# Patient Record
Sex: FEMALE | Race: WHITE | Employment: OTHER | URBAN - METROPOLITAN AREA
[De-identification: names, ages, dates, MRNs, and addresses within clinical notes are randomized per-mention and may not be internally consistent; named-entity substitution may affect disease eponyms.]

---

## 2017-01-12 ENCOUNTER — OFFICE VISIT (OUTPATIENT)
Dept: FAMILY MEDICINE CLINIC | Age: 72
End: 2017-01-12

## 2017-01-12 VITALS
HEART RATE: 60 BPM | DIASTOLIC BLOOD PRESSURE: 72 MMHG | RESPIRATION RATE: 16 BRPM | HEIGHT: 64 IN | SYSTOLIC BLOOD PRESSURE: 118 MMHG | TEMPERATURE: 96.7 F

## 2017-01-12 DIAGNOSIS — E11.9 TYPE 2 DIABETES MELLITUS WITHOUT COMPLICATION, WITHOUT LONG-TERM CURRENT USE OF INSULIN (HCC): ICD-10-CM

## 2017-01-12 DIAGNOSIS — L02.219 CUTANEOUS ABSCESS OF TRUNK, UNSPECIFIED SITE OF TRUNK: Primary | ICD-10-CM

## 2017-01-12 DIAGNOSIS — L02.219 CUTANEOUS ABSCESS OF TRUNK, UNSPECIFIED SITE OF TRUNK: ICD-10-CM

## 2017-01-12 PROCEDURE — 99213 OFFICE O/P EST LOW 20 MIN: CPT | Performed by: FAMILY MEDICINE

## 2017-01-12 PROCEDURE — 3044F HG A1C LEVEL LT 7.0%: CPT | Performed by: FAMILY MEDICINE

## 2017-01-12 PROCEDURE — 3017F COLORECTAL CA SCREEN DOC REV: CPT | Performed by: FAMILY MEDICINE

## 2017-01-12 PROCEDURE — 1090F PRES/ABSN URINE INCON ASSESS: CPT | Performed by: FAMILY MEDICINE

## 2017-01-12 PROCEDURE — G8427 DOCREV CUR MEDS BY ELIG CLIN: HCPCS | Performed by: FAMILY MEDICINE

## 2017-01-12 PROCEDURE — G8400 PT W/DXA NO RESULTS DOC: HCPCS | Performed by: FAMILY MEDICINE

## 2017-01-12 PROCEDURE — 1123F ACP DISCUSS/DSCN MKR DOCD: CPT | Performed by: FAMILY MEDICINE

## 2017-01-12 PROCEDURE — G8419 CALC BMI OUT NRM PARAM NOF/U: HCPCS | Performed by: FAMILY MEDICINE

## 2017-01-12 PROCEDURE — G8484 FLU IMMUNIZE NO ADMIN: HCPCS | Performed by: FAMILY MEDICINE

## 2017-01-12 PROCEDURE — 4040F PNEUMOC VAC/ADMIN/RCVD: CPT | Performed by: FAMILY MEDICINE

## 2017-01-12 PROCEDURE — 1036F TOBACCO NON-USER: CPT | Performed by: FAMILY MEDICINE

## 2017-01-12 PROCEDURE — 3014F SCREEN MAMMO DOC REV: CPT | Performed by: FAMILY MEDICINE

## 2017-01-12 RX ORDER — CLINDAMYCIN HYDROCHLORIDE 150 MG/1
150 CAPSULE ORAL 3 TIMES DAILY
Qty: 30 CAPSULE | Refills: 0 | Status: SHIPPED | OUTPATIENT
Start: 2017-01-12 | End: 2018-08-27 | Stop reason: SDUPTHER

## 2017-01-12 RX ORDER — ATORVASTATIN CALCIUM 20 MG/1
20 TABLET, FILM COATED ORAL DAILY
Qty: 90 TABLET | Refills: 1 | Status: SHIPPED | OUTPATIENT
Start: 2017-01-12 | End: 2017-08-11 | Stop reason: SDUPTHER

## 2017-01-12 RX ORDER — LEVOTHYROXINE SODIUM 0.15 MG/1
150 TABLET ORAL DAILY
Qty: 90 TABLET | Refills: 1 | Status: SHIPPED | OUTPATIENT
Start: 2017-01-12 | End: 2017-09-02 | Stop reason: SDUPTHER

## 2017-01-12 RX ORDER — FAMOTIDINE 20 MG/1
20 TABLET, FILM COATED ORAL 2 TIMES DAILY
Qty: 180 TABLET | Refills: 1 | Status: SHIPPED | OUTPATIENT
Start: 2017-01-12 | End: 2017-08-16 | Stop reason: SDUPTHER

## 2017-01-12 RX ORDER — CITALOPRAM 20 MG/1
40 TABLET ORAL DAILY
Qty: 180 TABLET | Refills: 1 | Status: SHIPPED | OUTPATIENT
Start: 2017-01-12 | End: 2017-02-08 | Stop reason: DRUGHIGH

## 2017-01-12 RX ORDER — FAMOTIDINE 20 MG/1
20 TABLET, FILM COATED ORAL 2 TIMES DAILY
COMMUNITY
End: 2017-09-03 | Stop reason: SDUPTHER

## 2017-01-12 RX ORDER — VITAMIN B COMPLEX
1 CAPSULE ORAL DAILY
COMMUNITY
End: 2019-04-10 | Stop reason: SDUPTHER

## 2017-01-12 ASSESSMENT — ENCOUNTER SYMPTOMS
EYE DISCHARGE: 0
CONSTIPATION: 0
EYE ITCHING: 0
ABDOMINAL PAIN: 0
SORE THROAT: 0
SINUS PRESSURE: 0
COUGH: 0
SHORTNESS OF BREATH: 0
DIARRHEA: 0

## 2017-01-14 LAB
ANAEROBIC CULTURE: NORMAL
GRAM STAIN RESULT: NORMAL
WOUND/ABSCESS: NORMAL

## 2017-02-03 ENCOUNTER — TELEPHONE (OUTPATIENT)
Dept: FAMILY MEDICINE CLINIC | Age: 72
End: 2017-02-03

## 2017-02-08 ENCOUNTER — OFFICE VISIT (OUTPATIENT)
Dept: FAMILY MEDICINE CLINIC | Age: 72
End: 2017-02-08

## 2017-02-08 VITALS
RESPIRATION RATE: 12 BRPM | DIASTOLIC BLOOD PRESSURE: 76 MMHG | OXYGEN SATURATION: 97 % | HEIGHT: 64 IN | SYSTOLIC BLOOD PRESSURE: 132 MMHG | TEMPERATURE: 96.8 F | HEART RATE: 66 BPM

## 2017-02-08 DIAGNOSIS — G50.0 TRIGEMINAL NEURALGIA OF RIGHT SIDE OF FACE: Primary | ICD-10-CM

## 2017-02-08 DIAGNOSIS — M79.7 FIBROMYALGIA: ICD-10-CM

## 2017-02-08 DIAGNOSIS — E03.1 CONGENITAL HYPOTHYROIDISM WITHOUT GOITER: ICD-10-CM

## 2017-02-08 DIAGNOSIS — Z12.31 VISIT FOR SCREENING MAMMOGRAM: ICD-10-CM

## 2017-02-08 DIAGNOSIS — E78.1 PURE HYPERGLYCERIDEMIA: ICD-10-CM

## 2017-02-08 PROCEDURE — 1123F ACP DISCUSS/DSCN MKR DOCD: CPT | Performed by: FAMILY MEDICINE

## 2017-02-08 PROCEDURE — G8427 DOCREV CUR MEDS BY ELIG CLIN: HCPCS | Performed by: FAMILY MEDICINE

## 2017-02-08 PROCEDURE — G8400 PT W/DXA NO RESULTS DOC: HCPCS | Performed by: FAMILY MEDICINE

## 2017-02-08 PROCEDURE — 1036F TOBACCO NON-USER: CPT | Performed by: FAMILY MEDICINE

## 2017-02-08 PROCEDURE — 3017F COLORECTAL CA SCREEN DOC REV: CPT | Performed by: FAMILY MEDICINE

## 2017-02-08 PROCEDURE — 3014F SCREEN MAMMO DOC REV: CPT | Performed by: FAMILY MEDICINE

## 2017-02-08 PROCEDURE — 4040F PNEUMOC VAC/ADMIN/RCVD: CPT | Performed by: FAMILY MEDICINE

## 2017-02-08 PROCEDURE — G8417 CALC BMI ABV UP PARAM F/U: HCPCS | Performed by: FAMILY MEDICINE

## 2017-02-08 PROCEDURE — G8484 FLU IMMUNIZE NO ADMIN: HCPCS | Performed by: FAMILY MEDICINE

## 2017-02-08 PROCEDURE — 1090F PRES/ABSN URINE INCON ASSESS: CPT | Performed by: FAMILY MEDICINE

## 2017-02-08 PROCEDURE — 99213 OFFICE O/P EST LOW 20 MIN: CPT | Performed by: FAMILY MEDICINE

## 2017-02-08 RX ORDER — CITALOPRAM 20 MG/1
20 TABLET ORAL DAILY
Qty: 180 TABLET | Refills: 1 | Status: SHIPPED
Start: 2017-02-08 | End: 2017-08-16 | Stop reason: SDUPTHER

## 2017-02-08 RX ORDER — METHYLPREDNISOLONE 4 MG/1
TABLET ORAL
Qty: 1 KIT | Refills: 0 | Status: SHIPPED | OUTPATIENT
Start: 2017-02-08 | End: 2017-02-14

## 2017-02-08 ASSESSMENT — ENCOUNTER SYMPTOMS
SHORTNESS OF BREATH: 0
COUGH: 0
VOMITING: 0
ABDOMINAL PAIN: 0
DIARRHEA: 0
CONSTIPATION: 0
EYES NEGATIVE: 1
NAUSEA: 0

## 2017-03-03 ENCOUNTER — HOSPITAL ENCOUNTER (OUTPATIENT)
Dept: WOMENS IMAGING | Age: 72
Discharge: HOME | End: 2017-03-03
Payer: MEDICARE

## 2017-03-03 DIAGNOSIS — Z12.31 VISIT FOR SCREENING MAMMOGRAM: ICD-10-CM

## 2017-03-03 PROCEDURE — G0202 SCR MAMMO BI INCL CAD: HCPCS

## 2017-04-21 ENCOUNTER — OFFICE VISIT (OUTPATIENT)
Dept: FAMILY MEDICINE CLINIC | Age: 72
End: 2017-04-21

## 2017-04-21 VITALS
DIASTOLIC BLOOD PRESSURE: 76 MMHG | SYSTOLIC BLOOD PRESSURE: 122 MMHG | HEART RATE: 80 BPM | OXYGEN SATURATION: 95 % | TEMPERATURE: 96.8 F | HEIGHT: 64 IN

## 2017-04-21 DIAGNOSIS — E11.9 TYPE 2 DIABETES MELLITUS WITHOUT COMPLICATION, WITHOUT LONG-TERM CURRENT USE OF INSULIN (HCC): Primary | ICD-10-CM

## 2017-04-21 DIAGNOSIS — E11.9 TYPE 2 DIABETES MELLITUS WITHOUT COMPLICATION, WITHOUT LONG-TERM CURRENT USE OF INSULIN (HCC): ICD-10-CM

## 2017-04-21 DIAGNOSIS — Z12.11 SCREEN FOR COLON CANCER: ICD-10-CM

## 2017-04-21 LAB
CREATININE URINE: 146.5 MG/DL
MICROALBUMIN UR-MCNC: <1.2 MG/DL
MICROALBUMIN/CREAT UR-RTO: NORMAL MG/G (ref 0–30)

## 2017-04-21 PROCEDURE — 3017F COLORECTAL CA SCREEN DOC REV: CPT | Performed by: FAMILY MEDICINE

## 2017-04-21 PROCEDURE — 1123F ACP DISCUSS/DSCN MKR DOCD: CPT | Performed by: FAMILY MEDICINE

## 2017-04-21 PROCEDURE — G8427 DOCREV CUR MEDS BY ELIG CLIN: HCPCS | Performed by: FAMILY MEDICINE

## 2017-04-21 PROCEDURE — G8421 BMI NOT CALCULATED: HCPCS | Performed by: FAMILY MEDICINE

## 2017-04-21 PROCEDURE — 3014F SCREEN MAMMO DOC REV: CPT | Performed by: FAMILY MEDICINE

## 2017-04-21 PROCEDURE — 1036F TOBACCO NON-USER: CPT | Performed by: FAMILY MEDICINE

## 2017-04-21 PROCEDURE — 3044F HG A1C LEVEL LT 7.0%: CPT | Performed by: FAMILY MEDICINE

## 2017-04-21 PROCEDURE — 4040F PNEUMOC VAC/ADMIN/RCVD: CPT | Performed by: FAMILY MEDICINE

## 2017-04-21 PROCEDURE — G8400 PT W/DXA NO RESULTS DOC: HCPCS | Performed by: FAMILY MEDICINE

## 2017-04-21 PROCEDURE — 1090F PRES/ABSN URINE INCON ASSESS: CPT | Performed by: FAMILY MEDICINE

## 2017-04-21 PROCEDURE — 99213 OFFICE O/P EST LOW 20 MIN: CPT | Performed by: FAMILY MEDICINE

## 2017-05-03 ENCOUNTER — OFFICE VISIT (OUTPATIENT)
Dept: FAMILY MEDICINE CLINIC | Age: 72
End: 2017-05-03

## 2017-05-03 DIAGNOSIS — F41.9 ANXIETY: ICD-10-CM

## 2017-05-03 DIAGNOSIS — E11.9 TYPE 2 DIABETES MELLITUS WITHOUT COMPLICATION, WITHOUT LONG-TERM CURRENT USE OF INSULIN (HCC): ICD-10-CM

## 2017-05-03 DIAGNOSIS — J01.00 ACUTE NON-RECURRENT MAXILLARY SINUSITIS: Primary | ICD-10-CM

## 2017-05-03 DIAGNOSIS — E03.1 CONGENITAL HYPOTHYROIDISM WITHOUT GOITER: ICD-10-CM

## 2017-05-03 PROCEDURE — 1036F TOBACCO NON-USER: CPT | Performed by: FAMILY MEDICINE

## 2017-05-03 PROCEDURE — G8421 BMI NOT CALCULATED: HCPCS | Performed by: FAMILY MEDICINE

## 2017-05-03 PROCEDURE — 1090F PRES/ABSN URINE INCON ASSESS: CPT | Performed by: FAMILY MEDICINE

## 2017-05-03 PROCEDURE — 4040F PNEUMOC VAC/ADMIN/RCVD: CPT | Performed by: FAMILY MEDICINE

## 2017-05-03 PROCEDURE — 3044F HG A1C LEVEL LT 7.0%: CPT | Performed by: FAMILY MEDICINE

## 2017-05-03 PROCEDURE — 99213 OFFICE O/P EST LOW 20 MIN: CPT | Performed by: FAMILY MEDICINE

## 2017-05-03 PROCEDURE — 3014F SCREEN MAMMO DOC REV: CPT | Performed by: FAMILY MEDICINE

## 2017-05-03 PROCEDURE — G8427 DOCREV CUR MEDS BY ELIG CLIN: HCPCS | Performed by: FAMILY MEDICINE

## 2017-05-03 PROCEDURE — G8400 PT W/DXA NO RESULTS DOC: HCPCS | Performed by: FAMILY MEDICINE

## 2017-05-03 PROCEDURE — 3017F COLORECTAL CA SCREEN DOC REV: CPT | Performed by: FAMILY MEDICINE

## 2017-05-03 PROCEDURE — 1123F ACP DISCUSS/DSCN MKR DOCD: CPT | Performed by: FAMILY MEDICINE

## 2017-05-03 RX ORDER — AMOXICILLIN 875 MG/1
875 TABLET, COATED ORAL 2 TIMES DAILY
Qty: 20 TABLET | Refills: 0 | Status: SHIPPED | OUTPATIENT
Start: 2017-05-03 | End: 2018-11-15 | Stop reason: SDUPTHER

## 2017-05-03 ASSESSMENT — ENCOUNTER SYMPTOMS
RHINORRHEA: 1
SORE THROAT: 1
COUGH: 1
SINUS PAIN: 1

## 2017-06-19 ENCOUNTER — TELEPHONE (OUTPATIENT)
Dept: FAMILY MEDICINE CLINIC | Age: 72
End: 2017-06-19

## 2017-06-19 RX ORDER — CITALOPRAM 20 MG/1
TABLET ORAL
Qty: 180 TABLET | Refills: 0 | Status: SHIPPED | OUTPATIENT
Start: 2017-06-19 | End: 2018-05-15 | Stop reason: SDUPTHER

## 2017-06-28 DIAGNOSIS — Z12.11 SCREEN FOR COLON CANCER: ICD-10-CM

## 2017-07-24 ENCOUNTER — HOSPITAL ENCOUNTER (OUTPATIENT)
Age: 72
Setting detail: OUTPATIENT SURGERY
Discharge: HOME | End: 2017-07-24
Attending: SPECIALIST | Admitting: SPECIALIST
Payer: MEDICARE

## 2017-07-24 ENCOUNTER — ANESTHESIA EVENT (OUTPATIENT)
Dept: ENDOSCOPY | Age: 72
End: 2017-07-24
Payer: MEDICARE

## 2017-07-24 ENCOUNTER — ANESTHESIA (OUTPATIENT)
Dept: ENDOSCOPY | Age: 72
End: 2017-07-24
Payer: MEDICARE

## 2017-07-24 VITALS
OXYGEN SATURATION: 95 % | RESPIRATION RATE: 16 BRPM | HEART RATE: 75 BPM | DIASTOLIC BLOOD PRESSURE: 75 MMHG | SYSTOLIC BLOOD PRESSURE: 136 MMHG

## 2017-07-24 VITALS
SYSTOLIC BLOOD PRESSURE: 108 MMHG | RESPIRATION RATE: 14 BRPM | DIASTOLIC BLOOD PRESSURE: 55 MMHG | OXYGEN SATURATION: 96 %

## 2017-07-24 PROCEDURE — 7100000011 HC PHASE II RECOVERY - ADDTL 15 MIN: Performed by: SPECIALIST

## 2017-07-24 PROCEDURE — 3700000001 HC ADD 15 MINUTES (ANESTHESIA): Performed by: SPECIALIST

## 2017-07-24 PROCEDURE — 3700000000 HC ANESTHESIA ATTENDED CARE: Performed by: SPECIALIST

## 2017-07-24 PROCEDURE — 3609027000 HC COLONOSCOPY: Performed by: SPECIALIST

## 2017-07-24 PROCEDURE — 2580000003 HC RX 258: Performed by: SPECIALIST

## 2017-07-24 PROCEDURE — 88305 TISSUE EXAM BY PATHOLOGIST: CPT

## 2017-07-24 PROCEDURE — 7100000010 HC PHASE II RECOVERY - FIRST 15 MIN: Performed by: SPECIALIST

## 2017-07-24 PROCEDURE — 6360000002 HC RX W HCPCS: Performed by: NURSE ANESTHETIST, CERTIFIED REGISTERED

## 2017-07-24 PROCEDURE — 2500000003 HC RX 250 WO HCPCS: Performed by: SPECIALIST

## 2017-07-24 RX ORDER — SODIUM CHLORIDE 0.9 % (FLUSH) 0.9 %
10 SYRINGE (ML) INJECTION PRN
Status: DISCONTINUED | OUTPATIENT
Start: 2017-07-24 | End: 2017-07-24 | Stop reason: HOSPADM

## 2017-07-24 RX ORDER — SODIUM CHLORIDE 0.9 % (FLUSH) 0.9 %
10 SYRINGE (ML) INJECTION EVERY 12 HOURS SCHEDULED
Status: DISCONTINUED | OUTPATIENT
Start: 2017-07-24 | End: 2017-07-24 | Stop reason: HOSPADM

## 2017-07-24 RX ORDER — LIDOCAINE HYDROCHLORIDE 10 MG/ML
1 INJECTION, SOLUTION EPIDURAL; INFILTRATION; INTRACAUDAL; PERINEURAL
Status: COMPLETED | OUTPATIENT
Start: 2017-07-24 | End: 2017-07-24

## 2017-07-24 RX ORDER — PROPOFOL 10 MG/ML
INJECTION, EMULSION INTRAVENOUS PRN
Status: DISCONTINUED | OUTPATIENT
Start: 2017-07-24 | End: 2017-07-24 | Stop reason: SDUPTHER

## 2017-07-24 RX ORDER — ONDANSETRON 2 MG/ML
4 INJECTION INTRAMUSCULAR; INTRAVENOUS
Status: DISCONTINUED | OUTPATIENT
Start: 2017-07-24 | End: 2017-07-24 | Stop reason: HOSPADM

## 2017-07-24 RX ORDER — SODIUM CHLORIDE 9 MG/ML
INJECTION, SOLUTION INTRAVENOUS CONTINUOUS
Status: DISCONTINUED | OUTPATIENT
Start: 2017-07-24 | End: 2017-07-24 | Stop reason: HOSPADM

## 2017-07-24 RX ADMIN — PROPOFOL 100 MG: 10 INJECTION, EMULSION INTRAVENOUS at 09:10

## 2017-07-24 RX ADMIN — LIDOCAINE HYDROCHLORIDE 30 MG: 10 INJECTION, SOLUTION EPIDURAL; INFILTRATION; INTRACAUDAL; PERINEURAL at 08:51

## 2017-07-24 RX ADMIN — PROPOFOL 200 MG: 10 INJECTION, EMULSION INTRAVENOUS at 08:52

## 2017-07-24 RX ADMIN — SODIUM CHLORIDE: 9 INJECTION, SOLUTION INTRAVENOUS at 08:21

## 2017-07-24 ASSESSMENT — COPD QUESTIONNAIRES: CAT_SEVERITY: MODERATE

## 2017-07-24 ASSESSMENT — ENCOUNTER SYMPTOMS: SHORTNESS OF BREATH: 1

## 2017-08-11 RX ORDER — ATORVASTATIN CALCIUM 20 MG/1
TABLET, FILM COATED ORAL
Qty: 90 TABLET | Refills: 1 | Status: SHIPPED | OUTPATIENT
Start: 2017-08-11 | End: 2018-02-07 | Stop reason: SDUPTHER

## 2017-08-11 RX ORDER — SITAGLIPTIN 100 MG/1
TABLET, FILM COATED ORAL
Qty: 90 TABLET | Refills: 1 | Status: SHIPPED | OUTPATIENT
Start: 2017-08-11 | End: 2018-02-07 | Stop reason: SDUPTHER

## 2017-08-16 ENCOUNTER — OFFICE VISIT (OUTPATIENT)
Dept: FAMILY MEDICINE CLINIC | Age: 72
End: 2017-08-16

## 2017-08-16 VITALS
HEART RATE: 73 BPM | OXYGEN SATURATION: 96 % | TEMPERATURE: 96.1 F | SYSTOLIC BLOOD PRESSURE: 100 MMHG | HEIGHT: 64 IN | DIASTOLIC BLOOD PRESSURE: 72 MMHG

## 2017-08-16 DIAGNOSIS — E11.9 TYPE 2 DIABETES MELLITUS WITHOUT COMPLICATION, WITHOUT LONG-TERM CURRENT USE OF INSULIN (HCC): Primary | ICD-10-CM

## 2017-08-16 DIAGNOSIS — M25.561 CHRONIC PAIN OF BOTH KNEES: ICD-10-CM

## 2017-08-16 DIAGNOSIS — E78.1 PURE HYPERGLYCERIDEMIA: ICD-10-CM

## 2017-08-16 DIAGNOSIS — E03.1 CONGENITAL HYPOTHYROIDISM WITHOUT GOITER: ICD-10-CM

## 2017-08-16 DIAGNOSIS — M25.562 CHRONIC PAIN OF BOTH KNEES: ICD-10-CM

## 2017-08-16 DIAGNOSIS — G89.29 CHRONIC PAIN OF BOTH KNEES: ICD-10-CM

## 2017-08-16 LAB — HBA1C MFR BLD: 6.7 %

## 2017-08-16 PROCEDURE — 83036 HEMOGLOBIN GLYCOSYLATED A1C: CPT | Performed by: FAMILY MEDICINE

## 2017-08-16 PROCEDURE — 3046F HEMOGLOBIN A1C LEVEL >9.0%: CPT | Performed by: FAMILY MEDICINE

## 2017-08-16 PROCEDURE — 99214 OFFICE O/P EST MOD 30 MIN: CPT | Performed by: FAMILY MEDICINE

## 2017-08-16 PROCEDURE — 1123F ACP DISCUSS/DSCN MKR DOCD: CPT | Performed by: FAMILY MEDICINE

## 2017-08-16 PROCEDURE — 3014F SCREEN MAMMO DOC REV: CPT | Performed by: FAMILY MEDICINE

## 2017-08-16 PROCEDURE — G8421 BMI NOT CALCULATED: HCPCS | Performed by: FAMILY MEDICINE

## 2017-08-16 PROCEDURE — 4040F PNEUMOC VAC/ADMIN/RCVD: CPT | Performed by: FAMILY MEDICINE

## 2017-08-16 PROCEDURE — 3017F COLORECTAL CA SCREEN DOC REV: CPT | Performed by: FAMILY MEDICINE

## 2017-08-16 PROCEDURE — G8427 DOCREV CUR MEDS BY ELIG CLIN: HCPCS | Performed by: FAMILY MEDICINE

## 2017-08-16 PROCEDURE — G8400 PT W/DXA NO RESULTS DOC: HCPCS | Performed by: FAMILY MEDICINE

## 2017-08-16 PROCEDURE — 1090F PRES/ABSN URINE INCON ASSESS: CPT | Performed by: FAMILY MEDICINE

## 2017-08-16 PROCEDURE — 1036F TOBACCO NON-USER: CPT | Performed by: FAMILY MEDICINE

## 2017-08-16 RX ORDER — CELECOXIB 200 MG/1
200 CAPSULE ORAL DAILY
Qty: 30 CAPSULE | Refills: 2 | Status: SHIPPED | OUTPATIENT
Start: 2017-08-16 | End: 2017-12-11 | Stop reason: SDUPTHER

## 2017-09-05 RX ORDER — FAMOTIDINE 20 MG/1
TABLET, FILM COATED ORAL
Qty: 180 TABLET | Refills: 1 | Status: SHIPPED | OUTPATIENT
Start: 2017-09-05 | End: 2018-02-09 | Stop reason: SDUPTHER

## 2017-09-05 RX ORDER — LEVOTHYROXINE SODIUM 0.15 MG/1
TABLET ORAL
Qty: 90 TABLET | Refills: 1 | Status: SHIPPED | OUTPATIENT
Start: 2017-09-05 | End: 2018-03-04 | Stop reason: SDUPTHER

## 2017-12-11 RX ORDER — CELECOXIB 200 MG/1
CAPSULE ORAL
Qty: 30 CAPSULE | Refills: 3 | Status: SHIPPED | OUTPATIENT
Start: 2017-12-11 | End: 2018-01-29 | Stop reason: SDUPTHER

## 2018-01-19 ENCOUNTER — TELEPHONE (OUTPATIENT)
Dept: FAMILY MEDICINE CLINIC | Age: 73
End: 2018-01-19

## 2018-01-19 NOTE — TELEPHONE ENCOUNTER
She had an appointment yesterday that she missed, or canceled, but if she keeps having blood and she needs to be seen, if she is not having any dizziness or shortness of breath or chest pain get an appointment for Monday, if she feels uncomfortable tonight she is more than welcome to go to emergency room

## 2018-01-19 NOTE — TELEPHONE ENCOUNTER
Daughter calling. States today she started with bloody stools. Since Monday she has been having a cough, congestion, chills. Daughter states both BM's today were soft, and the 2nd one was bloody. If she kept wiping, she would bleed more. Last seen 8-2017. Daughter wondering if you want to see her, or if you want her to go to the ER? Please advise.

## 2018-01-29 ENCOUNTER — OFFICE VISIT (OUTPATIENT)
Dept: FAMILY MEDICINE CLINIC | Age: 73
End: 2018-01-29
Payer: MEDICARE

## 2018-01-29 VITALS
SYSTOLIC BLOOD PRESSURE: 122 MMHG | HEIGHT: 64 IN | DIASTOLIC BLOOD PRESSURE: 74 MMHG | RESPIRATION RATE: 18 BRPM | HEART RATE: 68 BPM | TEMPERATURE: 97.6 F

## 2018-01-29 DIAGNOSIS — E55.9 VITAMIN D DEFICIENCY: ICD-10-CM

## 2018-01-29 DIAGNOSIS — Z13.220 NEED FOR LIPID SCREENING: ICD-10-CM

## 2018-01-29 DIAGNOSIS — J10.1 INFLUENZA A: ICD-10-CM

## 2018-01-29 DIAGNOSIS — E53.8 B12 DEFICIENCY: ICD-10-CM

## 2018-01-29 DIAGNOSIS — E11.9 TYPE 2 DIABETES MELLITUS WITHOUT COMPLICATION, WITHOUT LONG-TERM CURRENT USE OF INSULIN (HCC): ICD-10-CM

## 2018-01-29 DIAGNOSIS — E11.9 TYPE 2 DIABETES MELLITUS WITHOUT COMPLICATION, WITHOUT LONG-TERM CURRENT USE OF INSULIN (HCC): Primary | ICD-10-CM

## 2018-01-29 DIAGNOSIS — E78.1 PURE HYPERGLYCERIDEMIA: ICD-10-CM

## 2018-01-29 DIAGNOSIS — B35.1 ONYCHOMYCOSIS: ICD-10-CM

## 2018-01-29 DIAGNOSIS — F33.42 RECURRENT MAJOR DEPRESSIVE EPISODES, IN FULL REMISSION (HCC): ICD-10-CM

## 2018-01-29 LAB
CHOLESTEROL, TOTAL: 134 MG/DL (ref 0–199)
FOLATE: 15.7 NG/ML (ref 7.3–26.1)
HBA1C MFR BLD: 7.1 % (ref 4.8–5.9)
HDLC SERPL-MCNC: 63 MG/DL (ref 40–59)
LDL CHOLESTEROL CALCULATED: 53 MG/DL (ref 0–129)
TRIGL SERPL-MCNC: 89 MG/DL (ref 0–200)
VITAMIN B-12: 992 PG/ML (ref 211–946)
VITAMIN D 25-HYDROXY: 65.6 NG/ML (ref 30–100)

## 2018-01-29 PROCEDURE — 99213 OFFICE O/P EST LOW 20 MIN: CPT | Performed by: FAMILY MEDICINE

## 2018-01-29 RX ORDER — CELECOXIB 200 MG/1
CAPSULE ORAL
Qty: 90 CAPSULE | Refills: 1 | Status: SHIPPED | OUTPATIENT
Start: 2018-01-29 | End: 2018-07-17 | Stop reason: SDUPTHER

## 2018-01-29 NOTE — PROGRESS NOTES
100 each 6    Lancets MISC Use as instructed once a day DX DM TYPE 2, 250.00      aspirin 81 MG EC tablet Take 81 mg by mouth daily.  [DISCONTINUED] celecoxib (CELEBREX) 200 MG capsule TAKE 1 CAPSULE BY MOUTH EVERY DAY 30 capsule 3     No facility-administered encounter medications on file as of 1/29/2018. Social History     Social History    Marital status:      Spouse name: N/A    Number of children: N/A    Years of education: N/A     Occupational History    Not on file. Social History Main Topics    Smoking status: Former Smoker     Packs/day: 1.00     Years: 48.00     Types: Cigarettes     Quit date: 7/24/2013    Smokeless tobacco: Never Used    Alcohol use No      Comment: very rarely    Drug use: No    Sexual activity: Yes     Partners: Male     Other Topics Concern    Not on file     Social History Narrative    No narrative on file     Family History   Problem Relation Age of Onset    Stroke Mother     High Cholesterol Mother     Cancer Mother      Past Medical History:   Diagnosis Date    Anxiety     DM (diabetes mellitus) ([facility] [address] 4/14/2015    Hepatitis B infection     Hyperlipidemia     Hypothyroidism     Insomnia     Leg pain 4/14/2015    Post herpetic neuralgia     Unspecified sleep apnea     after seeing [facility] they state she is does not have sleep apnea. Not using cpap     Past Surgical History:   Procedure Laterality Date    ANKLE FRACTURE SURGERY  09/2016    DR ANDRADE  LT    BRAIN SURGERY  2015    surgery for transgeminal neuraglia.   Insertion of sponge for chronic pain    COLONOSCOPY  3/31/14    DR Tanvi Patel    HYSTERECTOMY      PARTIAL    LYMPHADENECTOMY      OTHER SURGICAL HISTORY Right     Trigeminal Neuralgia    [place] COLON CA SCRN NOT  [address] [place] [address] N/A 7/24/2017    COLONOSCOPY performed by Jessica Kerns MD at [address] [place] [address] Place:   REVIEW OF SYSTEMS:   Patient seen today for exam.  Denies any problems with hearing, headaches or vision. Denies any shortness of breath, chest pain, nausea or vomiting. No black stool, no blood in the stool. No heartburn. Denies any problems with constipation or diarrhea either. No dysuria type symptoms. Objective:     /74   Pulse 68   Temp 97.6 °F (36.4 °C) (Temporal)   Resp 18   Ht 5' 4\" (1.626 m)   LMP 02/28/1982     Physical Exam      PHYSICAL EXAMINATION:  Vital signs as noted. Alert, oriented in no acute distress. HEENT:  TMs are showing fluid bilaterally. Pharynx reveals postnasal drainage noted. Positive pain over sinuses and forehead  Pos shoddy adenopathy noted bilaterallyNECK: supple. HEART:  RRR without gallops. LUNGS:  clear to auscultation, no wheezing or rhonchi. ABDOMEN:  soft and nontender, bowel sounds positive. EXTREMITIES:  no edema. SKIN: without any changes      DIABETIC FOOT EXAM:  Bilateral feet were examined here today with normal pulses anteriorly and posteriorly at the dorsalis pedis. No callus, fissuring or sores noted. Sensation intact. Fine pin prick testing was within normal limits  Left toe has onychomycoses no fungus between the toes  Assessment:      1. Type 2 diabetes mellitus without complication, without long-term current use of insulin ([facility])  celecoxib (CELEBREX) 200 MG capsule    Hemoglobin A1C   2. Need for lipid screening  Lipid Panel   3. Pure hyperglyceridemia  Lipid Panel   4. Onychomycosis     5. Influenza A     6. Vitamin D deficiency  Vitamin D 25 Hydroxy   7.  B12 deficiency  Vitamin B12 & Folate             Plan:        Orders Placed This Encounter   Medications    celecoxib (CELEBREX) 200 MG capsule     Sig: TAKE 1 CAPSULE BY MOUTH EVERY DAY     Dispense:  90 capsule     Refill:  1     Orders Placed This Encounter   Procedures    Lipid Panel     Standing Status:   Future     Number of Occurrences:   1     Standing Expiration Date:   1/29/2019     Order Specific Question:   Is Patient Fasting?/# of Hours

## 2018-02-06 ENCOUNTER — TELEPHONE (OUTPATIENT)
Dept: OTHER | Facility: CLINIC | Age: 73
End: 2018-02-06

## 2018-02-07 RX ORDER — ATORVASTATIN CALCIUM 20 MG/1
TABLET, FILM COATED ORAL
Qty: 90 TABLET | Refills: 1 | Status: SHIPPED | OUTPATIENT
Start: 2018-02-07 | End: 2018-08-06 | Stop reason: SDUPTHER

## 2018-02-07 RX ORDER — SITAGLIPTIN 100 MG/1
TABLET, FILM COATED ORAL
Qty: 90 TABLET | Refills: 1 | Status: SHIPPED | OUTPATIENT
Start: 2018-02-07 | End: 2018-08-06 | Stop reason: SDUPTHER

## 2018-02-09 RX ORDER — FAMOTIDINE 20 MG/1
TABLET, FILM COATED ORAL
Qty: 180 TABLET | Refills: 1 | Status: SHIPPED | OUTPATIENT
Start: 2018-02-09 | End: 2018-08-08 | Stop reason: SDUPTHER

## 2018-03-05 RX ORDER — LEVOTHYROXINE SODIUM 0.15 MG/1
TABLET ORAL
Qty: 90 TABLET | Refills: 1 | Status: SHIPPED | OUTPATIENT
Start: 2018-03-05 | End: 2018-09-01 | Stop reason: SDUPTHER

## 2018-04-09 ENCOUNTER — OFFICE VISIT (OUTPATIENT)
Dept: FAMILY MEDICINE CLINIC | Age: 73
End: 2018-04-09
Payer: MEDICARE

## 2018-04-09 VITALS
WEIGHT: 213 LBS | HEIGHT: 64 IN | TEMPERATURE: 97.4 F | RESPIRATION RATE: 16 BRPM | DIASTOLIC BLOOD PRESSURE: 74 MMHG | BODY MASS INDEX: 36.37 KG/M2 | OXYGEN SATURATION: 94 % | HEART RATE: 83 BPM | SYSTOLIC BLOOD PRESSURE: 118 MMHG

## 2018-04-09 DIAGNOSIS — B96.89 ACUTE BACTERIAL SINUSITIS: Primary | ICD-10-CM

## 2018-04-09 DIAGNOSIS — J01.90 ACUTE BACTERIAL SINUSITIS: Primary | ICD-10-CM

## 2018-04-09 DIAGNOSIS — J20.8 ACUTE BRONCHITIS DUE TO OTHER SPECIFIED ORGANISMS: ICD-10-CM

## 2018-04-09 PROCEDURE — G8510 SCR DEP NEG, NO PLAN REQD: HCPCS | Performed by: FAMILY MEDICINE

## 2018-04-09 PROCEDURE — 3288F FALL RISK ASSESSMENT DOCD: CPT | Performed by: FAMILY MEDICINE

## 2018-04-09 PROCEDURE — 99213 OFFICE O/P EST LOW 20 MIN: CPT | Performed by: FAMILY MEDICINE

## 2018-04-09 RX ORDER — DEXTROMETHORPHAN HYDROBROMIDE AND PROMETHAZINE HYDROCHLORIDE 15; 6.25 MG/5ML; MG/5ML
5 SYRUP ORAL 4 TIMES DAILY PRN
Qty: 120 ML | Refills: 0 | Status: SHIPPED | OUTPATIENT
Start: 2018-04-09 | End: 2018-04-16

## 2018-04-09 RX ORDER — LEVOFLOXACIN 500 MG/1
500 TABLET, FILM COATED ORAL DAILY
Qty: 10 TABLET | Refills: 0 | Status: SHIPPED | OUTPATIENT
Start: 2018-04-09 | End: 2018-04-19

## 2018-04-09 ASSESSMENT — PATIENT HEALTH QUESTIONNAIRE - PHQ9
2. FEELING DOWN, DEPRESSED OR HOPELESS: 0
1. LITTLE INTEREST OR PLEASURE IN DOING THINGS: 0
SUM OF ALL RESPONSES TO PHQ9 QUESTIONS 1 & 2: 0
SUM OF ALL RESPONSES TO PHQ QUESTIONS 1-9: 0

## 2018-05-16 RX ORDER — CITALOPRAM 20 MG/1
TABLET ORAL
Qty: 180 TABLET | Refills: 0 | Status: SHIPPED | OUTPATIENT
Start: 2018-05-16 | End: 2018-08-15 | Stop reason: SDUPTHER

## 2018-07-16 LAB — DIABETIC RETINOPATHY: NEGATIVE

## 2018-07-17 DIAGNOSIS — E11.9 TYPE 2 DIABETES MELLITUS WITHOUT COMPLICATION, WITHOUT LONG-TERM CURRENT USE OF INSULIN (HCC): ICD-10-CM

## 2018-07-18 RX ORDER — CELECOXIB 200 MG/1
CAPSULE ORAL
Qty: 90 CAPSULE | Refills: 0 | Status: SHIPPED | OUTPATIENT
Start: 2018-07-18 | End: 2021-04-08 | Stop reason: ALTCHOICE

## 2018-08-06 RX ORDER — SITAGLIPTIN 100 MG/1
TABLET, FILM COATED ORAL
Qty: 90 TABLET | Refills: 0 | Status: SHIPPED | OUTPATIENT
Start: 2018-08-06 | End: 2019-01-07 | Stop reason: SDUPTHER

## 2018-08-06 RX ORDER — ATORVASTATIN CALCIUM 20 MG/1
TABLET, FILM COATED ORAL
Qty: 90 TABLET | Refills: 0 | Status: SHIPPED | OUTPATIENT
Start: 2018-08-06 | End: 2019-01-07 | Stop reason: SDUPTHER

## 2018-08-08 RX ORDER — FAMOTIDINE 20 MG/1
TABLET, FILM COATED ORAL
Qty: 180 TABLET | Refills: 0 | Status: SHIPPED | OUTPATIENT
Start: 2018-08-08 | End: 2019-01-07 | Stop reason: SDUPTHER

## 2018-08-15 RX ORDER — CITALOPRAM 20 MG/1
TABLET ORAL
Qty: 180 TABLET | Refills: 0 | Status: SHIPPED | OUTPATIENT
Start: 2018-08-15 | End: 2019-01-07 | Stop reason: SDUPTHER

## 2018-08-27 ENCOUNTER — OFFICE VISIT (OUTPATIENT)
Dept: FAMILY MEDICINE CLINIC | Age: 73
End: 2018-08-27
Payer: MEDICARE

## 2018-08-27 ENCOUNTER — TELEPHONE (OUTPATIENT)
Dept: FAMILY MEDICINE CLINIC | Age: 73
End: 2018-08-27

## 2018-08-27 VITALS
HEIGHT: 64 IN | HEART RATE: 115 BPM | TEMPERATURE: 96.2 F | OXYGEN SATURATION: 92 % | DIASTOLIC BLOOD PRESSURE: 84 MMHG | SYSTOLIC BLOOD PRESSURE: 124 MMHG | RESPIRATION RATE: 25 BRPM | BODY MASS INDEX: 36.56 KG/M2

## 2018-08-27 DIAGNOSIS — J34.89 SINUS PAIN: ICD-10-CM

## 2018-08-27 DIAGNOSIS — J02.9 SORE THROAT: Primary | ICD-10-CM

## 2018-08-27 PROCEDURE — 99213 OFFICE O/P EST LOW 20 MIN: CPT | Performed by: NURSE PRACTITIONER

## 2018-08-27 RX ORDER — BACLOFEN 10 MG/1
10 TABLET ORAL
COMMUNITY
Start: 2018-05-16 | End: 2018-11-15 | Stop reason: ALTCHOICE

## 2018-08-27 RX ORDER — KETOCONAZOLE 20 MG/G
CREAM TOPICAL
COMMUNITY
Start: 2018-02-12 | End: 2018-11-15 | Stop reason: ALTCHOICE

## 2018-08-27 RX ORDER — HYDROCORTISONE ACETATE 25 MG/1
25 SUPPOSITORY RECTAL
COMMUNITY
Start: 2018-01-19 | End: 2018-11-15 | Stop reason: ALTCHOICE

## 2018-08-27 RX ORDER — CLINDAMYCIN HYDROCHLORIDE 300 MG/1
300 CAPSULE ORAL 3 TIMES DAILY
Qty: 21 CAPSULE | Refills: 0 | Status: SHIPPED | OUTPATIENT
Start: 2018-08-27 | End: 2018-09-03

## 2018-08-27 RX ORDER — AZITHROMYCIN 250 MG/1
TABLET, FILM COATED ORAL
Qty: 6 TABLET | Refills: 0 | Status: CANCELLED | OUTPATIENT
Start: 2018-08-27

## 2018-08-27 ASSESSMENT — ENCOUNTER SYMPTOMS
TROUBLE SWALLOWING: 0
WHEEZING: 0
SORE THROAT: 1
SINUS PAIN: 1
CONSTIPATION: 0
SHORTNESS OF BREATH: 0
NAUSEA: 0
CHEST TIGHTNESS: 0
EYE PAIN: 0
DIARRHEA: 0
EYE DISCHARGE: 0
EYE ITCHING: 0
COUGH: 1
EYE REDNESS: 0
RHINORRHEA: 1
SINUS PRESSURE: 1
VOMITING: 0

## 2018-08-27 NOTE — PROGRESS NOTES
Subjective  Office Visit  [address] [facility]  [place] [id number]  Dept: [phone]  Dept Fax: [phone]  Loc: [address] [place] [address]  YOB: 1945  Age: 68 y.o. Sex: female  Date of Assessment:  8/27/2018  PCP: Renee Marie DO    Chief Complaint   Patient presents with    Cough     x 1 day. dry    Headache    Pharyngitis    Nasal Congestion       HPI    C/C:Presents for evaluation of dry cough headache sore throat nasal congestion. Patient has had ill contacts with URI symptoms. Relevant PMH: No pertinent PMH. Patient denies travel or residence in the [facility]. Patient denies travel or residence in [address] [place] [address] for TB. Patient denies occupational or hobby exposure to irritants such as sulfiting agents, tartrazine, epoxies, chemical, flour, wood dust.)     Onset of symptoms was 1 day ago     Denies: fever, myalgias/arthralgias, headache, ear symptoms, shortness of breath, wheezing/chest tightness, dizziness, eye symptoms, nausea/vomiting and diarrhea, history of TMJ. Symptoms are: gradually worsening since that time. Treatment to date: nothing, which has been  na. Symptoms are alleviated by nothing    Symptoms are aggravated by nothing. Personal and Family History    Past Medical History:   Diagnosis Date    Anxiety     DM (diabetes mellitus) ([facility] [address] 4/14/2015    Hepatitis B infection     Hyperlipidemia     Hypothyroidism     Insomnia     Leg pain 4/14/2015    Post herpetic neuralgia     Unspecified sleep apnea     after seeing [facility] they state she is does not have sleep apnea. Not using cpap       Past Surgical History:   Procedure Laterality Date    ANKLE FRACTURE SURGERY  09/2016    DR ANDRADE  LT    BRAIN SURGERY  2015    surgery for transgeminal neuraglia.   Insertion of sponge for chronic pain    COLONOSCOPY  3/31/14     [facility]    HYSTERECTOMY      PARTIAL    LYMPHADENECTOMY      OTHER SURGICAL HISTORY Right     Trigeminal Neuralgia    [place] COLON [place] SCRN NOT  [address] IND N/A 7/24/2017    COLONOSCOPY performed by Jaime Chambers MD at [address] [place] [address]       Family History   Problem Relation Age of Onset    Stroke Mother     High Cholesterol Mother     Cancer Mother        Social History    Social History     Social History    Marital status:      Spouse name: N/A    Number of children: N/A    Years of education: N/A     Occupational History    Not on file. Social History Main Topics    Smoking status: Former Smoker     Packs/day: 1.00     Years: 48.00     Types: Cigarettes     Quit date: 7/24/2013    Smokeless tobacco: Never Used    Alcohol use No      Comment: very rarely    Drug use: No    Sexual activity: Yes     Partners: Male     Other Topics Concern    Not on file     Social History Narrative    No narrative on file       Medication    Current Outpatient Prescriptions on File Prior to Visit   Medication Sig Dispense Refill    citalopram (CELEXA) 20 MG tablet TAKE 2 TABLETS DAILY 180 tablet 0    famotidine (PEPCID) 20 MG tablet TAKE 1 TABLET TWICE A  tablet 0    atorvastatin (LIPITOR) 20 MG tablet TAKE 1 TABLET DAILY 90 tablet 0    JANUVIA 100 MG tablet TAKE 1 TABLET DAILY 90 tablet 0    celecoxib (CELEBREX) 200 MG capsule TAKE 1 CAPSULE DAILY 90 capsule 0    hydrocortisone 2.5 % cream apply topically twice daily 28.35 g 1    levothyroxine (SYNTHROID) 150 MCG tablet TAKE 1 TABLET DAILY 90 tablet 1    b complex vitamins capsule Take 1 capsule by mouth daily      glucose blood VI test strips (TRUETEST TEST) strip Use as directed 100 each 6    Lancets MISC Use as instructed once a day DX DM TYPE 2, 250.00      aspirin 81 MG EC tablet Take 81 mg by mouth daily. No current facility-administered medications on file prior to visit.         Allergies    Cefdinir and Metformin and related    ROS    Review of Systems Constitutional: Negative for activity change, appetite change, chills, diaphoresis, fatigue and fever. HENT: Positive for congestion, postnasal drip, rhinorrhea, sinus pain, sinus pressure and sore throat. Negative for drooling, ear discharge, ear pain, hearing loss, sneezing and trouble swallowing. Eyes: Negative for pain, discharge, redness and itching. Respiratory: Positive for cough. Negative for chest tightness, shortness of breath and wheezing. Cardiovascular: Negative for chest pain and palpitations. Gastrointestinal: Negative for constipation, diarrhea, nausea and vomiting. Allergic/Immunologic: Negative for environmental allergies and food allergies. Vitals    /84   Pulse 115   Temp 96.2 °F (35.7 °C) (Temporal)   Resp 25   Ht 5' 4\" (1.626 m)   LMP 02/28/1982   SpO2 92%   BMI 36.56 kg/m²     BP Readings from Last 3 Encounters:   08/27/18 124/84   04/09/18 118/74   01/29/18 122/74         Wt Readings from Last 3 Encounters:   04/09/18 213 lb (96.6 kg)   09/20/16 190 lb (86.2 kg)   02/03/16 205 lb 1 oz (93 kg)       Objective    Physical Exam   Constitutional: She is oriented to person, place, and time. She appears well-developed and well-nourished. HENT:   Head: Normocephalic. Right Ear: Hearing, tympanic membrane, external ear and ear canal normal.   Left Ear: Hearing, tympanic membrane, external ear and ear canal normal.   Nose: Mucosal edema and rhinorrhea present. Right sinus exhibits maxillary sinus tenderness. Right sinus exhibits no frontal sinus tenderness. Left sinus exhibits maxillary sinus tenderness. Left sinus exhibits no frontal sinus tenderness. Mouth/Throat: Posterior oropharyngeal erythema present. Eyes: Pupils are equal, round, and reactive to light. Conjunctivae and EOM are normal.   Neck: Normal range of motion. Neck supple. Cardiovascular: Normal rate and regular rhythm.     Pulmonary/Chest: Effort normal and breath sounds normal.   Abdominal: Normal appearance. Musculoskeletal: Normal range of motion. Lymphadenopathy:        Head (right side): No submental, no submandibular, no tonsillar, no preauricular, no posterior auricular and no occipital adenopathy present. Head (left side): No submental, no submandibular, no tonsillar, no preauricular, no posterior auricular and no occipital adenopathy present. She has no cervical adenopathy. Right: No supraclavicular adenopathy present. Left: No supraclavicular adenopathy present. Neurological: She is alert and oriented to person, place, and time. Skin: Skin is warm and dry. Psychiatric: She has a normal mood and affect. Her speech is normal and behavior is normal. Judgment and thought content normal. Cognition and memory are normal.   Nursing note and vitals reviewed. Assessment and Treatment     Diagnosis Orders   1. Sore throat  clindamycin (CLEOCIN) 300 MG capsule   2. Sinus pain  clindamycin (CLEOCIN) 300 MG capsule       Plan and Follow Up    No orders of the defined types were placed in this encounter. Orders Placed This Encounter   Medications    clindamycin (CLEOCIN) 300 MG capsule     Sig: Take 1 capsule by mouth 3 times daily for 7 days     Dispense:  21 capsule     Refill:  0       Return if symptoms worsen or fail to improve. Antibiotics: To prevent antibiotic resistances please take medication as ordered and for the full duration even if you start   To feel better. Females: Consider intake of yogurt or probiotic during antibiotic use and for a few days after to help reduce the risk of superinfection (yeast infection). Separate the yogurt and antibiotic by at least 1 hour. Avoid Alcohol. Supportive Measures  Wash hands frequently, Tylenol or Ibuprofen for discomfort or fever. For sore throat use warm salt water gargles, cough drops or chloraseptic spray. For cough add humidification to the air for dry environments. Sit in a steam shower.  Add

## 2018-08-27 NOTE — TELEPHONE ENCOUNTER
Pt in [facility] at the [facility] office and wants you to know that her  Tena Arredondo passed away Sat evening 8/25/18.

## 2018-09-04 RX ORDER — LEVOTHYROXINE SODIUM 0.15 MG/1
TABLET ORAL
Qty: 90 TABLET | Refills: 0 | Status: SHIPPED | OUTPATIENT
Start: 2018-09-04 | End: 2019-01-07 | Stop reason: SDUPTHER

## 2018-11-15 ENCOUNTER — OFFICE VISIT (OUTPATIENT)
Dept: FAMILY MEDICINE CLINIC | Age: 73
End: 2018-11-15
Payer: MEDICARE

## 2018-11-15 VITALS
TEMPERATURE: 96.8 F | HEART RATE: 87 BPM | SYSTOLIC BLOOD PRESSURE: 132 MMHG | RESPIRATION RATE: 16 BRPM | DIASTOLIC BLOOD PRESSURE: 84 MMHG

## 2018-11-15 DIAGNOSIS — E03.1 CONGENITAL HYPOTHYROIDISM WITHOUT GOITER: ICD-10-CM

## 2018-11-15 DIAGNOSIS — Z12.39 SCREENING FOR BREAST CANCER: ICD-10-CM

## 2018-11-15 DIAGNOSIS — H65.23 BILATERAL CHRONIC SEROUS OTITIS MEDIA: Primary | ICD-10-CM

## 2018-11-15 DIAGNOSIS — F43.21 GRIEF: ICD-10-CM

## 2018-11-15 DIAGNOSIS — E11.9 TYPE 2 DIABETES MELLITUS WITHOUT COMPLICATION, WITHOUT LONG-TERM CURRENT USE OF INSULIN (HCC): ICD-10-CM

## 2018-11-15 LAB — HBA1C MFR BLD: 7.7 %

## 2018-11-15 PROCEDURE — 83036 HEMOGLOBIN GLYCOSYLATED A1C: CPT | Performed by: FAMILY MEDICINE

## 2018-11-15 PROCEDURE — 99214 OFFICE O/P EST MOD 30 MIN: CPT | Performed by: FAMILY MEDICINE

## 2018-11-15 RX ORDER — AMOXICILLIN 875 MG/1
875 TABLET, COATED ORAL 2 TIMES DAILY
Qty: 20 TABLET | Refills: 0 | Status: SHIPPED | OUTPATIENT
Start: 2018-11-15 | End: 2018-11-25

## 2018-11-15 RX ORDER — METHYLPREDNISOLONE 4 MG/1
TABLET ORAL
Qty: 1 KIT | Refills: 0 | Status: SHIPPED | OUTPATIENT
Start: 2018-11-15 | End: 2018-11-21

## 2018-11-15 RX ORDER — BUPROPION HYDROCHLORIDE 150 MG/1
150 TABLET ORAL EVERY MORNING
Qty: 30 TABLET | Refills: 3 | Status: SHIPPED | OUTPATIENT
Start: 2018-11-15 | End: 2019-04-10

## 2018-11-15 NOTE — PROGRESS NOTES
apnea     after seeing [facility] they state she is does not have sleep apnea. Not using cpap     Past Surgical History:   Procedure Laterality Date    ANKLE FRACTURE SURGERY  09/2016    DR ANDRADE  LT    BRAIN SURGERY  2015    surgery for transgeminal neuraglia. Insertion of sponge for chronic pain    COLONOSCOPY  3/31/14    DR Sandy Labor    HYSTERECTOMY      PARTIAL    LYMPHADENECTOMY      OTHER SURGICAL HISTORY Right     Trigeminal Neuralgia    MI COLON CA SCRN NOT  [address] IND N/A 7/24/2017    COLONOSCOPY performed by Shae Purcell MD at [address] [place] [address] Place:   REVIEW OF SYSTEMS:   Patient seen today for exam.  Denies any problems with hearing, headaches or vision. Denies any shortness ofbreath, chest pain, nausea or vomiting. No black stool, no blood in the stool. No heartburn. Denies any problems with constipation or diarrhea either. No dysuria type symptoms. Objective:     /84   Pulse 87   Temp 96.8 °F (36 °C) (Temporal)   Resp 16   LMP 02/28/1982     Physical Exam      O:  Alert and active female in no acute distress  HEENT:  TMs clear. Pharynx neg. Nares clear, no drainage noted  Neck supple/ no adenopathy   HEART:  RRR without murmur/ no carotid bruits  LUNGS:  Clear to auscultation bilaterally, no wheeze or rhonchi noted  THYROID: neg masses or nodularity  ABDOMEN:  Soft x4. Bowel sounds positive. No masses or organomegaly,  Negative tenderness, guarding or rebound. EXTR:  Without edema./ good pulses bilat    Neurologic exam unremarkable. DTRs in upper and lower extremities within normal limits. Full strength noted    Skin- no lesions noted       Assessment:       Diagnosis Orders   1. Bilateral chronic serous otitis media     2. Congenital hypothyroidism without goiter     3. Type 2 diabetes mellitus without complication, without long-term current use of insulin (HCC)  POCT glycosylated hemoglobin (Hb A1C)   4.  Screening for breast cancer

## 2018-11-20 ENCOUNTER — HOSPITAL ENCOUNTER (OUTPATIENT)
Dept: WOMENS IMAGING | Age: 73
Discharge: HOME | End: 2018-11-22
Payer: MEDICARE

## 2018-11-20 DIAGNOSIS — Z12.39 SCREENING FOR BREAST CANCER: ICD-10-CM

## 2018-11-20 PROCEDURE — 77067 SCR MAMMO BI INCL CAD: CPT

## 2019-01-07 DIAGNOSIS — F32.A DEPRESSION, UNSPECIFIED DEPRESSION TYPE: ICD-10-CM

## 2019-01-07 DIAGNOSIS — E11.9 TYPE 2 DIABETES MELLITUS WITHOUT COMPLICATION, WITHOUT LONG-TERM CURRENT USE OF INSULIN (HCC): Primary | ICD-10-CM

## 2019-01-07 DIAGNOSIS — E78.1 PURE HYPERGLYCERIDEMIA: ICD-10-CM

## 2019-01-07 RX ORDER — ATORVASTATIN CALCIUM 20 MG/1
TABLET, FILM COATED ORAL
Qty: 90 TABLET | Refills: 0 | Status: SHIPPED | OUTPATIENT
Start: 2019-01-07 | End: 2019-04-10 | Stop reason: SINTOL

## 2019-01-07 RX ORDER — FAMOTIDINE 20 MG/1
TABLET, FILM COATED ORAL
Qty: 180 TABLET | Refills: 0 | Status: SHIPPED | OUTPATIENT
Start: 2019-01-07 | End: 2019-04-10 | Stop reason: SDUPTHER

## 2019-01-07 RX ORDER — LEVOTHYROXINE SODIUM 0.15 MG/1
150 TABLET ORAL DAILY
Qty: 90 TABLET | Refills: 0 | Status: SHIPPED | OUTPATIENT
Start: 2019-01-07 | End: 2019-04-10 | Stop reason: SDUPTHER

## 2019-01-07 RX ORDER — CITALOPRAM 20 MG/1
TABLET ORAL
Qty: 180 TABLET | Refills: 0 | Status: SHIPPED | OUTPATIENT
Start: 2019-01-07 | End: 2019-04-10 | Stop reason: SDUPTHER

## 2019-01-07 RX ORDER — SITAGLIPTIN 100 MG/1
TABLET, FILM COATED ORAL
Qty: 90 TABLET | Refills: 0 | Status: SHIPPED | OUTPATIENT
Start: 2019-01-07 | End: 2019-01-07 | Stop reason: SDUPTHER

## 2019-03-12 ENCOUNTER — TELEPHONE (OUTPATIENT)
Dept: FAMILY MEDICINE CLINIC | Age: 74
End: 2019-03-12

## 2019-04-10 ENCOUNTER — OFFICE VISIT (OUTPATIENT)
Dept: FAMILY MEDICINE CLINIC | Age: 74
End: 2019-04-10
Payer: MEDICARE

## 2019-04-10 VITALS
OXYGEN SATURATION: 99 % | HEIGHT: 64 IN | RESPIRATION RATE: 14 BRPM | HEART RATE: 82 BPM | TEMPERATURE: 97.6 F | SYSTOLIC BLOOD PRESSURE: 132 MMHG | DIASTOLIC BLOOD PRESSURE: 76 MMHG | BODY MASS INDEX: 36.56 KG/M2

## 2019-04-10 DIAGNOSIS — R51.9 FACIAL PAIN: Primary | ICD-10-CM

## 2019-04-10 DIAGNOSIS — F32.A DEPRESSION, UNSPECIFIED DEPRESSION TYPE: ICD-10-CM

## 2019-04-10 DIAGNOSIS — G89.29 CHRONIC PAIN OF BOTH KNEES: ICD-10-CM

## 2019-04-10 DIAGNOSIS — Z00.00 HEALTH CARE MAINTENANCE: ICD-10-CM

## 2019-04-10 DIAGNOSIS — M25.562 CHRONIC PAIN OF BOTH KNEES: ICD-10-CM

## 2019-04-10 DIAGNOSIS — E03.9 HYPOTHYROIDISM, UNSPECIFIED TYPE: ICD-10-CM

## 2019-04-10 DIAGNOSIS — M25.561 CHRONIC PAIN OF BOTH KNEES: ICD-10-CM

## 2019-04-10 DIAGNOSIS — E11.9 TYPE 2 DIABETES MELLITUS WITHOUT COMPLICATION, WITHOUT LONG-TERM CURRENT USE OF INSULIN (HCC): ICD-10-CM

## 2019-04-10 DIAGNOSIS — M79.10 MYALGIA: ICD-10-CM

## 2019-04-10 LAB — HBA1C MFR BLD: 7.7 %

## 2019-04-10 PROCEDURE — 99214 OFFICE O/P EST MOD 30 MIN: CPT | Performed by: INTERNAL MEDICINE

## 2019-04-10 PROCEDURE — 83036 HEMOGLOBIN GLYCOSYLATED A1C: CPT | Performed by: INTERNAL MEDICINE

## 2019-04-10 PROCEDURE — 96372 THER/PROPH/DIAG INJ SC/IM: CPT | Performed by: INTERNAL MEDICINE

## 2019-04-10 RX ORDER — VITAMIN B COMPLEX
1 CAPSULE ORAL DAILY
Qty: 30 CAPSULE | Refills: 3 | Status: SHIPPED | OUTPATIENT
Start: 2019-04-10 | End: 2019-05-13

## 2019-04-10 RX ORDER — FAMOTIDINE 20 MG/1
TABLET, FILM COATED ORAL
Qty: 180 TABLET | Refills: 0 | Status: SHIPPED | OUTPATIENT
Start: 2019-04-10 | End: 2019-06-18 | Stop reason: SDUPTHER

## 2019-04-10 RX ORDER — CITALOPRAM 20 MG/1
TABLET ORAL
Qty: 180 TABLET | Refills: 0 | Status: SHIPPED | OUTPATIENT
Start: 2019-04-10 | End: 2020-02-19 | Stop reason: SDUPTHER

## 2019-04-10 RX ORDER — CARBAMAZEPINE 100 MG/1
100 TABLET, CHEWABLE ORAL
Qty: 90 TABLET | Status: CANCELLED | OUTPATIENT
Start: 2019-04-10

## 2019-04-10 RX ORDER — CARBAMAZEPINE 100 MG/1
100 TABLET, CHEWABLE ORAL
COMMUNITY
Start: 2019-01-30 | End: 2019-04-10 | Stop reason: ALTCHOICE

## 2019-04-10 RX ORDER — BUPROPION HYDROCHLORIDE 150 MG/1
150 TABLET ORAL EVERY MORNING
Qty: 30 TABLET | Refills: 3 | Status: CANCELLED | OUTPATIENT
Start: 2019-04-10

## 2019-04-10 RX ORDER — PREDNISONE 20 MG/1
40 TABLET ORAL DAILY
Qty: 10 TABLET | Refills: 0 | Status: SHIPPED | OUTPATIENT
Start: 2019-04-10 | End: 2019-05-09 | Stop reason: SDUPTHER

## 2019-04-10 RX ORDER — TRIAMCINOLONE ACETONIDE 40 MG/ML
60 INJECTION, SUSPENSION INTRA-ARTICULAR; INTRAMUSCULAR ONCE
Status: COMPLETED | OUTPATIENT
Start: 2019-04-10 | End: 2019-04-10

## 2019-04-10 RX ORDER — ASPIRIN 81 MG/1
81 TABLET ORAL DAILY
Qty: 90 TABLET | Refills: 3 | Status: SHIPPED | OUTPATIENT
Start: 2019-04-10

## 2019-04-10 RX ORDER — LANCETS 30 GAUGE
1 EACH MISCELLANEOUS 3 TIMES DAILY
Qty: 100 EACH | Refills: 5 | Status: SHIPPED | OUTPATIENT
Start: 2019-04-10 | End: 2019-05-09 | Stop reason: SDUPTHER

## 2019-04-10 RX ORDER — LEVOTHYROXINE SODIUM 0.15 MG/1
150 TABLET ORAL DAILY
Qty: 90 TABLET | Refills: 0 | Status: SHIPPED | OUTPATIENT
Start: 2019-04-10 | End: 2019-06-18 | Stop reason: SDUPTHER

## 2019-04-10 RX ORDER — CELECOXIB 200 MG/1
CAPSULE ORAL
Qty: 90 CAPSULE | Refills: 0 | Status: CANCELLED | OUTPATIENT
Start: 2019-04-10

## 2019-04-10 RX ORDER — ATORVASTATIN CALCIUM 20 MG/1
TABLET, FILM COATED ORAL
Qty: 90 TABLET | Refills: 0 | Status: CANCELLED | OUTPATIENT
Start: 2019-04-10

## 2019-04-10 RX ADMIN — TRIAMCINOLONE ACETONIDE 60 MG: 40 INJECTION, SUSPENSION INTRA-ARTICULAR; INTRAMUSCULAR at 15:22

## 2019-04-10 NOTE — PROGRESS NOTES
Subjective:      Patient ID: Yas Alejandro is a 68 y.o. female    Facial pain x 6 months    HPI    Pt presents with 6 months of right facial pain from the temple to the forehead. Pain is sharp, intermittent, rated 10/10. No known aggravating factor but pain is relieved with touch. Pt denies headache, rash, fever or chills. Attests to vision blurring (last eye exam was last year). Hx cataract surgery. Attests to hx trigeminal neuralgia s/p surgery in Dec 2015. Pain was much improved since surgery. Assoc hearing impairment in the right ear but no pain. No difficulty swallowing, no ear discharge. Excessive tiredness and muscle pain x 9 months   Hx hypothyroidism on Synthroid. Last TSH was normal but low prior to then. Muscle pain has been generalized especially in the legs. Requests med refills today. Past Medical History:   Diagnosis Date    Anxiety     DM (diabetes mellitus) ([facility] [address] 4/14/2015    Hepatitis B infection     Hyperlipidemia     Hypothyroidism     Insomnia     Leg pain 4/14/2015    Post herpetic neuralgia     Unspecified sleep apnea     after seeing [facility] [place] they state she is does not have sleep apnea. Not using cpap     Past Surgical History:   Procedure Laterality Date    ANKLE FRACTURE SURGERY  09/2016    DR ANDRADE  LT    BRAIN SURGERY  2015    surgery for transgeminal neuraglia.   Insertion of sponge for chronic pain    COLONOSCOPY  3/31/14    DR Bhanu Martinez    HYSTERECTOMY      PARTIAL    LYMPHADENECTOMY      OTHER SURGICAL HISTORY Right     Trigeminal Neuralgia    [place] COLON CA SCRN NOT  [address] [place] [address] N/A 7/24/2017    COLONOSCOPY performed by Deneen Burnette MD at [address] History     Socioeconomic History    Marital status:      Spouse name: Not on file    Number of children: Not on file    Years of education: Not on file    Highest education level: Not on file   Occupational History    Not on file   Social Needs    Financial resource strain: fatigue and fever. HENT: Positive for hearing loss. Negative for congestion, ear discharge, ear pain, rhinorrhea, sinus pressure, sinus pain, sneezing and sore throat. Respiratory: Negative for cough, shortness of breath and wheezing. Cardiovascular: Negative for chest pain. Gastrointestinal: Negative for abdominal pain, diarrhea, nausea and vomiting. Endocrine: Negative for cold intolerance and heat intolerance. Genitourinary: Negative for dysuria and frequency. Musculoskeletal: Positive for myalgias. Negative for arthralgias and neck pain. Skin: Negative for rash and wound. Neurological: Negative for dizziness, weakness and light-headedness. Objective:   /76   Pulse 82   Temp 97.6 °F (36.4 °C) (Temporal)   Resp 14   Ht 5' 4\" (1.626 m)   LMP 02/28/1982   SpO2 99%   Breastfeeding? No   BMI 36.56 kg/m²     Physical Exam   Constitutional: She is oriented to person, place, and time. She appears well-developed and well-nourished. She appears distressed (tired and in pain). HENT:   Head: Normocephalic and atraumatic. Right Ear: External ear normal.   Left Ear: External ear normal.   Mouth/Throat: Oropharynx is clear and moist. No oropharyngeal exudate. No TM or pharyngeal erythema  No sinus TTP    Well- healed longitudinal scar in the right occipital region, no erythema, swelling but mild scalp TTP   Cardiovascular: Normal rate, regular rhythm, S1 normal, S2 normal and normal heart sounds. Pulmonary/Chest: Effort normal and breath sounds normal. No accessory muscle usage. No tachypnea. No respiratory distress. She has no wheezes. She has no rales. She exhibits no tenderness. Abdominal: Soft. Normal appearance and bowel sounds are normal. She exhibits no distension. There is no hepatosplenomegaly. There is no tenderness. Musculoskeletal: She exhibits no edema. Neurological: She is alert and oriented to person, place, and time. Skin: She is not diaphoretic. (NEED APPOINTMENT FOR FURTHER REFILLS)     Dispense:  180 tablet     Refill:  0    famotidine (PEPCID) 20 MG tablet     Sig: TAKE 1 TABLET TWICE A DAY (NEED APPOINTMENT FOR FURTHER REFILLS)     Dispense:  180 tablet     Refill:  0    blood glucose test strips (TRUETEST TEST) strip     Sig: Use as directed     Dispense:  100 each     Refill:  6    levothyroxine (SYNTHROID) 150 MCG tablet     Sig: Take 1 tablet by mouth Daily     Dispense:  90 tablet     Refill:  0     NEEDS APPT FOR FURTHER REFILLS    aspirin 81 MG EC tablet     Sig: Take 1 tablet by mouth daily     Dispense:  90 tablet     Refill:  3    b complex vitamins capsule     Sig: Take 1 capsule by mouth daily     Dispense:  30 capsule     Refill:  3    Lancets MISC     Sig: Inject 1 each into the skin 3 times daily Use as instructed once a day DX DM TYPE 2, 250.00     Dispense:  100 each     Refill:  5    SITagliptin (JANUVIA) 100 MG tablet     Sig: Take 1 tablet by mouth daily     Dispense:  90 tablet     Refill:  1    SITagliptin (JANUVIA) 100 MG tablet     Sig: Take 1 tablet by mouth daily     Dispense:  10 tablet     Refill:  0    triamcinolone acetonide (KENALOG-40) injection 60 mg    predniSONE (DELTASONE) 20 MG tablet     Sig: Take 2 tablets by mouth daily for 5 days     Dispense:  10 tablet     Refill:  0     Lipitor DCed. Based on facial/scalp pain with fatigue, muscle pain and vision changes, will treat for GCA, pending temporal artery biopsy. Return in about 1 month (around 5/10/2019) for assessment of response to treatment, review of test results.

## 2019-04-11 ENCOUNTER — TELEPHONE (OUTPATIENT)
Dept: FAMILY MEDICINE CLINIC | Age: 74
End: 2019-04-11

## 2019-04-11 ASSESSMENT — ENCOUNTER SYMPTOMS
SHORTNESS OF BREATH: 0
NAUSEA: 0
SINUS PAIN: 0
SORE THROAT: 0
VOMITING: 0
COUGH: 0
DIARRHEA: 0
SINUS PRESSURE: 0
RHINORRHEA: 0
WHEEZING: 0
ABDOMINAL PAIN: 0

## 2019-04-11 NOTE — TELEPHONE ENCOUNTER
Terrance Velazquez, daughter of the patient is requesting to be called back st [phone]. Patient got a call from our office and cannot recall the message. Please advise.

## 2019-04-18 ENCOUNTER — PREP FOR PROCEDURE (OUTPATIENT)
Dept: SURGERY | Age: 74
End: 2019-04-18

## 2019-04-18 ENCOUNTER — OFFICE VISIT (OUTPATIENT)
Dept: SURGERY | Age: 74
End: 2019-04-18
Payer: MEDICARE

## 2019-04-18 VITALS
DIASTOLIC BLOOD PRESSURE: 74 MMHG | WEIGHT: 206.4 LBS | SYSTOLIC BLOOD PRESSURE: 126 MMHG | HEIGHT: 64 IN | BODY MASS INDEX: 35.24 KG/M2 | TEMPERATURE: 97.6 F

## 2019-04-18 DIAGNOSIS — R51.9 FREQUENT HEADACHES: Primary | ICD-10-CM

## 2019-04-18 PROCEDURE — 99203 OFFICE O/P NEW LOW 30 MIN: CPT | Performed by: SURGERY

## 2019-04-18 ASSESSMENT — ENCOUNTER SYMPTOMS
RHINORRHEA: 0
ABDOMINAL PAIN: 0
ALLERGIC/IMMUNOLOGIC NEGATIVE: 1
BLOOD IN STOOL: 0
ABDOMINAL DISTENTION: 0
COLOR CHANGE: 0
NAUSEA: 0
RECTAL PAIN: 0
CHEST TIGHTNESS: 0
SHORTNESS OF BREATH: 0

## 2019-04-18 NOTE — PROGRESS NOTES
Subjective:      Patient ID: Michael Jones is a 68 y.o. female. HPI  Rancho Platt is here today to have a temporal artery biopsy for headache and visual problems. She improved on steroids but they came back as soon as they were stopped due to elevated blood surgars. Review of Systems   Constitutional: Positive for fatigue. Negative for activity change, appetite change and unexpected weight change. HENT: Negative for congestion, nosebleeds, rhinorrhea and sneezing. Right sided headaches and visual problems   Eyes: Negative for visual disturbance. Respiratory: Negative for chest tightness and shortness of breath. Cardiovascular: Negative for chest pain and leg swelling. Gastrointestinal: Negative for abdominal distention, abdominal pain, blood in stool, nausea and rectal pain. Endocrine: Negative. Genitourinary: Negative for difficulty urinating. Musculoskeletal: Negative. Skin: Negative for color change. Allergic/Immunologic: Negative. Neurological: Positive for weakness. Negative for seizures, light-headedness, numbness and headaches. Hematological: Does not bruise/bleed easily. Psychiatric/Behavioral: Negative for sleep disturbance. Past Medical History:   Diagnosis Date    Anxiety     DM (diabetes mellitus) ([facility] [address] 4/14/2015    Hepatitis B infection     Hyperlipidemia     Hypothyroidism     Insomnia     Leg pain 4/14/2015    Post herpetic neuralgia     Unspecified sleep apnea     after seeing [facility] they state she is does not have sleep apnea. Not using cpap     Past Surgical History:   Procedure Laterality Date    ANKLE FRACTURE SURGERY  09/2016    DR ANDRADE  LT    BRAIN SURGERY  2015    surgery for transgeminal neuraglia.   Insertion of sponge for chronic pain    COLONOSCOPY  3/31/14    DR POND    HYSTERECTOMY      PARTIAL    LYMPHADENECTOMY      OTHER SURGICAL HISTORY Right     Trigeminal Neuralgia    CT COLON CA SCRN NOT  [address] [facility] N/A 7/24/2017 COLONOSCOPY performed by Sandra Kyle MD at [address] History     Tobacco Use    Smoking status: Former Smoker     Packs/day: 1.00     Years: 48.00     Pack years: 48.00     Types: Cigarettes     Last attempt to quit: 2013     Years since quittin.7    Smokeless tobacco: Never Used   Substance Use Topics    Alcohol use: No     Comment: very rarely    Drug use: No     Family History   Problem Relation Age of Onset    Stroke Mother     High Cholesterol Mother     Cancer Mother      Cefdinir and Metformin and related  Allergies   Allergen Reactions    Cefdinir      Diarrhea      Metformin And Related      Swelling leg     Current Outpatient Medications   Medication Sig Dispense Refill    citalopram (CELEXA) 20 MG tablet TAKE 1 TABLET DAILY (NEED APPOINTMENT FOR FURTHER REFILLS) 180 tablet 0    famotidine (PEPCID) 20 MG tablet TAKE 1 TABLET TWICE A DAY (NEED APPOINTMENT FOR FURTHER REFILLS) 180 tablet 0    blood glucose test strips (TRUETEST TEST) strip Use as directed 100 each 6    levothyroxine (SYNTHROID) 150 MCG tablet Take 1 tablet by mouth Daily 90 tablet 0    aspirin 81 MG EC tablet Take 1 tablet by mouth daily 90 tablet 3    Lancets MISC Inject 1 each into the skin 3 times daily Use as instructed once a day DX DM TYPE 2, 250.00 100 each 5    SITagliptin (JANUVIA) 100 MG tablet Take 1 tablet by mouth daily 90 tablet 1    celecoxib (CELEBREX) 200 MG capsule TAKE 1 CAPSULE DAILY 90 capsule 0    b complex vitamins capsule Take 1 capsule by mouth daily 30 capsule 3     No current facility-administered medications for this visit. /74   Temp 97.6 °F (36.4 °C) (Temporal)   Ht 5' 4\" (1.626 m)   Wt 206 lb 6.4 oz (93.6 kg)   LMP 1982   BMI 35.43 kg/m²     Objective:   Physical Exam   Constitutional: She is oriented to person, place, and time. She appears well-developed and well-nourished. No distress.    HENT:   Mouth/Throat: Oropharynx is clear and moist.   Eyes: Pupils are equal, round, and reactive to light. Neck: No tracheal deviation present. No thyromegaly present. Cardiovascular: Normal rate, regular rhythm and normal heart sounds. Pulmonary/Chest: Effort normal and breath sounds normal. No respiratory distress. Musculoskeletal:   Normal gait   Neurological: She is alert and oriented to person, place, and time. Skin: No bruising, no lesion and no rash noted. Psychiatric: She has a normal mood and affect. Judgment normal.       Assessment:      Headaches  R/O temporal arteritis       Plan:      Right temporal artery biopsy     The options of therapy were discussed and the patient agrees to the above procedure. The procedure  as well as potential risks and complications including but not exclusive to infection, blood loss, damage to surrounding structures and even requiring further surgery  were discussed. All questions are answered . The patient appears to understands and is agreeable to the surgery.         Macie Mancuso MD

## 2019-04-19 NOTE — H&P (VIEW-ONLY)
Patient ID: Mamadou Pedroza is a 68 y.o. female. [facility]  Aisha Harrison is here today to have a temporal artery biopsy for headache and visual problems. She improved on steroids but they came back as soon as they were stopped due to elevated blood surgars. Review of Systems   Constitutional: Positive for fatigue. Negative for activity change, appetite change and unexpected weight change. HENT: Negative for congestion, nosebleeds, rhinorrhea and sneezing. Right sided headaches and visual problems   Eyes: Negative for visual disturbance. Respiratory: Negative for chest tightness and shortness of breath. Cardiovascular: Negative for chest pain and leg swelling. Gastrointestinal: Negative for abdominal distention, abdominal pain, blood in stool, nausea and rectal pain. Endocrine: Negative. Genitourinary: Negative for difficulty urinating. Musculoskeletal: Negative. Skin: Negative for color change. Allergic/Immunologic: Negative. Neurological: Positive for weakness. Negative for seizures, light-headedness, numbness and headaches. Hematological: Does not bruise/bleed easily. Psychiatric/Behavioral: Negative for sleep disturbance. Past Medical History:   Diagnosis Date    Anxiety     DM (diabetes mellitus) ([facility] [address] 4/14/2015    Hepatitis B infection     Hyperlipidemia     Hypothyroidism     Insomnia     Leg pain 4/14/2015    Post herpetic neuralgia     Unspecified sleep apnea     after seeing [facility] they state she is does not have sleep apnea. Not using cpap     Past Surgical History:   Procedure Laterality Date    ANKLE FRACTURE SURGERY  09/2016    DR ANDRADE  LT    BRAIN SURGERY  2015    surgery for transgeminal neuraglia.   Insertion of sponge for chronic pain    COLONOSCOPY  3/31/14    DR POND    HYSTERECTOMY      PARTIAL    LYMPHADENECTOMY      OTHER SURGICAL HISTORY Right     Trigeminal Neuralgia    [place] COLON CA SCRN NOT  [address]  [place] N/A 7/24/2017    COLONOSCOPY performed by Jayjay Thompson MD at [address] History     Tobacco Use    Smoking status: Former Smoker     Packs/day: 1.00     Years: 48.00     Pack years: 48.00     Types: Cigarettes     Last attempt to quit: 2013     Years since quittin.7    Smokeless tobacco: Never Used   Substance Use Topics    Alcohol use: No     Comment: very rarely    Drug use: No     Family History   Problem Relation Age of Onset    Stroke Mother     High Cholesterol Mother     Cancer Mother      Cefdinir and Metformin and related  Allergies   Allergen Reactions    Cefdinir      Diarrhea      Metformin And Related      Swelling leg     Current Outpatient Medications   Medication Sig Dispense Refill    citalopram (CELEXA) 20 MG tablet TAKE 1 TABLET DAILY (NEED APPOINTMENT FOR FURTHER REFILLS) 180 tablet 0    famotidine (PEPCID) 20 MG tablet TAKE 1 TABLET TWICE A DAY (NEED APPOINTMENT FOR FURTHER REFILLS) 180 tablet 0    blood glucose test strips (TRUETEST TEST) strip Use as directed 100 each 6    levothyroxine (SYNTHROID) 150 MCG tablet Take 1 tablet by mouth Daily 90 tablet 0    aspirin 81 MG EC tablet Take 1 tablet by mouth daily 90 tablet 3    Lancets MISC Inject 1 each into the skin 3 times daily Use as instructed once a day DX DM TYPE 2, 250.00 100 each 5    SITagliptin (JANUVIA) 100 MG tablet Take 1 tablet by mouth daily 90 tablet 1    celecoxib (CELEBREX) 200 MG capsule TAKE 1 CAPSULE DAILY 90 capsule 0    b complex vitamins capsule Take 1 capsule by mouth daily 30 capsule 3     No current facility-administered medications for this visit. /74   Temp 97.6 °F (36.4 °C) (Temporal)   Ht 5' 4\" (1.626 m)   Wt 206 lb 6.4 oz (93.6 kg)   LMP 1982   BMI 35.43 kg/m²     Objective:   Physical Exam   Constitutional: She is oriented to person, place, and time. She appears well-developed and well-nourished. No distress.    HENT:   Mouth/Throat: Oropharynx is clear and moist. Eyes: Pupils are equal, round, and reactive to light. Neck: No tracheal deviation present. No thyromegaly present. Cardiovascular: Normal rate, regular rhythm and normal heart sounds. Pulmonary/Chest: Effort normal and breath sounds normal. No respiratory distress. Musculoskeletal:   Normal gait   Neurological: She is alert and oriented to person, place, and time. Skin: No bruising, no lesion and no rash noted. Psychiatric: She has a normal mood and affect. Judgment normal.       Assessment:      Headaches  R/O temporal arteritis       Plan:      Right temporal artery biopsy     The options of therapy were discussed and the patient agrees to the above procedure. The procedure  as well as potential risks and complications including but not exclusive to infection, blood loss, damage to surrounding structures and even requiring further surgery  were discussed. All questions are answered . The patient appears to understands and is agreeable to the surgery.         Beth Atkinson MD

## 2019-04-26 ENCOUNTER — HOSPITAL ENCOUNTER (OUTPATIENT)
Age: 74
Setting detail: OUTPATIENT SURGERY
Discharge: HOME | End: 2019-04-26
Attending: SURGERY | Admitting: SURGERY
Payer: MEDICARE

## 2019-04-26 VITALS
SYSTOLIC BLOOD PRESSURE: 135 MMHG | RESPIRATION RATE: 21 BRPM | OXYGEN SATURATION: 96 % | BODY MASS INDEX: 34.15 KG/M2 | TEMPERATURE: 97.1 F | DIASTOLIC BLOOD PRESSURE: 69 MMHG | HEART RATE: 75 BPM | HEIGHT: 64 IN | WEIGHT: 200 LBS

## 2019-04-26 DIAGNOSIS — R51.9 FREQUENT HEADACHES: Primary | ICD-10-CM

## 2019-04-26 PROCEDURE — 88305 TISSUE EXAM BY PATHOLOGIST: CPT

## 2019-04-26 PROCEDURE — 3600000002 HC SURGERY LEVEL 2 BASE: Performed by: SURGERY

## 2019-04-26 PROCEDURE — 3600000012 HC SURGERY LEVEL 2 ADDTL 15MIN: Performed by: SURGERY

## 2019-04-26 PROCEDURE — 2580000003 HC RX 258: Performed by: SURGERY

## 2019-04-26 PROCEDURE — 2500000003 HC RX 250 WO HCPCS: Performed by: SURGERY

## 2019-04-26 PROCEDURE — 37609 LIGATION/BX TEMPORAL ARTERY: CPT | Performed by: SURGERY

## 2019-04-26 PROCEDURE — 2709999900 HC NON-CHARGEABLE SUPPLY: Performed by: SURGERY

## 2019-04-26 RX ORDER — HYDROCODONE BITARTRATE AND ACETAMINOPHEN 5; 325 MG/1; MG/1
1 TABLET ORAL EVERY 6 HOURS PRN
Qty: 20 TABLET | Refills: 0 | Status: SHIPPED | OUTPATIENT
Start: 2019-04-26 | End: 2019-05-01

## 2019-04-26 RX ORDER — BUPIVACAINE HYDROCHLORIDE AND EPINEPHRINE 2.5; 5 MG/ML; UG/ML
INJECTION, SOLUTION EPIDURAL; INFILTRATION; INTRACAUDAL; PERINEURAL PRN
Status: DISCONTINUED | OUTPATIENT
Start: 2019-04-26 | End: 2019-04-26 | Stop reason: ALTCHOICE

## 2019-04-26 RX ORDER — MAGNESIUM HYDROXIDE 1200 MG/15ML
LIQUID ORAL CONTINUOUS PRN
Status: COMPLETED | OUTPATIENT
Start: 2019-04-26 | End: 2019-04-26

## 2019-04-26 ASSESSMENT — PAIN - FUNCTIONAL ASSESSMENT: PAIN_FUNCTIONAL_ASSESSMENT: 0-10

## 2019-04-26 NOTE — BRIEF OP NOTE
Brief Postoperative Note  ______________________________________________________________    Patient: Jann Church  YOB: 1945  MRN: [id number]  Date of Procedure: 4/26/2019    Pre-Op Diagnosis: HEADACHES    Post-Op Diagnosis: Same       Procedure(s):  RIGHT TEMPORAL ARTERY BIOPSY    Anesthesia: local    Surgeon(s):  María Lafleur MD    Assistant: Mary    Estimated Blood Loss (mL): less than 50     Complications: None    Specimens:   ID Type Source Tests Collected by Time Destination   A : temporal artery biopsy Tissue Head SURGICAL PATHOLOGY María Lafleur MD 4/26/2019 1259        Implants:  * No implants in log *      Drains: * No LDAs found *    Findings: unremarkable    María Lafleur MD  Date: 4/26/2019  Time: 1:04 PM

## 2019-04-27 NOTE — OP NOTE
[facility] [address] [place] [address], [address] [facility]                                OPERATIVE REPORT    PATIENT NAME: Raul Miller                      :        1945  MED REC NO:   [id number]                            ROOM:  ACCOUNT NO:   [de-identified]                           ADMIT DATE: 2019  PROVIDER:     Tavares Kaufman MD    DATE OF PROCEDURE:  2019    PREOPERATIVE DIAGNOSIS:  Headaches. POSTOPERATIVE DIAGNOSIS:  Headaches. OPERATION PERFORMED:  Right temporal artery biopsy. SURGEON:  Tavares Kaufman MD    ANESTHESIA:  Local.    COMPLICATIONS:  None. HISTORY:  The patient is a 81-year-old female who I have been asked to  do a temporal artery biopsy to rule out temporal arteritis. She has  been having visual problems and headaches. The procedure of the artery  biopsy as well as the risks and complications were discussed including  infection, blood loss, damage to surrounding structures, and even the  possibility of needing further surgery in the future were all discussed. She understood and was agreeable to the procedure. OPERATIVE PROCEDURE:  The patient brought in the operative suite and  placed in the supine position. The patient's head was turned to the  left and the right temporal artery area was prepped and draped in a  sterile fashion. Time-out called. The patient and procedure were  properly identified. Afterwards, 0.5% Marcaine with epinephrine was  used for infiltration for local control of pain. Afterwards, a small  somewhat oblique incision was made in the right temporal area just above  the area of the eye and carried down through the subcutaneous tissue. The temporal artery was identified without any difficulty and appeared  to be normal in size with no significant inflammation around it. The  artery was isolated and about a 2-cm segment of the artery was removed.    It was tied off proximally and

## 2019-04-30 LAB — DIABETIC RETINOPATHY: NEGATIVE

## 2019-05-08 ENCOUNTER — TELEPHONE (OUTPATIENT)
Dept: FAMILY MEDICINE CLINIC | Age: 74
End: 2019-05-08

## 2019-05-09 ENCOUNTER — OFFICE VISIT (OUTPATIENT)
Dept: FAMILY MEDICINE CLINIC | Age: 74
End: 2019-05-09
Payer: MEDICARE

## 2019-05-09 ENCOUNTER — TELEPHONE (OUTPATIENT)
Dept: FAMILY MEDICINE CLINIC | Age: 74
End: 2019-05-09

## 2019-05-09 VITALS
DIASTOLIC BLOOD PRESSURE: 66 MMHG | SYSTOLIC BLOOD PRESSURE: 128 MMHG | HEIGHT: 64 IN | RESPIRATION RATE: 12 BRPM | BODY MASS INDEX: 35.68 KG/M2 | WEIGHT: 209 LBS | TEMPERATURE: 97.2 F | OXYGEN SATURATION: 94 % | HEART RATE: 86 BPM

## 2019-05-09 DIAGNOSIS — M79.10 MYALGIA: ICD-10-CM

## 2019-05-09 DIAGNOSIS — E11.9 TYPE 2 DIABETES MELLITUS WITHOUT COMPLICATION, WITHOUT LONG-TERM CURRENT USE OF INSULIN (HCC): Primary | ICD-10-CM

## 2019-05-09 DIAGNOSIS — R51.9 FACIAL PAIN: ICD-10-CM

## 2019-05-09 DIAGNOSIS — E11.9 TYPE 2 DIABETES MELLITUS WITHOUT COMPLICATION, WITHOUT LONG-TERM CURRENT USE OF INSULIN (HCC): ICD-10-CM

## 2019-05-09 DIAGNOSIS — F33.42 MAJOR DEPRESSIVE DISORDER, RECURRENT, IN FULL REMISSION (HCC): ICD-10-CM

## 2019-05-09 PROCEDURE — 3288F FALL RISK ASSESSMENT DOCD: CPT | Performed by: INTERNAL MEDICINE

## 2019-05-09 PROCEDURE — 99214 OFFICE O/P EST MOD 30 MIN: CPT | Performed by: INTERNAL MEDICINE

## 2019-05-09 RX ORDER — ATORVASTATIN CALCIUM 20 MG/1
20 TABLET, FILM COATED ORAL DAILY
COMMUNITY
End: 2019-05-09 | Stop reason: SINTOL

## 2019-05-09 RX ORDER — LANCETS 30 GAUGE
1 EACH MISCELLANEOUS 3 TIMES DAILY
Qty: 100 EACH | Refills: 5 | Status: SHIPPED | OUTPATIENT
Start: 2019-05-09 | End: 2019-05-12 | Stop reason: SDUPTHER

## 2019-05-09 RX ORDER — PREDNISONE 20 MG/1
40 TABLET ORAL DAILY
Qty: 20 TABLET | Refills: 0 | Status: SHIPPED | OUTPATIENT
Start: 2019-05-09 | End: 2019-05-19

## 2019-05-09 NOTE — TELEPHONE ENCOUNTER
[facility] Needs directions clarified for test strips and lancets. She will also need a script for meter unless patient has one? They would need to know what brand? Her # is [phone].

## 2019-05-09 NOTE — TELEPHONE ENCOUNTER
Message not accurate  Pt has been on Lipitor for a long time  I actually saw the prescription at visit today.   In fact I just withheld it today because of fatigue

## 2019-05-09 NOTE — PROGRESS NOTES
Subjective:      Patient ID: Leo Crawley is a 69 y.o. female    Follow up for management of facial pain and fatigue. HPI    Pt presents for follow up regarding management of facial pain, scalp pain and fatigue. Placed on steroid treatment and temporal artery biopsy negative for suspected GCA. Fatigue and pain improved for the short period of time when steroids were taken but now resurged upon upon completion of steroid. Denies muscle pain or weakness. Denies depression, suicidal ideation or anxiety. Attests to feeling sad due to loss of both parents 3 years ago and  last year. Compliant with Celexa 20mg and Lipitor 20mg well. Past Medical History:   Diagnosis Date    Anxiety     DM (diabetes mellitus) ([facility] [address] 4/14/2015    Hepatitis B infection     Hyperlipidemia     Hypothyroidism     Insomnia     Leg pain 4/14/2015    Post herpetic neuralgia     Unspecified sleep apnea     after [facility] they state she is does not have sleep apnea. Not using cpap     Past Surgical History:   Procedure Laterality Date    ANKLE FRACTURE SURGERY  09/2016    DR ANDRADE  LT    BIOPSY / LIGATION TEMPORAL ARTERY Right 4/26/2019    RIGHT TEMPORAL ARTERY BIOPSY performed by Madeleine Joshua MD at [facility]  2015    surgery for transgeminal neuraglia. Insertion of sponge for chronic pain    COLONOSCOPY  3/31/14    DR Daniel Orosco    HYSTERECTOMY      PARTIAL    LYMPHADENECTOMY      OTHER SURGICAL HISTORY Right     Trigeminal Neuralgia    [place] COLON CA SCRN NOT  [address] [place] [address] N/A 7/24/2017    COLONOSCOPY performed by Christin Brooke MD at [address] [place] [address] Road History     Socioeconomic History    Marital status:       Spouse name: Not on file    Number of children: Not on file    Years of education: Not on file    Highest education level: Not on file   Occupational History    Not on file   Social Needs    Financial resource strain: Not on file    Food insecurity: Worry: Not on file     Inability: Not on file    Transportation needs:     Medical: Not on file     Non-medical: Not on file   Tobacco Use    Smoking status: Former Smoker     Packs/day: 1.00     Years: 48.00     Pack years: 48.00     Types: Cigarettes     Last attempt to quit: 2013     Years since quittin.7    Smokeless tobacco: Never Used   Substance and Sexual Activity    Alcohol use: No     Comment: very rarely    Drug use: No    Sexual activity: Yes     Partners: Male   Lifestyle    Physical activity:     Days per week: Not on file     Minutes per session: Not on file    Stress: Not on file   Relationships    Social connections:     Talks on phone: Not on file     Gets together: Not on file     Attends Taoist service: Not on file     Active member of club or organization: Not on file     Attends meetings of clubs or organizations: Not on file     Relationship status: Not on file    Intimate partner violence:     Fear of current or ex partner: Not on file     Emotionally abused: Not on file     Physically abused: Not on file     Forced sexual activity: Not on file   Other Topics Concern    Not on file   Social History Narrative    Not on file     Family History   Problem Relation Age of Onset    Stroke Mother     High Cholesterol Mother     Cancer Mother      Allergies:  Cefdinir and Metformin and related  Patient Active Problem List   Diagnosis    Hepatitis B infection    Sleep apnea    Anxiety    Insomnia    Fibromyalgia    Hyperlipidemia    Hypothyroidism    Dyspnea    [facility]    Leg pain    Depression    Frequent headaches     Current Outpatient Medications on File Prior to Visit   Medication Sig Dispense Refill    citalopram (CELEXA) 20 MG tablet TAKE 1 TABLET DAILY (NEED APPOINTMENT FOR FURTHER REFILLS) 180 tablet 0    famotidine (PEPCID) 20 MG tablet TAKE 1 TABLET TWICE A DAY (NEED APPOINTMENT FOR FURTHER REFILLS) 180 tablet 0    levothyroxine (SYNTHROID) 150 MCG tablet Take 1 tablet by mouth Daily 90 tablet 0    aspirin 81 MG EC tablet Take 1 tablet by mouth daily 90 tablet 3    SITagliptin (JANUVIA) 100 MG tablet Take 1 tablet by mouth daily 90 tablet 1    celecoxib (CELEBREX) 200 MG capsule TAKE 1 CAPSULE DAILY 90 capsule 0    b complex vitamins capsule Take 1 capsule by mouth daily 30 capsule 3     No current facility-administered medications on file prior to visit. Review of Systems   Constitutional: Negative for chills, diaphoresis, fatigue and fever. HENT: Negative for congestion, ear discharge, ear pain, rhinorrhea, sinus pressure, sinus pain, sneezing and sore throat. Respiratory: Negative for cough, shortness of breath and wheezing. Cardiovascular: Negative for chest pain. Gastrointestinal: Negative for abdominal pain, diarrhea, nausea and vomiting. Endocrine: Negative for cold intolerance and heat intolerance. Genitourinary: Negative for dysuria and frequency. Neurological: Positive for weakness and headaches. Negative for dizziness and light-headedness. Psychiatric/Behavioral: Negative for dysphoric mood. The patient is not nervous/anxious. Objective:   /66 (Site: Left Wrist, Position: Sitting, Cuff Size: Medium Adult)   Pulse 86   Temp 97.2 °F (36.2 °C) (Temporal)   Resp 12   Ht 5' 4\" (1.626 m)   Wt 209 lb (94.8 kg)   LMP 02/28/1982   SpO2 94%   BMI 35.87 kg/m²     Physical Exam   Constitutional: She is oriented to person, place, and time. She appears well-developed and well-nourished. She appears distressed (tired-looking). HENT:   Well healed right temporal incision, no TTP   Cardiovascular: Normal rate, regular rhythm and normal heart sounds. Pulmonary/Chest: Effort normal and breath sounds normal. No respiratory distress. Abdominal: Soft. Normal appearance and bowel sounds are normal. She exhibits no distension. There is no hepatosplenomegaly. There is no tenderness.

## 2019-05-10 PROBLEM — F33.42 MAJOR DEPRESSIVE DISORDER, RECURRENT, IN FULL REMISSION (HCC): Status: ACTIVE | Noted: 2019-05-10

## 2019-05-10 ASSESSMENT — ENCOUNTER SYMPTOMS
COUGH: 0
NAUSEA: 0
VOMITING: 0
SINUS PAIN: 0
SORE THROAT: 0
ABDOMINAL PAIN: 0
DIARRHEA: 0
WHEEZING: 0
SINUS PRESSURE: 0
SHORTNESS OF BREATH: 0
RHINORRHEA: 0

## 2019-05-12 RX ORDER — LANCETS 30 GAUGE
1 EACH MISCELLANEOUS 3 TIMES DAILY
Qty: 100 EACH | Refills: 5 | Status: SHIPPED | OUTPATIENT
Start: 2019-05-12

## 2019-05-13 ENCOUNTER — OFFICE VISIT (OUTPATIENT)
Dept: SURGERY | Age: 74
End: 2019-05-13
Payer: MEDICARE

## 2019-05-13 VITALS — SYSTOLIC BLOOD PRESSURE: 136 MMHG | DIASTOLIC BLOOD PRESSURE: 84 MMHG | TEMPERATURE: 96.3 F

## 2019-05-13 DIAGNOSIS — R51.9 FREQUENT HEADACHES: Primary | ICD-10-CM

## 2019-05-13 PROCEDURE — 99212 OFFICE O/P EST SF 10 MIN: CPT | Performed by: SURGERY

## 2019-05-13 ASSESSMENT — ENCOUNTER SYMPTOMS
ABDOMINAL DISTENTION: 0
BLOOD IN STOOL: 0
SHORTNESS OF BREATH: 0
RHINORRHEA: 0
ALLERGIC/IMMUNOLOGIC NEGATIVE: 1
ABDOMINAL PAIN: 0
CHEST TIGHTNESS: 0
RECTAL PAIN: 0
NAUSEA: 0
COLOR CHANGE: 0

## 2019-05-13 NOTE — PROGRESS NOTES
Subjective:      Patient ID: Barrett Rosales is a 69 y.o. female. HPI  She is S/P right temporal artery biopsy on 4/26/2019. The biopsy was normal she denies pain. She has been scheduled to see a rheumatologist.     Review of Systems   Constitutional: Positive for fatigue. Negative for activity change, appetite change and unexpected weight change. HENT: Negative for congestion, nosebleeds, rhinorrhea and sneezing. Right sided headaches and visual problems   Eyes: Negative for visual disturbance. Respiratory: Negative for chest tightness and shortness of breath. Cardiovascular: Negative for chest pain and leg swelling. Gastrointestinal: Negative for abdominal distention, abdominal pain, blood in stool, nausea and rectal pain. Endocrine: Negative. Genitourinary: Negative for difficulty urinating. Musculoskeletal: Negative. Skin: Negative for color change. Allergic/Immunologic: Negative. Neurological: Positive for weakness. Negative for seizures, light-headedness, numbness and headaches. Hematological: Does not bruise/bleed easily. Psychiatric/Behavioral: Negative for sleep disturbance. Objective:   Physical Exam   HENT:   Head:       incision is well approximated, no erythema present, minimal swelling, mild tenderness, no drainage is present, skin sutures/glue present.      /84   Temp 96.3 °F (35.7 °C) (Temporal)   LMP 02/28/1982   Assessment:      Satisfactory course      Plan:      Return to see me as needed        Ness Roman MD

## 2019-05-14 NOTE — TELEPHONE ENCOUNTER
Pharmacy notified, the Pharmacist, Jennifer Hooker is asking if there is another statin that you would recommend that patient would do better on, or if the lipitor is only being held temporarily. Please advise.

## 2019-05-31 ENCOUNTER — TELEPHONE (OUTPATIENT)
Dept: FAMILY MEDICINE CLINIC | Age: 74
End: 2019-05-31

## 2019-05-31 NOTE — TELEPHONE ENCOUNTER
Nancy Torres (daughter) says the [facility]   are no longer helping her mother with the pain. Would like to know if the dosage can be increased.

## 2019-06-07 ENCOUNTER — OFFICE VISIT (OUTPATIENT)
Dept: FAMILY MEDICINE CLINIC | Age: 74
End: 2019-06-07
Payer: MEDICARE

## 2019-06-07 VITALS
HEIGHT: 64 IN | BODY MASS INDEX: 35.87 KG/M2 | DIASTOLIC BLOOD PRESSURE: 78 MMHG | SYSTOLIC BLOOD PRESSURE: 122 MMHG | TEMPERATURE: 97.2 F | HEART RATE: 83 BPM | RESPIRATION RATE: 16 BRPM | OXYGEN SATURATION: 98 %

## 2019-06-07 DIAGNOSIS — R51.9 FACIAL PAIN: Primary | ICD-10-CM

## 2019-06-07 DIAGNOSIS — R53.83 FATIGUE, UNSPECIFIED TYPE: ICD-10-CM

## 2019-06-07 DIAGNOSIS — R51.9 FACIAL PAIN: ICD-10-CM

## 2019-06-07 DIAGNOSIS — E11.9 TYPE 2 DIABETES MELLITUS WITHOUT COMPLICATION, WITHOUT LONG-TERM CURRENT USE OF INSULIN (HCC): ICD-10-CM

## 2019-06-07 DIAGNOSIS — Z00.00 HEALTH CARE MAINTENANCE: ICD-10-CM

## 2019-06-07 DIAGNOSIS — M79.10 MYALGIA: ICD-10-CM

## 2019-06-07 LAB
HEPATITIS C ANTIBODY INTERPRETATION: NORMAL
TSH SERPL DL<=0.05 MIU/L-ACNC: 1.96 UIU/ML (ref 0.44–3.86)

## 2019-06-07 PROCEDURE — 99213 OFFICE O/P EST LOW 20 MIN: CPT | Performed by: INTERNAL MEDICINE

## 2019-06-07 PROCEDURE — 96372 THER/PROPH/DIAG INJ SC/IM: CPT | Performed by: INTERNAL MEDICINE

## 2019-06-07 RX ORDER — KETOROLAC TROMETHAMINE 30 MG/ML
60 INJECTION, SOLUTION INTRAMUSCULAR; INTRAVENOUS ONCE
Status: COMPLETED | OUTPATIENT
Start: 2019-06-07 | End: 2019-06-07

## 2019-06-07 RX ORDER — KETOROLAC TROMETHAMINE 10 MG/1
10 TABLET, FILM COATED ORAL EVERY 6 HOURS PRN
Qty: 20 TABLET | Refills: 0 | Status: SHIPPED | OUTPATIENT
Start: 2019-06-07 | End: 2019-11-26

## 2019-06-07 RX ADMIN — KETOROLAC TROMETHAMINE 60 MG: 30 INJECTION, SOLUTION INTRAMUSCULAR; INTRAVENOUS at 15:11

## 2019-06-07 NOTE — PROGRESS NOTES
Subjective:      Patient ID: Jose Angel is a 69 y.o. female    Follow up for management of facial and scalp pain    HPI     Pt presents for follow up regarding management of facial pain, scalp pain and fatigue. Placed on steroid treatment and temporal artery biopsy negative for suspected GCA. Fatigue and pain transiently improved on steroids but resurged thereafter. This was with an additional DC of Lipitor. Denies muscle pain or weakness, denies headache or thigh/arm ache. 4/2019 ophthalmology appt: no diabetic retinopathy. Appt rheumatologist pending. Brain MRI/MRA 2015, mild atrophy, otherwise negative. Past Medical History:   Diagnosis Date    Anxiety     DM (diabetes mellitus) ([facility] [address] 4/14/2015    Hepatitis B infection     Hyperlipidemia     Hypothyroidism     Insomnia     Leg pain 4/14/2015    Post herpetic neuralgia     Unspecified sleep apnea     [facility] they state she is does not have sleep apnea. Not using cpap     Past Surgical History:   Procedure Laterality Date    ANKLE FRACTURE SURGERY  09/2016    DR ANDRADE  LT    BIOPSY / LIGATION TEMPORAL ARTERY Right 4/26/2019    RIGHT TEMPORAL ARTERY BIOPSY performed by Ofe Mcdaniels MD at [facility]  2015    surgery for transgeminal neuraglia. Insertion of sponge for chronic pain    COLONOSCOPY  3/31/14    DR Cely Krishnamurthy    HYSTERECTOMY      PARTIAL    LYMPHADENECTOMY      OTHER SURGICAL HISTORY Right     Trigeminal Neuralgia    [facility] COLON CA SCRN NOT  [address] [facility] N/A 7/24/2017    COLONOSCOPY performed by Abby Hills MD at [address] [place] [address] History     Socioeconomic History    Marital status:       Spouse name: Not on file    Number of children: Not on file    Years of education: Not on file    Highest education level: Not on file   Occupational History    Not on file   Social Needs    Financial resource strain: Not on file    Food insecurity:     Worry: Not on file Inability: Not on file    Transportation needs:     Medical: Not on file     Non-medical: Not on file   Tobacco Use    Smoking status: Former Smoker     Packs/day: 1.00     Years: 48.00     Pack years: 48.00     Types: Cigarettes     Last attempt to quit: 2013     Years since quittin.8    Smokeless tobacco: Never Used   Substance and Sexual Activity    Alcohol use: No     Comment: very rarely    Drug use: No    Sexual activity: Yes     Partners: Male   Lifestyle    Physical activity:     Days per week: Not on file     Minutes per session: Not on file    Stress: Not on file   Relationships    Social connections:     Talks on phone: Not on file     Gets together: Not on file     Attends Sabianist service: Not on file     Active member of club or organization: Not on file     Attends meetings of clubs or organizations: Not on file     Relationship status: Not on file    Intimate partner violence:     Fear of current or ex partner: Not on file     Emotionally abused: Not on file     Physically abused: Not on file     Forced sexual activity: Not on file   Other Topics Concern    Not on file   Social History Narrative    Not on file     Family History   Problem Relation Age of Onset    Stroke Mother     High Cholesterol Mother     Cancer Mother      Allergies:  Cefdinir and Metformin and related  Patient Active Problem List   Diagnosis    Hepatitis B infection    Sleep apnea    Anxiety    Insomnia    Fibromyalgia    Hyperlipidemia    Hypothyroidism    Dyspnea    DM [facility]diabetes mellitus) ([facility] [address]    Leg pain    Depression    Frequent headaches    Major depressive disorder, recurrent, in full remission ([facility] [address]     Current Outpatient Medications on File Prior to Visit   Medication Sig Dispense Refill    blood glucose test strips (TRUETEST TEST) strip Test blood glucose fasting and 1 hour post lunch and dinner 100 each 5    Lancets MISC Inject 1 each into the skin 3 times daily Use early in the normal. No respiratory distress. Abdominal: Soft. Normal appearance and bowel sounds are normal. There is no hepatosplenomegaly. There is no tenderness. Neurological: She is alert and oriented to person, place, and time. Assessment:       Diagnosis Orders   1. Facial pain  CT FACIAL BONES W CONTRAST    ketorolac (TORADOL) injection 60 mg    ketorolac (TORADOL) 10 MG tablet   2. Fatigue, unspecified type     3. Type 2 diabetes mellitus without complication, without long-term current use of insulin (HCC)  Microalbumin / Creatinine Urine Ratio   4. Health care maintenance  TSH Without Reflex    Hepatitis C Antibody      Plan:      Orders Placed This Encounter   Procedures    CT FACIAL BONES W CONTRAST     Standing Status:   Future     Standing Expiration Date:   6/7/2020     Order Specific Question:   Reason for exam:     Answer:   right facial pain    Microalbumin / Creatinine Urine Ratio     Standing Status:   Future     Standing Expiration Date:   6/6/2020    TSH Without Reflex     Standing Status:   Future     Number of Occurrences:   1     Standing Expiration Date:   6/7/2020    Hepatitis C Antibody     Standing Status:   Future     Number of Occurrences:   1     Standing Expiration Date:   6/7/2020     Orders Placed This Encounter   Medications    ketorolac (TORADOL) injection 60 mg    ketorolac (TORADOL) 10 MG tablet     Sig: Take 1 tablet by mouth every 6 hours as needed for Pain     Dispense:  20 tablet     Refill:  0     No follow-ups on file. Pt will make appt with me after rheumatology appt.

## 2019-06-07 NOTE — TELEPHONE ENCOUNTER
Patient is aware, has appointment with Dr. Reina Marie on 6-10-19. Also has appointment with you today so she will discuss when she comes in.

## 2019-06-08 ASSESSMENT — ENCOUNTER SYMPTOMS
SINUS PAIN: 0
DIARRHEA: 0
ABDOMINAL PAIN: 0
WHEEZING: 0
NAUSEA: 0
COUGH: 0
VOMITING: 0
SORE THROAT: 0
SHORTNESS OF BREATH: 0
RHINORRHEA: 0
SINUS PRESSURE: 0

## 2019-06-09 LAB
ANTINUCLEAR AB INTERPRETIVE COMMENT: NORMAL
ANTINUCLEAR ANTIBODY, HEP-2, IGG: NORMAL

## 2019-06-13 ENCOUNTER — TELEPHONE (OUTPATIENT)
Dept: FAMILY MEDICINE CLINIC | Age: 74
End: 2019-06-13

## 2019-06-13 RX ORDER — DULOXETIN HYDROCHLORIDE 30 MG/1
30 CAPSULE, DELAYED RELEASE ORAL DAILY
Qty: 90 CAPSULE | Refills: 0 | Status: SHIPPED | OUTPATIENT
Start: 2019-06-13 | End: 2019-08-20 | Stop reason: SDUPTHER

## 2019-06-13 NOTE — TELEPHONE ENCOUNTER
Patient is concerned that the Ketorolac is making her sick. As of now, she has stopped taking it but would like to know if a script for either duloxetine or Cymbalta can be written and sent to her pharmacy for nerve pain and depression, please advise, thank you.

## 2019-06-17 ENCOUNTER — HOSPITAL ENCOUNTER (OUTPATIENT)
Dept: CT IMAGING | Age: 74
Discharge: HOME | End: 2019-06-19
Payer: MEDICARE

## 2019-06-17 DIAGNOSIS — R51.9 FACIAL PAIN: ICD-10-CM

## 2019-06-17 PROCEDURE — 6360000004 HC RX CONTRAST MEDICATION: Performed by: INTERNAL MEDICINE

## 2019-06-17 PROCEDURE — 70487 CT MAXILLOFACIAL W/DYE: CPT

## 2019-06-17 RX ADMIN — IOPAMIDOL 100 ML: 612 INJECTION, SOLUTION INTRAVENOUS at 14:28

## 2019-06-18 RX ORDER — LEVOTHYROXINE SODIUM 0.15 MG/1
TABLET ORAL
Qty: 90 TABLET | Refills: 0 | Status: SHIPPED | OUTPATIENT
Start: 2019-06-18 | End: 2019-09-16 | Stop reason: SDUPTHER

## 2019-06-18 RX ORDER — FAMOTIDINE 20 MG/1
TABLET, FILM COATED ORAL
Qty: 180 TABLET | Refills: 0 | Status: SHIPPED | OUTPATIENT
Start: 2019-06-18 | End: 2019-09-16 | Stop reason: SDUPTHER

## 2019-07-23 ENCOUNTER — TELEPHONE (OUTPATIENT)
Dept: FAMILY MEDICINE CLINIC | Age: 74
End: 2019-07-23

## 2019-08-20 DIAGNOSIS — F32.A DEPRESSION, UNSPECIFIED DEPRESSION TYPE: ICD-10-CM

## 2019-08-20 DIAGNOSIS — M79.7 FIBROMYALGIA: ICD-10-CM

## 2019-08-20 RX ORDER — DULOXETIN HYDROCHLORIDE 30 MG/1
CAPSULE, DELAYED RELEASE ORAL
Qty: 90 CAPSULE | Refills: 4 | Status: SHIPPED | OUTPATIENT
Start: 2019-08-20 | End: 2020-02-11 | Stop reason: ALTCHOICE

## 2019-09-17 ENCOUNTER — OFFICE VISIT (OUTPATIENT)
Dept: FAMILY MEDICINE CLINIC | Age: 74
End: 2019-09-17
Payer: MEDICARE

## 2019-09-17 VITALS
BODY MASS INDEX: 35.87 KG/M2 | RESPIRATION RATE: 14 BRPM | OXYGEN SATURATION: 96 % | HEART RATE: 88 BPM | TEMPERATURE: 97.7 F | SYSTOLIC BLOOD PRESSURE: 120 MMHG | DIASTOLIC BLOOD PRESSURE: 76 MMHG | HEIGHT: 64 IN

## 2019-09-17 DIAGNOSIS — Z00.00 ENCOUNTER FOR MEDICARE ANNUAL WELLNESS EXAM: Primary | ICD-10-CM

## 2019-09-17 DIAGNOSIS — Z87.891 PERSONAL HISTORY OF TOBACCO USE: ICD-10-CM

## 2019-09-17 DIAGNOSIS — E11.9 TYPE 2 DIABETES MELLITUS WITHOUT COMPLICATION, WITHOUT LONG-TERM CURRENT USE OF INSULIN (HCC): ICD-10-CM

## 2019-09-17 PROCEDURE — G0296 VISIT TO DETERM LDCT ELIG: HCPCS | Performed by: INTERNAL MEDICINE

## 2019-09-17 PROCEDURE — 99214 OFFICE O/P EST MOD 30 MIN: CPT | Performed by: INTERNAL MEDICINE

## 2019-09-17 PROCEDURE — 99397 PER PM REEVAL EST PAT 65+ YR: CPT | Performed by: INTERNAL MEDICINE

## 2019-09-17 RX ORDER — FAMOTIDINE 20 MG/1
TABLET, FILM COATED ORAL
Qty: 180 TABLET | Refills: 4 | Status: SHIPPED | OUTPATIENT
Start: 2019-09-17 | End: 2020-12-09

## 2019-09-17 RX ORDER — LEVOTHYROXINE SODIUM 0.15 MG/1
TABLET ORAL
Qty: 90 TABLET | Refills: 4 | Status: SHIPPED | OUTPATIENT
Start: 2019-09-17 | End: 2020-12-09

## 2019-09-17 ASSESSMENT — PATIENT HEALTH QUESTIONNAIRE - PHQ9
SUM OF ALL RESPONSES TO PHQ QUESTIONS 1-9: 1
SUM OF ALL RESPONSES TO PHQ QUESTIONS 1-9: 1

## 2019-09-17 ASSESSMENT — LIFESTYLE VARIABLES
HOW OFTEN DURING THE LAST YEAR HAVE YOU BEEN UNABLE TO REMEMBER WHAT HAPPENED THE NIGHT BEFORE BECAUSE YOU HAD BEEN DRINKING: 0
HOW OFTEN DO YOU HAVE A DRINK CONTAINING ALCOHOL: 1
HOW OFTEN DURING THE LAST YEAR HAVE YOU FOUND THAT YOU WERE NOT ABLE TO STOP DRINKING ONCE YOU HAD STARTED: 0
HOW OFTEN DURING THE LAST YEAR HAVE YOU NEEDED AN ALCOHOLIC DRINK FIRST THING IN THE MORNING TO GET YOURSELF GOING AFTER A NIGHT OF HEAVY DRINKING: 0
HAVE YOU OR SOMEONE ELSE BEEN INJURED AS A RESULT OF YOUR DRINKING: 0
HOW OFTEN DO YOU HAVE SIX OR MORE DRINKS ON ONE OCCASION: 0
HAS A RELATIVE, FRIEND, DOCTOR, OR ANOTHER HEALTH PROFESSIONAL EXPRESSED CONCERN ABOUT YOUR DRINKING OR SUGGESTED YOU CUT DOWN: 0
HOW OFTEN DURING THE LAST YEAR HAVE YOU FAILED TO DO WHAT WAS NORMALLY EXPECTED FROM YOU BECAUSE OF DRINKING: 0
HOW OFTEN DURING THE LAST YEAR HAVE YOU HAD A FEELING OF GUILT OR REMORSE AFTER DRINKING: 0

## 2019-09-17 NOTE — TELEPHONE ENCOUNTER
Pharmacy requesting medication refill.  Please approve or deny this request.    Rx requested:  Requested Prescriptions     Pending Prescriptions Disp Refills    levothyroxine (SYNTHROID) 150 MCG tablet [Pharmacy Med Name: L-THYROXINE (SYNTHROID) TABS 150MCG] 90 tablet 4     Sig: TAKE 1 TABLET DAILY (NEED APPOINTMENT FOR FURTHER REFILLS)    famotidine (PEPCID) 20 MG tablet [Pharmacy Med Name: FAMOTIDINE TABS 20MG] 180 tablet 4     Sig: TAKE 1 TABLET TWICE A DAY (NEED APPOINTMENT FOR FURTHER REFILLS)         Last Office Visit:   6/7/2019      Next Visit Date:  Future Appointments   Date Time Provider Sosa Barragan   9/17/2019  3:15 PM Shahrzad Moreno MD [address]

## 2019-09-19 PROCEDURE — 90715 TDAP VACCINE 7 YRS/> IM: CPT | Performed by: INTERNAL MEDICINE

## 2019-09-19 PROCEDURE — 90471 IMMUNIZATION ADMIN: CPT | Performed by: INTERNAL MEDICINE

## 2019-09-25 RX ORDER — SITAGLIPTIN 100 MG/1
TABLET, FILM COATED ORAL
Qty: 90 TABLET | Refills: 4 | Status: SHIPPED | OUTPATIENT
Start: 2019-09-25 | End: 2020-12-19

## 2019-09-27 ENCOUNTER — TELEPHONE (OUTPATIENT)
Dept: CASE MANAGEMENT | Age: 74
End: 2019-09-27

## 2019-10-02 ENCOUNTER — HOSPITAL ENCOUNTER (OUTPATIENT)
Dept: CT IMAGING | Age: 74
Discharge: HOME | End: 2019-10-04
Payer: MEDICARE

## 2019-10-02 DIAGNOSIS — Z87.891 PERSONAL HISTORY OF TOBACCO USE: ICD-10-CM

## 2019-10-02 PROCEDURE — G0297 LDCT FOR LUNG CA SCREEN: HCPCS

## 2019-11-26 ENCOUNTER — OFFICE VISIT (OUTPATIENT)
Dept: FAMILY MEDICINE CLINIC | Age: 74
End: 2019-11-26
Payer: MEDICARE

## 2019-11-26 VITALS
SYSTOLIC BLOOD PRESSURE: 124 MMHG | TEMPERATURE: 98 F | RESPIRATION RATE: 18 BRPM | HEIGHT: 64 IN | HEART RATE: 84 BPM | BODY MASS INDEX: 35.87 KG/M2 | DIASTOLIC BLOOD PRESSURE: 74 MMHG | OXYGEN SATURATION: 98 %

## 2019-11-26 DIAGNOSIS — J20.9 ACUTE BRONCHITIS, UNSPECIFIED ORGANISM: Primary | ICD-10-CM

## 2019-11-26 PROBLEM — D23.9 DILATED PORE OF WINER: Status: ACTIVE | Noted: 2018-02-12

## 2019-11-26 PROBLEM — L57.8 DIFFUSE PHOTODAMAGE OF SKIN: Status: ACTIVE | Noted: 2018-02-12

## 2019-11-26 PROBLEM — L81.4 LENTIGINES: Status: ACTIVE | Noted: 2018-02-12

## 2019-11-26 PROBLEM — G50.0 TRIGEMINAL NEURALGIA: Status: ACTIVE | Noted: 2018-06-21

## 2019-11-26 PROBLEM — D18.01 CHERRY ANGIOMA: Status: ACTIVE | Noted: 2019-02-18

## 2019-11-26 PROBLEM — D22.9 MULTIPLE BENIGN NEVI: Status: ACTIVE | Noted: 2019-02-18

## 2019-11-26 PROBLEM — L82.1 SEBORRHEIC KERATOSIS: Status: ACTIVE | Noted: 2018-02-12

## 2019-11-26 PROBLEM — L81.6 POIKILODERMA: Status: ACTIVE | Noted: 2018-02-12

## 2019-11-26 PROBLEM — L73.2 HIDRADENITIS SUPPURATIVA: Status: ACTIVE | Noted: 2019-02-18

## 2019-11-26 PROCEDURE — 99213 OFFICE O/P EST LOW 20 MIN: CPT | Performed by: FAMILY MEDICINE

## 2019-11-26 RX ORDER — AZITHROMYCIN 250 MG/1
250 TABLET, FILM COATED ORAL SEE ADMIN INSTRUCTIONS
Qty: 6 TABLET | Refills: 0 | Status: SHIPPED | OUTPATIENT
Start: 2019-11-26 | End: 2019-12-01

## 2019-11-27 ASSESSMENT — ENCOUNTER SYMPTOMS
CONSTIPATION: 0
ABDOMINAL PAIN: 0
NAUSEA: 0
SINUS PRESSURE: 0
TROUBLE SWALLOWING: 0
SINUS PAIN: 0
COUGH: 1
CHEST TIGHTNESS: 0
VOMITING: 0
RHINORRHEA: 1
SHORTNESS OF BREATH: 0
DIARRHEA: 0
SORE THROAT: 0

## 2020-02-11 ENCOUNTER — OFFICE VISIT (OUTPATIENT)
Dept: FAMILY MEDICINE CLINIC | Age: 75
End: 2020-02-11
Payer: MEDICARE

## 2020-02-11 VITALS
DIASTOLIC BLOOD PRESSURE: 68 MMHG | BODY MASS INDEX: 35.87 KG/M2 | TEMPERATURE: 96.7 F | HEART RATE: 92 BPM | SYSTOLIC BLOOD PRESSURE: 110 MMHG | RESPIRATION RATE: 14 BRPM | OXYGEN SATURATION: 97 % | HEIGHT: 64 IN

## 2020-02-11 PROCEDURE — 99213 OFFICE O/P EST LOW 20 MIN: CPT | Performed by: INTERNAL MEDICINE

## 2020-02-11 RX ORDER — AMOXICILLIN AND CLAVULANATE POTASSIUM 875; 125 MG/1; MG/1
1 TABLET, FILM COATED ORAL 2 TIMES DAILY
Qty: 10 TABLET | Refills: 0 | Status: SHIPPED | OUTPATIENT
Start: 2020-02-11 | End: 2020-02-11 | Stop reason: SDUPTHER

## 2020-02-11 RX ORDER — AMOXICILLIN AND CLAVULANATE POTASSIUM 875; 125 MG/1; MG/1
1 TABLET, FILM COATED ORAL 2 TIMES DAILY
Qty: 10 TABLET | Refills: 0 | Status: SHIPPED | OUTPATIENT
Start: 2020-02-11 | End: 2020-02-16

## 2020-02-11 RX ORDER — AMOXICILLIN AND CLAVULANATE POTASSIUM 875; 125 MG/1; MG/1
TABLET, FILM COATED ORAL
Qty: 20 TABLET | Refills: 1 | Status: SHIPPED | OUTPATIENT
Start: 2020-02-11 | End: 2020-04-14 | Stop reason: SDUPTHER

## 2020-02-11 RX ORDER — AMOXICILLIN AND CLAVULANATE POTASSIUM 875; 125 MG/1; MG/1
TABLET, FILM COATED ORAL
Qty: 20 TABLET | Refills: 1 | Status: SHIPPED | OUTPATIENT
Start: 2020-02-11 | End: 2020-02-11 | Stop reason: SDUPTHER

## 2020-02-11 NOTE — PROGRESS NOTES
General: She is not in acute distress. Appearance: Normal appearance. She is well-developed. Cardiovascular:      Rate and Rhythm: Normal rate and regular rhythm. Pulses: Normal pulses. Heart sounds: Normal heart sounds. Pulmonary:      Effort: Pulmonary effort is normal. No respiratory distress. Breath sounds: Normal breath sounds. Abdominal:      General: Bowel sounds are normal. There is no distension. Palpations: Abdomen is soft. Tenderness: There is no abdominal tenderness. There is no guarding. Genitourinary:     Comments: Declines genital /groin exam       Assessment:       Diagnosis Orders   1. Cellulitis of left groin  amoxicillin-clavulanate (AUGMENTIN) 875-125 MG per tablet    DISCONTINUED: amoxicillin-clavulanate (AUGMENTIN) 875-125 MG per tablet   2. Hx of hidradenitis suppurativa  amoxicillin-clavulanate (AUGMENTIN) 875-125 MG per tablet    DISCONTINUED: amoxicillin-clavulanate (AUGMENTIN) 875-125 MG per tablet   3. Type 2 diabetes mellitus without complication, without long-term current use of insulin ([facility])       Plan:      Orders Placed This Encounter   Medications    DISCONTD: amoxicillin-clavulanate (AUGMENTIN) 875-125 MG per tablet     Sig: Take 1 tablet by mouth 2 times daily for 5 days     Dispense:  10 tablet     Refill:  0    DISCONTD: amoxicillin-clavulanate (AUGMENTIN) 875-125 MG per tablet     Sig: When you get an abscess flare, take 1 tablet twice daily for 5 days. Dispense:  20 tablet     Refill:  1    amoxicillin-clavulanate (AUGMENTIN) 875-125 MG per tablet     Sig: Take 1 tablet by mouth 2 times daily for 5 days     Dispense:  10 tablet     Refill:  0    amoxicillin-clavulanate (AUGMENTIN) 875-125 MG per tablet     Sig: When you get an abscess flare, take 1 tablet twice daily for 5 days. Dispense:  20 tablet     Refill:  1     Return in about 3 months (around 5/11/2020) for review of test results.

## 2020-02-12 ENCOUNTER — TELEPHONE (OUTPATIENT)
Dept: FAMILY MEDICINE CLINIC | Age: 75
End: 2020-02-12

## 2020-02-12 ASSESSMENT — ENCOUNTER SYMPTOMS
SHORTNESS OF BREATH: 0
VOMITING: 0
DIARRHEA: 0
WHEEZING: 0
COUGH: 0
NAUSEA: 0
ABDOMINAL PAIN: 0

## 2020-02-19 RX ORDER — CITALOPRAM 20 MG/1
TABLET ORAL
Qty: 90 TABLET | Refills: 2 | Status: SHIPPED | OUTPATIENT
Start: 2020-02-19 | End: 2020-04-14 | Stop reason: SDUPTHER

## 2020-02-19 NOTE — TELEPHONE ENCOUNTER
Ramo is requesting medication refill.  Please approve or deny this request.    Rx requested:  Requested Prescriptions      No prescriptions requested or ordered in this encounter         Last Office Visit:   2/11/2020      Next Visit Date:  Future Appointments   Date Time Provider Sosa Barragan   5/26/2020  3:15 PM Dana Ruvalcaba MD [address] [facility]

## 2020-02-24 ENCOUNTER — HOSPITAL ENCOUNTER (OUTPATIENT)
Dept: LAB | Age: 75
Discharge: HOME | End: 2020-02-24
Payer: MEDICARE

## 2020-02-24 LAB
CREATININE URINE: 229.6 MG/DL
HBA1C MFR BLD: 7.2 % (ref 4.8–5.9)
MICROALBUMIN UR-MCNC: <1.2 MG/DL
MICROALBUMIN/CREAT UR-RTO: NORMAL MG/G (ref 0–30)

## 2020-02-24 PROCEDURE — 83036 HEMOGLOBIN GLYCOSYLATED A1C: CPT

## 2020-02-24 PROCEDURE — 82043 UR ALBUMIN QUANTITATIVE: CPT

## 2020-02-24 PROCEDURE — 82570 ASSAY OF URINE CREATININE: CPT

## 2020-03-21 ENCOUNTER — TELEPHONE (OUTPATIENT)
Dept: FAMILY MEDICINE CLINIC | Age: 75
End: 2020-03-21

## 2020-03-25 ENCOUNTER — VIRTUAL VISIT (OUTPATIENT)
Dept: FAMILY MEDICINE CLINIC | Age: 75
End: 2020-03-25
Payer: MEDICARE

## 2020-03-25 PROCEDURE — 99442 PR PHYS/QHP TELEPHONE EVALUATION 11-20 MIN: CPT | Performed by: INTERNAL MEDICINE

## 2020-04-14 RX ORDER — CITALOPRAM 20 MG/1
TABLET ORAL
Qty: 90 TABLET | Refills: 2 | Status: SHIPPED | OUTPATIENT
Start: 2020-04-14 | End: 2021-01-21

## 2020-04-14 RX ORDER — AMOXICILLIN AND CLAVULANATE POTASSIUM 875; 125 MG/1; MG/1
TABLET, FILM COATED ORAL
Qty: 20 TABLET | Refills: 1 | Status: SHIPPED | OUTPATIENT
Start: 2020-04-14 | End: 2020-06-25 | Stop reason: ALTCHOICE

## 2020-05-26 ENCOUNTER — VIRTUAL VISIT (OUTPATIENT)
Dept: PRIMARY CARE CLINIC | Age: 75
End: 2020-05-26
Payer: MEDICARE

## 2020-05-26 PROCEDURE — 99214 OFFICE O/P EST MOD 30 MIN: CPT | Performed by: INTERNAL MEDICINE

## 2020-05-26 RX ORDER — SULFAMETHOXAZOLE AND TRIMETHOPRIM 800; 160 MG/1; MG/1
1 TABLET ORAL 2 TIMES DAILY
Qty: 28 TABLET | Refills: 0 | Status: SHIPPED | OUTPATIENT
Start: 2020-05-26 | End: 2020-06-09

## 2020-05-26 ASSESSMENT — ENCOUNTER SYMPTOMS
ABDOMINAL PAIN: 0
WHEEZING: 0
COUGH: 0
COLOR CHANGE: 1
SHORTNESS OF BREATH: 0
RHINORRHEA: 0
DIARRHEA: 0
VOMITING: 0
NAUSEA: 0

## 2020-05-26 NOTE — PROGRESS NOTES
2020    TELEHEALTH EVALUATION -- Audio/Visual (During [facility]-87 public health emergency)    HPI:  Pieter Polanco (:  1945) has requested an audio/video evaluation for the following concern(s):    Pt presents with left groin pain x 1 week  Pt claims left groin feels sore and hard. No fever or chills, no discharge. and for follow up regarding groin pain. Hx hidradenitis suppurativa. Treated with Augmentin in February with temporary relief. No discharge, no   bleed. Review of Systems   Constitutional: Negative for chills, diaphoresis, fatigue and fever. HENT: Negative for congestion, ear discharge, ear pain and rhinorrhea. Respiratory: Negative for cough, shortness of breath and wheezing. Cardiovascular: Negative for chest pain. Gastrointestinal: Negative for abdominal pain, diarrhea, nausea and vomiting. Endocrine: Negative for cold intolerance and heat intolerance. Genitourinary: Negative for dysuria and frequency. Skin: Positive for color change. Negative for rash. Neurological: Negative for dizziness and light-headedness. Prior to Visit Medications    Medication Sig Taking? Authorizing Provider   sulfamethoxazole-trimethoprim (BACTRIM DS) 800-160 MG per tablet Take 1 tablet by mouth 2 times daily for 14 days Yes Allie Calix MD   citalopram (CELEXA) 20 MG tablet TAKE 1 TABLET DAILY (NEED APPOINTMENT FOR FURTHER REFILLS)  Allie Calix MD   amoxicillin-clavulanate (AUGMENTIN) 875-125 MG per tablet When you get an abscess flare, take 1 tablet twice daily for 5 days.   Allie Calix MD   JANUVIA 100 MG tablet TAKE 1 TABLET DAILY  Allie Calix MD   levothyroxine (SYNTHROID) 150 MCG tablet TAKE 1 TABLET DAILY (NEED APPOINTMENT FOR FURTHER REFILLS)  Allie Calix MD   famotidine (PEPCID) 20 MG tablet TAKE 1 TABLET TWICE A DAY (NEED APPOINTMENT FOR FURTHER REFILLS)  Allie Calix MD   Blood Glucose Monitoring Suppl (TRUE METRIX METER) w/Device KIT use to test sugar  Historical Provider, MD   blood glucose test strips (TRUETEST TEST) strip Test blood glucose fasting and 1 hour post lunch and dinner  Darshana Calderón MD   Lancets MISC Inject 1 each into the skin 3 times daily Use early in the morning fasting and 1 hour post lunch and dinner  Darshana Calderón MD   aspirin 81 MG EC tablet Take 1 tablet by mouth daily  Darshana Calderón MD   celecoxib (CELEBREX) 200 MG capsule TAKE 1 CAPSULE DAILY  Patient not taking: Reported on 2020  Julieth Le, DO       Social History     Tobacco Use    Smoking status: Former Smoker     Packs/day: 1.00     Years: 48.00     Pack years: 48.00     Types: Cigarettes     Last attempt to quit: 2013     Years since quittin.8    Smokeless tobacco: Never Used   Substance Use Topics    Alcohol use: No     Comment: very rarely    Drug use: No      Allergies   Allergen Reactions    Cefdinir      Diarrhea      Metformin And Related      Swelling leg   ,   Past Medical History:   Diagnosis Date    Anxiety     DM (diabetes mellitus) ([address] 2015    Hepatitis B infection     Hyperlipidemia     Hypothyroidism     Insomnia     Leg pain 2015    Post herpetic neuralgia     Unspecified sleep apnea     after seeing [facility] they state she is does not have sleep apnea. Not using cpap   ,   Past Surgical History:   Procedure Laterality Date    ANKLE FRACTURE SURGERY  2016    DR ANDRADE  LT    BIOPSY / LIGATION TEMPORAL ARTERY Right 2019    RIGHT TEMPORAL ARTERY BIOPSY performed by Gerardo Jasso [facility] at [facility]      surgery for transgeminal neuraglia.   Insertion of sponge for chronic pain    COLONOSCOPY  3/31/14    DR Brian Gardner    HYSTERECTOMY      PARTIAL    LYMPHADENECTOMY      OTHER SURGICAL HISTORY Right     Trigeminal Neuralgia    [facility] NOT  [facility]    COLONOSCOPY performed by Ashlee Appiah MD at [address] [place] [address]   ,   Social History     Tobacco Use    Smoking status: Former Smoker     Packs/day: Surgery, [place]  - sulfamethoxazole-trimethoprim (BACTRIM DS) 800-160 MG per tablet; Take 1 tablet by mouth 2 times daily for 14 days  Dispense: 28 tablet; Refill: 0    Return in about 1 month (around 6/26/2020) for assessment of response to treatment. [place] Lucero is a 77 y.o. female being evaluated by a Virtual Visit (video visit) encounter to address concerns as mentioned above. A caregiver was present when appropriate. Due to this being a TeleHealth encounter (During [facility]-38 public health emergency), evaluation of the following organ systems was limited: Vitals/Constitutional/EENT/Resp/CV/GI//MS/Neuro/Skin/Heme-Lymph-Imm. Pursuant to the emergency declaration under the [address] [place] [address], [address] [place] [address] authority and the [employer] Act, this Virtual Visit was conducted with patient's (and/or legal guardian's) consent, to reduce the patient's risk of exposure to COVID-19 and provide necessary medical care. The patient (and/or legal guardian) has also been advised to contact this office for worsening conditions or problems, and seek emergency medical treatment and/or call 911 if deemed necessary. Patient identification was verified at the start of the visit: Yes    Total time spent on this encounter: Not billed by time    Services were provided through a video synchronous discussion virtually to substitute for in-person clinic visit. Patient and provider were located at their individual homes. --Harsh Coker MD on 5/26/2020 at 6:42 PM    An electronic signature was used to authenticate this note.

## 2020-06-25 ENCOUNTER — VIRTUAL VISIT (OUTPATIENT)
Dept: PRIMARY CARE CLINIC | Age: 75
End: 2020-06-25
Payer: MEDICARE

## 2020-06-25 PROCEDURE — 99441 PR PHYS/QHP TELEPHONE EVALUATION 5-10 MIN: CPT | Performed by: INTERNAL MEDICINE

## 2020-06-25 NOTE — PROGRESS NOTES
Mary Luo is a 77 y.o. female evaluated via telephone on 6/25/2020. Consent:  She and/or health care decision maker is aware that that she may receive a bill for this telephone service, depending on her insurance coverage, and has provided verbal consent to proceed: Yes    Documentation:  I communicated with the patient and/or health care decision maker. Details of this discussion including any medical advice provided:     Pt presents for follow up regarding treatment of hidradenitis suppurativa of the groin. ABscess is resolved since antibiotic completion. As such declines Gen Surg appt. I affirm this is a Patient Initiated Episode with a Patient who has not had a related appointment within my department in the past 7 days or scheduled within the next 24 hours.     Patient identification was verified at the start of the visit: Yes    Total Time: minutes: 5-10 minutes    Note: not billable if this call serves to triage the patient into an appointment for the relevant concern    [facility]

## 2020-07-27 ENCOUNTER — NURSE TRIAGE (OUTPATIENT)
Dept: OTHER | Facility: CLINIC | Age: 75
End: 2020-07-27

## 2020-07-27 ENCOUNTER — TELEPHONE (OUTPATIENT)
Dept: PRIMARY CARE CLINIC | Age: 75
End: 2020-07-27

## 2020-07-27 NOTE — TELEPHONE ENCOUNTER
Her and PCP have investigated this problem before. She gets dizzy and feel like she is going to fall over. It comes and goes. She has been working pulling weeds at her daughters. Not sure if the heat and sun is causing. It has been going on since Thursday, 4 days. It usually happens when she if moving. She has been having trouble with her right ear. It feels plugged. Each time this happens her PCP tell her there is nothing there. She had a clear drainage coming from her right ear a couple of weeks ago. Then the ear got crusty. She has been putting ear drops and first aid cream in her right ear. Now when she takes the cotton ball out of her ear she can see a little blood on the cotton ball. She denies any ear pain. Reason for Disposition   Patient wants to be seen    Answer Assessment - Initial Assessment Questions  1. DESCRIPTION: \"Describe your dizziness. \"      Her and PCP have investigated this problem before. She gets dizzy and feel like she is going to fall over. It comes and goes. She has been working pulling weeds at her daughters. Not sure if the heat and sun is causing. It has been going on since Thursday, 4 days. It usually happens when she if moving. She has been having trouble with her right ear. It feels plugged. Each time this happens her PCP tell her there is nothing there. She had a clear drainage coming from her right ear a couple of weeks ago. Then the ear got crusty. She has been putting ear drops and first aid cream in her right ear. Now when she takes the cotton ball out of her ear she can see a little blood on the cotton ball. She denies any ear pain. 2. LIGHTHEADED: \"Do you feel lightheaded? \" (e.g., somewhat faint, woozy, weak upon standing)        3. VERTIGO: \"Do you feel like either you or the room is spinning or tilting? \" (i.e. vertigo)      Seems she is off balance and grabs something near her. Then the feeling goes away. 4. SEVERITY: \"How bad is it? \" \"Do you feel like you are going to faint? \" \"Can you stand and walk? \"    - MILD - walking normally    - MODERATE - interferes with normal activities (e.g., work, school)     - SEVERE - unable to stand, requires support to walk, feels like passing out now. Mild to moderate, it is more than mild, but she does not think it is enough for her to fall down. Then it passes. 5. ONSET:  \"When did the dizziness begin? \"      4 days ago    6. AGGRAVATING FACTORS: \"Does anything make it worse? \" (e.g., standing, change in head position)     Moving around    7. HEART RATE: \"Can you tell me your heart rate? \" \"How many beats in 15 seconds? \"  (Note: not all patients can do this)          8. CAUSE: \"What do you think is causing the dizziness? \"      No idea, not sure if the ear is related. PCP has had her go to different people to get it checked out. They have not found anything. When this is severe she has pins and needles in top head and around right eye. 9. RECURRENT SYMPTOM: \"Have you had dizziness before? \" If so, ask: \"When was the last time? \" \"What happened that time? \"      Has had this before, it has been a month since the last time. She did have surgery on her brain, December 31, 2013.    10. OTHER SYMPTOMS: \"Do you have any other symptoms? \" (e.g., fever, chest pain, vomiting, diarrhea, bleeding)        Chest heaviness once in a whie, nothing that worries her. 11. PREGNANCY: \"Is there any chance you are pregnant? \" \"When was your last menstrual period? \"        No    Protocols used: DIZZINESS-ADULT-OH    Pod 1    She already has an appointment this afternoon with a dermatologist.  Can not do and appointment today. Received call from [address]. Call soft transferred to [address] to schedule appointment. Please do not reply to the triage nurse through this encounter. Any subsequent communication should be directly with the patient.

## 2020-07-28 ENCOUNTER — HOSPITAL ENCOUNTER (OUTPATIENT)
Dept: MRI IMAGING | Age: 75
Discharge: HOME | End: 2020-07-30
Payer: MEDICARE

## 2020-07-28 ENCOUNTER — OFFICE VISIT (OUTPATIENT)
Dept: PRIMARY CARE CLINIC | Age: 75
End: 2020-07-28
Payer: MEDICARE

## 2020-07-28 VITALS
WEIGHT: 188.8 LBS | OXYGEN SATURATION: 97 % | RESPIRATION RATE: 18 BRPM | HEIGHT: 64 IN | SYSTOLIC BLOOD PRESSURE: 120 MMHG | HEART RATE: 87 BPM | TEMPERATURE: 97.8 F | BODY MASS INDEX: 32.23 KG/M2 | DIASTOLIC BLOOD PRESSURE: 62 MMHG

## 2020-07-28 PROBLEM — E66.01 MORBIDLY OBESE (HCC): Status: ACTIVE | Noted: 2020-07-28

## 2020-07-28 LAB
GFR AFRICAN AMERICAN: >60
GFR NON-AFRICAN AMERICAN: 54
PERFORMED ON: ABNORMAL
POC CREATININE: 1 MG/DL (ref 0.6–1.2)
POC SAMPLE TYPE: ABNORMAL

## 2020-07-28 PROCEDURE — 70553 MRI BRAIN STEM W/O & W/DYE: CPT

## 2020-07-28 PROCEDURE — 99214 OFFICE O/P EST MOD 30 MIN: CPT | Performed by: INTERNAL MEDICINE

## 2020-07-28 PROCEDURE — 6360000004 HC RX CONTRAST MEDICATION: Performed by: INTERNAL MEDICINE

## 2020-07-28 PROCEDURE — 69210 REMOVE IMPACTED EAR WAX UNI: CPT | Performed by: INTERNAL MEDICINE

## 2020-07-28 PROCEDURE — A9579 GAD-BASE MR CONTRAST NOS,1ML: HCPCS | Performed by: INTERNAL MEDICINE

## 2020-07-28 RX ORDER — CIPROFLOXACIN AND DEXAMETHASONE 3; 1 MG/ML; MG/ML
4 SUSPENSION/ DROPS AURICULAR (OTIC) 2 TIMES DAILY
Qty: 1 BOTTLE | Refills: 0 | Status: SHIPPED | OUTPATIENT
Start: 2020-07-28 | End: 2020-08-07

## 2020-07-28 RX ORDER — SODIUM CHLORIDE 9 MG/ML
INJECTION, SOLUTION INTRAVENOUS CONTINUOUS
Status: DISCONTINUED | OUTPATIENT
Start: 2020-07-28 | End: 2020-07-31 | Stop reason: HOSPADM

## 2020-07-28 RX ADMIN — GADOTERIDOL 17 ML: 279.3 INJECTION, SOLUTION INTRAVENOUS at 20:49

## 2020-07-28 ASSESSMENT — ENCOUNTER SYMPTOMS
VOMITING: 0
SINUS PRESSURE: 0
SORE THROAT: 0
WHEEZING: 0
SHORTNESS OF BREATH: 0
SINUS PAIN: 0
COUGH: 0
DIARRHEA: 0
RHINORRHEA: 0
NAUSEA: 0
ABDOMINAL PAIN: 0

## 2020-07-29 NOTE — PROGRESS NOTES
Patient is was having a STAT MRI for dizziness after injecting patient with Prohance gadolinium she began vomiting.   Patient sat up for about 5 minutes and was able to complete exam

## 2020-08-12 ENCOUNTER — OFFICE VISIT (OUTPATIENT)
Dept: PRIMARY CARE CLINIC | Age: 75
End: 2020-08-12
Payer: MEDICARE

## 2020-08-12 VITALS
BODY MASS INDEX: 31.96 KG/M2 | SYSTOLIC BLOOD PRESSURE: 116 MMHG | RESPIRATION RATE: 12 BRPM | HEART RATE: 94 BPM | OXYGEN SATURATION: 98 % | HEIGHT: 64 IN | TEMPERATURE: 98.7 F | DIASTOLIC BLOOD PRESSURE: 60 MMHG | WEIGHT: 187.2 LBS

## 2020-08-12 PROCEDURE — 99214 OFFICE O/P EST MOD 30 MIN: CPT | Performed by: INTERNAL MEDICINE

## 2020-08-12 RX ORDER — METHYLPREDNISOLONE 4 MG/1
TABLET ORAL
Qty: 1 KIT | Refills: 0 | Status: SHIPPED | OUTPATIENT
Start: 2020-08-12 | End: 2020-09-14 | Stop reason: ALTCHOICE

## 2020-08-12 RX ORDER — AMOXICILLIN AND CLAVULANATE POTASSIUM 875; 125 MG/1; MG/1
1 TABLET, FILM COATED ORAL 2 TIMES DAILY
Qty: 20 TABLET | Refills: 0 | Status: SHIPPED | OUTPATIENT
Start: 2020-08-12 | End: 2020-08-22

## 2020-08-12 NOTE — PROGRESS NOTES
Subjective:      Patient ID: Lucio Wilson is a 77 y.o. female  Follow up for management of vertigo. HPI  Pt was seen 2 weeks ago for vertigo and right otitis externa. The latter is resolved. Vertigo persists but brain MRI negative. Still feels off balance upon rising up. Still with right headache and pain around acial pain. Hx trigeminal neuralgia s/p surgery. No arm or thigh pain. Biopsy negative for GCA. No sinus congestion        Morbid obesity- not actively exercising. Advised to increase physical activity 50 minutes  5 dasya week. Low fat/carb diet advised. Vitals 8/12/2020 7/28/2020   Weight 187 lb 3.2 oz 188 lb 12.8 oz     Depression controlled on Celexa, no sucidal ideation or hallucinations. Past Medical History:   Diagnosis Date    Anxiety     DM (diabetes mellitus) ([facility] 4/14/2015    Hepatitis B infection     Hyperlipidemia     Hypothyroidism     Insomnia     Leg pain 4/14/2015    Post herpetic neuralgia     Unspecified sleep apnea     after seeing [facility] they state she is does not have sleep apnea. Not using cpap     Past Surgical History:   Procedure Laterality Date    ANKLE FRACTURE SURGERY  09/2016    DR ANDRADE  LT    BIOPSY / LIGATION TEMPORAL ARTERY Right 4/26/2019    RIGHT TEMPORAL ARTERY BIOPSY performed by Luiz Harrison MD at [facility]  2015    surgery for transgeminal neuraglia. Insertion of sponge for chronic pain    COLONOSCOPY  3/31/14    DR Annye Goldmann    HYSTERECTOMY      PARTIAL    LYMPHADENECTOMY      OTHER SURGICAL HISTORY Right     Trigeminal Neuralgia    [place] COLON CA SCRN NOT  [address] [place] [address] N/A 7/24/2017    COLONOSCOPY performed by Pascual Degroot MD at [address] [place] [address] Road History     Socioeconomic History    Marital status:       Spouse name: Not on file    Number of children: Not on file    Years of education: Not on file    Highest education level: Not on file   Occupational History    Not on file   Social Needs    Financial resource strain: Not on file    Food insecurity     Worry: Not on file     Inability: Not on file    Transportation needs     Medical: Not on file     Non-medical: Not on file   Tobacco Use    Smoking status: Former Smoker     Packs/day: 1.00     Years: 48.00     Pack years: 48.00     Types: Cigarettes     Last attempt to quit: 2013     Years since quittin.0    Smokeless tobacco: Never Used   Substance and Sexual Activity    Alcohol use: No     Comment: very rarely    Drug use: No    Sexual activity: Yes     Partners: Male   Lifestyle    Physical activity     Days per week: Not on file     Minutes per session: Not on file    Stress: Not on file   Relationships    Social connections     Talks on phone: Not on file     Gets together: Not on file     Attends Anabaptist service: Not on file     Active member of club or organization: Not on file     Attends meetings of clubs or organizations: Not on file     Relationship status: Not on file    Intimate partner violence     Fear of current or ex partner: Not on file     Emotionally abused: Not on file     Physically abused: Not on file     Forced sexual activity: Not on file   Other Topics Concern    Not on file   Social History Narrative    Not on file     Family History   Problem Relation Age of Onset    Stroke Mother     High Cholesterol Mother     Cancer Mother      Allergies:  Cefdinir and Metformin and related  Patient Active Problem List   Diagnosis    Hepatitis B infection    Sleep apnea    Anxiety    Insomnia    Trigeminal neuralgia    Hyperlipidemia    Hypothyroidism    Dyspnea    DM [facility] mellitus) [facility]    Leg pain    Depression    Frequent headaches    Major depressive disorder, recurrent, in full remission ([facility]    Cherry angioma    Diffuse photodamage of skin    Dilated pore of Solomon    GERD (gastroesophageal reflux disease)    Hidradenitis suppurativa    Lentigines    Multiple benign nevi  Poikiloderma    Seborrheic keratosis    Morbidly obese (HCC)     Current Outpatient Medications on File Prior to Visit   Medication Sig Dispense Refill    citalopram (CELEXA) 20 MG tablet TAKE 1 TABLET DAILY (NEED APPOINTMENT FOR FURTHER REFILLS) 90 tablet 2    JANUVIA 100 MG tablet TAKE 1 TABLET DAILY 90 tablet 4    levothyroxine (SYNTHROID) 150 MCG tablet TAKE 1 TABLET DAILY (NEED APPOINTMENT FOR FURTHER REFILLS) 90 tablet 4    famotidine (PEPCID) 20 MG tablet TAKE 1 TABLET TWICE A DAY (NEED APPOINTMENT FOR FURTHER REFILLS) 180 tablet 4    Blood Glucose Monitoring Suppl (TRUE METRIX METER) w/Device KIT use to test sugar  0    blood glucose test strips (TRUETEST TEST) strip Test blood glucose fasting and 1 hour post lunch and dinner 100 each 5    Lancets MISC Inject 1 each into the skin 3 times daily Use early in the morning fasting and 1 hour post lunch and dinner 100 each 5    aspirin 81 MG EC tablet Take 1 tablet by mouth daily 90 tablet 3    celecoxib (CELEBREX) 200 MG capsule TAKE 1 CAPSULE DAILY 90 capsule 0     No current facility-administered medications on file prior to visit. Review of Systems   Constitutional: Negative for chills, diaphoresis, fatigue and fever. HENT: Negative for congestion, ear discharge, ear pain, rhinorrhea, sinus pressure, sinus pain, sneezing and sore throat. Respiratory: Negative for cough, shortness of breath and wheezing. Cardiovascular: Negative for chest pain. Gastrointestinal: Negative for abdominal pain, diarrhea, nausea and vomiting. Endocrine: Negative for cold intolerance and heat intolerance. Genitourinary: Negative for dysuria and frequency. Neurological: Positive for dizziness. Negative for seizures, speech difficulty, weakness and light-headedness.      Objective:   /60 (Site: Left Upper Arm, Position: Sitting, Cuff Size: Medium Adult)   Pulse 94   Temp 98.7 °F (37.1 °C) (Temporal)   Resp 12   Ht 5' 4\" (1.626 m)   Wt 187 lb 3.2 oz (84.9 kg)   LMP 02/28/1982   SpO2 98%   BMI 32.13 kg/m²     Physical Exam  Constitutional:       General: She is not in acute distress. Appearance: Normal appearance. She is well-developed. HENT:      Head:      Comments: No scalp TTP     Nose:      Comments: No TM or pharyngeal erythema  Frontal and b/l maxillary (R>L) sinus TTP  Eyes:      General:         Right eye: No discharge. Left eye: No discharge. Conjunctiva/sclera: Conjunctivae normal.   Cardiovascular:      Rate and Rhythm: Normal rate and regular rhythm. Pulses: Normal pulses. Heart sounds: Normal heart sounds. Pulmonary:      Effort: Pulmonary effort is normal. No respiratory distress. Breath sounds: Normal breath sounds. No wheezing or rhonchi. Chest:      Chest wall: No tenderness. Abdominal:      General: Bowel sounds are normal. There is no distension. Palpations: Abdomen is soft. There is no mass. Tenderness: There is no abdominal tenderness. There is no guarding or rebound. Neurological:      Mental Status: She is alert. Psychiatric:         Mood and Affect: Mood normal.         Behavior: Behavior normal.       Assessment:       Diagnosis Orders   1. Subacute sinusitis, unspecified location  methylPREDNISolone (MEDROL, RICHARD,) 4 MG tablet    amoxicillin-clavulanate (AUGMENTIN) 875-125 MG per tablet   2. Major depressive disorder, recurrent, in full remission ([address]     3. Morbidly obese ([address]     4. Hyperlipidemia, unspecified hyperlipidemia type  Lipid Panel   5.  Hypothyroidism, unspecified type  TSH with Reflex     Plan:      Orders Placed This Encounter   Procedures    TSH with Reflex     Standing Status:   Future     Standing Expiration Date:   8/12/2021    Lipid Panel     Standing Status:   Future     Standing Expiration Date:   8/12/2021     Order Specific Question:   Is Patient Fasting?/# of Hours     Answer:   yes     Orders Placed This Encounter   Medications    methylPREDNISolone (MEDROL, RICHARD,) 4 MG tablet     Sig: Take by mouth. Dispense:  1 kit     Refill:  0    amoxicillin-clavulanate (AUGMENTIN) 875-125 MG per tablet     Sig: Take 1 tablet by mouth 2 times daily for 10 days     Dispense:  20 tablet     Refill:  0     Return in about 2 weeks (around 8/26/2020) for Medicare annual well visit and follow up .

## 2020-08-13 ASSESSMENT — ENCOUNTER SYMPTOMS
SHORTNESS OF BREATH: 0
VOMITING: 0
ABDOMINAL PAIN: 0
COUGH: 0
DIARRHEA: 0
SINUS PRESSURE: 0
SORE THROAT: 0
NAUSEA: 0
RHINORRHEA: 0
WHEEZING: 0
SINUS PAIN: 0

## 2020-09-14 ENCOUNTER — OFFICE VISIT (OUTPATIENT)
Dept: PRIMARY CARE CLINIC | Age: 75
End: 2020-09-14
Payer: MEDICARE

## 2020-09-14 VITALS
OXYGEN SATURATION: 97 % | BODY MASS INDEX: 32.88 KG/M2 | SYSTOLIC BLOOD PRESSURE: 118 MMHG | DIASTOLIC BLOOD PRESSURE: 68 MMHG | HEIGHT: 64 IN | HEART RATE: 80 BPM | RESPIRATION RATE: 12 BRPM | WEIGHT: 192.6 LBS | TEMPERATURE: 97.5 F

## 2020-09-14 DIAGNOSIS — R42 VERTIGO: ICD-10-CM

## 2020-09-14 LAB — VITAMIN B-12: 448 PG/ML (ref 232–1245)

## 2020-09-14 PROCEDURE — 99214 OFFICE O/P EST MOD 30 MIN: CPT | Performed by: INTERNAL MEDICINE

## 2020-09-14 RX ORDER — MECLIZINE HYDROCHLORIDE 25 MG/1
25 TABLET ORAL 3 TIMES DAILY PRN
Qty: 30 TABLET | Refills: 0 | Status: SHIPPED | OUTPATIENT
Start: 2020-09-14 | End: 2022-02-15

## 2020-09-14 ASSESSMENT — ENCOUNTER SYMPTOMS
WHEEZING: 0
VOMITING: 0
ABDOMINAL PAIN: 0
COUGH: 0
SHORTNESS OF BREATH: 0
NAUSEA: 0
DIARRHEA: 0

## 2020-09-14 ASSESSMENT — PATIENT HEALTH QUESTIONNAIRE - PHQ9
SUM OF ALL RESPONSES TO PHQ QUESTIONS 1-9: 2
SUM OF ALL RESPONSES TO PHQ9 QUESTIONS 1 & 2: 2
2. FEELING DOWN, DEPRESSED OR HOPELESS: 1
SUM OF ALL RESPONSES TO PHQ QUESTIONS 1-9: 2
1. LITTLE INTEREST OR PLEASURE IN DOING THINGS: 1

## 2020-09-14 NOTE — PROGRESS NOTES
Subjective:      Patient ID: Mark Emanuel is a 77 y.o. female  Follow up for chronic dizziness  HPI  Pt presents with persistent dizziness despite negative MRI brain and recent treatment for sinusitis. Feels off-balance when walking. Meds Celexa(same dose for many years)     No vegatarian diet, no abd surgey, no tingling or numbness in feet. Attests to sufficient water intake. No ear ringing or hearing loss. Past Medical History:   Diagnosis Date    Anxiety     DM (diabetes mellitus) ([facility] 4/14/2015    Hepatitis B infection     Hyperlipidemia     Hypothyroidism     Insomnia     Leg pain 4/14/2015    Post herpetic neuralgia     Unspecified sleep apnea     after seeing [facility] they state she is does not have sleep apnea. Not using cpap     Past Surgical History:   Procedure Laterality Date    ANKLE FRACTURE SURGERY  09/2016    DR ANDRADE  LT    BIOPSY / LIGATION TEMPORAL ARTERY Right 4/26/2019    RIGHT TEMPORAL ARTERY BIOPSY performed by Diane Cordero [facility] at [facility]  2015    surgery for transgeminal neuraglia. Insertion of sponge for chronic pain    COLONOSCOPY  3/31/14    DR Sherren Settle    HYSTERECTOMY      PARTIAL    LYMPHADENECTOMY      OTHER SURGICAL HISTORY Right     Trigeminal Neuralgia    [place] COLON CA SCRN NOT  W [address] [place] [address] N/A 7/24/2017    COLONOSCOPY performed by Yesi Palacios MD at [address] [place] [address] Road History     Socioeconomic History    Marital status:       Spouse name: Not on file    Number of children: Not on file    Years of education: Not on file    Highest education level: Not on file   Occupational History    Not on file   Social Needs    Financial resource strain: Not on file    Food insecurity     Worry: Not on file     Inability: Not on file    Transportation needs     Medical: Not on file     Non-medical: Not on file   Tobacco Use    Smoking status: Former Smoker     Packs/day: 1.00     Years: 48.00     Pack years: 48.00 DAILY 90 tablet 4    levothyroxine (SYNTHROID) 150 MCG tablet TAKE 1 TABLET DAILY (NEED APPOINTMENT FOR FURTHER REFILLS) 90 tablet 4    famotidine (PEPCID) 20 MG tablet TAKE 1 TABLET TWICE A DAY (NEED APPOINTMENT FOR FURTHER REFILLS) 180 tablet 4    Blood Glucose Monitoring Suppl (TRUE METRIX METER) w/Device KIT use to test sugar  0    blood glucose test strips (TRUETEST TEST) strip Test blood glucose fasting and 1 hour post lunch and dinner 100 each 5    Lancets MISC Inject 1 each into the skin 3 times daily Use early in the morning fasting and 1 hour post lunch and dinner 100 each 5    aspirin 81 MG EC tablet Take 1 tablet by mouth daily 90 tablet 3    celecoxib (CELEBREX) 200 MG capsule TAKE 1 CAPSULE DAILY 90 capsule 0     No current facility-administered medications on file prior to visit. Review of Systems   Constitutional: Negative for chills, diaphoresis, fatigue and fever. HENT: Negative for congestion, ear discharge and ear pain. Respiratory: Negative for cough, shortness of breath and wheezing. Cardiovascular: Negative for chest pain. Gastrointestinal: Negative for abdominal pain, diarrhea, nausea and vomiting. Endocrine: Negative for cold intolerance and heat intolerance. Genitourinary: Negative for dysuria and frequency. Neurological: Positive for dizziness. Negative for light-headedness. Objective:   /68 (Site: Left Upper Arm, Position: Sitting, Cuff Size: Medium Adult)   Pulse 80   Temp 97.5 °F (36.4 °C) (Temporal)   Resp 12   Ht 5' 4\" (1.626 m)   Wt 192 lb 9.6 oz (87.4 kg)   LMP 02/28/1982   SpO2 97%   BMI 33.06 kg/m²     Physical Exam  Constitutional:       General: She is not in acute distress. Appearance: Normal appearance. She is well-developed. HENT:      Head:      Comments: No TM or pharyngeal erythema  No sinus TTP  Cardiovascular:      Rate and Rhythm: Normal rate and regular rhythm. Pulses: Normal pulses.       Heart sounds: Normal heart sounds. No murmur. Pulmonary:      Effort: Pulmonary effort is normal. No respiratory distress. Breath sounds: Normal breath sounds. Abdominal:      General: Bowel sounds are normal. There is no distension. Palpations: Abdomen is soft. There is no mass. Tenderness: There is no abdominal tenderness. There is no guarding or rebound. Neurological:      General: No focal deficit present. Mental Status: She is alert and oriented to person, place, and time. Cranial Nerves: No cranial nerve deficit. Motor: No weakness. Coordination: Coordination normal.      Gait: Gait normal.       Assessment:       Diagnosis Orders   1. Vertigo  Vitamin B12    Methylmalonic Acid, Serum    Joao Real MD, Neurology, [facility]    meclizine (ANTIVERT) 25 MG tablet       Plan:      Orders Placed This Encounter   Procedures    Vitamin B12     Standing Status:   Future     Number of Occurrences:   1     Standing Expiration Date:   9/14/2021    Methylmalonic Acid, Serum     Standing Status:   Future     Number of Occurrences:   1     Standing Expiration Date:   9/14/2021   Floyd Car MD, Neurology, [facility]     Referral Priority:   Routine     Referral Type:   Eval and Treat     Referral Reason:   Specialty Services Required     Referred to Provider:   Eliana Atkinson MD     Requested Specialty:   Neurology     Number of Visits Requested:   1     Orders Placed This Encounter   Medications    meclizine (ANTIVERT) 25 MG tablet     Sig: Take 1 tablet by mouth 3 times daily as needed for Dizziness     Dispense:  30 tablet     Refill:  0     Return in about 1 week (around 9/21/2020) for Medicare annual well visit.

## 2020-09-17 LAB — METHYLMALONIC ACID: 0.27 UMOL/L (ref 0–0.4)

## 2020-10-16 ENCOUNTER — TELEPHONE (OUTPATIENT)
Dept: CASE MANAGEMENT | Age: 75
End: 2020-10-16

## 2020-10-16 NOTE — TELEPHONE ENCOUNTER
Physician documentation on smoking history and CT Lung Screening reviewed. All required documentation complete. Patient is a former smoker (quit 2013- 7 yrs) with a 48 pack year history (1 ppd x 48 years) per physician documentation. Gilford Reason spoke to patient to review details of CT Lung Screening exam and informed patient of entrance to facility as well as requirement to wear a mask due to COVID precautions at this time. All questions answered at this time.

## 2020-10-20 ENCOUNTER — HOSPITAL ENCOUNTER (OUTPATIENT)
Dept: CT IMAGING | Age: 75
Discharge: HOME | End: 2020-10-22
Payer: MEDICARE

## 2020-10-20 PROCEDURE — G0297 LDCT FOR LUNG CA SCREEN: HCPCS

## 2020-10-21 ENCOUNTER — OFFICE VISIT (OUTPATIENT)
Dept: PRIMARY CARE CLINIC | Age: 75
End: 2020-10-21
Payer: MEDICARE

## 2020-10-21 VITALS
SYSTOLIC BLOOD PRESSURE: 110 MMHG | HEIGHT: 64 IN | OXYGEN SATURATION: 99 % | TEMPERATURE: 97.6 F | RESPIRATION RATE: 14 BRPM | HEART RATE: 95 BPM | DIASTOLIC BLOOD PRESSURE: 64 MMHG | BODY MASS INDEX: 33.19 KG/M2 | WEIGHT: 194.4 LBS

## 2020-10-21 PROCEDURE — 90694 VACC AIIV4 NO PRSRV 0.5ML IM: CPT | Performed by: INTERNAL MEDICINE

## 2020-10-21 PROCEDURE — G0438 PPPS, INITIAL VISIT: HCPCS | Performed by: INTERNAL MEDICINE

## 2020-10-21 PROCEDURE — G0008 ADMIN INFLUENZA VIRUS VAC: HCPCS | Performed by: INTERNAL MEDICINE

## 2020-10-21 PROCEDURE — 99213 OFFICE O/P EST LOW 20 MIN: CPT | Performed by: INTERNAL MEDICINE

## 2020-10-21 ASSESSMENT — LIFESTYLE VARIABLES
HOW OFTEN DO YOU HAVE SIX OR MORE DRINKS ON ONE OCCASION: 0
HOW OFTEN DO YOU HAVE SIX OR MORE DRINKS ON ONE OCCASION: NEVER
HOW OFTEN DURING THE LAST YEAR HAVE YOU BEEN UNABLE TO REMEMBER WHAT HAPPENED THE NIGHT BEFORE BECAUSE YOU HAD BEEN DRINKING: 0
HAS A RELATIVE, FRIEND, DOCTOR, OR ANOTHER HEALTH PROFESSIONAL EXPRESSED CONCERN ABOUT YOUR DRINKING OR SUGGESTED YOU CUT DOWN: 0
AUDIT-C TOTAL SCORE: 1
HOW MANY STANDARD DRINKS CONTAINING ALCOHOL DO YOU HAVE ON A TYPICAL DAY: ONE OR TWO
AUDIT-C TOTAL SCORE: 0
HOW OFTEN DURING THE LAST YEAR HAVE YOU HAD A FEELING OF GUILT OR REMORSE AFTER DRINKING: 0
AUDIT TOTAL SCORE: 1
HAVE YOU OR SOMEONE ELSE BEEN INJURED AS A RESULT OF YOUR DRINKING: 0
HAVE YOU OR SOMEONE ELSE BEEN INJURED AS A RESULT OF YOUR DRINKING: NO
AUDIT TOTAL SCORE: 0
HOW OFTEN DURING THE LAST YEAR HAVE YOU FOUND THAT YOU WERE NOT ABLE TO STOP DRINKING ONCE YOU HAD STARTED: 0
HOW OFTEN DURING THE LAST YEAR HAVE YOU FAILED TO DO WHAT WAS NORMALLY EXPECTED FROM YOU BECAUSE OF DRINKING: NEVER
HOW OFTEN DO YOU HAVE A DRINK CONTAINING ALCOHOL: 1
HOW OFTEN DURING THE LAST YEAR HAVE YOU BEEN UNABLE TO REMEMBER WHAT HAPPENED THE NIGHT BEFORE BECAUSE YOU HAD BEEN DRINKING: NEVER
HOW OFTEN DURING THE LAST YEAR HAVE YOU NEEDED AN ALCOHOLIC DRINK FIRST THING IN THE MORNING TO GET YOURSELF GOING AFTER A NIGHT OF HEAVY DRINKING: 0
HOW OFTEN DURING THE LAST YEAR HAVE YOU NEEDED AN ALCOHOLIC DRINK FIRST THING IN THE MORNING TO GET YOURSELF GOING AFTER A NIGHT OF HEAVY DRINKING: NEVER
HOW OFTEN DURING THE LAST YEAR HAVE YOU FAILED TO DO WHAT WAS NORMALLY EXPECTED FROM YOU BECAUSE OF DRINKING: 0
HOW OFTEN DO YOU HAVE A DRINK CONTAINING ALCOHOL: MONTHLY OR LESS
HAS A RELATIVE, FRIEND, DOCTOR, OR ANOTHER HEALTH PROFESSIONAL EXPRESSED CONCERN ABOUT YOUR DRINKING OR SUGGESTED YOU CUT DOWN: NO
HOW OFTEN DURING THE LAST YEAR HAVE YOU HAD A FEELING OF GUILT OR REMORSE AFTER DRINKING: NEVER
HOW MANY STANDARD DRINKS CONTAINING ALCOHOL DO YOU HAVE ON A TYPICAL DAY: 0
HOW OFTEN DURING THE LAST YEAR HAVE YOU FOUND THAT YOU WERE NOT ABLE TO STOP DRINKING ONCE YOU HAD STARTED: NEVER

## 2020-10-21 ASSESSMENT — PATIENT HEALTH QUESTIONNAIRE - PHQ9
7. TROUBLE CONCENTRATING ON THINGS, SUCH AS READING THE NEWSPAPER OR WATCHING TELEVISION: 0
2. FEELING DOWN, DEPRESSED OR HOPELESS: 3
SUM OF ALL RESPONSES TO PHQ QUESTIONS 1-9: 12
5. POOR APPETITE OR OVEREATING: 3
8. MOVING OR SPEAKING SO SLOWLY THAT OTHER PEOPLE COULD HAVE NOTICED. OR THE OPPOSITE, BEING SO FIGETY OR RESTLESS THAT YOU HAVE BEEN MOVING AROUND A LOT MORE THAN USUAL: 0
SUM OF ALL RESPONSES TO PHQ9 QUESTIONS 1 & 2: 6
SUM OF ALL RESPONSES TO PHQ QUESTIONS 1-9: 12
9. THOUGHTS THAT YOU WOULD BE BETTER OFF DEAD, OR OF HURTING YOURSELF: 0
6. FEELING BAD ABOUT YOURSELF - OR THAT YOU ARE A FAILURE OR HAVE LET YOURSELF OR YOUR FAMILY DOWN: 0
SUM OF ALL RESPONSES TO PHQ QUESTIONS 1-9: 12
4. FEELING TIRED OR HAVING LITTLE ENERGY: 3
1. LITTLE INTEREST OR PLEASURE IN DOING THINGS: 3

## 2020-10-21 NOTE — PROGRESS NOTES
Medicare Annual Wellness Visit  Name: James Meyer Date: 10/21/2020   MRN: [id number] Sex: Female   Age: 77 y.o. Ethnicity: Non-/Non    : 1945 Race: Brittany Singh is here for Medicare AWV    Screenings for behavioral, psychosocial and functional/safety risks, and cognitive dysfunction are all negative except as indicated below. These results, as well as other patient data from the [address] form, are documented in Flowsheets linked to this Encounter. Allergies   Allergen Reactions    Cefdinir      Diarrhea      Metformin And Related      Swelling leg         Prior to Visit Medications    Medication Sig Taking?  Authorizing Provider   meclizine (ANTIVERT) 25 MG tablet Take 1 tablet by mouth 3 times daily as needed for Dizziness Yes Antonia Kwok MD   citalopram (CELEXA) 20 MG tablet TAKE 1 TABLET DAILY (NEED APPOINTMENT FOR FURTHER REFILLS) Yes Antonia Kwok MD   JANUVIA 100 MG tablet TAKE 1 TABLET DAILY Yes Antonia Kwok MD   levothyroxine (SYNTHROID) 150 MCG tablet TAKE 1 TABLET DAILY (NEED APPOINTMENT FOR FURTHER REFILLS) Yes Antonia Kwko MD   famotidine (PEPCID) 20 MG tablet TAKE 1 TABLET TWICE A DAY (NEED APPOINTMENT FOR FURTHER REFILLS) Yes Antonia Kwok MD   Blood Glucose Monitoring Suppl (TRUE METRIX METER) w/Device KIT use to test sugar Yes Historical Provider, MD   blood glucose test strips (TRUETEST TEST) strip Test blood glucose fasting and 1 hour post lunch and dinner Yes Antonia Kwok MD   Lancets MISC Inject 1 each into the skin 3 times daily Use early in the morning fasting and 1 hour post lunch and dinner Yes Antonia Kwok MD   aspirin 81 MG EC tablet Take 1 tablet by mouth daily Yes Antonia Kwok MD   celecoxib (CELEBREX) 200 MG capsule TAKE 1 CAPSULE DAILY Yes Robby Courtney DO         Past Medical History:   Diagnosis Date    Anxiety     DM [facility]di[facility] mellitus) ([facility] 2015    Hepatitis B infection     Hyperlipidemia     Hypothyroidism     Insomnia     Leg pain 4/14/2015    Post herpetic neuralgia     Unspecified sleep apnea     after seeing [facility] they state she is does not have sleep apnea. Not using cpap       Past Surgical History:   Procedure Laterality Date    ANKLE FRACTURE SURGERY  09/2016    DR ANDRADE  LT    BIOPSY / LIGATION TEMPORAL ARTERY Right 4/26/2019    RIGHT TEMPORAL ARTERY BIOPSY [facility] at [facility]  2015    surgery for transgeminal neuraglia. Insertion of sponge for chronic pain    COLONOSCOPY  3/31/14    DR Yoselyn Basilio    HYSTERECTOMY      PARTIAL    LYMPHADENECTOMY      OTHER SURGICAL HISTORY Right     Trigeminal Neuralgia    [place] COLON CA SCRN NOT  [address] IND N/A 7/24/2017    COLONOSCOPY performed by Nadir Irene MD at [address] [place] [address]         Family History   Problem Relation Age of Onset    Stroke Mother     High Cholesterol Mother     Cancer Mother        CareTeam (Including outside providers/suppliers regularly involved in providing care):   Patient Care Team:  Abel Quispe MD as PCP - General (Internal Medicine)  Abel Quispe MD as PCP - REHABILITATION [facility] Empaneled Provider    Wt Readings from Last 3 Encounters:   10/21/20 194 lb 6.4 oz (88.2 kg)   09/14/20 192 lb 9.6 oz (87.4 kg)   08/12/20 187 lb 3.2 oz (84.9 kg)     Vitals:    10/21/20 1629   BP: 110/64   Site: Right Upper Arm   Position: Sitting   Cuff Size: Medium Adult   Pulse: 95   Resp: 14   Temp: 97.6 °F (36.4 °C)   TempSrc: Temporal   SpO2: 99%   Weight: 194 lb 6.4 oz (88.2 kg)   Height: 5' 4\" (1.626 m)     Body mass index is 33.37 kg/m². Based upon direct observation of the patient, evaluation of cognition reveals recent and remote memory intact.     General Appearance: alert and oriented to person, place and time, well developed and well- nourished, in no acute distress  Skin: warm and dry, no rash or erythema  Head: normocephalic and atraumatic  Eyes: pupils equal, round, and reactive to light, extraocular eye movements intact, conjunctivae normal  ENT: tympanic membrane, external ear and ear canal normal bilaterally, nose without deformity, nasal mucosa and turbinates normal without polyps  Neck: supple and non-tender without mass, no thyromegaly or thyroid nodules, no cervical lymphadenopathy  Pulmonary/Chest: clear to auscultation bilaterally- no wheezes, rales or rhonchi, normal air movement, no respiratory distress  Cardiovascular: normal rate, regular rhythm, normal S1 and S2, no murmurs, rubs, clicks, or gallops, distal pulses intact, no carotid bruits  Abdomen: soft, non-tender, non-distended, normal bowel sounds, no masses or organomegaly  Extremities: no cyanosis, clubbing or edema  Musculoskeletal: normal range of motion, no joint swelling, deformity or tenderness  Neurologic: reflexes normal and symmetric, no cranial nerve deficit, gait, coordination and speech normal    Patient's complete Health Risk Assessment and screening values have been reviewed and are found in Flowsheets. The following problems were reviewed today and where indicated follow up appointments were made and/or referrals ordered. Positive Risk Factor Screenings with Interventions:     Depression:  PHQ-2 Score: 6  PHQ-9 Total Score: 12    Severity:1-4 = minimal depression, 5-9 = mild depression, 10-14 = moderate depression, 15-19 = moderately severe depression, 20-27 = severe depression  Depression Interventions:  · LPN INTERVENTION GUIDE: SCORE 15 OR ABOVE = MODERATE TO SEVERE DEPRESSION: PCP CONSULTED DURING VISIT    General Health and ACP:  General  In general, how would you say your health is?: Fair  In the past 7 days, have you experienced any of the following?  New or Increased Pain, New or Increased Fatigue, Loneliness, Social Isolation, Stress or Anger?: (!) New or Increased Fatigue  Do you get the social and emotional support that you need?: Yes  Do you have a Living Will?: Yes  Advance Directives     Power of  Living Will ACP-Advance Directive ACP-Power of     Not on File Filed on 07/26/17 Filed [address] [place] Risk Interventions:  · Stress: patient's comments regarding reasons for stress and/or anger: pt just fell prey to internet theft and lost more than $1,000    Health Habits/Nutrition:  Health Habits/Nutrition  Do you exercise for at least 20 minutes 2-3 times per week?: (!) No  Have you lost any weight without trying in the past 3 months?: No  Do you eat fewer than 2 meals per day?: (!) Yes  Have you seen a dentist within the past year?: Yes  Body mass index: (!) 33.37  Health Habits/Nutrition Interventions:  · Inadequate physical activity:  patient agrees to exercise for at least 150 minutes/week    Hearing/Vision:  No exam data present  Hearing/Vision  Do you or your family notice any trouble with your hearing?: (!) Yes  Do you have difficulty driving, watching TV, or doing any of your daily activities because of your eyesight?: No  Have you had an eye exam within the past year?: Yes  Hearing/Vision Interventions:  · none    Safety:  Safety  Do you have working smoke detectors?: Yes  Have all throw rugs been removed or fastened?: (!) No  Do you have non-slip mats or surfaces in all bathtubs/showers?: Yes  Do all of your stairways have a railing or banister?: Yes  Are your doorways, halls and stairs free of clutter?: Yes  Do you always fasten your seatbelt when you are in a car?: Yes  Safety Interventions:  · Home safety tips provided    Personalized Preventive Plan   Current Health Maintenance Status  Immunization History   Administered Date(s) Administered    Influenza A (S3C4-20) Vaccine PF IM 01/12/2010    Influenza Vaccine, unspecified formulation 09/01/2015, 09/17/2015, 10/19/2016, 10/29/2017    Influenza Virus Vaccine 11/06/2014    Influenza, High Dose (Fluzone 65 yrs and older) 09/17/2015, 10/19/2016, 10/12/2018    Influenza, Quadv, adjuvanted, 65 yrs +, IM, PF (Fluad) 10/21/2020    Influenza, Triv, inactivated, subunit, adjuvanted, IM (Fluad 65 yrs and older) 08/28/2019    Pneumococcal Conjugate 13-valent ([id number]) 09/17/2015    Pneumococcal Polysaccharide ([id number]) 10/06/2011    Tdap (Boostrix, Adacel) 04/05/2013, 09/19/2019    Zoster Live (Zostavax) 12/12/2012        Health Maintenance   Topic Date Due    Shingles Vaccine (2 of 3) 02/06/2013    Lipid screen  01/29/2019    Annual Wellness Visit (AWV)  05/29/2019    TSH testing  06/07/2020    Diabetic foot exam  09/17/2020    Breast cancer screen  11/20/2020    A1C test (Diabetic or Prediabetic)  02/24/2021    Diabetic microalbuminuria test  02/24/2021    Diabetic retinal exam  04/30/2021    Low dose CT lung screening  10/20/2021    Colon cancer screen colonoscopy  07/24/2022    DTaP/Tdap/Td vaccine (3 - Td) 09/19/2029    Flu vaccine  Completed    Pneumococcal 65+ years Vaccine  Completed    Hepatitis C screen  Completed    DEXA (modify frequency per FRAX score)  Addressed    Hepatitis A vaccine  Aged Out    Hib vaccine  Aged Out    Meningococcal (ACWY) vaccine  Aged Out     Recommendations for [facility] Due: see orders and patient instructions/AVS.  . Recommended screening schedule for the next 5-10 years is provided to the patient in written form: see Patient Instructions/AVS.    Katharina Arguello was seen today for medicare awv.     Diagnoses and all orders for this visit:    Routine general medical examination at a health care facility
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
## 2020-10-22 ASSESSMENT — ENCOUNTER SYMPTOMS
VOMITING: 0
WHEEZING: 0
DIARRHEA: 0
COUGH: 0
SHORTNESS OF BREATH: 0
ABDOMINAL PAIN: 0
NAUSEA: 0

## 2020-11-11 ENCOUNTER — OFFICE VISIT (OUTPATIENT)
Dept: PRIMARY CARE CLINIC | Age: 75
End: 2020-11-11
Payer: MEDICARE

## 2020-11-11 VITALS
OXYGEN SATURATION: 97 % | HEIGHT: 64 IN | RESPIRATION RATE: 16 BRPM | BODY MASS INDEX: 33.12 KG/M2 | WEIGHT: 194 LBS | SYSTOLIC BLOOD PRESSURE: 112 MMHG | HEART RATE: 85 BPM | DIASTOLIC BLOOD PRESSURE: 64 MMHG

## 2020-11-11 PROCEDURE — 99214 OFFICE O/P EST MOD 30 MIN: CPT | Performed by: INTERNAL MEDICINE

## 2020-11-11 ASSESSMENT — PATIENT HEALTH QUESTIONNAIRE - PHQ9
SUM OF ALL RESPONSES TO PHQ9 QUESTIONS 1 & 2: 0
SUM OF ALL RESPONSES TO PHQ QUESTIONS 1-9: 0
SUM OF ALL RESPONSES TO PHQ QUESTIONS 1-9: 0
1. LITTLE INTEREST OR PLEASURE IN DOING THINGS: 0
SUM OF ALL RESPONSES TO PHQ QUESTIONS 1-9: 0
2. FEELING DOWN, DEPRESSED OR HOPELESS: 0
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
## 2020-11-12 ASSESSMENT — ENCOUNTER SYMPTOMS
COUGH: 0
WHEEZING: 0
ABDOMINAL PAIN: 0
VOMITING: 0
SHORTNESS OF BREATH: 0
SINUS PRESSURE: 0
NAUSEA: 0
SORE THROAT: 0
SINUS PAIN: 0
RHINORRHEA: 0
DIARRHEA: 0

## 2020-11-23 ENCOUNTER — VIRTUAL VISIT (OUTPATIENT)
Dept: PRIMARY CARE CLINIC | Age: 75
End: 2020-11-23
Payer: MEDICARE

## 2020-11-23 ENCOUNTER — NURSE TRIAGE (OUTPATIENT)
Dept: OTHER | Facility: CLINIC | Age: 75
End: 2020-11-23

## 2020-11-23 PROCEDURE — 87804 INFLUENZA ASSAY W/OPTIC: CPT | Performed by: NURSE PRACTITIONER

## 2020-11-23 PROCEDURE — 99442 PR PHYS/QHP TELEPHONE EVALUATION 11-20 MIN: CPT | Performed by: NURSE PRACTITIONER

## 2020-11-23 RX ORDER — ALBUTEROL SULFATE 90 UG/1
2 AEROSOL, METERED RESPIRATORY (INHALATION) EVERY 6 HOURS PRN
Qty: 1 INHALER | Refills: 0 | Status: SHIPPED | OUTPATIENT
Start: 2020-11-23

## 2020-11-23 ASSESSMENT — ENCOUNTER SYMPTOMS
VOMITING: 0
TROUBLE SWALLOWING: 0
NAUSEA: 0
CONSTIPATION: 0
DIARRHEA: 0
ABDOMINAL DISTENTION: 0
WHEEZING: 0
APNEA: 0
EYE ITCHING: 0
EYE REDNESS: 0
SINUS PRESSURE: 0
SINUS PAIN: 0

## 2020-11-23 NOTE — PROGRESS NOTES
2020    Pt and provider completed a telephone visit today. Pt was at her home and provider was in her office. TELEHEALTH EVALUATION -- Audio/Visual (During [id number]-[id number] public health emergency)    PT WAS ADVISED TO GO TO THE ER FOR SOB AND ANY LABORED BREATHING TODAY BUT SHE DENIES SHE NEEDS TO AT THIS TIME AND WILL COME TOMORROW FOR COVID/FLU TESTS    HPI:    Pt presents today with c/o body aches, shortness of breath with exertion at times, fatigue and not feeling well x friday  and she states a sore throat this a.m that has resolved. Pt denies any chest congestion, cough, sinus drainage, chest pain, diarrhea, N/V, rash, loss of sense of smell/taste, dizziness, wheezing, or weakness. Pt has not tried any home treatments. Pt has a HX of DM, HTN, anxiety, HLD, hypothyroidism and a former smoker. Pt was prescribed an inhaler today and advised to make sure to she checks her BS as this can increase her sugars due to it being a steroid. Pt verbalized understanding. Nica Ryan (:  1945) has requested an audio/video evaluation for the following concern(s):  Chief Complaint   Patient presents with    Concern For COVID-19     pt is high risk bracket, only been to store    Generalized Body Aches     x several days    Shortness of Breath     pt reports she feels some SOB or labored breathing at times, can't catch breath sometimes           Review of Systems   Constitutional: Positive for fatigue. Negative for activity change, appetite change and fever. HENT: Positive for postnasal drip and sore throat (pt woke up with sore throat today but this has resolved, denies a strep test). Negative for congestion, drooling, ear pain, hearing loss, sinus pressure, sinus pain and trouble swallowing. Eyes: Negative for redness, itching and visual disturbance. Respiratory: Positive for shortness of breath (pt reports she feels SOB with exertion and labored breathing at times, pt advised ER- but denies ). Negative for apnea, chest tightness and wheezing. Cardiovascular: Negative for chest pain and leg swelling. Gastrointestinal: Negative for abdominal distention, constipation, diarrhea, nausea and vomiting. Endocrine: Negative for heat intolerance. Genitourinary: Negative for difficulty urinating, dysuria and urgency. Musculoskeletal: Negative for gait problem, myalgias and neck stiffness. Skin: Negative for rash. Neurological: Negative for tremors, seizures, facial asymmetry and headaches. Psychiatric/Behavioral: Negative for confusion. All other systems reviewed and are negative. Prior to Visit Medications    Medication Sig Taking?  Authorizing Provider   albuterol sulfate  (90 Base) MCG/ACT inhaler Inhale 2 puffs into the lungs every 6 hours as needed for Wheezing Yes Chantel Pizarro, APRN - CNP   meclizine (ANTIVERT) 25 MG tablet Take 1 tablet by mouth 3 times daily as needed for Dizziness  Asad Cardoso MD   citalopram (CELEXA) 20 MG tablet TAKE 1 TABLET DAILY (NEED APPOINTMENT FOR FURTHER REFILLS)  Asad Cardoso MD   JANUVIA 100 MG tablet TAKE 1 TABLET DAILY  Asad Cardoso MD   levothyroxine (SYNTHROID) 150 MCG tablet TAKE 1 TABLET DAILY (NEED APPOINTMENT FOR FURTHER REFILLS)  Asad Cardoso MD   famotidine (PEPCID) 20 MG tablet TAKE 1 TABLET TWICE A DAY (NEED APPOINTMENT FOR FURTHER REFILLS)  Asad Cardoso MD   Blood Glucose Monitoring Suppl (TRUE METRIX METER) w/Device KIT use to test sugar  Historical Provider, MD   blood glucose test strips (TRUETEST TEST) strip Test blood glucose fasting and 1 hour post lunch and dinner  Asad Cardoso MD   Lancets MISC Inject 1 each into the skin 3 times daily Use early in the morning fasting and 1 hour post lunch and dinner  Asad Cardoso MD   aspirin 81 MG EC tablet Take 1 tablet by mouth daily  Asad Cardoso MD   celecoxib (CELEBREX) 200 MG capsule TAKE 1 CAPSULE DAILY  Hugh Higginbotham DO       Social History     Tobacco Use    Smoking status: Former Smoker Packs/day: 1.00     Years: 48.00     Pack years: 48.00     Types: Cigarettes     Last attempt to quit: 2013     Years since quittin.3    Smokeless tobacco: Never Used   Substance Use Topics    Alcohol use: No     Comment: very rarely    Drug use: No        Allergies   Allergen Reactions    Cefdinir      Diarrhea      Metformin And Related      Swelling leg   ,   Past Medical History:   Diagnosis Date    Anxiety     DM (diabetes mellitus) ([address] 2015    Hepatitis B infection     Hyperlipidemia     Hypothyroidism     Insomnia     Leg pain 2015    Post herpetic neuralgia     Unspecified sleep apnea     after [facility] they state she is does not have sleep apnea. Not using cpap   ,   Past Surgical History:   Procedure Laterality Date    ANKLE FRACTURE SURGERY  2016    DR ANDRADE  LT    BIOPSY / LIGATION TEMPORAL ARTERY Right 2019    RIGHT TEMPORAL ARTERY BIOPSY performed by Candee Riedel, MD at [facility]  2015    surgery for transgeminal neuraglia.   Insertion of sponge for chronic pain    COLONOSCOPY  3/31/14    DR Edward Alford    HYSTERECTOMY      PARTIAL    LYMPHADENECTOMY      OTHER SURGICAL HISTORY Right     Trigeminal Neuralgia    DE COLON CA SCRN NOT  W [address] [place] [address] N/A 2017    COLONOSCOPY performed by Roxana Castro MD at [address] [place] [address]   ,   Social History     Tobacco Use    Smoking status: Former Smoker     Packs/day: 1.00     Years: 48.00     Pack years: 48.00     Types: Cigarettes     Last attempt to quit: 2013     Years since quittin.3    Smokeless tobacco: Never Used   Substance Use Topics    Alcohol use: No     Comment: very rarely    Drug use: No   ,   Family History   Problem Relation Age of Onset    Stroke Mother     High Cholesterol Mother     Cancer Mother    ,   Immunization History   Administered Date(s) Administered    Influenza A ([id number]) Vaccine PF IM 2010    Influenza Vaccine, unspecified

## 2020-11-23 NOTE — TELEPHONE ENCOUNTER
Olivia Vilchis    Brief description of triage: concerned about symptoms of Covid. Shortness of breath, extreme fatigue and headache. Triage indicates for patient to go to office with PCP approval or virtual visit. Screens RED. Care advice provided, patient verbalizes understanding; denies any other questions or concerns; instructed to call back for any new or worsening symptoms. Writer provided warm transfer to Lynnette at [facility] for appointment scheduling. Attention Provider: Thank you for allowing me to participate in the care of your patient. The patient was connected to triage in response to information provided to the ECC. Please do not respond through this encounter as the response is not directed to a shared pool.

## 2020-11-25 ASSESSMENT — ENCOUNTER SYMPTOMS
SHORTNESS OF BREATH: 1
SORE THROAT: 1
CHEST TIGHTNESS: 0

## 2020-11-25 NOTE — PATIENT INSTRUCTIONS
Patient Education        Learning About Coronavirus [phone])  Coronavirus [phone]): Overview  What is coronavirus ([id number])? The coronavirus disease (COVID-19) is caused by a virus. It is an illness that was first found in December 2019. It has since spread worldwide. The virus can cause fever, cough, and trouble breathing. In severe cases, it can cause pneumonia and make it hard to breathe without help. It can cause death. This virus spreads person-to-person through droplets from coughing and sneezing. It can also spread when you are close to someone who is infected. And it can spread when you touch something that has the virus on it, such as a doorknob or a tabletop. Coronaviruses are a large group of viruses. They cause the common cold. They also cause more serious illnesses like [place] East respiratory syndrome (MERS) and severe acute respiratory syndrome (SARS). COVID-19 is caused by a novel coronavirus. That means it's a new type that has not been seen in people before. How is COVID-19 treated? Mild illness can be treated at home, but more serious illness needs to be treated in the hospital. Treatment may include medicines to reduce symptoms, plus breathing support such as oxygen therapy or a ventilator. Other treatments, such as antiviral medicines, may help people who have COVID-19. What can you do to protect yourself from COVID-19? The best way to protect yourself from getting sick is to:  · Avoid areas where there is an outbreak. · Avoid contact with people who may be infected. · Avoid crowds and try to stay at least 6 feet away from other people. · Wash your hands often, especially after you cough or sneeze. Use soap and water, and scrub for at least 20 seconds. If soap and water aren't available, use an alcohol-based hand . · Avoid touching your mouth, nose, and eyes. What can you do to avoid spreading the virus to others?   To help avoid spreading the virus to others:  · [employer] your hands often with soap or alcohol-based hand sanitizers. · Cover your mouth with a tissue when you cough or sneeze. Then throw the tissue in the trash. · Use a disinfectant to clean things that you touch often. These include doorknobs, remote controls, phones, and handles on your refrigerator and microwave. And don't forget countertops, tabletops, bathrooms, and computer keyboards. · Wear a cloth face cover if you have to go to public areas. If you know or suspect that you have COVID-19:  · Stay home. Don't go to school, work, or public areas. And don't use public transportation, ride-shares, or taxis unless you have no choice. · Leave your home only if you need to get medical care or testing. But call the doctor's office first so they know you're coming. And wear a face cover. · Limit contact with people in your home. If possible, stay in a separate bedroom and use a separate bathroom. · Wear a face cover whenever you're around other people. It can help stop the spread of the virus when you cough or sneeze. · Clean and disinfect your home every day. Use household  and disinfectant wipes or sprays. Take special care to clean things that you grab with your hands. · Self-isolate until it's safe to be around others again. ? If you have symptoms, it's safe when you haven't had a fever for 3 days and your symptoms have improved and it's been at least 10 days since your symptoms started. ? If you were exposed to the virus but don't have symptoms, it's safe to be around others 14 days after exposure. ? Talk to your doctor about whether you also need testing, especially if you have a weakened immune system. When to call for help  Call 911 anytime you think you may need emergency care. For example, call if:  · You have severe trouble breathing. (You can't talk at all.)  · You have constant chest pain or pressure. · You are severely dizzy or lightheaded.   · You are confused or can't think therapy or a ventilator. It's important to not spread the virus to others. If you have COVID-19, wear a face cover anytime you are around other people. You need to isolate yourself while you are sick. Leave your home only if you need to get medical care or testing. Follow-up care is a key part of your treatment and safety. Be sure to make and go to all appointments, and call your doctor if you are having problems. It's also a good idea to know your test results and keep a list of the medicines you take. How can you care for yourself at home? · Get extra rest. It can help you feel better. · Drink plenty of fluids. This helps replace fluids lost from fever. Fluids also help ease a scratchy throat. Water, soup, fruit juice, and hot tea with lemon are good choices. · Take acetaminophen (such as Tylenol) to reduce a fever. It may also help with muscle aches. Read and follow all instructions on the label. · Use petroleum jelly on sore skin. This can help if the skin around your nose and lips becomes sore from rubbing a lot with tissues. Tips for self-isolation  · Limit contact with people in your home. If possible, stay in a separate bedroom and use a separate bathroom. · Wear a cloth face cover when you are around other people. It can help stop the spread of the virus when you cough or sneeze. · If you have to leave home, avoid crowds and try to stay at least 6 feet away from other people. · Avoid contact with pets and other animals. · Cover your mouth and nose with a tissue when you cough or sneeze. Then throw it in the trash right away. · Wash your hands often, especially after you cough or sneeze. Use soap and water, and scrub for at least 20 seconds. If soap and water aren't available, use an alcohol-based hand . · Don't share personal household items. These include bedding, towels, cups and glasses, and eating utensils.   · [address] laundry in the warmest water allowed for the fabric type, and dry it completely. It's okay to wash other people's laundry with yours. · Clean and disinfect your home every day. Use household  and disinfectant wipes or sprays. Take special care to clean things that you grab with your hands. These include doorknobs, remote controls, phones, and handles on your refrigerator and microwave. And don't forget countertops, tabletops, bathrooms, and computer keyboards. When you can end self-isolation  · If you know or suspect that you have COVID-19, stay in self-isolation until:  ? You haven't had a fever for 3 days, and  ? Your symptoms have improved, and  ? It's been at least 10 days since your symptoms started. · Talk to your doctor about whether you also need testing, especially if you have a weakened immune system. When should you call for help? Call 911 anytime you think you may need emergency care. For example, call if you have life-threatening symptoms, such as:    · You have severe trouble breathing. (You can't talk at all.)     · You have constant chest pain or pressure.     · You are severely dizzy or lightheaded.     · You are confused or can't think clearly.     · Your face and lips have a blue color.     · You pass out (lose consciousness) or are very hard to wake up. Call your doctor now or seek immediate medical care if:    · You have moderate trouble breathing. (You can't speak a full sentence.)     · You are coughing up blood (more than about 1 teaspoon).     · You have signs of low blood pressure. These include feeling lightheaded; being too weak to stand; and having cold, pale, clammy skin. Watch closely for changes in your health, and be sure to contact your doctor if:    · Your symptoms get worse.     · You are not getting better as expected. Call before you go to the doctor's office. Follow their instructions. And wear a cloth face cover. Current as of: July 10, 2020               Content Version: 12.6  © 2556-1146 [employer]. Care instructions adapted under license by [facility] ([facility]). If you have questions about a medical condition or this instruction, always ask your healthcare professional. [place] [address] any warranty or liability for your use of this information. Patient Education        Shortness of Breath: Care Instructions  Your Care Instructions     Shortness of breath has many causes. Sometimes conditions such as anxiety can lead to shortness of breath. Some people get mild shortness of breath when they exercise. Trouble breathing also can be a symptom of a serious problem, such as asthma, lung disease, emphysema, heart problems, and pneumonia. If your shortness of breath continues, you may need tests and treatment. Watch for any changes in your breathing and other symptoms. Follow-up care is a key part of your treatment and safety. Be sure to make and go to all appointments, and call your doctor if you are having problems. It's also a good idea to know your test results and keep a list of the medicines you take. How can you care for yourself at home? · Do not smoke or allow others to smoke around you. If you need help quitting, talk to your doctor about stop-smoking programs and medicines. These can increase your chances of quitting for good. · Get plenty of rest and sleep. · Take your medicines exactly as prescribed. Call your doctor if you think you are having a problem with your medicine. · Find healthy ways to deal with stress. ? Exercise daily. ? Get plenty of sleep. ? Eat regularly and well. When should you call for help? Call 911 anytime you think you may need emergency care. For example, call if:    · You have severe shortness of breath.     · You have symptoms of a heart attack. These may include:  ? Chest pain or pressure, or a strange feeling in the chest.  ? Sweating. ? Shortness of breath. ? Nausea or vomiting.   ? Pain, pressure, or a strange feeling in the back, neck, jaw, or upper belly or in one or both shoulders or arms. ? Lightheadedness or sudden weakness. ? A fast or irregular heartbeat. After you call 911, the  may tell you to chew 1 adult-strength or 2 to 4 low-dose aspirin. Wait for an ambulance. Do not try to drive yourself. Call your doctor now or seek immediate medical care if:    · Your shortness of breath gets worse or you start to wheeze. Wheezing is a high-pitched sound when you breathe.     · You wake up at night out of breath or have to prop your head up on several pillows to breathe.     · You are short of breath after only light activity or while at rest.   Watch closely for changes in your health, and be sure to contact your doctor if:    · You do not get better over the next 1 to 2 days. Where can you learn more? Go to https://[employer]SnapstreamewThe [employer].INSOMENIA. org and sign in to your [employer] account. Enter S780 in the Autopilot box to learn more about \"Shortness of Breath: Care Instructions. \"     If you do not have an account, please click on the \"Sign Up Now\" link. Current as of: February 24, 2020               Content Version: 12.6  © 2006-2020 [employer]. Care instructions adapted under license by [facility] ([facility]). If you have questions about a medical condition or this instruction, always ask your healthcare professional. [place] [address] any warranty or liability for your use of this information.

## 2020-11-26 LAB
SARS-COV-2: NOT DETECTED
SOURCE: NORMAL

## 2020-11-27 ENCOUNTER — TELEPHONE (OUTPATIENT)
Dept: PRIMARY CARE CLINIC | Age: 75
End: 2020-11-27

## 2020-12-08 ENCOUNTER — OFFICE VISIT (OUTPATIENT)
Dept: PRIMARY CARE CLINIC | Age: 75
End: 2020-12-08
Payer: MEDICARE

## 2020-12-08 VITALS
DIASTOLIC BLOOD PRESSURE: 64 MMHG | WEIGHT: 196 LBS | HEART RATE: 84 BPM | OXYGEN SATURATION: 98 % | RESPIRATION RATE: 18 BRPM | BODY MASS INDEX: 33.46 KG/M2 | SYSTOLIC BLOOD PRESSURE: 112 MMHG | HEIGHT: 64 IN

## 2020-12-08 PROCEDURE — 99214 OFFICE O/P EST MOD 30 MIN: CPT | Performed by: INTERNAL MEDICINE

## 2020-12-08 PROCEDURE — 1111F DSCHRG MED/CURRENT MED MERGE: CPT | Performed by: INTERNAL MEDICINE

## 2020-12-08 RX ORDER — FLUCONAZOLE 150 MG/1
150 TABLET ORAL ONCE
Qty: 1 TABLET | Refills: 0 | Status: SHIPPED | OUTPATIENT
Start: 2020-12-08 | End: 2021-11-02 | Stop reason: SDUPTHER

## 2020-12-08 RX ORDER — PNV NO.95/FERROUS FUM/FOLIC AC 28MG-0.8MG
TABLET ORAL
COMMUNITY
Start: 2020-11-29 | End: 2020-12-08 | Stop reason: SDUPTHER

## 2020-12-08 RX ORDER — CEPHALEXIN 500 MG/1
CAPSULE ORAL
COMMUNITY
Start: 2020-11-29 | End: 2021-04-08

## 2020-12-08 RX ORDER — MELATONIN
1000 DAILY
Qty: 30 TABLET | Refills: 0 | Status: SHIPPED | OUTPATIENT
Start: 2020-12-08 | End: 2022-08-02 | Stop reason: SDUPTHER

## 2020-12-08 RX ORDER — PNV NO.95/FERROUS FUM/FOLIC AC 28MG-0.8MG
TABLET ORAL
Qty: 60 TABLET | Refills: 3 | Status: SHIPPED | OUTPATIENT
Start: 2020-12-08 | End: 2021-06-21

## 2020-12-08 ASSESSMENT — PATIENT HEALTH QUESTIONNAIRE - PHQ9
2. FEELING DOWN, DEPRESSED OR HOPELESS: 0
SUM OF ALL RESPONSES TO PHQ9 QUESTIONS 1 & 2: 0
1. LITTLE INTEREST OR PLEASURE IN DOING THINGS: 0
SUM OF ALL RESPONSES TO PHQ QUESTIONS 1-9: 0

## 2020-12-08 NOTE — PROGRESS NOTES
Post-Discharge Transitional Care Management Services or Hospital Follow Up  Clare Valerio   YOB: 1945  Date of Office Visit:  12/8/2020  Date of Hospital Admission: 4/26/19  Date of Hospital Discharge: 4/26/19  Readmission Risk Score(high >=14%.  Medium >=10%):No data recorded  Care management risk score Rising risk (score 2-5) and Complex Care (Scores >=6): 1     Non face to face  following discharge, date last encounter closed (first attempt may have been earlier): *No documented post hospital discharge outreach found in the last 14 days *No documented post hospital discharge outreach found in the last 14 days   Call initiated 2 business days of discharge: *No response recorded in the last 14 days     Patient Active Problem List   Diagnosis    Hepatitis B infection    Sleep apnea    Anxiety    Insomnia    Trigeminal neuralgia    Hyperlipidemia    Hypothyroidism    Dyspnea    DM [facility]diabetes mellitus) [facility]    Leg pain    Depression    Frequent headaches    Major depressive disorder, recurrent, in full remission ([facility]    Cherry angioma    Diffuse photodamage of skin    Dilated pore of Solomon    GERD (gastroesophageal reflux disease)    Hidradenitis suppurativa    Lentigines    Multiple benign nevi    Poikiloderma    Seborrheic keratosis    Morbidly obese (HCC)     Allergies   Allergen Reactions    Cefdinir      Diarrhea      Metformin And Related      Swelling leg       Medications listed as ordered at the time of discharge from hospital   Milton Certain L   Home Medication Instructions KRISTIAN:    Printed on:12/11/20 5605   Medication Information                      albuterol sulfate  (90 Base) MCG/ACT inhaler  Inhale 2 puffs into the lungs every 6 hours as needed for Wheezing             aspirin 81 MG EC tablet  Take 1 tablet by mouth daily             Blood Glucose Monitoring Suppl (TRUE METRIX METER) w/Device KIT  use to test sugar             blood glucose test strips (TRUETEST TEST) strip  Test blood glucose fasting and 1 hour post lunch and dinner             celecoxib (CELEBREX) 200 MG capsule  TAKE 1 CAPSULE DAILY             cephALEXin (KEFLEX) 500 MG capsule  Take 1 capsule by mouth every 8 hours for 7 days. citalopram (CELEXA) 20 MG tablet  TAKE 1 TABLET DAILY (NEED APPOINTMENT FOR FURTHER REFILLS)             famotidine (PEPCID) 20 MG tablet  TAKE 1 TABLET TWICE A DAY (NEED APPOINTMENT FOR FURTHER REFILLS)             Ferrous Sulfate (IRON) 325 (65 Fe) MG TABS  Take 1 tablet by mouth twice daily. JANUVIA 100 MG tablet  TAKE 1 TABLET DAILY             Lancets MISC  Inject 1 each into the skin 3 times daily Use early in the morning fasting and 1 hour post lunch and dinner             levothyroxine (SYNTHROID) 150 MCG tablet  TAKE 1 TABLET DAILY (NEED APPOINTMENT FOR FURTHER REFILLS)             meclizine (ANTIVERT) 25 MG tablet  Take 1 tablet by mouth 3 times daily as needed for Dizziness             vitamin D3 (CHOLECALCIFEROL) 25 MCG (1000 UT) TABS tablet  Take 1 tablet by mouth daily              Medications marked \"taking\" at this time  Outpatient Medications Marked as Taking for the 20 encounter (Office Visit) with Norma Jackson MD   Medication Sig Dispense Refill    cephALEXin (KEFLEX) 500 MG capsule Take 1 capsule by mouth every 8 hours for 7 days.  [] fluconazole (DIFLUCAN) 150 MG tablet Take 1 tablet by mouth once for 1 dose 1 tablet 0    vitamin D3 (CHOLECALCIFEROL) 25 MCG (1000 UT) TABS tablet Take 1 tablet by mouth daily 30 tablet 0    Ferrous Sulfate (IRON) 325 (65 Fe) MG TABS Take 1 tablet by mouth twice daily.  60 tablet 3    albuterol sulfate  (90 Base) MCG/ACT inhaler Inhale 2 puffs into the lungs every 6 hours as needed for Wheezing 1 Inhaler 0    meclizine (ANTIVERT) 25 MG tablet Take 1 tablet by mouth 3 times daily as needed for Dizziness 30 tablet 0    citalopram (CELEXA) 20 MG tablet TAKE 1 TABLET DAILY (NEED APPOINTMENT FOR FURTHER REFILLS) 90 tablet 2    JANUVIA 100 MG tablet TAKE 1 TABLET DAILY 90 tablet 4    [DISCONTINUED] levothyroxine (SYNTHROID) 150 MCG tablet TAKE 1 TABLET DAILY (NEED APPOINTMENT FOR FURTHER REFILLS) 90 tablet 4    [DISCONTINUED] famotidine (PEPCID) 20 MG tablet TAKE 1 TABLET TWICE A DAY (NEED APPOINTMENT FOR FURTHER REFILLS) 180 tablet 4    Blood Glucose Monitoring Suppl (TRUE METRIX METER) w/Device KIT use to test sugar  0    blood glucose test strips (TRUETEST TEST) strip Test blood glucose fasting and 1 hour post lunch and dinner 100 each 5    Lancets MISC Inject 1 each into the skin 3 times daily Use early in the morning fasting and 1 hour post lunch and dinner 100 each 5    aspirin 81 MG EC tablet Take 1 tablet by mouth daily 90 tablet 3    celecoxib (CELEBREX) 200 MG capsule TAKE 1 CAPSULE DAILY 90 capsule 0   Medications patient taking as of now reconciled against medications ordered at time of hospital discharge: Yes  Chief Complaint   Patient presents with    Follow-Up from Hospital     Patient presents today for a follow up after hospital visit. Pt was discharged on 11/29/2020.  Vaginal Itching     Patient would like fluconazole for her vaginal itch. Post hospital   HPI  Pt admitted for left leg cellulitis, placed on IV antibiotics. Gradual improvement noted. US neg for DVT. HARIS also noted (Hb 7.1). Last colonoscopy 2017 showed 2 polyps and to repeat in 2022. No bloody or black stools, no more dizziness. No vaginal bleed. No more dizziness but feels tired. Pt claims to have received 2 pints of RBC in the hospital.   Last Hb 8.1. Attests to tiredness, no SOB. Vaginal itch, no discharge, no painful or bloody urination. Inpatient course: Discharge summary reviewed- see chart. Interval history/Current status: stable    Review of Systems   Constitutional: Negative for chills, diaphoresis, fatigue and fever.    HENT: Negative for congestion, ear discharge, ear pain, rhinorrhea, sinus pressure, sinus pain, sneezing and sore throat. Respiratory: Negative for cough, shortness of breath and wheezing. Cardiovascular: Negative for chest pain. Gastrointestinal: Negative for abdominal pain, diarrhea, nausea and vomiting. Endocrine: Negative for cold intolerance and heat intolerance. Genitourinary: Negative for dysuria, frequency, hematuria and vaginal discharge. Skin: Positive for color change. Negative for rash and wound. Neurological: Positive for weakness. Negative for dizziness and light-headedness. Psychiatric/Behavioral: Negative for dysphoric mood, sleep disturbance and suicidal ideas. The patient is not nervous/anxious. Vitals:    12/08/20 1626   BP: 112/64   Site: Left Upper Arm   Position: Sitting   Cuff Size: Medium Adult   Pulse: 84   Resp: 18   SpO2: 98%   Weight: 196 lb (88.9 kg)   Height: 5' 4\" (1.626 m)     Body mass index is 33.64 kg/m². Wt Readings from Last 3 Encounters:   12/08/20 196 lb (88.9 kg)   11/11/20 194 lb (88 kg)   10/21/20 194 lb 6.4 oz (88.2 kg)     BP Readings from Last 3 Encounters:   12/08/20 112/64   11/11/20 112/64   10/21/20 110/64     Physical Exam  Constitutional:       General: She is not in acute distress. Appearance: Normal appearance. She is well-developed. Eyes:      Conjunctiva/sclera: Conjunctivae normal.   Cardiovascular:      Rate and Rhythm: Normal rate and regular rhythm. Pulses: Normal pulses. Heart sounds: Normal heart sounds. No murmur. Pulmonary:      Effort: Pulmonary effort is normal. No respiratory distress. Breath sounds: Normal breath sounds. No wheezing. Abdominal:      General: Bowel sounds are normal. There is no distension. Palpations: Abdomen is soft. There is no mass. Tenderness: There is no abdominal tenderness. There is no guarding or rebound.    Musculoskeletal:      Comments: B/l LE symmetry, no erythema, but mild TTP   Skin: Coloration: Skin is not pale. Neurological:      General: No focal deficit present. Mental Status: She is alert. Cranial Nerves: No cranial nerve deficit. Psychiatric:         Behavior: Behavior normal.         Thought Content: Thought content normal.         Judgment: Judgment normal.      Comments: Sad affect     Assessment/Plan:  1. Cellulitis of left lower extremity  -improving  - CO DISCHARGE MEDS RECONCILED W/ CURRENT OUTPATIENT MED LIST  2. Iron deficiency anemia, unspecified iron deficiency anemia type  - Ciera Jim MD, Gastroenterology, [place]  - CBC With Auto Differential; Future  - CO DISCHARGE MEDS RECONCILED W/ CURRENT OUTPATIENT MED LIST  3. Candidal vaginitis  -post antibiotic  - fluconazole (DIFLUCAN) 150 MG tablet; Take 1 tablet by mouth once for 1 dose  Dispense: 1 tablet; Refill: 0  4. Preventative health care  - Vitamin D 25 Hydroxy; Future  - vitamin D3 (CHOLECALCIFEROL) 25 MCG (1000 UT) TABS tablet; Take 1 tablet by mouth daily  Dispense: 30 tablet; Refill: 0  - Comprehensive Metabolic Panel;  Future      Medical Decision Making: moderate complexity

## 2020-12-09 RX ORDER — LEVOTHYROXINE SODIUM 0.15 MG/1
TABLET ORAL
Qty: 90 TABLET | Refills: 3 | Status: SHIPPED | OUTPATIENT
Start: 2020-12-09 | End: 2021-12-05

## 2020-12-09 RX ORDER — FAMOTIDINE 20 MG/1
TABLET, FILM COATED ORAL
Qty: 180 TABLET | Refills: 3 | Status: SHIPPED | OUTPATIENT
Start: 2020-12-09 | End: 2021-12-05

## 2020-12-09 NOTE — TELEPHONE ENCOUNTER
Rx request   Requested Prescriptions     Pending Prescriptions Disp Refills    famotidine (PEPCID) 20 MG tablet [Pharmacy Med Name: FAMOTIDINE TABS 20MG] 180 tablet 3     Sig: TAKE 1 TABLET TWICE A DAY (NEED APPOINTMENT FOR FURTHER REFILLS)    levothyroxine (SYNTHROID) 150 MCG tablet [Pharmacy Med Name: L-THYROXINE (SYNTHROID) TABS 150MCG] 90 tablet 3     Sig: TAKE 1 TABLET DAILY (NEED APPOINTMENT FOR FURTHER REFILLS)     LOV 12/8/2020  Next Visit Date:  Future Appointments   Date Time Provider Sosa Barragan   1/7/2021  4:00 PM [facility] [address]   1/12/2021  4:00 PM MD Sosa Snyder

## 2020-12-11 ASSESSMENT — ENCOUNTER SYMPTOMS
SORE THROAT: 0
COLOR CHANGE: 1
VOMITING: 0
WHEEZING: 0
ABDOMINAL PAIN: 0
DIARRHEA: 0
COUGH: 0
RHINORRHEA: 0
SINUS PAIN: 0
NAUSEA: 0
SINUS PRESSURE: 0
SHORTNESS OF BREATH: 0

## 2020-12-18 NOTE — TELEPHONE ENCOUNTER
Rx request   Requested Prescriptions     Pending Prescriptions Disp Refills    JANUVIA 100 MG tablet [Pharmacy Med Name: Verónica Jack 100MG] 90 tablet 3     Sig: TAKE 1 TABLET DAILY     LOV 12/8/2020  Next Visit Date:  Future Appointments   Date Time Provider Sosa Barragan   12/29/2020  2:15 PM Moise Joe [address] [facility]   1/7/2021  4:00 PM [address]   1/12/2021  4:00 PM MD Sosa Begum

## 2020-12-19 RX ORDER — SITAGLIPTIN 100 MG/1
TABLET, FILM COATED ORAL
Qty: 90 TABLET | Refills: 3 | Status: SHIPPED | OUTPATIENT
Start: 2020-12-19 | End: 2021-11-28

## 2020-12-29 ENCOUNTER — VIRTUAL VISIT (OUTPATIENT)
Dept: GASTROENTEROLOGY | Age: 75
End: 2020-12-29
Payer: MEDICARE

## 2020-12-29 ENCOUNTER — HOSPITAL ENCOUNTER (OUTPATIENT)
Dept: LAB | Age: 75
Discharge: HOME | End: 2020-12-29
Payer: MEDICARE

## 2020-12-29 LAB
ALBUMIN SERPL-MCNC: 3.6 G/DL (ref 3.5–4.6)
ALP BLD-CCNC: 139 U/L (ref 40–130)
ALT SERPL-CCNC: 29 U/L (ref 0–33)
ANION GAP SERPL CALCULATED.3IONS-SCNC: 11 MEQ/L (ref 9–15)
ANISOCYTOSIS: ABNORMAL
AST SERPL-CCNC: 31 U/L (ref 0–35)
BASOPHILS ABSOLUTE: 0 K/UL (ref 0–0.2)
BASOPHILS RELATIVE PERCENT: 0.9 %
BILIRUB SERPL-MCNC: 0.4 MG/DL (ref 0.2–0.7)
BUN BLDV-MCNC: 10 MG/DL (ref 8–23)
CALCIUM SERPL-MCNC: 9.1 MG/DL (ref 8.5–9.9)
CHLORIDE BLD-SCNC: 105 MEQ/L (ref 95–107)
CO2: 28 MEQ/L (ref 20–31)
CREAT SERPL-MCNC: 0.95 MG/DL (ref 0.5–0.9)
EOSINOPHILS ABSOLUTE: 0.1 K/UL (ref 0–0.7)
EOSINOPHILS RELATIVE PERCENT: 1.7 %
GFR AFRICAN AMERICAN: >60
GFR NON-AFRICAN AMERICAN: 57.4
GLOBULIN: 2.8 G/DL (ref 2.3–3.5)
GLUCOSE BLD-MCNC: 83 MG/DL (ref 70–99)
HCT VFR BLD CALC: 38.1 % (ref 37–47)
HEMOGLOBIN: 11.7 G/DL (ref 12–16)
HYPOCHROMIA: ABNORMAL
LYMPHOCYTES ABSOLUTE: 1.3 K/UL (ref 1–4.8)
LYMPHOCYTES RELATIVE PERCENT: 24.4 %
MCH RBC QN AUTO: 23.2 PG (ref 27–31.3)
MCHC RBC AUTO-ENTMCNC: 30.7 % (ref 33–37)
MCV RBC AUTO: 75.6 FL (ref 82–100)
MICROCYTES: ABNORMAL
MONOCYTES ABSOLUTE: 0.5 K/UL (ref 0.2–0.8)
MONOCYTES RELATIVE PERCENT: 9.8 %
NEUTROPHILS ABSOLUTE: 3.4 K/UL (ref 1.4–6.5)
NEUTROPHILS RELATIVE PERCENT: 63.2 %
OVALOCYTES: ABNORMAL
PDW BLD-RTO: 32 % (ref 11.5–14.5)
PLATELET # BLD: 113 K/UL (ref 130–400)
PLATELET SLIDE REVIEW: ABNORMAL
POIKILOCYTES: ABNORMAL
POTASSIUM SERPL-SCNC: 4.2 MEQ/L (ref 3.4–4.9)
RBC # BLD: 5.05 M/UL (ref 4.2–5.4)
SLIDE REVIEW: ABNORMAL
SODIUM BLD-SCNC: 144 MEQ/L (ref 135–144)
TARGET CELLS: ABNORMAL
TOTAL PROTEIN: 6.4 G/DL (ref 6.3–8)
VITAMIN D 25-HYDROXY: 38.8 NG/ML (ref 30–100)
WBC # BLD: 5.3 K/UL (ref 4.8–10.8)

## 2020-12-29 PROCEDURE — 99443 PR PHYS/QHP TELEPHONE EVALUATION 21-30 MIN: CPT | Performed by: SPECIALIST

## 2020-12-29 PROCEDURE — 82306 VITAMIN D 25 HYDROXY: CPT

## 2020-12-29 PROCEDURE — 80053 COMPREHEN METABOLIC PANEL: CPT

## 2020-12-29 PROCEDURE — 85025 COMPLETE CBC W/AUTO DIFF WBC: CPT

## 2020-12-29 RX ORDER — SODIUM, POTASSIUM,MAG SULFATES 17.5-3.13G
SOLUTION, RECONSTITUTED, ORAL ORAL
Qty: 1 BOTTLE | Refills: 0 | Status: SHIPPED | OUTPATIENT
Start: 2020-12-29 | End: 2021-06-18

## 2020-12-29 ASSESSMENT — ENCOUNTER SYMPTOMS
ABDOMINAL PAIN: 0
RECTAL PAIN: 0
CONSTIPATION: 0
EYES NEGATIVE: 1
ANAL BLEEDING: 0
BLOOD IN STOOL: 0
NAUSEA: 0
SHORTNESS OF BREATH: 1
DIARRHEA: 0
VOMITING: 0
GASTROINTESTINAL NEGATIVE: 1
ABDOMINAL DISTENTION: 0

## 2020-12-29 NOTE — PROGRESS NOTES
Gastroenterology Clinic Visit    Osorio Flores  [id number]  Chief Complaint   Patient presents with    Anemia     [facility]. no blood in stool. HPI: 77 y.o. female presents to the clinic with history of anemia. This was a telephone encounter and patient was at home and I was in my office. She was told it is a billable service and had agreed for this.,  Patient was recently admitted to [facility] when she presented there with generalized weakness and fatigue and was found to have low hemoglobin and received 1 unit of packed cells. She was also found to have low vitamin D level. She was having swelling of her left lower extremity. Patient was discharged home on oral iron supplements and vitamin D.,  She reports no history of any hematemesis melena or rectal bleeding. No change in bowel habits, no symptoms of any chronic GERD she reports occasional choking sensation when she swallows, she of any weight loss.,  She had a colonoscopy in 2017 which showed a small polyp and hemorrhoid, no family history of any celiac sprue tell history includes hysterectomy. Social history she is vapes does not drink alcohol, duration of phone call was 21 minutes. Review of Systems   Constitutional: Negative. Fatigue   HENT: Negative. Eyes: Negative. Respiratory: Positive for shortness of breath. Cardiovascular: Negative. Gastrointestinal: Negative. Negative for abdominal distention, abdominal pain, anal bleeding, blood in stool, constipation, diarrhea, nausea, rectal pain and vomiting. History of occasional choking sensation   Endocrine: Negative. Genitourinary: Negative. Musculoskeletal: Negative. Skin: Negative. Allergic/Immunologic: Negative for food allergies. Neurological: Negative. Hematological: Negative. Psychiatric/Behavioral: Negative.          Past Medical History:   Diagnosis Date    Anxiety     [facility] 4/14/2015    Hepatitis B Physically abused: None     Forced sexual activity: None   Other Topics Concern    None   Social History Narrative    None       Last menstrual period 02/28/1982, not currently breastfeeding. Physical Exam    Laboratory, Pathology, Radiology reviewed indetail with relevant important investigations summarized below:  Lab Results   Component Value Date    WBC 6.8 12/08/2016    HGB 14.0 12/08/2016    HCT 44.3 12/08/2016    MCV 83.7 12/08/2016     12/08/2016     Lab Results   Component Value Date    ALT 34 (H) 12/08/2016    AST 27 12/08/2016    ALKPHOS 119 12/08/2016    BILITOT 0.4 12/08/2016       No results found. Endoscopic investigations:     Assessmentand Plan:  77 y.o. female with history of iron deficiency anemia and low vitamin D level. Needs to rule out any GI causes to explain the anemia. Will schedule EGD and colonoscopy patient was told to hold the oral iron supplements 10 days prior to the colonoscopy. Diagnosis Orders   1. Iron deficiency anemia, unspecified iron deficiency anemia type  EGD    Endoscopy, colon, diagnostic     No follow-ups on file. Josselin Peterson MD   Staff Gastroenterologist  [facility]    Please note this report has been partially produced using speech recognition software and may cause contain errors related to thatsystem including grammar, punctuation and spelling as well as words and phrases that may seem inappropriate. If there are questions or concerns please feel free to contact me to clarify.

## 2021-01-03 DIAGNOSIS — N17.9 AKI (ACUTE KIDNEY INJURY) (HCC): ICD-10-CM

## 2021-01-03 DIAGNOSIS — D69.6 ANEMIA WITH LOW PLATELET COUNT (HCC): ICD-10-CM

## 2021-01-03 DIAGNOSIS — I82.90 THROMBOSIS: Primary | ICD-10-CM

## 2021-01-07 ENCOUNTER — HOSPITAL ENCOUNTER (OUTPATIENT)
Dept: WOMENS IMAGING | Age: 76
Discharge: HOME | End: 2021-01-09
Payer: MEDICARE

## 2021-01-07 DIAGNOSIS — Z12.31 VISIT FOR SCREENING MAMMOGRAM: ICD-10-CM

## 2021-01-07 PROCEDURE — 77067 SCR MAMMO BI INCL CAD: CPT

## 2021-01-12 ENCOUNTER — OFFICE VISIT (OUTPATIENT)
Dept: PRIMARY CARE CLINIC | Age: 76
End: 2021-01-12
Payer: MEDICARE

## 2021-01-12 VITALS
WEIGHT: 200 LBS | SYSTOLIC BLOOD PRESSURE: 122 MMHG | HEART RATE: 88 BPM | OXYGEN SATURATION: 98 % | BODY MASS INDEX: 34.15 KG/M2 | RESPIRATION RATE: 14 BRPM | HEIGHT: 64 IN | DIASTOLIC BLOOD PRESSURE: 74 MMHG

## 2021-01-12 DIAGNOSIS — F33.42 MAJOR DEPRESSIVE DISORDER, RECURRENT, IN FULL REMISSION (HCC): ICD-10-CM

## 2021-01-12 DIAGNOSIS — D50.9 IRON DEFICIENCY ANEMIA, UNSPECIFIED IRON DEFICIENCY ANEMIA TYPE: ICD-10-CM

## 2021-01-12 DIAGNOSIS — J44.9 CHRONIC OBSTRUCTIVE PULMONARY DISEASE, UNSPECIFIED COPD TYPE (HCC): ICD-10-CM

## 2021-01-12 DIAGNOSIS — E66.01 MORBIDLY OBESE (HCC): ICD-10-CM

## 2021-01-12 DIAGNOSIS — D69.6 ANEMIA WITH LOW PLATELET COUNT (HCC): Primary | ICD-10-CM

## 2021-01-12 DIAGNOSIS — E11.9 TYPE 2 DIABETES MELLITUS WITHOUT COMPLICATION, UNSPECIFIED WHETHER LONG TERM INSULIN USE (HCC): ICD-10-CM

## 2021-01-12 DIAGNOSIS — N17.9 AKI (ACUTE KIDNEY INJURY) (HCC): ICD-10-CM

## 2021-01-12 PROCEDURE — 99214 OFFICE O/P EST MOD 30 MIN: CPT | Performed by: INTERNAL MEDICINE

## 2021-01-12 NOTE — PROGRESS NOTES
Subjective:      Patient ID: Cristobal Leon is a 77 y.o. female  HARIS follow up  HPI  Pt presents for follow up regarding HARIS (Hb 7.1). Last colonoscopy 2017 showed 2 polyps. Repeat Hb was 11. Planned for repeat colonoscopy with GI. No bloody or black stools, no more dizziness. No vaginal bleed. COPD-controlled  T2DM-  7.2  Morbid obesity- not ready to exercise   BACILIO- await repeat CMP   Past Medical History:   Diagnosis Date    Anxiety     DM (diabetes mellitus) ([facility] 4/14/2015    Hepatitis B infection     Hyperlipidemia     Hypothyroidism     Insomnia     Leg pain 4/14/2015    Post herpetic neuralgia     Unspecified sleep apnea     after seeing [facility] they state she is does not have sleep apnea. Not using cpap     Past Surgical History:   Procedure Laterality Date    ANKLE FRACTURE SURGERY  09/2016    DR ANDRADE  LT    BIOPSY / LIGATION TEMPORAL ARTERY Right 4/26/2019    RIGHT TEMPORAL ARTERY BIOPSY performed by Lennox Guallpa [facility]  2015    surgery for transgeminal neuraglia. Insertion of sponge for chronic pain    COLONOSCOPY  3/31/14    DR Mariela Beckett    HYSTERECTOMY      PARTIAL    LYMPHADENECTOMY      OTHER SURGICAL HISTORY Right     Trigeminal Neuralgia    [place] COLON CA SCRN NOT  [address] [place] [address] IND N/A 7/24/2017    COLONOSCOPY performed by Luz Anaya MD at [address] [place] [address] Road History     Socioeconomic History    Marital status:       Spouse name: Not on file    Number of children: Not on file    Years of education: Not on file    Highest education level: Not on file   Occupational History    Not on file   Social Needs    Financial resource strain: Not on file    Food insecurity     Worry: Not on file     Inability: Not on file    Transportation needs     Medical: Not on file     Non-medical: Not on file   Tobacco Use    Smoking status: Former Smoker     Packs/day: 1.00     Years: 48.00     Pack years: 48.00     Types: Cigarettes Quit date: 2013     Years since quittin.4    Smokeless tobacco: Never Used   Substance and Sexual Activity    Alcohol use: No     Comment: very rarely    Drug use: No    Sexual activity: Yes     Partners: Male   Lifestyle    Physical activity     Days per week: Not on file     Minutes per session: Not on file    Stress: Not on file   Relationships    Social connections     Talks on phone: Not on file     Gets together: Not on file     Attends Alevism service: Not on file     Active member of club or organization: Not on file     Attends meetings of clubs or organizations: Not on file     Relationship status: Not on file    Intimate partner violence     Fear of current or ex partner: Not on file     Emotionally abused: Not on file     Physically abused: Not on file     Forced sexual activity: Not on file   Other Topics Concern    Not on file   Social History Narrative    Not on file     Family History   Problem Relation Age of Onset    Stroke Mother     High Cholesterol Mother     Cancer Mother     Colon Cancer Neg Hx     Celiac Disease Neg Hx     Crohn's Disease Neg Hx      Allergies:  Cefdinir and Metformin and related  Patient Active Problem List   Diagnosis    Hepatitis B infection    Sleep apnea    Anxiety    Insomnia    Trigeminal neuralgia    Hyperlipidemia    Hypothyroidism    Dyspnea    DM (diabetes mellitus) ([facility]    Leg pain    Depression    Frequent headaches    Major depressive disorder, recurrent, in full remission ([facility]    Cherry angioma    Diffuse photodamage of skin    Dilated pore of Solomon    GERD (gastroesophageal reflux disease)    Hidradenitis suppurativa    Lentigines    Multiple benign nevi    Poikiloderma    Seborrheic keratosis    Morbidly obese ([facility]     Current Outpatient Medications on File Prior to Visit   Medication Sig Dispense Refill    Na Sulfate-K Sulfate-Mg Sulf 17.5-3.13-1.6 GM/177ML SOLN As directed 1 Bottle 0    JANUVIA 100 MG tablet TAKE 1 TABLET DAILY 90 tablet 3    famotidine (PEPCID) 20 MG tablet TAKE 1 TABLET TWICE A DAY (NEED APPOINTMENT FOR FURTHER REFILLS) 180 tablet 3    levothyroxine (SYNTHROID) 150 MCG tablet TAKE 1 TABLET DAILY (NEED APPOINTMENT FOR FURTHER REFILLS) 90 tablet 3    cephALEXin (KEFLEX) 500 MG capsule Take 1 capsule by mouth every 8 hours for 7 days.  vitamin D3 (CHOLECALCIFEROL) 25 MCG (1000 UT) TABS tablet Take 1 tablet by mouth daily 30 tablet 0    Ferrous Sulfate (IRON) 325 (65 Fe) MG TABS Take 1 tablet by mouth twice daily. 60 tablet 3    albuterol sulfate  (90 Base) MCG/ACT inhaler Inhale 2 puffs into the lungs every 6 hours as needed for Wheezing 1 Inhaler 0    meclizine (ANTIVERT) 25 MG tablet Take 1 tablet by mouth 3 times daily as needed for Dizziness 30 tablet 0    citalopram (CELEXA) 20 MG tablet TAKE 1 TABLET DAILY (NEED APPOINTMENT FOR FURTHER REFILLS) 90 tablet 2    Blood Glucose Monitoring Suppl (TRUE METRIX METER) w/Device KIT use to test sugar  0    blood glucose test strips (TRUETEST TEST) strip Test blood glucose fasting and 1 hour post lunch and dinner 100 each 5    Lancets MISC Inject 1 each into the skin 3 times daily Use early in the morning fasting and 1 hour post lunch and dinner 100 each 5    aspirin 81 MG EC tablet Take 1 tablet by mouth daily 90 tablet 3    celecoxib (CELEBREX) 200 MG capsule TAKE 1 CAPSULE DAILY 90 capsule 0     No current facility-administered medications on file prior to visit. Review of Systems   Constitutional: Negative for chills, diaphoresis, fatigue and fever. HENT: Negative for congestion, ear discharge, ear pain, rhinorrhea, sinus pressure, sinus pain, sneezing and sore throat. Respiratory: Negative for cough, shortness of breath and wheezing. Cardiovascular: Negative for chest pain. Gastrointestinal: Negative for abdominal pain, diarrhea, nausea and vomiting.    Endocrine: Negative for cold intolerance and heat intolerance. Genitourinary: Negative for dysuria and frequency. Neurological: Negative for dizziness and light-headedness. Psychiatric/Behavioral: Negative for dysphoric mood. The patient is not nervous/anxious. Objective:   /74 (Site: Right Upper Arm, Position: Sitting, Cuff Size: Medium Adult)   Pulse 88   Resp 14   Ht 5' 4\" (1.626 m)   Wt 200 lb (90.7 kg)   LMP 02/28/1982   SpO2 98%   BMI 34.33 kg/m²     Physical Exam  Constitutional:       General: She is not in acute distress. Appearance: Normal appearance. She is well-developed. Cardiovascular:      Rate and Rhythm: Normal rate and regular rhythm. Pulses: Normal pulses. Heart sounds: Normal heart sounds. No murmur. Pulmonary:      Effort: Pulmonary effort is normal. No respiratory distress. Breath sounds: Normal breath sounds. No wheezing. Abdominal:      General: Bowel sounds are normal. There is no distension. Palpations: Abdomen is soft. There is no mass. Tenderness: There is no abdominal tenderness. There is no guarding or rebound. Neurological:      General: No focal deficit present. Mental Status: She is alert. Cranial Nerves: No cranial nerve deficit. Psychiatric:         Mood and Affect: Mood normal.         Thought Content: Thought content normal.       Assessment:       Diagnosis Orders   1. Anemia with low platelet count (HCC)  GUILLERMO - Mynor Piña MD, Oncololgy, [place]   2. BACILIO (acute kidney injury) ([facility]     3. Iron deficiency anemia, unspecified iron deficiency anemia type      await EGD and colonoscopy   4. Chronic obstructive pulmonary disease, unspecified COPD type ([address]     5. Major depressive disorder, recurrent, in full remission ([address]     6. Morbidly obese ([address]     7.  Type 2 diabetes mellitus without complication, unspecified whether long term insulin use ([address]       Plan:      Orders Placed This Encounter   Procedures   Nathaniel Ramsey MD, Arnol Rios Referral Priority:   Routine     Referral Type:   Eval and Treat     Referral Reason:   Specialty Services Required     Referred to Provider:   Edwige Inman MD     Requested Specialty:   Hematology and Oncology     Number of Visits Requested:   1     No orders of the defined types were placed in this encounter. Return in about 3 months (around 4/12/2021).

## 2021-01-13 ASSESSMENT — ENCOUNTER SYMPTOMS
VOMITING: 0
COUGH: 0
DIARRHEA: 0
RHINORRHEA: 0
SORE THROAT: 0
ABDOMINAL PAIN: 0
SINUS PAIN: 0
SINUS PRESSURE: 0
WHEEZING: 0
NAUSEA: 0
SHORTNESS OF BREATH: 0

## 2021-01-27 ENCOUNTER — NURSE ONLY (OUTPATIENT)
Dept: PRIMARY CARE CLINIC | Age: 76
End: 2021-01-27

## 2021-01-27 DIAGNOSIS — Z01.818 PRE-OP TESTING: ICD-10-CM

## 2021-01-28 LAB
SARS-COV-2: NOT DETECTED
SOURCE: NORMAL

## 2021-02-02 ENCOUNTER — ANESTHESIA (OUTPATIENT)
Dept: ENDOSCOPY | Age: 76
End: 2021-02-02
Payer: MEDICARE

## 2021-02-02 ENCOUNTER — ANCILLARY PROCEDURE (OUTPATIENT)
Dept: ENDOSCOPY | Age: 76
End: 2021-02-02
Attending: SPECIALIST
Payer: MEDICARE

## 2021-02-02 ENCOUNTER — ANESTHESIA EVENT (OUTPATIENT)
Dept: ENDOSCOPY | Age: 76
End: 2021-02-02
Payer: MEDICARE

## 2021-02-02 ENCOUNTER — HOSPITAL ENCOUNTER (OUTPATIENT)
Age: 76
Setting detail: OUTPATIENT SURGERY
Discharge: HOME | End: 2021-02-02
Attending: SPECIALIST | Admitting: SPECIALIST
Payer: MEDICARE

## 2021-02-02 VITALS
SYSTOLIC BLOOD PRESSURE: 128 MMHG | DIASTOLIC BLOOD PRESSURE: 70 MMHG | RESPIRATION RATE: 13 BRPM | OXYGEN SATURATION: 99 %

## 2021-02-02 VITALS
HEART RATE: 76 BPM | OXYGEN SATURATION: 94 % | RESPIRATION RATE: 16 BRPM | DIASTOLIC BLOOD PRESSURE: 65 MMHG | BODY MASS INDEX: 34.15 KG/M2 | HEIGHT: 64 IN | WEIGHT: 200 LBS | TEMPERATURE: 97.2 F | SYSTOLIC BLOOD PRESSURE: 111 MMHG

## 2021-02-02 DIAGNOSIS — R79.89 ABNORMAL LFTS: Primary | ICD-10-CM

## 2021-02-02 LAB
GLUCOSE BLD-MCNC: 127 MG/DL (ref 60–115)
PERFORMED ON: ABNORMAL

## 2021-02-02 PROCEDURE — 88342 IMHCHEM/IMCYTCHM 1ST ANTB: CPT

## 2021-02-02 PROCEDURE — 7100000011 HC PHASE II RECOVERY - ADDTL 15 MIN: Performed by: SPECIALIST

## 2021-02-02 PROCEDURE — 7100000010 HC PHASE II RECOVERY - FIRST 15 MIN: Performed by: SPECIALIST

## 2021-02-02 PROCEDURE — 2500000003 HC RX 250 WO HCPCS: Performed by: NURSE ANESTHETIST, CERTIFIED REGISTERED

## 2021-02-02 PROCEDURE — 3700000000 HC ANESTHESIA ATTENDED CARE: Performed by: SPECIALIST

## 2021-02-02 PROCEDURE — 45380 COLONOSCOPY AND BIOPSY: CPT | Performed by: SPECIALIST

## 2021-02-02 PROCEDURE — 2580000003 HC RX 258: Performed by: NURSE ANESTHETIST, CERTIFIED REGISTERED

## 2021-02-02 PROCEDURE — 2580000003 HC RX 258: Performed by: SPECIALIST

## 2021-02-02 PROCEDURE — 6360000002 HC RX W HCPCS: Performed by: NURSE ANESTHETIST, CERTIFIED REGISTERED

## 2021-02-02 PROCEDURE — 2709999900 HC NON-CHARGEABLE SUPPLY: Performed by: SPECIALIST

## 2021-02-02 PROCEDURE — 3700000001 HC ADD 15 MINUTES (ANESTHESIA): Performed by: SPECIALIST

## 2021-02-02 PROCEDURE — 3609027000 HC COLONOSCOPY: Performed by: SPECIALIST

## 2021-02-02 PROCEDURE — 45385 COLONOSCOPY W/LESION REMOVAL: CPT | Performed by: SPECIALIST

## 2021-02-02 PROCEDURE — 88305 TISSUE EXAM BY PATHOLOGIST: CPT

## 2021-02-02 PROCEDURE — 2580000003 HC RX 258

## 2021-02-02 PROCEDURE — 3609017100 HC EGD: Performed by: SPECIALIST

## 2021-02-02 PROCEDURE — 43239 EGD BIOPSY SINGLE/MULTIPLE: CPT | Performed by: SPECIALIST

## 2021-02-02 RX ORDER — SODIUM CHLORIDE 9 MG/ML
INJECTION, SOLUTION INTRAVENOUS
Status: COMPLETED
Start: 2021-02-02 | End: 2021-02-02

## 2021-02-02 RX ORDER — SODIUM CHLORIDE 0.9 % (FLUSH) 0.9 %
10 SYRINGE (ML) INJECTION EVERY 12 HOURS SCHEDULED
Status: CANCELLED | OUTPATIENT
Start: 2021-02-02

## 2021-02-02 RX ORDER — SODIUM CHLORIDE 9 MG/ML
INJECTION, SOLUTION INTRAVENOUS CONTINUOUS
Status: DISCONTINUED | OUTPATIENT
Start: 2021-02-02 | End: 2021-02-02 | Stop reason: HOSPADM

## 2021-02-02 RX ORDER — MAGNESIUM HYDROXIDE 1200 MG/15ML
LIQUID ORAL PRN
Status: DISCONTINUED | OUTPATIENT
Start: 2021-02-02 | End: 2021-02-02 | Stop reason: ALTCHOICE

## 2021-02-02 RX ORDER — SODIUM CHLORIDE 0.9 % (FLUSH) 0.9 %
10 SYRINGE (ML) INJECTION PRN
Status: CANCELLED | OUTPATIENT
Start: 2021-02-02

## 2021-02-02 RX ORDER — SODIUM CHLORIDE 9 MG/ML
INJECTION, SOLUTION INTRAVENOUS CONTINUOUS PRN
Status: DISCONTINUED | OUTPATIENT
Start: 2021-02-02 | End: 2021-02-02 | Stop reason: SDUPTHER

## 2021-02-02 RX ORDER — LIDOCAINE HYDROCHLORIDE 10 MG/ML
1 INJECTION, SOLUTION EPIDURAL; INFILTRATION; INTRACAUDAL; PERINEURAL
Status: CANCELLED | OUTPATIENT
Start: 2021-02-02 | End: 2021-02-02

## 2021-02-02 RX ORDER — SODIUM CHLORIDE 9 MG/ML
INJECTION, SOLUTION INTRAVENOUS CONTINUOUS
Status: CANCELLED | OUTPATIENT
Start: 2021-02-02

## 2021-02-02 RX ORDER — ONDANSETRON 2 MG/ML
4 INJECTION INTRAMUSCULAR; INTRAVENOUS
Status: DISCONTINUED | OUTPATIENT
Start: 2021-02-02 | End: 2021-02-02 | Stop reason: HOSPADM

## 2021-02-02 RX ORDER — PROPOFOL 10 MG/ML
INJECTION, EMULSION INTRAVENOUS PRN
Status: DISCONTINUED | OUTPATIENT
Start: 2021-02-02 | End: 2021-02-02 | Stop reason: SDUPTHER

## 2021-02-02 RX ORDER — PROPOFOL 10 MG/ML
INJECTION, EMULSION INTRAVENOUS CONTINUOUS PRN
Status: DISCONTINUED | OUTPATIENT
Start: 2021-02-02 | End: 2021-02-02 | Stop reason: SDUPTHER

## 2021-02-02 RX ORDER — LIDOCAINE HYDROCHLORIDE 20 MG/ML
INJECTION, SOLUTION INFILTRATION; PERINEURAL PRN
Status: DISCONTINUED | OUTPATIENT
Start: 2021-02-02 | End: 2021-02-02 | Stop reason: SDUPTHER

## 2021-02-02 RX ADMIN — SODIUM CHLORIDE: 9 INJECTION, SOLUTION INTRAVENOUS at 08:17

## 2021-02-02 RX ADMIN — SODIUM CHLORIDE: 9 INJECTION, SOLUTION INTRAVENOUS at 07:47

## 2021-02-02 RX ADMIN — LIDOCAINE HYDROCHLORIDE 40 MG: 20 INJECTION, SOLUTION INFILTRATION; PERINEURAL at 08:16

## 2021-02-02 RX ADMIN — PROPOFOL 150 MCG/KG/MIN: 10 INJECTION, EMULSION INTRAVENOUS at 08:18

## 2021-02-02 RX ADMIN — PROPOFOL 50 MG: 10 INJECTION, EMULSION INTRAVENOUS at 08:17

## 2021-02-02 ASSESSMENT — PAIN DESCRIPTION - DESCRIPTORS: DESCRIPTORS: ACHING

## 2021-02-02 NOTE — ANESTHESIA PRE PROCEDURE
fasting and 1 hour post lunch and dinner 5/12/19   Chinmay Turner MD   aspirin 81 MG EC tablet Take 1 tablet by mouth daily 4/10/19   Chinmay Turner MD   celecoxib (CELEBREX) 200 MG capsule TAKE 1 CAPSULE DAILY 7/18/18   Ganga Bullock DO       Current medications:    Current Facility-Administered Medications   Medication Dose Route Frequency Provider Last Rate Last Admin    Simethicone 20 MG/0.3ML SUSP             0.9 % sodium chloride infusion   Intravenous Continuous Renay [place] MD        sodium chloride 0.9 % infusion             sterile water for irrigation    PRN Renay [place] MD   100 mL at 02/02/21 0800       Allergies: Allergies   Allergen Reactions    Cefdinir      Diarrhea      Metformin And Related      Swelling leg       Problem List:    Patient Active Problem List   Diagnosis Code    Hepatitis B infection B19.10    Sleep apnea G47.30    Anxiety F41.9    Insomnia G47.00    Trigeminal neuralgia G50.0    Hyperlipidemia E78.5    Hypothyroidism E03.9    Dyspnea R06.00    DM (diabetes mellitus) ([facility] E11.9    Leg pain M79.606    Depression F32.9    Frequent headaches R51.9    Major depressive disorder, recurrent, in full remission ([facility] F33.42    Cherry angioma D18.01    Diffuse photodamage of skin L57.8    Dilated pore of Solomon L70.8    GERD (gastroesophageal reflux disease) K21.9    Hidradenitis suppurativa L73.2    Lentigines L81.4    Multiple benign nevi D22.9    Poikiloderma L81.6    Seborrheic keratosis L82.1    Morbidly obese (HCC) E66.01       Past Medical History:        Diagnosis Date    Anxiety     DM (diabetes mellitus) ([facility] 4/14/2015    Hepatitis B infection     Hyperlipidemia     Hypothyroidism     Insomnia     Leg pain 4/14/2015    Post herpetic neuralgia     Unspecified sleep apnea     after seeing [facility] they state she is does not have sleep apnea.   Not using cpap       Past Surgical History:        Procedure Laterality Date    ANKLE FRACTURE SURGERY  2016    DR ANDRADE  LT    BIOPSY / LIGATION TEMPORAL ARTERY Right 2019    RIGHT TEMPORAL ARTERY BIOPSY performed by Iram Rm MD at [place] [address]    surgery for transgeminal neuraglia. Insertion of sponge for chronic pain    COLONOSCOPY  3/31/14    DR Chika Ren    HYSTERECTOMY      PARTIAL    LYMPHADENECTOMY      OTHER SURGICAL HISTORY Right     Trigeminal Neuralgia    [place] COLON CA SCRN NOT  [address]  IND N/A 2017    COL[facility]OS[facility] Gutierrez Cornell [facility] History:    Social History     Tobacco Use    Smoking status: Former Smoker     Packs/day: 1.00     Years: 48.00     Pack years: 48.00     Types: Cigarettes     Quit date: 2013     Years since quittin.5    Smokeless tobacco: Never Used   Substance Use Topics    Alcohol use: No     Comment: very rarely                                Counseling given: Not Answered      Vital Signs (Current):   Vitals:    21 0731   BP: (!) 161/78   Pulse: 98   Resp: 18   Temp: 36.2 °C (97.2 °F)   TempSrc: Temporal   SpO2: 95%   Weight: 200 lb (90.7 kg)   Height: 5' 4\" (1.626 m)                                              BP Readings from Last 3 Encounters:   21 (!) 161/78   21 122/74   20 112/64       NPO Status:                                                                                 BMI:   Wt Readings from Last 3 Encounters:   21 200 lb (90.7 kg)   21 200 lb (90.7 kg)   20 196 lb (88.9 kg)     Body mass index is 34.33 kg/m².     CBC:   Lab Results   Component Value Date    WBC 5.3 2020    RBC 5.05 2020    RBC 5.34 2011    HGB 11.7 2020    HCT 38.1 2020    MCV 75.6 2020    RDW 32.0 2020     2020       CMP:   Lab Results   Component Value Date     2020    K 4.2 2020     2020    CO2 28 2020    BUN 10 2020    CREATININE 0.95 2020 GFRAA >60.0 12/29/2020    LABGLOM 57.4 12/29/2020    GLUCOSE 83 12/29/2020    GLUCOSE 94 11/21/2011    PROT 6.4 12/29/2020    CALCIUM 9.1 12/29/2020    BILITOT 0.4 12/29/2020    ALKPHOS 139 12/29/2020    AST 31 12/29/2020    ALT 29 12/29/2020       POC Tests: No results for input(s): POCGLU, POCNA, POCK, POCCL, POCBUN, POCHEMO, POCHCT in the last 72 hours. Coags: No results found for: PROTIME, INR, APTT    HCG (If Applicable): No results found for: PREGTESTUR, PREGSERUM, HCG, HCGQUANT     ABGs: No results found for: PHART, PO2ART, EDK9LDF, BDW7WBD, BEART, Y3NDFAEQ     Type & Screen (If Applicable):  No results found for: LABABO, LABRH    Drug/Infectious Status (If Applicable):  No results found for: HIV, HEPCAB    COVID-19 Screening (If Applicable):   Lab Results   Component Value Date    COVID19 Not Detected 01/27/2021         Anesthesia Evaluation  Patient summary reviewed and Nursing notes reviewed  Airway: Mallampati: II  TM distance: >3 FB   Neck ROM: full  Mouth opening: > = 3 FB Dental:          Pulmonary:normal exam    (+) sleep apnea:                             Cardiovascular:  Exercise tolerance: good (>4 METS),           Rhythm: regular                      Neuro/Psych:   (+) psychiatric history:depression/anxiety             GI/Hepatic/Renal:   (+) GERD:,           Endo/Other:    (+) Diabetes, hypothyroidism::., .                 Abdominal:   (+) obese,         Vascular:                                      Anesthesia Plan      MAC     ASA 3       Induction: intravenous. Anesthetic plan and risks discussed with patient. Plan discussed with CRNA and attending.                   NILSON Murray - YANNA   2/2/2021

## 2021-02-02 NOTE — ANESTHESIA POSTPROCEDURE EVALUATION
Department of Anesthesiology  Postprocedure Note    Patient: Clifton Villavicencio  MRN: [id number]  YOB: 1945  Date of evaluation: 2/2/2021  Time:  9:01 AM     Procedure Summary     Date: 02/02/21 Room / Location: [address] [place] [address] / Luella Ormond    Anesthesia Start: 2794 Anesthesia Stop: 0900    Procedures:       EGD ESOPHAGOGASTRODUODENOSCOPY (N/A )      COLONOSCOPY DIAGNOSTIC (N/A ) Diagnosis: (iron deficiency anemia D50.9)    Surgeons: Elane Lombard, MD Responsible Provider: NILSON Ellington CRNA    Anesthesia Type: MAC ASA Status: 3          Anesthesia Type: MAC    Francisca Phase I: Francisca Score: 10    Francisca Phase II:      Last vitals: Reviewed and per EMR flowsheets.        Anesthesia Post Evaluation    Patient location during evaluation: PACU  Patient participation: complete - patient participated  Level of consciousness: responsive to verbal stimuli  Pain score: 0  Airway patency: patent  Nausea & Vomiting: no nausea  Complications: no  Cardiovascular status: hemodynamically stable  Respiratory status: acceptable  Hydration status: stable

## 2021-02-02 NOTE — H&P
Patient Name: Sayda Azar  : 1945  MRN: [id number]  DATE: 21      ENDOSCOPY  History and Physical    Procedure:    [x] Diagnostic Colonoscopy       [] Screening Colonoscopy  [x] EGD      [] ERCP      [] EUS       [] Other    [x] Previous office notes/History and Physical reviewed from the patients chart. Please see EMR for further details of HPI. I have examined the patient's status immediately prior to the procedure and:      Indications/HPI:    []Abdominal Pain  []Cancer- GI/Lung  []Fhx of colon CA/polyps  []History of Polyps  []Masseys   []Melena  []Abnormal Imaging  []Dysphagia    []Persistent Pneumonia  [x]Anemia  []Food Impaction  [x]History of Polyps  []GI Bleed  []Pulmonary nodule/Mass  []Change in bowel habits []Heartburn/Reflux  []Rectal Bleed (BRBPR)  []Chest Pain - Non Cardiac []Heme (+) Stoo  l[]Ulcers  []Constipation  []Hemoptysis   []Varices  []Diarrhea  []Hypoxemia  []Nausea/Vomiting  []Screening   []Crohns/Colitis  []Other:   Anesthesia:   [x] MAC [] Moderate Sedation   [] General   [] None     ROS: 12 pt Review of Symptoms was negative unless mentioned above    Medications:   Prior to Admission medications    Medication Sig Start Date End Date Taking? Authorizing Provider   Na Sulfate-K Sulfate-Mg Sulf 17.5-3.13-1.6 GM/177ML SOLN As directed 20  Yes Tre Feliciano MD   citalopram (CELEXA) 20 MG tablet TAKE 1 TABLET DAILY (NEED APPOINTMENT FOR FURTHER REFILLS) 21   Colletta Bally, MD   JANUVIA 100 MG tablet TAKE 1 TABLET DAILY 20   Colletta Bally, MD   famotidine (PEPCID) 20 MG tablet TAKE 1 TABLET TWICE A DAY (NEED APPOINTMENT FOR FURTHER REFILLS) 20   Colletta Bally, MD   levothyroxine (SYNTHROID) 150 MCG tablet TAKE 1 TABLET DAILY (NEED APPOINTMENT FOR FURTHER REFILLS) 20   Colletta Bally, MD   cephALEXin (KEFLEX) 500 MG capsule Take 1 capsule by mouth every 8 hours for 7 days.  20   Historical Provider, MD   vitamin D3 (CHOLECALCIFEROL) 25 MCG (1000 UT) TABS tablet DR Santoro Perez    HYSTERECTOMY      PARTIAL    LYMPHADENECTOMY      OTHER SURGICAL HISTORY Right     Trigeminal Neuralgia    [place] COLON CA SCRN NOT  [address] [facility] N/A 2017    COLONOSCOPY performed by Germania Gallardo MD at [address] [place] [address] History:  Social History     Tobacco Use    Smoking status: Former Smoker     Packs/day: 1.00     Years: 48.00     Pack years: 48.00     Types: Cigarettes     Quit date: 2013     Years since quittin.5    Smokeless tobacco: Never Used   Substance Use Topics    Alcohol use: No     Comment: very rarely    Drug use: No       Vital Signs:   Vitals:    21 0731   BP: (!) 161/78   Pulse: 98   Resp: 18   Temp: 97.2 °F (36.2 °C)   SpO2: 95%        Physical Exam:  Cardiac:  [x]WNL  []Comments:  Pulmonary:  [x]WNL   []Comments:   Neuro/Mental Status:  [x]WNL  []Comments:  Abdominal:  [x]WNL    []Comments:  Other:   []WNL  []Comments:    Informed Consent:  The risks and benefits of the procedure have been discussed with either the patient or if they cannot consent, their representative. Assessment:  Patient examined and appropriate for planned sedation and procedure. Plan:  Proceed with planned sedation and procedure as above.     Germania Gallardo MD  8:09 AM

## 2021-02-23 ENCOUNTER — HOSPITAL ENCOUNTER (OUTPATIENT)
Dept: ULTRASOUND IMAGING | Age: 76
Discharge: HOME | End: 2021-02-25
Payer: MEDICARE

## 2021-02-23 ENCOUNTER — HOSPITAL ENCOUNTER (OUTPATIENT)
Dept: LAB | Age: 76
Discharge: HOME | End: 2021-02-23
Payer: MEDICARE

## 2021-02-23 DIAGNOSIS — N17.9 AKI (ACUTE KIDNEY INJURY) (HCC): ICD-10-CM

## 2021-02-23 DIAGNOSIS — R79.89 ABNORMAL LFTS: ICD-10-CM

## 2021-02-23 DIAGNOSIS — D69.6 ANEMIA WITH LOW PLATELET COUNT (HCC): ICD-10-CM

## 2021-02-23 LAB
ALBUMIN SERPL-MCNC: 3.3 G/DL (ref 3.5–4.6)
ALP BLD-CCNC: 126 U/L (ref 40–130)
ALT SERPL-CCNC: 22 U/L (ref 0–33)
ANION GAP SERPL CALCULATED.3IONS-SCNC: 11 MEQ/L (ref 9–15)
AST SERPL-CCNC: 26 U/L (ref 0–35)
BASOPHILS ABSOLUTE: 0 K/UL (ref 0–0.2)
BASOPHILS RELATIVE PERCENT: 0.8 %
BILIRUB SERPL-MCNC: 0.3 MG/DL (ref 0.2–0.7)
BUN BLDV-MCNC: 19 MG/DL (ref 8–23)
CALCIUM SERPL-MCNC: 9.1 MG/DL (ref 8.5–9.9)
CHLORIDE BLD-SCNC: 107 MEQ/L (ref 95–107)
CO2: 25 MEQ/L (ref 20–31)
CREAT SERPL-MCNC: 0.98 MG/DL (ref 0.5–0.9)
EOSINOPHILS ABSOLUTE: 0.1 K/UL (ref 0–0.7)
EOSINOPHILS RELATIVE PERCENT: 1.6 %
GFR AFRICAN AMERICAN: >60
GFR NON-AFRICAN AMERICAN: 55.4
GLOBULIN: 2.9 G/DL (ref 2.3–3.5)
GLUCOSE BLD-MCNC: 154 MG/DL (ref 70–99)
HCT VFR BLD CALC: 37 % (ref 37–47)
HEMOGLOBIN: 11.5 G/DL (ref 12–16)
LYMPHOCYTES ABSOLUTE: 1.1 K/UL (ref 1–4.8)
LYMPHOCYTES RELATIVE PERCENT: 24.6 %
MCH RBC QN AUTO: 23.8 PG (ref 27–31.3)
MCHC RBC AUTO-ENTMCNC: 31.1 % (ref 33–37)
MCV RBC AUTO: 76.4 FL (ref 82–100)
MONOCYTES ABSOLUTE: 0.5 K/UL (ref 0.2–0.8)
MONOCYTES RELATIVE PERCENT: 10.8 %
NEUTROPHILS ABSOLUTE: 2.7 K/UL (ref 1.4–6.5)
NEUTROPHILS RELATIVE PERCENT: 62.2 %
PDW BLD-RTO: 18.4 % (ref 11.5–14.5)
PLATELET # BLD: 112 K/UL (ref 130–400)
POTASSIUM SERPL-SCNC: 3.9 MEQ/L (ref 3.4–4.9)
RBC # BLD: 4.84 M/UL (ref 4.2–5.4)
SLIDE REVIEW: ABNORMAL
SODIUM BLD-SCNC: 143 MEQ/L (ref 135–144)
TOTAL PROTEIN: 6.2 G/DL (ref 6.3–8)
WBC # BLD: 4.4 K/UL (ref 4.8–10.8)

## 2021-02-23 PROCEDURE — 80053 COMPREHEN METABOLIC PANEL: CPT

## 2021-02-23 PROCEDURE — 76705 ECHO EXAM OF ABDOMEN: CPT

## 2021-02-23 PROCEDURE — 85025 COMPLETE CBC W/AUTO DIFF WBC: CPT

## 2021-04-08 ENCOUNTER — OFFICE VISIT (OUTPATIENT)
Dept: PRIMARY CARE CLINIC | Age: 76
End: 2021-04-08
Payer: MEDICARE

## 2021-04-08 VITALS
DIASTOLIC BLOOD PRESSURE: 60 MMHG | SYSTOLIC BLOOD PRESSURE: 116 MMHG | HEART RATE: 94 BPM | WEIGHT: 200 LBS | RESPIRATION RATE: 16 BRPM | HEIGHT: 64 IN | BODY MASS INDEX: 34.15 KG/M2 | OXYGEN SATURATION: 97 %

## 2021-04-08 DIAGNOSIS — H60.8X1 CHRONIC ACTINIC OTITIS EXTERNA OF RIGHT EAR: ICD-10-CM

## 2021-04-08 DIAGNOSIS — D50.9 IRON DEFICIENCY ANEMIA, UNSPECIFIED IRON DEFICIENCY ANEMIA TYPE: ICD-10-CM

## 2021-04-08 DIAGNOSIS — M54.9 MUSCULOSKELETAL BACK PAIN: Primary | ICD-10-CM

## 2021-04-08 DIAGNOSIS — E11.9 TYPE 2 DIABETES MELLITUS WITHOUT COMPLICATION, WITHOUT LONG-TERM CURRENT USE OF INSULIN (HCC): ICD-10-CM

## 2021-04-08 DIAGNOSIS — Z13.220 SCREENING FOR HYPERLIPIDEMIA: ICD-10-CM

## 2021-04-08 DIAGNOSIS — N17.9 AKI (ACUTE KIDNEY INJURY) (HCC): ICD-10-CM

## 2021-04-08 DIAGNOSIS — E03.9 HYPOTHYROIDISM, UNSPECIFIED TYPE: ICD-10-CM

## 2021-04-08 PROCEDURE — 99214 OFFICE O/P EST MOD 30 MIN: CPT | Performed by: INTERNAL MEDICINE

## 2021-04-08 RX ORDER — MELOXICAM 7.5 MG/1
7.5 TABLET ORAL DAILY
Qty: 90 TABLET | Refills: 1 | Status: SHIPPED | OUTPATIENT
Start: 2021-04-08 | End: 2021-06-21 | Stop reason: ALTCHOICE

## 2021-04-08 RX ORDER — CELECOXIB 200 MG/1
CAPSULE ORAL
Qty: 90 CAPSULE | Refills: 0 | Status: CANCELLED | OUTPATIENT
Start: 2021-04-08

## 2021-04-08 RX ORDER — TIZANIDINE 4 MG/1
4 TABLET ORAL EVERY 8 HOURS PRN
Qty: 20 TABLET | Refills: 0 | Status: SHIPPED | OUTPATIENT
Start: 2021-04-08 | End: 2021-06-21 | Stop reason: SDUPTHER

## 2021-04-08 ASSESSMENT — ENCOUNTER SYMPTOMS
WHEEZING: 0
COUGH: 0
BACK PAIN: 1
RHINORRHEA: 0
VOMITING: 0
DIARRHEA: 0
SINUS PRESSURE: 0
SHORTNESS OF BREATH: 0
SINUS PAIN: 0
ABDOMINAL PAIN: 0
SORE THROAT: 0
NAUSEA: 0

## 2021-04-08 NOTE — PROGRESS NOTES
Subjective:      Patient ID: Zaida Porter is a 77 y.o. female    HARIS follow   Back pain x 4 months  HPI  Pt presents for follow up HARIS. Last colonoscopy 2017 showed 2 polyps. Repeat Hb 11 on iron infusion. Repeat colonoscopy showed diverticulosis. Back pain x 4 months   Pain is across the middle of the back, sharp 8/10, nonradiating. No LE weakness, no tingling or numbness, no incontinence, no fever or chills. Ear itch x 2 months   Right ear itch, no pain or discharge, no trauma. Past Medical History:   Diagnosis Date    Anxiety     DM (diabetes mellitus) ([facility] [address] 4/14/2015    Hepatitis B infection     Hyperlipidemia     Hypothyroidism     Insomnia     Leg pain 4/14/2015    Post herpetic neuralgia     Unspecified sleep apnea     after seeing [facility] they state she is does not have sleep apnea. Not using cpap     Past Surgical History:   Procedure Laterality Date    ANKLE FRACTURE SURGERY  09/2016    DR ANDRADE  LT    BIOPSY / LIGATION TEMPORAL ARTERY Right 4/26/2019    RIGHT TEMPORAL ARTERY BIOPSY performed by Ronit Garcia MD at [facility]  2015    surgery for transgeminal neuraglia. Insertion of sponge for chronic pain    COLONOSCOPY  3/31/14     [facility]    COLONOSCOPY N/A 2/2/2021    COLONOSCOPY DIAGNOSTIC performed by Juliann Vergara MD at [facility]      OTHER SURGICAL HISTORY Right     Trigeminal Neuralgia    [place] COLON CA SCRN NOT  [address] IND N/A 7/24/2017    COLONOSCOPY performed by Juliann Vergara MD at [address] N/A 2/2/2021    EGD ESOPHAGOGASTRODUODENOSCOPY performed by Juliann Vergara MD at [facility] Marital status:       Spouse name: Not on file    Number of children: Not on file    Years of education: Not on file    Highest education level: Not on file   Occupational History    (gastroesophageal reflux disease)    Hidradenitis suppurativa    Lentigines    Multiple benign nevi    Poikiloderma    Seborrheic keratosis    Morbidly obese (HCC)    Chronic gastritis without bleeding    Polyp of sigmoid colon     Current Outpatient Medications on File Prior to Visit   Medication Sig Dispense Refill    citalopram (CELEXA) 20 MG tablet TAKE 1 TABLET DAILY (NEED APPOINTMENT FOR FURTHER REFILLS) 90 tablet 1    Na Sulfate-K Sulfate-Mg Sulf 17.5-3.13-1.6 GM/177ML SOLN As directed 1 Bottle 0    JANUVIA 100 MG tablet TAKE 1 TABLET DAILY 90 tablet 3    famotidine (PEPCID) 20 MG tablet TAKE 1 TABLET TWICE A DAY (NEED APPOINTMENT FOR FURTHER REFILLS) 180 tablet 3    levothyroxine (SYNTHROID) 150 MCG tablet TAKE 1 TABLET DAILY (NEED APPOINTMENT FOR FURTHER REFILLS) 90 tablet 3    vitamin D3 (CHOLECALCIFEROL) 25 MCG (1000 UT) TABS tablet Take 1 tablet by mouth daily 30 tablet 0    Ferrous Sulfate (IRON) 325 (65 Fe) MG TABS Take 1 tablet by mouth twice daily. 60 tablet 3    albuterol sulfate  (90 Base) MCG/ACT inhaler Inhale 2 puffs into the lungs every 6 hours as needed for Wheezing 1 Inhaler 0    meclizine (ANTIVERT) 25 MG tablet Take 1 tablet by mouth 3 times daily as needed for Dizziness 30 tablet 0    Blood Glucose Monitoring Suppl (TRUE METRIX METER) w/Device KIT use to test sugar  0    blood glucose test strips (TRUETEST TEST) strip Test blood glucose fasting and 1 hour post lunch and dinner 100 each 5    Lancets MISC Inject 1 each into the skin 3 times daily Use early in the morning fasting and 1 hour post lunch and dinner 100 each 5    aspirin 81 MG EC tablet Take 1 tablet by mouth daily 90 tablet 3     No current facility-administered medications on file prior to visit. Review of Systems   Constitutional: Negative for chills, diaphoresis, fatigue and fever.    HENT: Negative for congestion, ear discharge, ear pain, rhinorrhea, sinus pressure, sinus pain, sneezing and 4. Type 2 diabetes mellitus without complication, without long-term current use of insulin ([facility])  Hemoglobin A1c     DIABETES FOOT EXAM    Microalbumin / creatinine urine ratio   5. Iron deficiency anemia, unspecified iron deficiency anemia type Improving    6. BACILIO (acute kidney injury) ([facility] [address]  Basic Metabolic Panel   7. Hypothyroidism, unspecified type Controlled TSH with Reflex     Plan:      Orders Placed This Encounter   Procedures    Hemoglobin A1c     Standing Status:   Future     Standing Expiration Date:   4/8/2022    Lipid, Fasting     Standing Status:   Future     Standing Expiration Date:   4/8/2022    Microalbumin / creatinine urine ratio     Standing Status:   Future     Standing Expiration Date:   4/8/2022    TSH with Reflex     Standing Status:   Future     Standing Expiration Date:   4/8/2022    Basic Metabolic Panel     Standing Status:   Future     Standing Expiration Date:   4/8/2022     DIABETES FOOT EXAM     Orders Placed This Encounter   Medications    meloxicam (MOBIC) 7.5 MG tablet     Sig: Take 1 tablet by mouth daily     Dispense:  90 tablet     Refill:  1     DC Celebrex    neomycin-polymyxin-hydrocortisone (CORTISPORIN) 3.5-61171-7 otic solution     Sig: Place 3 drops into the right ear 4 times daily for 7 days     Dispense:  1 Bottle     Refill:  1    tiZANidine (ZANAFLEX) 4 MG tablet     Sig: Take 1 tablet by mouth every 8 hours as needed (back and hip muscle pain)     Dispense:  20 tablet     Refill:  0     Return in about 4 months (around 8/8/2021) for follow up.

## 2021-06-18 ENCOUNTER — CARE COORDINATION (OUTPATIENT)
Dept: CARE COORDINATION | Age: 76
End: 2021-06-18

## 2021-06-18 NOTE — LETTER
6/21/2021    [address]  [place] [address]    Dear Jerrod Ochoa,    I enjoyed speaking with you and wanted to send some additional information. Tiff Ahumada MD and I will work together to ensure your needs are met and help you achieve your health goals. We are committed to walk with you on this journey and look forward to working with you. Please feel free to contact me with any questions or concerns. I am available by phone or for appointments at the office. You can reach me at [phone].       In good health,     Jennifer Chaudhari RN

## 2021-06-21 ENCOUNTER — OFFICE VISIT (OUTPATIENT)
Dept: PRIMARY CARE CLINIC | Age: 76
End: 2021-06-21
Payer: MEDICARE

## 2021-06-21 VITALS
OXYGEN SATURATION: 97 % | SYSTOLIC BLOOD PRESSURE: 132 MMHG | HEIGHT: 64 IN | WEIGHT: 203 LBS | BODY MASS INDEX: 34.66 KG/M2 | HEART RATE: 80 BPM | RESPIRATION RATE: 16 BRPM | DIASTOLIC BLOOD PRESSURE: 72 MMHG

## 2021-06-21 DIAGNOSIS — E03.9 HYPOTHYROIDISM, UNSPECIFIED TYPE: ICD-10-CM

## 2021-06-21 DIAGNOSIS — E11.9 TYPE 2 DIABETES MELLITUS WITHOUT COMPLICATION, WITHOUT LONG-TERM CURRENT USE OF INSULIN (HCC): ICD-10-CM

## 2021-06-21 DIAGNOSIS — Z00.00 PREVENTATIVE HEALTH CARE: ICD-10-CM

## 2021-06-21 DIAGNOSIS — N17.9 AKI (ACUTE KIDNEY INJURY) (HCC): ICD-10-CM

## 2021-06-21 DIAGNOSIS — D50.9 IRON DEFICIENCY ANEMIA, UNSPECIFIED IRON DEFICIENCY ANEMIA TYPE: ICD-10-CM

## 2021-06-21 DIAGNOSIS — Z13.220 SCREENING FOR HYPERLIPIDEMIA: ICD-10-CM

## 2021-06-21 DIAGNOSIS — G89.29 CHRONIC RIGHT-SIDED LOW BACK PAIN WITH RIGHT-SIDED SCIATICA: Primary | ICD-10-CM

## 2021-06-21 DIAGNOSIS — M54.41 CHRONIC RIGHT-SIDED LOW BACK PAIN WITH RIGHT-SIDED SCIATICA: Primary | ICD-10-CM

## 2021-06-21 LAB
ALBUMIN SERPL-MCNC: 3.5 G/DL (ref 3.5–4.6)
ALP BLD-CCNC: 107 U/L (ref 40–130)
ALT SERPL-CCNC: 29 U/L (ref 0–33)
ANION GAP SERPL CALCULATED.3IONS-SCNC: 13 MEQ/L (ref 9–15)
AST SERPL-CCNC: 31 U/L (ref 0–35)
BASOPHILS ABSOLUTE: 0 K/UL (ref 0–0.2)
BASOPHILS RELATIVE PERCENT: 0.9 %
BILIRUB SERPL-MCNC: 0.3 MG/DL (ref 0.2–0.7)
BUN BLDV-MCNC: 17 MG/DL (ref 8–23)
CALCIUM SERPL-MCNC: 9.2 MG/DL (ref 8.5–9.9)
CHLORIDE BLD-SCNC: 109 MEQ/L (ref 95–107)
CHOLESTEROL, FASTING: 159 MG/DL (ref 0–199)
CO2: 24 MEQ/L (ref 20–31)
CREAT SERPL-MCNC: 0.85 MG/DL (ref 0.5–0.9)
CREATININE URINE: 177.3 MG/DL
EOSINOPHILS ABSOLUTE: 0.1 K/UL (ref 0–0.7)
EOSINOPHILS RELATIVE PERCENT: 3.2 %
GFR AFRICAN AMERICAN: >60
GFR NON-AFRICAN AMERICAN: >60
GLOBULIN: 3 G/DL (ref 2.3–3.5)
GLUCOSE BLD-MCNC: 146 MG/DL (ref 70–99)
HBA1C MFR BLD: 6.8 % (ref 4.8–5.9)
HCT VFR BLD CALC: 38.8 % (ref 37–47)
HDLC SERPL-MCNC: 66 MG/DL (ref 40–59)
HEMOGLOBIN: 12.4 G/DL (ref 12–16)
LDL CHOLESTEROL CALCULATED: 79 MG/DL (ref 0–129)
LYMPHOCYTES ABSOLUTE: 0.8 K/UL (ref 1–4.8)
LYMPHOCYTES RELATIVE PERCENT: 21.4 %
MCH RBC QN AUTO: 25.2 PG (ref 27–31.3)
MCHC RBC AUTO-ENTMCNC: 32.1 % (ref 33–37)
MCV RBC AUTO: 78.5 FL (ref 82–100)
MICROALBUMIN UR-MCNC: <1.2 MG/DL
MICROALBUMIN/CREAT UR-RTO: NORMAL MG/G (ref 0–30)
MONOCYTES ABSOLUTE: 0.4 K/UL (ref 0.2–0.8)
MONOCYTES RELATIVE PERCENT: 10 %
NEUTROPHILS ABSOLUTE: 2.6 K/UL (ref 1.4–6.5)
NEUTROPHILS RELATIVE PERCENT: 64.5 %
PDW BLD-RTO: 16.2 % (ref 11.5–14.5)
PLATELET # BLD: 121 K/UL (ref 130–400)
POTASSIUM SERPL-SCNC: 4.5 MEQ/L (ref 3.4–4.9)
RBC # BLD: 4.94 M/UL (ref 4.2–5.4)
SODIUM BLD-SCNC: 146 MEQ/L (ref 135–144)
TOTAL PROTEIN: 6.5 G/DL (ref 6.3–8)
TRIGLYCERIDE, FASTING: 68 MG/DL (ref 0–150)
TSH REFLEX: 0.58 UIU/ML (ref 0.44–3.86)
WBC # BLD: 4 K/UL (ref 4.8–10.8)

## 2021-06-21 PROCEDURE — 99214 OFFICE O/P EST MOD 30 MIN: CPT | Performed by: INTERNAL MEDICINE

## 2021-06-21 RX ORDER — BLOOD-GLUCOSE SENSOR
EACH MISCELLANEOUS
Qty: 1 EACH | Refills: 3 | Status: SHIPPED | OUTPATIENT
Start: 2021-06-21

## 2021-06-21 RX ORDER — PREDNISONE 20 MG/1
40 TABLET ORAL DAILY
Qty: 20 TABLET | Refills: 0 | Status: SHIPPED | OUTPATIENT
Start: 2021-06-21 | End: 2021-07-01

## 2021-06-21 RX ORDER — METHYLPREDNISOLONE ACETATE 40 MG/ML
60 INJECTION, SUSPENSION INTRA-ARTICULAR; INTRALESIONAL; INTRAMUSCULAR; SOFT TISSUE ONCE
Status: SHIPPED | OUTPATIENT
Start: 2021-06-21

## 2021-06-21 RX ORDER — BLOOD-GLUCOSE,RECEIVER,CONT
EACH MISCELLANEOUS
Qty: 1 DEVICE | Refills: 1 | Status: SHIPPED | OUTPATIENT
Start: 2021-06-21

## 2021-06-21 RX ORDER — TIZANIDINE 4 MG/1
4 TABLET ORAL EVERY 8 HOURS PRN
Qty: 20 TABLET | Refills: 0 | Status: SHIPPED | OUTPATIENT
Start: 2021-06-21 | End: 2021-09-22

## 2021-06-21 SDOH — ECONOMIC STABILITY: FOOD INSECURITY: WITHIN THE PAST 12 MONTHS, YOU WORRIED THAT YOUR FOOD WOULD RUN OUT BEFORE YOU GOT MONEY TO BUY MORE.: NEVER TRUE

## 2021-06-21 SDOH — ECONOMIC STABILITY: FOOD INSECURITY: WITHIN THE PAST 12 MONTHS, THE FOOD YOU BOUGHT JUST DIDN'T LAST AND YOU DIDN'T HAVE MONEY TO GET MORE.: NEVER TRUE

## 2021-06-21 ASSESSMENT — ENCOUNTER SYMPTOMS
WHEEZING: 0
NAUSEA: 0
COUGH: 0
SHORTNESS OF BREATH: 0
BACK PAIN: 1
ABDOMINAL PAIN: 0
VOMITING: 0
SINUS PAIN: 0
RHINORRHEA: 0
DIARRHEA: 0

## 2021-06-21 ASSESSMENT — SOCIAL DETERMINANTS OF HEALTH (SDOH): HOW HARD IS IT FOR YOU TO PAY FOR THE VERY BASICS LIKE FOOD, HOUSING, MEDICAL CARE, AND HEATING?: NOT VERY HARD

## 2021-06-21 NOTE — CARE COORDINATION
Ambulatory Care Coordination Note  6/18/2021  CM Risk Score: 2  Charlson 10 Year Mortality Risk Score: 47%     ACC: Ivan Mars RN    Summary Note: ACM CALLED PATIENT. INTRODUCED ACC PROGRAM, ROLE OF ACM GOALS AND BENEFITS. PATIENT REPORTS SHE HAS A LOT OF BACK PAIN. SHE ADMITS SHE HAS WENT TO SEE Latoya Hopper IN [place]. SHE REPORTS SHE HAS WENT 3 TIMES AND FEELS HER BACK IS WORSE NOW THEN BEFORE. ACM ASSISTED WITH PCP APPT FOR Monday. PATIENT IS DIABETIC LAST A1C 7.2 2/24/2020. PATIENT DOESN'T MONITOR AT HOME DAILY. ACM ADVISED PATIENT TO GET LABS Monday. WE WILL REVIEW FOLLOWING LABS. PATIENT DECLINED PHARM REVIEW. PATIENT AGREED TO NUTRITIONAL REVIEW   REFERRAL SENT. ACM COMPLETED INITIAL ASSESSMENT, MED REVIEW , SDOH, NUTRITIONAL REFERRAL. PLAN   EDU COPD,DM,FALLS  ACP DISCUSSION   FOLLOW UP ON POC FROM PCP    Ambulatory Care Coordination Assessment    Care Coordination Protocol  Program Enrollment: Rising Risk  Referral from Primary Care Provider: No  Week 1 - Initial Assessment     Do you have all of your prescriptions and are they filled?: Yes  Barriers to medication adherence: None  Are you able to afford your medications?: Yes  How often do you have trouble taking your medications the way you have been told to take them?: Sometimes I take them as prescribed. Do you have Home O2 Therapy?: No      Ability to seek help/take action for Emergent Urgent situations i.e. fire, crime, inclement weather or health crisis. : Independent  Ability to ambulate to restroom: Independent  Ability handle personal hygeine needs (bathing/dressing/grooming): Independent  Ability to manage Medications: Independent  Ability to prepare Food Preparation: Independent  Ability to maintain home (clean home, laundry): Independent  Ability to drive and/or has transportation: Independent  Ability to do shopping: Independent  Ability to manage finances:  Independent     Current Housing: Private Residence Per the Fall Risk Screening, did the patient have 2 or more falls or 1 fall with injury in the past year?: No     Frequent urination at night?: No  Do you use rails/bars?: No  Do you have a non-slip tub mat?: Yes     Are you experiencing loss of meaning?: No  Are you experiencing loss of hope and peace?: No     Thinking about your patient's physical health needs, are there any symptoms or problems (risk indicators) you are unsure about that require further investigation?: No identified areas of uncertainly or problems already being investigated   Are the patients physical health problems impacting on their mental well-being?: No identified areas of concern   Are there any problems with your patients lifestyle behaviors (alcohol, drugs, diet, exercise) that are impacting on physical or mental well-being?: No identified areas of concern   Do you have any other concerns about your patients mental well-being?  How would you rate their severity and impact on the patient?: No identified areas of concern   How would you rate their home environment in terms of safety and stability (including domestic violence, insecure housing, neighbor harassment)?: Consistently safe, supportive, stable, no identified problems   How do daily activities impact on the patient's well-being? (include current or anticipated unemployment, work, caregiving, access to transportation or other): Some general dissatisfaction but no concern   How would you rate their social network (family, work, friends)?: Adequate participation with social networks   How would you rate their financial resources (including ability to afford all required medical care)?: Financially secure, some resource challenges   How wells does the patient now understand their health and well-being (symptoms, signs or risk factors) and what they need to do to manage their health?: Reasonable to good understanding but do not feel able to engage with advice at this time   Do other services need to be involved to help this patient?: Other care/services in place and adequate   Are current services involved with this patient well-coordinated? (Include coordination with other services you are now recommendation): All required care/services in place and well-coordinated   Suggested Interventions and Community Resources  Fall Risk Prevention: Not Started Medi Set or Pill Pack: Completed   Pharmacist: Declined   Registered Dietician: Not Started   Zone Management Tools: Completed                  Prior to Admission medications    Medication Sig Start Date End Date Taking?  Authorizing Provider   citalopram (CELEXA) 20 MG tablet TAKE 1 TABLET DAILY (NEED APPOINTMENT FOR FURTHER REFILLS) 1/21/21  Yes René Alvarez MD   JANUVIA 100 MG tablet TAKE 1 TABLET DAILY 12/19/20  Yes René Alvarez MD   famotidine (PEPCID) 20 MG tablet TAKE 1 TABLET TWICE A DAY (NEED APPOINTMENT FOR FURTHER REFILLS) 12/9/20  Yes René Alvarez MD   levothyroxine (SYNTHROID) 150 MCG tablet TAKE 1 TABLET DAILY (NEED APPOINTMENT FOR FURTHER REFILLS) 12/9/20  Yes René Alvarez MD   vitamin D3 (CHOLECALCIFEROL) 25 MCG (1000 UT) TABS tablet Take 1 tablet by mouth daily 12/8/20  Yes René Alvarez MD   albuterol sulfate  (90 Base) MCG/ACT inhaler Inhale 2 puffs into the lungs every 6 hours as needed for Wheezing 11/23/20  Yes Chantel Pizarro APRN - CNP   Blood Glucose Monitoring Suppl (TRUE METRIX METER) w/Device KIT use to test sugar 5/13/19  Yes Historical Provider, MD   blood glucose test strips (TRUETEST TEST) strip Test blood glucose fasting and 1 hour post lunch and dinner 5/12/19  Yes René Alvarez MD   Lancets MISC Inject 1 each into the skin 3 times daily Use early in the morning fasting and 1 hour post lunch and dinner 5/12/19  Yes René Alvarez MD   aspirin 81 MG EC tablet Take 1 tablet by mouth daily 4/10/19  Yes René Alvarez MD   meloxicam (MOBIC) 7.5 MG tablet Take 1 tablet by mouth daily  Patient not taking: Reported on 6/18/2021 4/8/21   Bam Berger MD   tiZANidine (ZANAFLEX) 4 MG tablet Take 1 tablet by mouth every 8 hours as needed (back and hip muscle pain)  Patient not taking: Reported on 6/18/2021 4/8/21   Bam Berger MD   meclizine (ANTIVERT) 25 MG tablet Take 1 tablet by mouth 3 times daily as needed for Dizziness  Patient not taking: Reported on 6/18/2021 9/14/20   Bam Berger MD       Future Appointments   Date Time Provider Sosa Barragan   6/21/2021  9:45 AM MD ALEKSEY Orosco [facility] EMERGENCY [facility] [place]   8/10/2021  4:15 PM MD Sosa Orosco

## 2021-06-21 NOTE — PROGRESS NOTES
Subjective:      Patient ID: Chris Mandujano is a 77 y.o. female    Back pain   HPI  Pt presents for follow up regarding chronic back pain. No help from zanaflex and meloxicam. Session with chiropractor caused more pain. Injections to no avail. Pain is sharp, rated 8/10 in the right lower back down to the buttock, thigh and leg. No LE weakness, no tingling or numbness, no incontinence, no fever or chills. Difficulty using DM test strips. Last A1c 7.7. Past Medical History:   Diagnosis Date    Anxiety     DM (diabetes mellitus) ([facility] [address] 4/14/2015    Hepatitis B infection     Hyperlipidemia     Hypothyroidism     Insomnia     Leg pain 4/14/2015    Post herpetic neuralgia     Unspecified sleep apnea     after seeing [facility] they state she is does not have sleep apnea. Not using cpap     Past Surgical History:   Procedure Laterality Date    ANKLE FRACTURE SURGERY  09/2016    DR ANDRADE  LT    BIOPSY / LIGATION TEMPORAL ARTERY Right 4/26/2019    RIGHT TEMPORAL ARTERY BIOPSY performed by Elysia Rodgers MD at [facility]  2015    surgery for transgeminal neuraglia. Insertion of sponge for chronic pain    COLONOSCOPY  3/31/14    DR Hallie Deleon    COLONOSCOPY N/A 2/2/2021    COLONOSCOPY [facility]AG[facility]STIC [facility]      OTHER SURGICAL HISTORY Right     Trigeminal Neuralgia    [place] COLON CA SCRN NOT  [address] IND N/A 7/24/2017    COLONOSCOPY performed by Jessi De Los Santos MD at [address] N/A 2/2/2021    EGD ESOPHAGOGASTRODUODENOSCOPY performed by Jessi De Los [facility] MD at [facility] Marital status:       Spouse name: Not on file    Number of children: 2    Years of education: Not on file    Highest education level: 12th grade   Occupational History    Occupation: retired    Tobacco Use    Smoking status: Former Smoker     Packs/day: 1.00     Years: 48.00     Pack years: 48.00     Types: Cigarettes     Quit date: 2013     Years since quittin.9    Smokeless tobacco: Never Used   Vaping Use    Vaping Use: Every day    Substances: Nicotine, Flavoring, low  dose    Substance and Sexual Activity    Alcohol use: No     Comment: very rarely    Drug use: No    Sexual activity: Not Currently     Partners: Male   Other Topics Concern    Not on file   Social History Narrative    Lives alone    Has stairs in home needs to go up/down. 2 children that help if needed      Social Determinants of Health     Financial Resource Strain: Low Risk     Difficulty of Paying Living Expenses: Not very hard   Food Insecurity: No Food Insecurity    Worried About Running Out of Food in the Last Year: Never true    Franky of Food in the Last Year: Never true   Transportation Needs:     Lack of Transportation (Medical):      Lack of Transportation (Non-Medical):    Physical Activity:     Days of Exercise per Week:     Minutes of Exercise per Session:    Stress:     Feeling of Stress :    Social Connections:     Frequency of Communication with Friends and Family:     Frequency of Social Gatherings with Friends and Family:     Attends Tenriism Services:     Active Member of Clubs or Organizations:     Attends Club or Organization Meetings:     Marital Status:    Intimate Partner Violence:     Fear of Current or Ex-Partner:     Emotionally Abused:     Physically Abused:     Sexually Abused:      Family History   Problem Relation Age of Onset    Stroke Mother     High Cholesterol Mother     Cancer Mother     Colon Cancer Neg Hx     Celiac Disease Neg Hx     Crohn's Disease Neg Hx      Allergies:  Cefdinir and Metformin and related  Patient Active Problem List   Diagnosis    Hepatitis B infection    Sleep apnea    Anxiety    Insomnia    Trigeminal neuralgia    Hyperlipidemia    Hypothyroidism    Dyspnea    DM (diabetes mellitus) (HCC)    Leg pain    Depression    Frequent headaches    [facility] depressive disorder, recurrent, [facility] full [facility] ([facility] [address]    Cherry angioma    Diffuse photodamage of skin    Dilated pore of Solomon    GERD (gastroesophageal reflux disease)    Hidradenitis suppurativa    Lentigines    Multiple benign nevi    Poikiloderma    Seborrheic keratosis    Morbidly obese (HCC)    Chronic gastritis without bleeding    Polyp of sigmoid colon     Current Outpatient Medications on File Prior to Visit   Medication Sig Dispense Refill    citalopram (CELEXA) 20 MG tablet TAKE 1 TABLET DAILY (NEED APPOINTMENT FOR FURTHER REFILLS) 90 tablet 1    JANUVIA 100 MG tablet TAKE 1 TABLET DAILY 90 tablet 3    famotidine (PEPCID) 20 MG tablet TAKE 1 TABLET TWICE A DAY (NEED APPOINTMENT FOR FURTHER REFILLS) 180 tablet 3    levothyroxine (SYNTHROID) 150 MCG tablet TAKE 1 TABLET DAILY (NEED APPOINTMENT FOR FURTHER REFILLS) 90 tablet 3    albuterol sulfate  (90 Base) MCG/ACT inhaler Inhale 2 puffs into the lungs every 6 hours as needed for Wheezing 1 Inhaler 0    meclizine (ANTIVERT) 25 MG tablet Take 1 tablet by mouth 3 times daily as needed for Dizziness 30 tablet 0    Blood Glucose Monitoring Suppl (TRUE METRIX METER) w/Device KIT use to test sugar  0    blood glucose test strips (TRUETEST TEST) strip Test blood glucose fasting and 1 hour post lunch and dinner 100 each 5    Lancets MISC Inject 1 each into the skin 3 times daily Use early in the morning fasting and 1 hour post lunch and dinner 100 each 5    aspirin 81 MG EC tablet Take 1 tablet by mouth daily 90 tablet 3    vitamin D3 (CHOLECALCIFEROL) 25 MCG (1000 UT) TABS tablet Take 1 tablet by mouth daily 30 tablet 0     No current facility-administered medications on file prior to visit. Review of Systems   Constitutional: Negative for chills, diaphoresis, fatigue and fever.    HENT: Negative for congestion, ear discharge, ear pain, rhinorrhea and sinus pain. Respiratory: Negative for cough, shortness of breath and wheezing. Cardiovascular: Negative for chest pain. Gastrointestinal: Negative for abdominal pain, diarrhea, nausea and vomiting. Endocrine: Negative for cold intolerance and heat intolerance. Genitourinary: Negative for dysuria and frequency. Musculoskeletal: Positive for back pain. Neurological: Negative for dizziness and light-headedness. Psychiatric/Behavioral: Negative for dysphoric mood. The patient is not nervous/anxious. Objective:   /72 (Site: Left Upper Arm, Position: Sitting, Cuff Size: Medium Adult)   Pulse 80   Resp 16   Ht 5' 4\" (1.626 m)   Wt 203 lb (92.1 kg)   LMP 02/28/1982   SpO2 97%   BMI 34.84 kg/m²     Physical Exam  Constitutional:       General: She is not in acute distress. Appearance: She is not diaphoretic. Cardiovascular:      Rate and Rhythm: Normal rate and regular rhythm. Pulses: Normal pulses. Heart sounds: Normal heart sounds, S1 normal and S2 normal. No murmur heard. Pulmonary:      Effort: Pulmonary effort is normal. No respiratory distress. Breath sounds: Normal breath sounds. No wheezing or rales. Chest:      Chest wall: No tenderness. Abdominal:      General: Bowel sounds are normal.      Tenderness: There is no abdominal tenderness. Neurological:      General: No focal deficit present. Mental Status: She is alert. Cranial Nerves: No cranial nerve deficit. Psychiatric:         Mood and Affect: Mood normal.         Thought Content: Thought content normal.       Assessment:       Diagnosis Orders   1. Chronic right-sided low back pain with right-sided sciatica  MRI LUMBAR SPINE WO CONTRAST    methylPREDNISolone acetate (DEPO-MEDROL) injection 60 mg    tiZANidine (ZANAFLEX) 4 MG tablet    predniSONE (DELTASONE) 20 MG tablet   2.  Type 2 diabetes mellitus without complication, without

## 2021-06-22 ENCOUNTER — CARE COORDINATION (OUTPATIENT)
Dept: CARE COORDINATION | Age: 76
End: 2021-06-22

## 2021-06-22 NOTE — CARE COORDINATION
Contacted Anirudh Hudson regarding Dietitian referral. Pt answered, RD explained reason for call and role in care. Pt requested RD call back on a different day-prefers a call one day next week. Will contact pt in one week and follow up as appropriate.          [facility], [address] [place] [address]

## 2021-06-23 LAB — VITAMIN D 25-HYDROXY: 70.8 NG/ML (ref 30–100)

## 2021-06-25 ENCOUNTER — CARE COORDINATION (OUTPATIENT)
Dept: CARE COORDINATION | Age: 76
End: 2021-06-25

## 2021-06-25 NOTE — CARE COORDINATION
NANCYM SPOKE TO PATIENT. SHE REPORTS COMPLIANT WITH [facility] Ambulatory Care Coordination Note  6/25/2021  CM Risk Score: 2  Anibal Mortality Risk Score: [unfilled]    ACC: Joao Marks, RN    Summary Note: ACM SPOKE TO PATIENT. SHE REPORTS SHE HAS CONTINUED PAIN BUT A LITTLE BETTER THIS WEEK. SHE REPORTS COMPLIANT WITH [facility]. SHE REPORTS SHE DIDN'T RECEIVE DEPO-MEDROL INJECTION. SHE IS SET UP FOR MRI OF BACK NEXT WEEK. PATIENT IS AWARE OF APPT T/L. ACM REVIEWED LABS  PATIENT A1C 6.8. DM WELL CONTROLLED. PATIENT ADVISED TO MONITOR BS ONCE WEEKLY AT RANDOM TIMES. PATIENT AGREED. PATIENT REPORTS NO CP, SOB,EDEMA, OR FALLS. PATIENT REPORTS HER BREATHING HAS BEEN GOOD THIS WEEK. Fay Norwood received counseling on the following healthy behaviors: SELF MANAGEMENT of chronic health conditions,with goal to improve quality of life and overall wellbeing. The patient is asked to make an attempt to improve diet and exercise patterns to aid in medical management. I have instructed Zach to complete a self tracking handout on Blood Sugars , Blood Pressures  and Weights and instructed them to bring it with them to her next appointment. Discussed use, benefit, and side effects of prescribed medications. Barriers to medication compliance addressed. All patient questions answered. Pt voiced understanding. ACM REVIEWED AND SENT VIA [employer] DM EDU. Diabetes Assessment    Medic Alert ID: No  Meal Planning: None   How often do you test your blood sugar?: No Testing   Do you have barriers with adherence to non-pharmacologic self-management interventions?  (Nutrition/Exercise/Self-Monitoring): No   Have you ever had to go to the ED for symptoms of low blood sugar?: No       No patient-reported symptoms   Do you have hyperglycemia symptoms?: No   Do you have hypoglycemia symptoms?: No   Blood Sugar Monitoring Regimen: Not Testing       and   Lab Results   Component Value Date    LABA1C 6.8 (H) 06/21/2021    LABA1C 7.2 (H) 02/24/2020    LABA1C 7.7 (A) 04/10/2019     Lab Results   Component Value Date    LABMICR <1.20 06/21/2021    LDLCALC 79 06/21/2021    CREATININE 0.85 06/21/2021     COPD Assessment    Does the patient understand envrionmental exposure?: Yes  Is the patient able to verbalize Rescue vs. Long Acting medications?: Yes  Does the patient have a nebulizer?: No  Does the patient use a space with inhaled medications?: No     No patient-reported symptoms         Symptoms:     Have you had a recent diagnosis of pneumonia either by PCP or at a hospital?: No             Care Coordination Interventions    Program Enrollment: Rising Risk  Referral from Primary Care Provider: No  Suggested Interventions and Community Resources  Fall Risk Prevention: Not Started  Medi Set or Pill Pack: Completed (Comment: uses pill box )  Pharmacist: Declined  Registered Dietician: Completed  Zone Management Tools: Completed (Comment: dm and copd )         Goals Addressed                 This Visit's Progress     Conditions and Symptoms   No change     I will schedule office visits, as directed by my provider. I will keep my appointment or reschedule if I have to cancel. I will notify my provider of any barriers to my plan of care. I will follow my Zone Management tool to seek urgent or emergent care. I will notify my provider of any symptoms that indicate a worsening of my condition. Barriers: none  Plan for overcoming my barriers: N/A  Confidence: 6/10  Anticipated Goal Completion Date: 9/1/2021              Prior to Admission medications    Medication Sig Start Date End Date Taking?  Authorizing Provider   tiZANidine (ZANAFLEX) 4 MG tablet Take 1 tablet by mouth every 8 hours as needed (back and hip muscle pain) 6/21/21   Kelsie Kan MD   predniSONE (DELTASONE) 20 MG tablet Take 2 tablets by mouth daily for 10 days 6/21/21 7/1/21  eKlsie Kan MD   Continuous Blood Gluc  (DEXCOM G6 ) MARIELA Use daily for blood sugar readings 6/21/21   Otoniel Choudhury MD   Continuous Blood Gluc Sensor (DEXCOM G6 SENSOR) MISC Use daily for blood sugar readings 6/21/21   Otoniel Choudhury MD   citalopram (CELEXA) 20 MG tablet TAKE 1 TABLET DAILY (NEED APPOINTMENT FOR FURTHER REFILLS) 1/21/21   Otoniel Choudhury MD   JANUVIA 100 MG tablet TAKE 1 TABLET DAILY 12/19/20   Otoniel Choudhury MD   famotidine (PEPCID) 20 MG tablet TAKE 1 TABLET TWICE A DAY (NEED APPOINTMENT FOR FURTHER REFILLS) 12/9/20   Otoniel Choudhury MD   levothyroxine (SYNTHROID) 150 MCG tablet TAKE 1 TABLET DAILY (NEED APPOINTMENT FOR FURTHER REFILLS) 12/9/20   Otoniel Choudhury MD   vitamin D3 (CHOLECALCIFEROL) 25 MCG (1000 UT) TABS tablet Take 1 tablet by mouth daily 12/8/20   Otoniel Choudhury MD   albuterol sulfate  (90 Base) MCG/ACT inhaler Inhale 2 puffs into the lungs every 6 hours as needed for Wheezing 11/23/20   Chantel Pizarro, APRN - CNP   meclizine (ANTIVERT) 25 MG tablet Take 1 tablet by mouth 3 times daily as needed for Dizziness 9/14/20   Otoniel Choudhury MD   Blood Glucose Monitoring Suppl (TRUE METRIX METER) w/Device KIT use to test sugar 5/13/19   Historical Provider, MD   blood glucose test strips (TRUETEST TEST) strip Test blood glucose fasting and 1 hour post lunch and dinner 5/12/19   Otoniel Choudhury MD   Lancets MISC Inject 1 each into the skin 3 times daily Use early in the morning fasting and 1 hour post lunch and dinner 5/12/19   Otoniel Choudhury MD   aspirin 81 MG EC tablet Take 1 tablet by mouth daily 4/10/19   Otoniel Choudhury MD       Future Appointments   Date Time Provider Sosa Barragan   7/1/2021  2:30 PM Alicia   7/19/2021  2:00 PM Otoniel Choudhury MD [facility]   8/10/2021  4:15 PM MD Sosa Barillas

## 2021-06-25 NOTE — PATIENT INSTRUCTIONS
Patient Education        Learning About Meal Planning for Diabetes  Why plan your meals? Meal planning can be a key part of managing diabetes. Planning meals and snacks with the right balance of carbohydrate, protein, and fat can help you keep your blood sugar at the target level you set with your doctor. You don't have to eat special foods. You can eat what your family eats, including sweets once in a while. But you do have to pay attention to how often you eat and how much you eat of certain foods. You may want to work with a dietitian or a certified diabetes educator. He or she can give you tips and meal ideas and can answer your questions about meal planning. This health professional can also help you reach a healthy weight if that is one of your goals. What plan is right for you? Your dietitian or diabetes educator may suggest that you start with the plate format or carbohydrate counting. The plate format  The plate format is a simple way to help you manage how you eat. You plan meals by learning how much space each food should take on a plate. Using the plate format helps you spread carbohydrate throughout the day. It can make it easier to keep your blood sugar level within your target range. It also helps you see if you're eating healthy portion sizes. To use the plate format, you put non-starchy vegetables on half your plate. Add meat or meat substitutes on one-quarter of the plate. Put a grain or starchy vegetable (such as brown rice or a potato) on the final quarter of the plate. You can add a small piece of fruit and some low-fat or fat-free milk or yogurt, depending on your carbohydrate goal for each meal.  Here are some tips for using the plate format:  · Make sure that you are not using an oversized plate. A 9-inch plate is best. Many restaurants use larger plates. · Get used to using the plate format at home. Then you can use it when you eat out.   · Write down your questions about using the plate format. Talk to your doctor, a dietitian, or a diabetes educator about your concerns. Carbohydrate counting  With carbohydrate counting, you plan meals based on the amount of carbohydrate in each food. Carbohydrate raises blood sugar higher and more quickly than any other nutrient. It is found in desserts, breads and cereals, and fruit. It's also found in starchy vegetables such as potatoes and corn, grains such as rice and pasta, and milk and yogurt. Spreading carbohydrate throughout the day helps keep your blood sugar levels within your target range. Your daily amount depends on several things, including your weight, how active you are, which diabetes medicines you take, and what your goals are for your blood sugar levels. A registered dietitian or diabetes educator can help you plan how much carbohydrate to include in each meal and snack. A guideline for your daily amount of carbohydrate is:  · 45 to 60 grams at each meal. That's about the same as 3 to 4 carbohydrate servings. · 15 to 20 grams at each snack. That's about the same as 1 carbohydrate serving. The Nutrition Facts label on packaged foods tells you how much carbohydrate is in a serving of the food. First, look at the serving size on the food label. Is that the amount you eat in a serving? All of the nutrition information on a food label is based on that serving size. So if you eat more or less than that, you'll need to adjust the other numbers. Total carbohydrate is the next thing you need to look for on the label. If you count carbohydrate servings, one serving of carbohydrate is 15 grams. For foods that don't come with labels, such as fresh fruits and vegetables, you'll need a guide that lists carbohydrate in these foods. Ask your doctor, dietitian, or diabetes educator about books or other nutrition guides you can use.   If you take insulin, you need to know how many grams of carbohydrate are in a meal. This lets you know how much rapid-acting insulin to take before you eat. If you use an insulin pump, you get a constant rate of insulin during the day. So the pump must be programmed at meals to give you extra insulin to cover the rise in blood sugar after meals. When you know how much carbohydrate you will eat, you can take the right amount of insulin. Or, if you always use the same amount of insulin, you need to make sure that you eat the same amount of carbohydrate at meals. If you need more help to understand carbohydrate counting and food labels, ask your doctor, dietitian, or diabetes educator. How can you plan healthy meals? Here are some tips to get started:  · Plan your meals a week at a time. Don't forget to include snacks too. · Use cookbooks or online recipes to plan several main meals. Plan some quick meals for busy nights. You also can double some recipes that freeze well. Then you can save half for other busy nights when you don't have time to cook. · Make sure you have the ingredients you need for your recipes. If you're running low on basic items, put these items on your shopping list too. · List foods that you use to make breakfasts, lunches, and snacks. List plenty of fruits and vegetables. · Post this list on the refrigerator. Add to it as you think of more things you need. · Take the list to the store to do your weekly shopping. Follow-up care is a key part of your treatment and safety. Be sure to make and go to all appointments, and call your doctor if you are having problems. It's also a good idea to know your test results and keep a list of the medicines you take. Where can you learn more? Go to https://yariel.Klip.in. org and sign in to your [employer] account. Enter Y904 in the [facility] box to learn more about \"Learning About Meal Planning for Diabetes. \"     If you do not have an account, please click on the \"Sign Up Now\" link.   Current as of: August 31, 2020               Content before a meal is ___________________. Your blood sugar target range after a meal is _______________________. · Do this--___________________________________________________--to get your blood sugar back within your safe range if your blood sugar results are _________________________________________. (For example: Less than 70 or above 250 mg/dL.)  Call your doctor when your blood sugar results are ___________________________________. (For example: Less than 70 or above 250 mg/dL.)  What are the symptoms of low and high blood sugar? Common symptoms of low blood sugar are sweating and feeling shaky, weak, hungry, or confused. Symptoms can start quickly. Common symptoms of high blood sugar are feeling very thirsty or very hungry. You may also pass urine more often than usual. You may have blurry vision and may lose weight without trying. But some people may have high or low blood sugar without having any symptoms. That's a good reason to check your blood sugar on a regular schedule. What should you do if you have symptoms? Work with your doctor to fill in the blank spaces below that apply to you. Low blood sugar and \"the rule of 15\"  If you have symptoms of low blood sugar, check your blood sugar. If it's below _____ ( for example, below 70), eat or drink a quick-sugar food that has about 15 grams of carbohydrate. Your goal is to get your level back to your safe range. Check your blood sugar again 15 minutes later. If it's still not in your target range, take another 15 grams of carbohydrate and check your blood sugar again in 15 minutes. Repeat this until you reach your target. Then go back to your regular testing schedule. Children usually need less than 15 grams of carbohydrate. Check with your doctor or diabetes educator for the amount that is right for your child.   When you have low blood sugar, it's best to stop or reduce any physical activity until your blood sugar is back in your target range and is stable. If you must stay active, eat or drink 30 grams of carbohydrate. Then check your blood sugar again in 15 minutes. If it's not in your target range, take another 30 grams of carbohydrates. Check your blood sugar again in 15 minutes. Keep doing this until you reach your target. You can then go back to your regular testing schedule. If your symptoms or blood sugar levels are getting worse or have not improved after 15 minutes, seek medical care right away. If you take insulin, always carry a glucagon emergency kit. Be sure your family, friends, and coworkers know how to give glucagon. Here are some examples of quick-sugar foods with 15 grams of carbohydrate:  · 3 or 4 glucose tablets  · 1 tablespoon (3 teaspoons) table sugar  · ½ cup to ¾ cup (4 to 6 ounces) of fruit juice or regular (not diet) soda  · Hard candy (such as 6 Life Savers)  High blood sugar  If you have symptoms of high blood sugar, check your blood sugar. Your goal is to get your level back to your target range. If it's above ______ ( for example, above 250), follow these steps:  · If you missed a dose of your diabetes medicine, take it now. Take only the amount of medicine that you have been prescribed. Do not take more or less medicine. · Give yourself insulin if your doctor has prescribed it for high blood sugar. · Test for ketones, if the doctor told you to do so. If the results of the ketone test show a moderate-to-large amount of ketones, call the doctor for advice. · Wait 30 minutes after you take the extra insulin or the missed medicine. Check your blood sugar again. If your symptoms or blood sugar levels are getting worse or have not improved after taking these steps, seek medical care right away. Follow-up care is a key part of your treatment and safety. Be sure to make and go to all appointments, and call your doctor if you are having problems.  It's also a good idea to know your test results and keep a list of the medicines you take. Where can you learn more? Go to https://chpepiceweb.healthHelpr. org and sign in to your clypd account. Enter P357 in the [facility] box to learn more about \"Diabetes Blood Sugar Emergencies: Your Action Plan. \"     If you do not have an account, please click on the \"Sign Up Now\" link. Current as of: August 31, 2020               Content Version: 12.9  © 2006-2021 Healthwise, [employer]. Care instructions adapted under license by [facility] ([facility]). If you have questions about a medical condition or this instruction, always ask your healthcare professional. [address] any warranty or liability for your use of this information. Patient Education        Learning About Carbohydrate (Carb) Counting and Eating Out When You Have Diabetes  Why plan your meals? Meal planning can be a key part of managing diabetes. Planning meals and snacks with the right balance of carbohydrate, protein, and fat can help you keep your blood sugar at the target level you set with your doctor. You don't have to eat special foods. You can eat what your family eats, including sweets once in a while. But you do have to pay attention to how often you eat and how much you eat of certain foods. You may want to work with a dietitian or a certified diabetes educator. He or she can give you tips and meal ideas and can answer your questions about meal planning. This health professional can also help you reach a healthy weight if that is one of your goals. What should you know about eating carbs? Managing the amount of carbohydrate (carbs) you eat is an important part of healthy meals when you have diabetes. Carbohydrate is found in many foods. · Learn which foods have carbs. And learn the amounts of carbs in different foods. ? Bread, cereal, pasta, and rice have about 15 grams of carbs in a serving.  A serving is 1 slice of bread (1 ounce), ½ cup of cooked cereal, or 1/3 cup of cooked pasta or rice. ? Fruits have 15 grams of carbs in a serving. A serving is 1 small fresh fruit, such as an apple or orange; ½ of a banana; ½ cup of cooked or canned fruit; ½ cup of fruit juice; 1 cup of melon or raspberries; or 2 tablespoons of dried fruit. ? Milk and no-sugar-added yogurt have 15 grams of carbs in a serving. A serving is 1 cup of milk or 2/3 cup of no-sugar-added yogurt. ? Starchy vegetables have 15 grams of carbs in a serving. A serving is ½ cup of mashed potatoes or sweet potato; 1 cup winter squash; ½ of a small baked potato; ½ cup of cooked beans; or ½ cup cooked corn or green peas. · Learn how much carbs to eat each day and at each meal. A dietitian or CDE can teach you how to keep track of the amount of carbs you eat. This is called carbohydrate counting. · If you are not sure how to count carbohydrate grams, use the Plate Method to plan meals. It is a good, quick way to make sure that you have a balanced meal. It also helps you spread carbs throughout the day. ? Divide your plate by types of foods. Put non-starchy vegetables on half the plate, meat or other protein food on one-quarter of the plate, and a grain or starchy vegetable in the final quarter of the plate. To this you can add a small piece of fruit and 1 cup of milk or yogurt, depending on how many carbs you are supposed to eat at a meal.  · Try to eat about the same amount of carbs at each meal. Do not \"save up\" your daily allowance of carbs to eat at one meal.  · Proteins have very little or no carbs per serving. Examples of proteins are beef, chicken, turkey, fish, eggs, tofu, cheese, cottage cheese, and peanut butter. A serving size of meat is 3 ounces, which is about the size of a deck of cards. Examples of meat substitute serving sizes (equal to 1 ounce of meat) are 1/4 cup of cottage cheese, 1 egg, 1 tablespoon of peanut butter, and ½ cup of tofu. How can you eat out and still eat healthy?   · Learn to estimate adapted under license by [facility] ([facility]). If you have questions about a medical condition or this instruction, always ask your healthcare professional. [address] any warranty or liability for your use of this information. Patient Education        Learning About Diabetes and Exercise  Can you exercise if you have diabetes? When you have diabetes, it's important to get regular exercise. This helps control your blood sugar level. You can still play sports, run, ride a bike, swim, and do other activities when you have diabetes. How does exercise help when you have diabetes? Getting regular exercise can help control your blood sugar. Your body turns the food you eat into glucose, a type of sugar. You need this sugar for energy. When you have diabetes, the sugar builds up in your blood. But when you exercise, your body uses sugar. This helps keep it from building up in your blood and results in lower blood sugar and better control of diabetes. Exercise may help you in other ways too. It can help you reach and stay at a healthy weight. It also helps improve blood pressure and cholesterol, which can reduce the risk of heart disease. Exercise can make you feel stronger and happier. It can help you relax and sleep better. And it can give you confidence in other things you do. Exercising safely when you have diabetes  Before you start a new exercise program, talk to your doctor about how and when to exercise. Some types of exercise can be harmful if your diabetes is causing other problems, such as problems with your feet. Your doctor can tell you what types of exercise are good choices for you. Here are some general safety tips. · Check your blood sugar before and after you exercise. Be careful about what you eat, especially if you take insulin or other medicines for diabetes. · Take steps to avoid blood sugar problems. ?  Ask your doctor what blood sugar range is safe for you when you exercise. ? If you take medicine or insulin that lowers blood sugar, check your blood sugar before you exercise. ? If your blood sugar is less than 90 mg/dL, you may need to eat a carbohydrate snack first.  ? Be careful when you exercise if your blood sugar is too high. · Try to exercise at about the same time each day. This may help keep your blood sugar steady. If you want to exercise more, slowly increase how hard or long you exercise. · Have someone with you when you exercise. Or exercise at a gym. You may need help if your blood sugar drops too low. · Keep some quick-sugar food with you. You may get symptoms of low blood sugar during exercise or up to 24 hours later. · Use proper footwear and the right equipment. · Pay attention to your body. If you are used to exercising and notice that you cannot do as much as usual, talk to your doctor. Follow-up care is a key part of your treatment and safety. Be sure to make and go to all appointments, and call your doctor if you are having problems. It's also a good idea to know your test results and keep a list of the medicines you take. Where can you learn more? Go to https://Flirtomaticpe[facility].KeyMe. org and sign in to your [employer] account. Enter U827 in the [employer] box to learn more about \"Learning About Diabetes and Exercise. \"     If you do not have an account, please click on the \"Sign Up Now\" link. Current as of: August 31, 2020               Content Version: 12.9  © 2006-2021 Healthwise, Incorporated. Care instructions adapted under license by [facility] [place] ([facility]). If you have questions about a medical condition or this instruction, always ask your healthcare professional. [place] [address] any warranty or liability for your use of this information.          Patient Education        Diabetes and Preventing Falls: Care Instructions  Your Care Instructions     If you are an older adult who has diabetes, you may have a higher risk of falling. Complications of diabetes--such as nerve damage, foot problems, and reduced vision--may increase your risk of a fall. Some of your medicines also may add to your risk. By making your home safer, you can lower your risk of falling. Doing things to prevent diabetes complications may also help to lower your risk. You can make your home safer with a few simple measures. Follow-up care is a key part of your treatment and safety. Be sure to make and go to all appointments, and call your doctor if you are having problems. It's also a good idea to know your test results and keep a list of the medicines you take. How can you care for yourself at home? Taking care of yourself  · Keep your blood sugar at a target level (which you set with your doctor). · Exercise regularly to improve your strength, muscle tone, and balance. Walk if you can. Swimming may be a good choice if you cannot walk easily. · Have your vision checked as often as your doctor recommends. It is usually once a year or more often if you have eye problems. · Know the side effects of the medicines you take. Ask your doctor or pharmacist whether the medicines you take can affect your balance. Sleeping pills or sedatives can affect your balance. · Limit the amount of alcohol you drink. Alcohol can impair your balance and other senses. · Have your doctor check your feet during each visit. If you have a foot problem, see your doctor. Preventing falls at home  · Remove raised doorway thresholds, throw rugs, and clutter. Repair loose carpet or raised areas in the floor. · Move furniture and electrical cords to keep them out of walking paths. · Use nonskid floor wax, and wipe up spills right away, especially on ceramic tile floors. · If you use a walker or cane, put rubber tips on it. If you use crutches, clean the bottoms of them regularly with an abrasive pad, such as steel wool.   · Keep your house well lit, especially Incorporated disclaims any warranty or liability for your use of this information. Patient Education        Diabetes Foot Health: Care Instructions  Your Care Instructions     When you have diabetes, your feet need extra care and attention. Diabetes can damage the nerve endings and blood vessels in your feet, making you less likely to notice when your feet are injured. Diabetes also limits your body's ability to fight infection and get blood to areas that need it. If you get a minor foot injury, it could become an ulcer or a serious infection. With good foot care, you can prevent most of these problems. Caring for your feet can be quick and easy. Most of the care can be done when you are bathing or getting ready for bed. Follow-up care is a key part of your treatment and safety. Be sure to make and go to all appointments, and call your doctor if you are having problems. It's also a good idea to know your test results and keep a list of the medicines you take. How can you care for yourself at home? · Keep your blood sugar close to normal by watching what and how much you eat, monitoring blood sugar, taking medicines if prescribed, and getting regular exercise. · Do not smoke. Smoking affects blood flow and can make foot problems worse. If you need help quitting, talk to your doctor about stop-smoking programs and medicines. These can increase your chances of quitting for good. · Eat a diet that is low in fats. High fat intake can cause fat to build up in your blood vessels and decrease blood flow. · Inspect your feet daily for blisters, cuts, cracks, or sores. If you cannot see well, use a mirror or have someone help you. · Take care of your feet:  ? Wash your feet every day. Use warm (not hot) water. Check the water temperature with your wrists or other part of your body, not your feet. ? Dry your feet well. Pat them dry. Do not rub the skin on your feet too hard. Dry well between your toes.  If the skin on your feet stays moist, bacteria or a fungus can grow, which can lead to infection. ? Keep your skin soft. Use moisturizing skin cream to keep the skin on your feet soft and prevent calluses and cracks. But do not put the cream between your toes, and stop using any cream that causes a rash. ? Clean underneath your toenails carefully. Do not use a sharp object to clean underneath your toenails. Use the blunt end of a nail file or other rounded tool. ? Trim and file your toenails straight across to prevent ingrown toenails. Use a nail clipper, not scissors. Use an emery board to smooth the edges. · Change socks daily. Socks without seams are best, because seams often rub the feet. You can find socks for people with diabetes from specialty catalogs. · Look inside your shoes every day for things like gravel or torn linings, which could cause blisters or sores. · Buy shoes that fit well:  ? Look for shoes that have plenty of space around the toes. This helps prevent bunions and blisters. ? Try on shoes while wearing the kind of socks you will usually wear with the shoes. ? Avoid plastic shoes. They may rub your feet and cause blisters. Good shoes should be made of materials that are flexible and breathable, such as leather or cloth. ? Break in new shoes slowly by wearing them for no more than an hour a day for several days. Take extra time to check your feet for red areas, blisters, or other problems after you wear new shoes. · Do not go barefoot. Do not wear sandals, and do not wear shoes with very thin soles. Thin soles are easy to puncture. They also do not protect your feet from hot pavement or cold weather. · Have your doctor check your feet during each visit. If you have a foot problem, see your doctor. Do not try to treat an early foot problem at home. Home remedies or treatments that you can buy without a prescription (such as corn removers) can be harmful.   · Always get early treatment for foot problems. A minor irritation can lead to a major problem if not properly cared for early. When should you call for help? Call your doctor now or seek immediate medical care if:    · You have a foot sore, an ulcer or break in the skin that is not healing after 4 days, bleeding corns or calluses, or an ingrown toenail.     · You have blue or black areas, which can mean bruising or blood flow problems.     · You have peeling skin or tiny blisters between your toes or cracking or oozing of the skin.     · You have a fever for more than 24 hours and a foot sore.     · You have new numbness or tingling in your feet that does not go away after you move your feet or change positions.     · You have unexplained or unusual swelling of the foot or ankle. Watch closely for changes in your health, and be sure to contact your doctor if:    · You cannot do proper foot care. Where can you learn more? Go to [email or web address] org and sign in to your [email or web address] account. Enter A739 in the [employer]) box to learn more about \"Diabetes Foot Health: Care Instructions. \"     If you do not have an account, please click on the \"Sign Up Now\" link. Current as of: August 31, 2020               Content Version: 12.9  © 2006-2021 [employer]. Care instructions adapted under license by [facility] ([facility]). If you have questions about a medical condition or this instruction, always ask your healthcare professional. [place] [address] any warranty or liability for your use of this information. Patient Education        Learning About High Blood Sugar  What is high blood sugar? Your body turns the food you eat into glucose (sugar), which it uses for energy. But if your body isn't able to use the sugar right away, it can build up in your blood and lead to high blood sugar. When the amount of sugar in your blood stays too high for too much of the time, you may have diabetes. Diabetes is a disease that can cause serious health problems. The good news is that lifestyle changes may help you get your blood sugar back to normal and avoid or delay diabetes. What causes high blood sugar? Sugar (glucose) can build up in your blood if you:  · Have insulin resistance. · Don't take enough insulin or miss a dose of your diabetes medicine. · Take certain medicines, such as steroids. What are the symptoms? Having high blood sugar may not cause any symptoms at all. Or it may make you feel very thirsty or very hungry. You may also urinate more often than usual, have blurry vision, or lose weight without trying. How is high blood sugar treated? You can take steps to lower your blood sugar level if you understand what makes it get higher. Your doctor may want you to learn how to test your blood sugar level at home. Then you can see how illness, stress, or different kinds of food or medicine raise or lower your blood sugar level. Other tests may be needed to see if you have diabetes. How can you prevent high blood sugar? · Watch your weight. If you're overweight, losing just a small amount of weight may help. Reducing fat around your waist is most important. · Limit the amount of calories, sweets, and unhealthy fat you eat. Ask your doctor if a dietitian can help you. A registered dietitian can help you create meal plans that fit your lifestyle. · Get at least 30 minutes of exercise on most days of the week. Exercise helps control your blood sugar. It also helps you maintain a healthy weight. Walking is a good choice. You also may want to do other activities, such as running, swimming, cycling, or playing tennis or team sports. · If your doctor prescribed medicines, take them exactly as prescribed. Call your doctor if you think you are having a problem with your medicine. You will get more details on the specific medicines your doctor prescribes.   Follow-up care is a key part of your treatment and safety. Be sure to make and go to all appointments, and call your doctor if you are having problems. It's also a good idea to know your test results and keep a list of the medicines you take. Where can you learn more? Go to https://chroxann.SeatNinja. org and sign in to your [employer] account. Enter O108 in the SurDoc box to learn more about \"Learning About High Blood Sugar. \"     If you do not have an account, please click on the \"Sign Up Now\" link. Current as of: August 31, 2020               Content Version: 12.9  © 2006-2021 [employer]. Care instructions adapted under license by [facility] ([facility]). If you have questions about a medical condition or this instruction, always ask your healthcare professional. [address] any warranty or liability for your use of this information. Patient Education        Hypoglycemia: Care Instructions  Overview     Hypoglycemia means that your blood sugar is low and your body is not getting enough fuel. Some people get low blood sugar from not eating often enough. Some medicines to treat diabetes can cause low blood sugar. People who have had surgery on their stomachs or intestines may get hypoglycemia. Problems with the pancreas, kidneys, or liver also can cause low blood sugar. A snack or drink with sugar in it will raise your blood sugar and should ease your symptoms right away. Your doctor may recommend that you change or stop your medicines until you can get your blood sugar levels under control. In the long run, you may need to change your diet and eating habits so that you get enough fuel for your body throughout the day. Follow-up care is a key part of your treatment and safety. Be sure to make and go to all appointments, and call your doctor if you are having problems. It's also a good idea to know your test results and keep a list of the medicines you take.   How can you care for yourself at home? · Know the early signs of low blood sugar. Signs include:  ? Nausea. ? Hunger. ? Feeling nervous, irritable, or shaky. ? Cold, clammy skin. ? Sweating (when you're not exercising). ? A fast heartbeat.  ? Numbness or tingling in fingertips or lips. · If you have early signs of low blood sugar, eat or drink a quick-sugar food. Examples are glucose tablets, table sugar, hard candy (such as Life Savers), fruit juice, and regular (not diet) soda. · Eat small, frequent meals so you don't get too hungry between meals. · Balance extra exercise with eating more. · Keep a written record of your low blood sugar episodes, including what and when you last ate. This helps you know what causes your blood sugar to drop. · Make sure family, friends, and coworkers know the symptoms of low blood sugar and know how to get your sugar level up. · Wear medical alert jewelry that lists your condition. You can buy this at most drugstores. When should you call for help? Call 911 anytime you think you may need emergency care. For example, call if:    · You passed out (lost consciousness).     · You are confused or cannot think clearly.     · Your blood sugar is very high or very low. Watch closely for changes in your health, and be sure to contact your doctor if:    · Your blood sugar stays outside the level your doctor set for you.     · You have any problems. Where can you learn more? Go to https://VideoBurstpeSampling Technologies.Microstaq. org and sign in to your [employer] Crate account. Enter V117 in the [facility] box to learn more about \"Hypoglycemia: Care Instructions. \"     If you do not have an account, please click on the \"Sign Up Now\" link. Current as of: August 31, 2020               Content Version: 12.9  © 5057-3231 [employer]. Care instructions adapted under license by [facility] ([facility]).  If you have questions about a medical condition or this instruction, always ask your healthcare professional. [employer] disclaims any warranty or liability for your use of this information.

## 2021-06-28 ENCOUNTER — CARE COORDINATION (OUTPATIENT)
Dept: CARE COORDINATION | Age: 76
End: 2021-06-28

## 2021-06-28 NOTE — CARE COORDINATION
Contacted Anirudh Hudson regarding Dietitian referral. Pt answered, RD explained reason for call and role in care. Pt requested RD call back on a different day- pt explains she is still in bed at time of call and is going out this afternoon for granddaughters birthday. Pt requested a call tomorrow 6/29. RD will contact patient at this time and follow up as appropriate.          [address] [facility], [address] [place] [address]

## 2021-06-29 NOTE — CARE COORDINATION
main source of fuel for our bodies and the importance of choosing healthy sources such as whole grains, fruits and vegetables. Explained carbohydrates in food raise blood glucose and the importance of having balanced meals/snacks to help keep blood sugar steady. Discussed how eating a carbohydrate alone such as a banana versus a banana with peanut butter will affect blood sugar differently. Explained the components of a balanced meal using the MyPlate SAHJAPEKQ-5/2 plate fruits and/or vegetables, 1/4 plate protein and 1/4 plate starchy carbohydrates with 8 oz glass of low fat milk if desired. RD discussed eating three balanced meals/snacks per day instead of skipping meals completely. Discussed eating a balanced snack in the morning for breakfast. Explained the components of a balanced snack include a serving of protein and a serving of carbohydrate. Provided examples such as banana with peanut butter, apple with cheese stick, strawberries with almonds, hard boiled egg with piece of fruit, etc. Reiterated the importance of eating consistently and incorporating a variety. RD discussed making small changes and the big impact they will have with time. Reviewed the importance of checking BS daily and taking medicine as directed. Pt states she has supplies to check her BS but she does not do so- pt states her doctor is aware. RD explained the importance of checking BS at least once daily. Pt verbalizes understanding. RD offered to mail educational handouts to pt to reinforce concepts discussed during phone conversation, pt accepted and very appreciative. RD verified address.     24-Hour Diet Recall  Breakfast  Consumed: none    AM Snack  Consumed: none    Lunch  Consumed: 3 PM- fish, fries and coleslaw    PM Snack  Consumed: none    Dinner  Consumed: pizza    Bedtime Snack  Consumed: none    Beverages: water, pop and coffee    Frequency of meals away from home: not often    Nutrition Diagnosis:  #1 Problem Altered glass of low fat milk if desired. #2 Eat a balanced snack in the AM for breakfast instead of skipping the meal. #2 Include a serving of protein with a serving of carbohydrate. Follow Up: RD will call pt in 2 weeks to follow up and make sure pt received handouts in mail. RD will answer any nutrition related questions at this time.      [address] [facility] [address]

## 2021-06-30 ENCOUNTER — CARE COORDINATION (OUTPATIENT)
Dept: CARE COORDINATION | Age: 76
End: 2021-06-30

## 2021-06-30 NOTE — CARE COORDINATION
Ambulatory Care Coordination Note  6/30/2021  CM Risk Score: 2  Anibal Mortality Risk Score: [unfilled]    ACC: Elizabeth Candelaria, RN    Summary Note: ACM SPOKE TO PATIENT. SHE REPORTS TODAY FIRST DAY SHE HAS HAD RELIEF OF PAIN 0/10 CURRENTLY. SHE REPORTS SHE HAS NOT MONITORED BS AS WE DISCUSSED. ACM SET GOAL WITH PATIENT TO ADDRESS MONITORING. PATIENT WILL HAVE SON LOOK AT METER PER PATIENT SHE THINKS BATTERY MAY BE DEAD. PATIENT REPORTS ON ISSUES OR CONCERNS WITH HYPER/HYPOGLYCEMIA OR ANY COPD S/S  PATIENT COMPLETED NUTRITIONAL REVIEW AND STATES IT WAS HELPFUL  ACM REVIEWED DM EDU, TODAY   Zach received counseling on the following healthy behaviors: SELF MANAGEMENT of chronic health conditions,with goal to improve quality of life and overall wellbeing. The patient is asked to make an attempt to improve diet and exercise patterns to aid in medical management. I have instructed Zach to complete a self tracking handout on Blood Sugars  and instructed them to bring it with them to her next appointment. Discussed use, benefit, and side effects of prescribed medications. Barriers to medication compliance addressed. All patient questions answered. Pt voiced understanding. Diabetes Assessment    Medic Alert ID: No  Meal Planning: None   How often do you test your blood sugar?: No Testing   Do you have barriers with adherence to non-pharmacologic self-management interventions?  (Nutrition/Exercise/Self-Monitoring): No   Have you ever had to go to the ED for symptoms of low blood sugar?: No       No patient-reported symptoms   Do you have hyperglycemia symptoms?: No   Do you have hypoglycemia symptoms?: No   Blood Sugar Monitoring Regimen: Not Testing       and   COPD Assessment    Does the patient understand envrionmental exposure?: Yes  Is the patient able to verbalize Rescue vs. Long Acting medications?: Yes  Does the patient have a nebulizer?: No  Does the patient use a space with inhaled medications?: No     No patient-reported symptoms         Symptoms:     Have you had a recent diagnosis of pneumonia either by PCP or at a hospital?: No       Ambulatory Care Coordination Assessment    Care Coordination Protocol  Program Enrollment: Rising Risk  Referral from Primary Care Provider: No  Week 1 - Initial Assessment     Do you have all of your prescriptions and are they filled?: Yes  Barriers to medication adherence: None  Are you able to afford your medications?: Yes  How often do you have trouble taking your medications the way you have been told to take them?: Sometimes I take them as prescribed. Do you have Home O2 Therapy?: No      Ability to seek help/take action for Emergent Urgent situations i.e. fire, crime, inclement weather or health crisis. : Independent  Ability to ambulate to restroom: Independent  Ability handle personal hygeine needs (bathing/dressing/grooming): Independent  Ability to manage Medications: Independent  Ability to prepare Food Preparation: Independent  Ability to maintain home (clean home, laundry): Independent  Ability to drive and/or has transportation: Independent  Ability to do shopping: Independent  Ability to manage finances:  Independent     Current Housing: Private Residence        Per the Fall Risk Screening, did the patient have 2 or more falls or 1 fall with injury in the past year?: No     Frequent urination at night?: No  Do you use rails/bars?: No  Do you have a non-slip tub mat?: Yes     Are you experiencing loss of meaning?: No  Are you experiencing loss of hope and peace?: No     Thinking about your patient's physical health needs, are there any symptoms or problems (risk indicators) you are unsure about that require further investigation?: No identified areas of uncertainly or problems already being investigated   Are the patients physical health problems impacting on their mental well-being?: No identified areas of concern   Are there any problems with your patients lifestyle behaviors (alcohol, drugs, diet, exercise) that are impacting on physical or mental well-being?: No identified areas of concern   Do you have any other concerns about your patients mental well-being? How would you rate their severity and impact on the patient?: No identified areas of concern   How would you rate their home environment in terms of safety and stability (including domestic violence, insecure housing, neighbor harassment)?: Consistently safe, supportive, stable, no identified problems   How do daily activities impact on the patient's well-being? (include current or anticipated unemployment, work, caregiving, access to transportation or other): Some general dissatisfaction but no concern   How would you rate their social network (family, work, friends)?: Adequate participation with social networks   How would you rate their financial resources (including ability to afford all required medical care)?: Financially secure, some resource challenges   How wells does the patient now understand their health and well-being (symptoms, signs or risk factors) and what they need to do to manage their health?: Reasonable to good understanding but do not feel able to engage with advice at this time   Do other services need to be involved to help this patient?: Other care/services in place and adequate   Are current services involved with this patient well-coordinated? (Include coordination with other services you are now recommendation): All required care/services in place and well-coordinated   Suggested Interventions and Community Resources  Fall Risk Prevention: In Process Medi Set or Pill Pack: Completed   Pharmacist: Declined   Registered Dietician: Completed   Zone Management Tools: Completed         Schedule an appointment with the patient's PCP, Set up/Review an Education Plan, Set up/Review Goals              Prior to Admission medications    Medication Sig Start Date End Date Taking?  Authorizing PM Sujatha Bentley MD [facility] [place]   8/10/2021  4:15 PM Sujatha Bentley MD [address]

## 2021-07-01 ENCOUNTER — HOSPITAL ENCOUNTER (OUTPATIENT)
Dept: MRI IMAGING | Age: 76
Discharge: HOME | End: 2021-07-03
Payer: MEDICARE

## 2021-07-01 DIAGNOSIS — G89.29 CHRONIC RIGHT-SIDED LOW BACK PAIN WITH RIGHT-SIDED SCIATICA: ICD-10-CM

## 2021-07-01 DIAGNOSIS — M54.41 CHRONIC RIGHT-SIDED LOW BACK PAIN WITH RIGHT-SIDED SCIATICA: ICD-10-CM

## 2021-07-01 PROCEDURE — 72148 MRI LUMBAR SPINE W/O DYE: CPT

## 2021-07-05 DIAGNOSIS — M51.36 LUMBAR DEGENERATIVE DISC DISEASE: Primary | ICD-10-CM

## 2021-07-07 DIAGNOSIS — M51.36 LUMBAR DEGENERATIVE DISC DISEASE: Primary | ICD-10-CM

## 2021-07-09 ENCOUNTER — CARE COORDINATION (OUTPATIENT)
Dept: CARE COORDINATION | Age: 76
End: 2021-07-09

## 2021-07-09 NOTE — CARE COORDINATION
ACM CALLED PATIENT. LEFT MESSAGE REQUESTING RETURN CALL FOR [address]. CONTACT INFORMATION SUPPLIED.

## 2021-07-13 ENCOUNTER — CARE COORDINATION (OUTPATIENT)
Dept: CARE COORDINATION | Age: 76
End: 2021-07-13

## 2021-07-13 NOTE — CARE COORDINATION
Bony Olga  7/13/2021    Registered Dietitian Progress Note for Care Coordination    Assessment: Juan Ramon Edwards is a 77 y.o. female. RD referred for DM. RD spoke with patient for initial nutrition assessment on 6/29. RD called to follow up with pt today 7/13. Pt states things are \"good. \" Pt states she received the educational handouts in the mail- found the information helpful. RD discussed previous goals with pt. She is trying her best to eat consistently throughout the day- per pt she eats lunch, balanced snack in the afternoon and then dinner. Reiterated the importance of eating balanced meals consistently to help manage BS. Pt has no nutrition related questions at this time. RD offered to follow up with patient in a few weeks, pt does not feel this is necessary and prefers to call RD in the future as needed. Barriers to meeting goals: overwhelmed by complexity of regimen and stress    Action:  Reiterated the importance of eating balanced meals consistently, taking medicine as directed and monitoring BS daily. Reiterated the importance of not skipping meals. Pt is eating 2 meals/day with one snack. See assessment note above. Nutrition Monitoring and Evaluation  Indicator/Goal Criteria Progress   #1 Eat balanced meals consistently throughout the day. #1 Focus on eating 3 meals/snacks each day. Do not skip meals. Make meals balanced using the MyPlate QMHSASIMO-2/3 plate fruits and/or vegetables, 1/4 plate protein and 1/4 plate starchy carbohydrates with 8 oz glass of low fat milk if desired. #1 Pt states she is not eating anything in the morning. Pt states she is eating lunch and dinner with a snack in between. Pt will focus on making meals balanced. #2  Eat a balanced snack in the AM for breakfast instead of skipping the meal. #2 Include a serving of protein with a serving of carbohydrate. #2 Pt is eating a balanced snack between lunch and dinner.       Plan of Care:  RD encouraged pt to keep working toward goals set. RD explained to pt this is final follow up call and provided contact information to pt. Encouraged pt to call RD in future with any nutrition related questions or concerns. Follow Up:    Final follow up call today 7/13/21. RD will continue to follow/assist with patient return call.         [address] [facility], [address] [place] [address]

## 2021-07-16 ENCOUNTER — CARE COORDINATION (OUTPATIENT)
Dept: CARE COORDINATION | Age: 76
End: 2021-07-16

## 2021-07-19 ENCOUNTER — CARE COORDINATION (OUTPATIENT)
Dept: CARE COORDINATION | Age: 76
End: 2021-07-19

## 2021-07-19 NOTE — CARE COORDINATION
Ambulatory Care Coordination Note  7/19/2021  CM Risk Score: 2  Charlson 10 Year Mortality Risk Score: 47%     ACC: Lino Rodriguez, RN    Summary Note: ACM SPOKE TO PATIENT. SHE REPORTS IN A LOT OF PAIN. 8/10 ALL THE TIME. PER PATIENT SHE IS TAKING MOBIC 7.5MG  IT HELPS A LITTLE. SHE IS ALSO TAKING IBUPROFEN AT TIMES AS WELL. SHE REPORTS HER [facility] APPT ISNT UNTIL 8/26/2021. SHE REPORTS HER DAUGHTER IS GETTING CD OF MRI TO TAKE TO CC. ACM CALLED [facility] DR. JETER OFFICE TO ESCALATE CONCERNS . ACM  EXPLAINED CONCERNS AND NEEDS. THEY WILL DISCUSS WITH PROVIDER AND CALL PATIENT IF THEY CAN ASSIST WITH A SOONER APPOINTMENT OR A DIFFERENT PROVIDER. THEY RECOMMENDED PATIENT GO TO ED IF UNABLE TO DO ADLS AND PAIN. ACM ADVISED PATIENT OF CALL WITH [facility]. AC DID RECOMMEND SHE EITHER GO TO [facility] OR [facility] AS THEY COULD ASSIST WITH THE PROVIDERS SHE IS REQUESTING TO USE IN THIS MATTER. PATIENT REPORTS SHE IS STILL ABLE TO PERFORM ADLS AT THIS TIME BUT VERY HARD. PATIENT VERBALIZED UNDERSTANDING. PATIENT ADVISED TO KEEP PCP APPOINTMENT FOR TODAY AS WELL. PER PATIENT SHE IS GOING TO THAT APPT. PER PATIENT SHE HAS NOT MONITORED HER BS IN A WHILE. SHE REPORTS NO FALLS, OPEN WOUNDS. SHE ALSO DENIED ANY COPD S/S. ACM ROUTED THIS ENCOUNTER TO PCP. Sea Benítez received counseling on the following healthy behaviors: SELF MANAGEMENT of chronic health conditions,with goal to improve quality of life and overall wellbeing. The patient is asked to make an attempt to improve diet and exercise patterns to aid in medical management. I have instructed Zach to complete a self tracking handout on Blood Sugars , Blood Pressures  and Weights and instructed them to bring it with them to her next appointment. Discussed use, benefit, and side effects of prescribed medications. Barriers to medication compliance addressed. All patient questions answered. Pt voiced understanding.          Care Coordination Interventions    Program Enrollment: Rising Risk  Referral from Primary Care Provider: No  Suggested Interventions and Community Resources  Fall Risk Prevention: In Process  Medi Set or Pill Pack: Completed (Comment: uses pill box )  Pharmacist: Declined  Registered Dietician: Completed  Zone Management Tools: Completed (Comment: dm and copd )         Goals Addressed                 This Visit's Progress     Conditions and Symptoms   Improving     I will schedule office visits, as directed by my provider. I will keep my appointment or reschedule if I have to cancel. I will notify my provider of any barriers to my plan of care. I will follow my Zone Management tool to seek urgent or emergent care. I will notify my provider of any symptoms that indicate a worsening of my condition. Barriers: none  Plan for overcoming my barriers: N/A  Confidence: 6/10  Anticipated Goal Completion Date: 9/1/2021         Self Monitoring   Worsening     Self-Monitored Blood Glucose - I will check my blood sugar random blood sugars and Other: ONCE WEEKLY     None Recently Recorded    Barriers: lack of motivation and lack of education  Plan for overcoming my barriers: WORK WITH ACM HAVE SON LOOK AT METER AND MAKE SURE WORKING   Confidence: 6/10  Anticipated Goal Completion Date: 8/1/2021 7/19/2021   PATIENT HAS NOT BEEN MONITORING BS. SHE REPORTS SHE JUST HAS 'NT BECAUSE HER BACK HURTS TO BAD               Prior to Admission medications    Medication Sig Start Date End Date Taking?  Authorizing Provider   tiZANidine (ZANAFLEX) 4 MG tablet Take 1 tablet by mouth every 8 hours as needed (back and hip muscle pain) 6/21/21   Antonia Kwok MD   Continuous Blood Gluc  (DEXCOM G6 ) MARIELA Use daily for blood sugar readings 6/21/21   Antonia Kwok MD   Continuous Blood Gluc Sensor (DEXCOM G6 SENSOR) MISC Use daily for blood sugar readings 6/21/21   Antonia Kwok MD   citalopram (CELEXA) 20 MG tablet TAKE 1 TABLET DAILY (NEED APPOINTMENT FOR FURTHER REFILLS) 1/21/21   Lb Godinez MD   JANUVIA 100 MG tablet TAKE 1 TABLET DAILY 12/19/20   Lb Godinez MD   famotidine (PEPCID) 20 MG tablet TAKE 1 TABLET TWICE A DAY (NEED APPOINTMENT FOR FURTHER REFILLS) 12/9/20   Lb Godinez MD   levothyroxine (SYNTHROID) 150 MCG tablet TAKE 1 TABLET DAILY (NEED APPOINTMENT FOR FURTHER REFILLS) 12/9/20   Lb Godinez MD   vitamin D3 (CHOLECALCIFEROL) 25 MCG (1000 UT) TABS tablet Take 1 tablet by mouth daily 12/8/20   Lb Godinez MD   albuterol sulfate  (90 Base) MCG/ACT inhaler Inhale 2 puffs into the lungs every 6 hours as needed for Wheezing 11/23/20   Chantel Pizarro APRN - CNP   meclizine (ANTIVERT) 25 MG tablet Take 1 tablet by mouth 3 times daily as needed for Dizziness 9/14/20   Lb Godinez MD   Blood Glucose Monitoring Suppl (TRUE METRIX METER) w/Device KIT use to test sugar 5/13/19   Historical Provider, MD   blood glucose test strips (TRUETEST TEST) strip Test blood glucose fasting and 1 hour post lunch and dinner 5/12/19   Lb Godinez MD   Lancets MISC Inject 1 each into the skin 3 times daily Use early in the morning fasting and 1 hour post lunch and dinner 5/12/19   Lb Godinez MD   aspirin 81 MG EC tablet Take 1 tablet by mouth daily 4/10/19   Lb Godinez MD       Future Appointments   Date Time Provider Sosa Barragan   7/19/2021  2:00 PM MD [facility] [place]   8/10/2021  4:15 PM MD Sosa Mo

## 2021-07-20 ENCOUNTER — CARE COORDINATION (OUTPATIENT)
Dept: CARE COORDINATION | Age: 76
End: 2021-07-20

## 2021-07-20 RX ORDER — CITALOPRAM 20 MG/1
TABLET ORAL
Qty: 90 TABLET | Refills: 3 | Status: SHIPPED | OUTPATIENT
Start: 2021-07-20 | End: 2022-07-18

## 2021-07-20 NOTE — CARE COORDINATION
JABARI CALLED PATIENT AS SHE CANCELLED PCP APPT. JABARI NOTES, [facility] MADE SEVERAL ATTEMPTS TO REACH PATIENT YESTERDAY. PATIENT STATES SHE DIDN'T HEAR HER CELL PHONE. JABARI ADVISED PATIENT TO CALL [facility] DR. JETER OFFICE TO GET ADVISED ON NEXT ACTIONS AS IT RELATES TO HER BACK.  JABARI VERBALIZED UNDERSTANDING AND WILL CALL NOW, What Type Of Note Output Would You Prefer (Optional)?: Standard Output How Severe Is Your Rash?: mild Is This A New Presentation, Or A Follow-Up?: Rash

## 2021-07-20 NOTE — TELEPHONE ENCOUNTER
Requested Prescriptions     Pending Prescriptions Disp Refills    citalopram (CELEXA) 20 MG tablet [Pharmacy Med Name: CITALOPRAM HYDROBROMIDE TABS 20MG] 90 tablet 3     Sig: TAKE 1 TABLET DAILY (NEED APPOINTMENT FOR FURTHER REFILLS)

## 2021-07-26 ENCOUNTER — CARE COORDINATION (OUTPATIENT)
Dept: CARE COORDINATION | Age: 76
End: 2021-07-26

## 2021-07-26 NOTE — CARE COORDINATION
if I have to cancel. I will notify my provider of any barriers to my plan of care. I will follow my Zone Management tool to seek urgent or emergent care. I will notify my provider of any symptoms that indicate a worsening of my condition. Barriers: none  Plan for overcoming my barriers: N/A  Confidence: 6/10  Anticipated Goal Completion Date: 9/1/2021         Self Monitoring   No change     Self-Monitored Blood Glucose - I will check my blood sugar random blood sugars and Other: ONCE WEEKLY     None Recently Recorded    Barriers: lack of motivation and lack of education  Plan for overcoming my barriers: WORK WITH ACM HAVE SON LOOK AT METER AND MAKE SURE WORKING   Confidence: 6/10  Anticipated Goal Completion Date: 8/1/2021 7/19/2021   PATIENT HAS NOT BEEN MONITORING BS. SHE REPORTS SHE JUST HAS 'NT BECAUSE HER BACK HURTS TO BAD               Prior to Admission medications    Medication Sig Start Date End Date Taking?  Authorizing Provider   citalopram (CELEXA) 20 MG tablet TAKE 1 TABLET DAILY (NEED APPOINTMENT FOR FURTHER REFILLS) 7/20/21   Claudette Bake, MD   tiZANidine (ZANAFLEX) 4 MG tablet Take 1 tablet by mouth every 8 hours as needed (back and hip muscle pain) 6/21/21   Claudette Bake, MD   Continuous Blood Gluc  (DEXCOM G6 ) MARIELA Use daily for blood sugar readings 6/21/21   Claudette Bake, MD   Continuous Blood Gluc Sensor (DEXCOM G6 SENSOR) MISC Use daily for blood sugar readings 6/21/21   Claudette Bake, MD   JANUVIA 100 MG tablet TAKE 1 TABLET DAILY 12/19/20   Claudette Bake, MD   famotidine (PEPCID) 20 MG tablet TAKE 1 TABLET TWICE A DAY (NEED APPOINTMENT FOR FURTHER REFILLS) 12/9/20   Claudette Bake, MD   levothyroxine (SYNTHROID) 150 MCG tablet TAKE 1 TABLET DAILY (NEED APPOINTMENT FOR FURTHER REFILLS) 12/9/20   Claudette Bake, MD   vitamin D3 (CHOLECALCIFEROL) 25 MCG (1000 UT) TABS tablet Take 1 tablet by mouth daily 12/8/20   Claudette Bake, MD   albuterol sulfate  (90 Base) MCG/ACT inhaler Inhale 2 puffs into the lungs every 6 hours as needed for Wheezing 11/23/20   Chantel Pizarro, APRN - CNP   meclizine (ANTIVERT) 25 MG tablet Take 1 tablet by mouth 3 times daily as needed for Dizziness 9/14/20   Quique Arroyo MD   Blood Glucose Monitoring Suppl (TRUE METRIX METER) w/Device KIT use to test sugar 5/13/19   Historical Provider, MD   blood glucose test strips (TRUETEST TEST) strip Test blood glucose fasting and 1 hour post lunch and dinner 5/12/19   Quique Arroyo MD   Lancets MISC Inject 1 each into the skin 3 times daily Use early in the morning fasting and 1 hour post lunch and dinner 5/12/19   Quique Arroyo MD   aspirin 81 MG EC tablet Take 1 tablet by mouth daily 4/10/19   Quique Arroyo MD       Future Appointments   Date Time Provider Sosa Barragan   8/10/2021  4:15 PM MD Sosa Wallace

## 2021-08-10 ENCOUNTER — OFFICE VISIT (OUTPATIENT)
Dept: PRIMARY CARE CLINIC | Age: 76
End: 2021-08-10
Payer: MEDICARE

## 2021-08-10 ENCOUNTER — CARE COORDINATION (OUTPATIENT)
Dept: CARE COORDINATION | Age: 76
End: 2021-08-10

## 2021-08-10 VITALS
WEIGHT: 203.6 LBS | BODY MASS INDEX: 34.76 KG/M2 | HEART RATE: 94 BPM | RESPIRATION RATE: 16 BRPM | DIASTOLIC BLOOD PRESSURE: 62 MMHG | SYSTOLIC BLOOD PRESSURE: 132 MMHG | OXYGEN SATURATION: 93 % | HEIGHT: 64 IN | TEMPERATURE: 97.8 F

## 2021-08-10 DIAGNOSIS — E11.9 TYPE 2 DIABETES MELLITUS WITHOUT COMPLICATION, WITHOUT LONG-TERM CURRENT USE OF INSULIN (HCC): Primary | ICD-10-CM

## 2021-08-10 DIAGNOSIS — D50.9 IRON DEFICIENCY ANEMIA, UNSPECIFIED IRON DEFICIENCY ANEMIA TYPE: ICD-10-CM

## 2021-08-10 DIAGNOSIS — E03.9 HYPOTHYROIDISM, UNSPECIFIED TYPE: ICD-10-CM

## 2021-08-10 PROCEDURE — 99214 OFFICE O/P EST MOD 30 MIN: CPT | Performed by: INTERNAL MEDICINE

## 2021-08-10 ASSESSMENT — ENCOUNTER SYMPTOMS
VOMITING: 0
DIARRHEA: 0
SHORTNESS OF BREATH: 0
COUGH: 0
NAUSEA: 0
WHEEZING: 0
ABDOMINAL PAIN: 0

## 2021-08-10 NOTE — PROGRESS NOTES
Subjective:      Patient ID: Rubi Moyer is a 77 y.o. female    T2DM, hypothyroidism and HARIS f/u  HPI  Pt presents for follow up regarding T2DM. Last A1c 6.8 on meds. Cholesterol controled. Hypothyroidism controlled on Synthroid. Feels more tired. Hx HARIS, previous iron infusions well tolerated. Appt with Hematology this month. Last Hb normal but microcytosis noted. Depression and anxiety stable on Celexa. Past Medical History:   Diagnosis Date    Anxiety     DM (diabetes mellitus) ([facility] [address] 4/14/2015    Hepatitis B infection     Hyperlipidemia     Hypothyroidism     Insomnia     Leg pain 4/14/2015    Post herpetic neuralgia     Unspecified sleep apnea     after seeing [address] [place] [address] they state she is does not have sleep apnea. Not using cpap     Past Surgical History:   Procedure Laterality Date    ANKLE FRACTURE SURGERY  09/2016    DR ANDRADE  LT    BIOPSY / LIGATION TEMPORAL ARTERY Right 4/26/2019    RIGHT TEMPORAL ARTERY BIOPSY performed by Candee Riedel, MD at [facility]  2015    surgery for transgeminal neuraglia. Insertion of sponge for chronic pain    COLONOSCOPY  3/31/14    DR Edward Alford    COLONOSCOPY N/A 2/2/2021    COLONOSCOPY DIAGNOSTIC performed by Roxana Castro MD at [address] [place] [address]      OTHER SURGICAL HISTORY Right     Trigeminal Neuralgia    SD COLON CA SCRN NOT  [address] IND N/A 7/24/2017    COLONOSCOPY performed by Roxana Castro MD at [address] N/A 2/2/2021    EGD ESOPHAGOGASTRODUODENOSCOPY performed [facility] MD at [facility] Marital status:       Spouse name: Not on file    Number of children: 2    Years of education: Not on file    Highest education level: 12th grade   Occupational History    Occupation: retired    Tobacco Use    Smoking status: Former Smoker     Packs/day: 1.00 Years: 48.00     Pack years: 48.00     Types: Cigarettes     Quit date: 2013     Years since quittin.0    Smokeless tobacco: Never Used   Vaping Use    Vaping Use: Every day    Substances: Nicotine, Flavoring, low  dose    Substance and Sexual Activity    Alcohol use: No     Comment: very rarely    Drug use: No    Sexual activity: Not Currently     Partners: Male   Other Topics Concern    Not on file   Social History Narrative    Lives alone    Has stairs in home needs to go up/down. 2 children that help if needed      Social Determinants of Health     Financial Resource Strain: Low Risk     Difficulty of Paying Living Expenses: Not very hard   Food Insecurity: No Food Insecurity    Worried About Running Out of Food in the Last Year: Never true    Franky of Food in the Last Year: Never true   Transportation Needs:     Lack of Transportation (Medical):      Lack of Transportation (Non-Medical):    Physical Activity:     Days of Exercise per Week:     Minutes of Exercise per Session:    Stress:     Feeling of Stress :    Social Connections:     Frequency of Communication with Friends and Family:     Frequency of Social Gatherings with Friends and Family:     Attends Zoroastrian Services:     Active Member of Clubs or Organizations:     Attends Club or Organization Meetings:     Marital Status:    Intimate Partner Violence:     Fear of Current or Ex-Partner:     Emotionally Abused:     Physically Abused:     Sexually Abused:      Family History   Problem Relation Age of Onset    Stroke Mother     High Cholesterol Mother     Cancer Mother     Colon Cancer Neg Hx     Celiac Disease Neg Hx     Crohn's Disease Neg Hx      Allergies:  Cefdinir and Metformin and related  Patient Active Problem List   Diagnosis    Hepatitis B infection    Sleep apnea    Anxiety    Insomnia    Trigeminal neuralgia    Hyperlipidemia    Hypothyroidism    Dyspnea    DM (diabetes mellitus) ([facility] [address]    Leg pain    Depression    Frequent headaches    [facility]    Cherry angioma    Diffuse photodamage of skin    Dilated pore of Solomon    GERD (gastroesophageal reflux disease)    Hidradenitis suppurativa    Lentigines    Multiple benign nevi    Poikiloderma    Seborrheic keratosis    Morbidly obese (HCC)    Chronic gastritis without bleeding    Polyp of sigmoid colon     Current Outpatient Medications on File Prior to Visit   Medication Sig Dispense Refill    citalopram (CELEXA) 20 MG tablet TAKE 1 TABLET DAILY (NEED APPOINTMENT FOR FURTHER REFILLS) 90 tablet 3    tiZANidine (ZANAFLEX) 4 MG tablet Take 1 tablet by mouth every 8 hours as needed (back and hip muscle pain) 20 tablet 0    Continuous Blood Gluc  (DEXCOM G6 ) MARIELA Use daily for blood sugar readings 1 Device 1    Continuous Blood Gluc Sensor (DEXCOM G6 SENSOR) MISC Use daily for blood sugar readings 1 each 3    JANUVIA 100 MG tablet TAKE 1 TABLET DAILY 90 tablet 3    famotidine (PEPCID) 20 MG tablet TAKE 1 TABLET TWICE A DAY (NEED APPOINTMENT FOR FURTHER REFILLS) 180 tablet 3    levothyroxine (SYNTHROID) 150 MCG tablet TAKE 1 TABLET DAILY (NEED APPOINTMENT FOR FURTHER REFILLS) 90 tablet 3    vitamin D3 (CHOLECALCIFEROL) 25 MCG (1000 UT) TABS tablet Take 1 tablet by mouth daily 30 tablet 0    albuterol sulfate  (90 Base) MCG/ACT inhaler Inhale 2 puffs into the lungs every 6 hours as needed for Wheezing 1 Inhaler 0    meclizine (ANTIVERT) 25 MG tablet Take 1 tablet by mouth 3 times daily as needed for Dizziness 30 tablet 0    Blood Glucose Monitoring Suppl (TRUE METRIX METER) w/Device KIT use to test sugar  0    blood glucose test strips (TRUETEST TEST) strip Test blood glucose fasting and 1 hour post lunch and dinner 100 each 5    Lancets MISC Inject 1 each into the skin 3 times daily Use early in the morning fasting and 1 hour post lunch and dinner 100 each 5    aspirin 81 MG EC tablet Take 1 tablet by mouth daily 90 tablet 3     Current Facility-Administered Medications on File Prior to Visit   Medication Dose Route Frequency Provider Last Rate Last Admin    methylPREDNISolone acetate (DEPO-MEDROL) injection 60 mg  60 mg Intramuscular Once Tiffani Craig MD           Review of Systems   Constitutional: Negative for chills, diaphoresis, fatigue and fever. HENT: Negative for congestion, ear discharge and ear pain. Respiratory: Negative for cough, shortness of breath and wheezing. Cardiovascular: Negative for chest pain. Gastrointestinal: Negative for abdominal pain, diarrhea, nausea and vomiting. Endocrine: Negative for cold intolerance and heat intolerance. Genitourinary: Negative for dysuria and frequency. Neurological: Negative for dizziness and light-headedness. Objective:   /62   Pulse 94   Temp 97.8 °F (36.6 °C)   Resp 16   Ht 5' 4\" (1.626 m)   Wt 203 lb 9.6 oz (92.4 kg)   LMP 02/28/1982   SpO2 93%   BMI 34.95 kg/m²     Physical Exam  Constitutional:       General: She is not in acute distress. Appearance: She is not diaphoretic. Cardiovascular:      Rate and Rhythm: Normal rate and regular rhythm. Pulses: Normal pulses. Heart sounds: Normal heart sounds, S1 normal and S2 normal.   Pulmonary:      Effort: Pulmonary effort is normal. No respiratory distress. Breath sounds: Normal breath sounds. No wheezing or rales. Chest:      Chest wall: No tenderness. Abdominal:      General: Bowel sounds are normal.      Tenderness: There is no abdominal tenderness. Neurological:      Mental Status: She is alert. Assessment:       Diagnosis Orders   1. Type 2 diabetes mellitus without complication, without long-term current use of insulin ([facility]) Controlled    2. Iron deficiency anemia, unspecified iron deficiency anemia type  Iron And Tibc    Ferritin   3.  Hypothyroidism, unspecified type Controlled Plan:      Orders Placed This Encounter   Procedures    Iron And Tibc     Standing Status:   Future     Standing Expiration Date:   8/10/2022     Order Specific Question:   Is Patient Fasting? Answer:   . Order Specific Question:   No of Hours? Answer:   .  Ferritin     Standing Status:   Future     Standing Expiration Date:   8/10/2022     No orders of the defined types were placed in this encounter. Return in about 6 months (around 2/10/2022) for follow up.

## 2021-08-17 ENCOUNTER — CARE COORDINATION (OUTPATIENT)
Dept: CARE COORDINATION | Age: 76
End: 2021-08-17

## 2021-08-17 NOTE — CARE COORDINATION
NANCYM SPOKE TO PATIENT. SHE REPORTS NOT A GOOD TIME TO CALL. SHE REQUESTED [facility] CALL LATER THIS WEEK.

## 2021-08-20 ENCOUNTER — CARE COORDINATION (OUTPATIENT)
Dept: CARE COORDINATION | Age: 76
End: 2021-08-20

## 2021-08-20 NOTE — CARE COORDINATION
Ambulatory Care Coordination Note  8/20/2021  CM Risk Score: 2  Charlson 10 Year Mortality Risk Score: 47%     ACC: Lucia Denny, RN    Summary Note: ACM SPOKE TO PATIENT. SHE REPORTS SHE IS HAVING BACK SURGERY WITH [facility] 9/13. SHE IS HAVING SOME ANXIETY WITH THIS BUT VERY HAPPY TO GETTING THIS DONE. SHE REPORTS NO DM ISSUES AT THIS TIME. AND CONTINUES TO STATE SHE DOESN'T HAVE COPD. SHE REPORTS AFTER SURGERY SHE WILL GO TO HER DAUGHTERS TO RECOVER. ACM ENCOURAGED PATIENT TO NOT BE STRESSED ABOUT HER HOME AS SHE STATES THINGS NEED TO GET DONE AS IT RELATES TO CLEANING AND SUCH. SHE STATES, HER DAUGHTER IS COMING NEXT WEEK TO HELP OUT AROUND THE HOUSE. Tomas Webster received counseling on the following healthy behaviors: SELF MANAGEMENT of chronic health conditions,with goal to improve quality of life and overall wellbeing. The patient is asked to make an attempt to improve diet and exercise patterns to aid in medical management. Discussed use, benefit, and side effects of prescribed medications. Barriers to medication compliance addressed. All patient questions answered. Pt voiced understanding. Diabetes Assessment    Medic Alert ID: No  Meal Planning: None   How often do you test your blood sugar?: No Testing   Do you have barriers with adherence to non-pharmacologic self-management interventions? (Nutrition/Exercise/Self-Monitoring): No   Have you ever had to go to the ED for symptoms of low blood sugar?: No       No patient-reported symptoms           Lab Results   Component Value Date    LABA1C 6.8 (H) 06/21/2021    LABA1C 7.2 (H) 02/24/2020    LABA1C 7.7 (A) 04/10/2019     Lab Results   Component Value Date    LABMICR <1.20 06/21/2021    LDLCALC 79 06/21/2021    CREATININE 0.85 06/21/2021       Care Coordination Interventions    Program Enrollment: Rising Risk  Referral from Primary Care Provider: No  Suggested Interventions and Community Resources  Fall Risk Prevention:  In Process  Medi Set or Pill Pack: Completed (Comment: uses pill box )  Pharmacist: Declined  Registered Dietician: Completed  Zone Management Tools: Completed (Comment: dm and copd )         Goals Addressed                 This Visit's Progress     Conditions and Symptoms   Improving     I will schedule office visits, as directed by my provider. I will keep my appointment or reschedule if I have to cancel. I will notify my provider of any barriers to my plan of care. I will follow my Zone Management tool to seek urgent or emergent care. I will notify my provider of any symptoms that indicate a worsening of my condition. Barriers: none  Plan for overcoming my barriers: N/A  Confidence: 6/10  Anticipated Goal Completion Date: 9/1/2021         Self Monitoring   Improving     Self-Monitored Blood Glucose - I will check my blood sugar random blood sugars and Other: ONCE WEEKLY     None Recently Recorded    Barriers: lack of motivation and lack of education  Plan for overcoming my barriers: WORK WITH ACM HAVE SON LOOK AT METER AND MAKE SURE WORKING   Confidence: 6/10  Anticipated Goal Completion Date: 8/1/2021 7/19/2021   PATIENT HAS NOT BEEN MONITORING BS. SHE REPORTS SHE JUST HAS 'NT BECAUSE HER BACK HURTS TO BAD               Prior to Admission medications    Medication Sig Start Date End Date Taking?  Authorizing Provider   citalopram (CELEXA) 20 MG tablet TAKE 1 TABLET DAILY (NEED APPOINTMENT FOR FURTHER REFILLS) 7/20/21   Keanu Barfield MD   tiZANidine (ZANAFLEX) 4 MG tablet Take 1 tablet by mouth every 8 hours as needed (back and hip muscle pain) 6/21/21   Keanu Barfield MD   Continuous Blood Gluc  (DEXCOM G6 ) MARIELA Use daily for blood sugar readings 6/21/21   Keanu Barfield MD   Continuous Blood Gluc Sensor (DEXCOM G6 SENSOR) MISC Use daily for blood sugar readings 6/21/21   Keanu Barfield MD   JANUVIA 100 MG tablet TAKE 1 TABLET DAILY 12/19/20   Keanu Barfield MD   famotidine (PEPCID) 20 MG tablet TAKE 1 TABLET TWICE A DAY (NEED APPOINTMENT FOR FURTHER REFILLS) 12/9/20   Tremaine Cortes MD   levothyroxine (SYNTHROID) 150 MCG tablet TAKE 1 TABLET DAILY (NEED APPOINTMENT FOR FURTHER REFILLS) 12/9/20   Tremaine Cortes MD   vitamin D3 (CHOLECALCIFEROL) 25 MCG (1000 UT) TABS tablet Take 1 tablet by mouth daily 12/8/20   Tremaine Cortes MD   albuterol sulfate  (90 Base) MCG/ACT inhaler Inhale 2 puffs into the lungs every 6 hours as needed for Wheezing 11/23/20   Chantel Pizarro, APRN - CNP   meclizine (ANTIVERT) 25 MG tablet Take 1 tablet by mouth 3 times daily as needed for Dizziness 9/14/20   Tremaine Cortes MD   Blood Glucose Monitoring Suppl (TRUE METRIX METER) w/Device KIT use to test sugar 5/13/19   Historical Provider, MD   blood glucose test strips (TRUETEST TEST) strip Test blood glucose fasting and 1 hour post lunch and dinner 5/12/19   Tremaine Cortes MD   Lancets MISC Inject 1 each into the skin 3 times daily Use early in the morning fasting and 1 hour post lunch and dinner 5/12/19   Tremaine Cortes MD   aspirin 81 MG EC tablet Take 1 tablet by mouth daily 4/10/19   Tremaine Cortes MD       Future Appointments   Date Time Provider Sosa Barragan   2/10/2022  4:00 PM MD Sosa Mccracken

## 2021-08-27 ENCOUNTER — CARE COORDINATION (OUTPATIENT)
Dept: CARE COORDINATION | Age: 76
End: 2021-08-27

## 2021-08-27 NOTE — CARE COORDINATION
PATIENT CALLED [facility] WITH QUESTIONS REGARDING INFORMATION FROM DR. WALTERS OFFICE. SHE STATES, SHE HAD SOME LABS AND THEY CALLED AND STATED HER LABS WERE NOT BAD BUT RECOMMENDS SHE GETS IRON INFUSIONS. PATIENT HAS CONCERNS REGARDING COST. SHE STATED IF NOT THAT BAD, WHY CANT SHE TAKE ORAL MED. [facility] ADVISED PATIENT DR. Meryl Serrato IS NOT PART OF HER EHR FOR CHARTING  AND I DON'T SEE LABS IN QUESTION. SHE REQUESTED [facility] CALL PROVIDER AND GET A BETTER UNDERSTANDING OF WHAT PATIENT SHOULD DO. [facility] CALLED DR. Meryl Serrato OFFICE. SPOKE TO AZUL. EXPLAINED PATIENT CONCERNS. THEY WILL DISCUSS WITH PROVIDER AND CALL PATIENT WITH OTHER OPTIONS. [facility] PROVIDER PCP FAX TO SEND LAB RESULTS AND PROGRESS NOTES  PATIENT ADVISED SHE WILL RECEIVE CALL FROM DR. CLAROS OFFICE WITH UPDATE.

## 2021-09-08 ENCOUNTER — CARE COORDINATION (OUTPATIENT)
Dept: CARE COORDINATION | Age: 76
End: 2021-09-08

## 2021-09-08 ENCOUNTER — VIRTUAL VISIT (OUTPATIENT)
Dept: PRIMARY CARE CLINIC | Age: 76
End: 2021-09-08
Payer: MEDICARE

## 2021-09-08 DIAGNOSIS — R06.02 EXERTIONAL SHORTNESS OF BREATH: ICD-10-CM

## 2021-09-08 DIAGNOSIS — R19.7 DIARRHEA, UNSPECIFIED TYPE: Primary | ICD-10-CM

## 2021-09-08 DIAGNOSIS — J20.9 ACUTE BRONCHITIS, UNSPECIFIED ORGANISM: ICD-10-CM

## 2021-09-08 PROCEDURE — 87804 INFLUENZA ASSAY W/OPTIC: CPT | Performed by: INTERNAL MEDICINE

## 2021-09-08 PROCEDURE — 99441 PR PHYS/QHP TELEPHONE EVALUATION 5-10 MIN: CPT | Performed by: INTERNAL MEDICINE

## 2021-09-08 RX ORDER — METHYLPREDNISOLONE 4 MG/1
TABLET ORAL
Qty: 1 KIT | Refills: 0 | Status: SHIPPED | OUTPATIENT
Start: 2021-09-08 | End: 2021-11-09 | Stop reason: ALTCHOICE

## 2021-09-08 NOTE — CARE COORDINATION
NANCYM CALLED PATIENT. SHE WAS IN CAR HEADING TO [facility] ED . DUE TO S/S SUGGESTIVE OF COVID. WE ENDED CALL AS SHE ARRIVED AT FACILITY. JABARI WILL CALL TOMORROW FOR UPDATE.

## 2021-09-08 NOTE — PROGRESS NOTES
Keri Aguilera is a 77 y.o. female evaluated via telephone on 9/8/2021. Consent:  She and/or health care decision maker is aware that that she may receive a bill for this telephone service, depending on her insurance coverage, and has provided verbal consent to proceed: Yes    Failed  atempt at video encounter    Documentation:  I communicated with the patient and/or health care decision maker about. Details of this discussion including any medical advice provided:     Pt presents with 10 days of fatigue and generalized body aches. Assoc exertional SOB and wheezing, dry cough but no chest pain, no fever or chills. No sinus congestion. Assoc loose stools up to twice daily, generalized abd pain and distension since 3 days ago. No nausea or vomiting. No known COVID contact. Advised to go to ER. Declines EMS ride. Son will transport her. Pt advised to remain isolated: For at least 10 days after onset of symptoms  At least 24 hours after fever resolution  Improvement in symptoms. If more SOB or fatigued, go to ER ASAP. Otherwise, rest, fluid and APAP prn. Will DC isolation, if COVID negative. I affirm this is a Patient Initiated Episode with a Patient who has not had a related appointment within my department in the past 7 days or scheduled within the next 24 hours. Patient identification was verified at the start of the visit: Yes    Total Time: minutes: 5-10 minutes    The visit was conducted pursuant to the emergency declaration under the [address] [place] [address], [address] [place] [address] authority and the [employer] Act. Patient identification was verified, and a caregiver was present when appropriate. The patient was located in a state where the provider was credentialed to provide care.     Note: not billable if this call serves to triage the patient into an appointment for the relevant concern      Quinton Flowers MD

## 2021-09-09 ENCOUNTER — CARE COORDINATION (OUTPATIENT)
Dept: CARE COORDINATION | Age: 76
End: 2021-09-09

## 2021-09-09 NOTE — CARE COORDINATION
[facility] WAS SET TO CALL PATIENT FOR ACC OUTREACH. [facility] NOTES PATIENT CURRENTLY ADMITTED TO [address] [facility].  ACM WILL FU ONCE DISPO KNOWN

## 2021-09-21 ENCOUNTER — CARE COORDINATION (OUTPATIENT)
Dept: CARE COORDINATION | Age: 76
End: 2021-09-21

## 2021-09-21 ENCOUNTER — TELEPHONE (OUTPATIENT)
Dept: PRIMARY CARE CLINIC | Age: 76
End: 2021-09-21

## 2021-09-21 NOTE — CARE COORDINATION
Ambulatory Care Coordination Note  9/21/2021  CM Risk Score: 2  Charlson 10 Year Mortality Risk Score: 47%     ACC: Yemi Mueller, RN    Summary Note: ACM SPOKE TO PATIENT. SHE REPORTS HER BACK IS IN BAD PAIN. PER PATIENT HER SURGERY IS 10/6/28583. SHE REPORTS TAKING IBUPROFEN 600MG DAILY. ACM REVIEWED DOSING AND PRECAUTIONS. [address] PER PATIENT DX OF PNEUMONIA. PER PATIENT SHE HAS COMPLETED STEROIDS. SHE IS USING INHALER AS NEEDED AND ADVISED  TO FU WITH PULM. ACM OFFERED [facility]ERR[facility] PATIENT WANTS [facility]. SHE WILL CONTACT HERSELF. PATIENT ADVISED OF NEED FOR PCP FU. PATIENT WILL CONTACT OFFICE TOMORROW. PATIENT DECLINED ACM ASSISTANCE. ACM CONTACTED OFFICE REGARDING REFILL OF ZANAFLEX NEEDED TO ASSIST WITH BACK PAIN. ACM REVIEWED NEED TO USE MOBILITY DEVICE IN HOME TO HELP PREVENT FALLS. PATIENT SEEMS A LITTLE DOWN WITH HER CURRENT SITUATION. ACM WILL MONITOR. AC SENT ADDITIONAL EDU WITH NATALIE   Zach received counseling on the following healthy behaviors: SELF MANAGEMENT of chronic health conditions,with goal to improve quality of life and overall wellbeing. The patient is asked to make an attempt to improve diet and exercise patterns to aid in medical management. I have instructed Zach to complete a self tracking handout on Blood Sugars  and instructed them to bring it with them to her next appointment. Discussed use, benefit, and side effects of prescribed medications. Barriers to medication compliance addressed. All patient questions answered. Pt voiced understanding. Diabetes Assessment    Medic Alert ID: No  Meal Planning: None   How often do you test your blood sugar?: No Testing   Do you have barriers with adherence to non-pharmacologic self-management interventions?  (Nutrition/Exercise/Self-Monitoring): No   Have you ever had to go to the ED for symptoms of low blood sugar?: No       No patient-reported symptoms       and   COPD Assessment    Does the patient understand envrionmental exposure?: Yes  Is the patient able to verbalize Rescue vs. Long Acting medications?: Yes  Does the patient have a nebulizer?: No  Does the patient use a space with inhaled medications?: No            Symptoms:     Symptom course: improving  Breathlessness: none  Increase use of rapid acting/rescue inhaled medications?: No  Change in chronic cough?: No/At Baseline  Change in sputum?: No/At Baseline  Self Monitoring - SaO2: No  Have you had a recent diagnosis of pneumonia either by PCP or at a hospital?: Yes at Hospital  Have you seen your PCP for follow up or do you have an appointment scheduled?: No  Are you taking your antibiotics as prescribed?:  (Comment: NA )  Symptom Course: stable         Care Coordination Interventions    Program Enrollment: Rising Risk  Referral from Primary Care Provider: No  Suggested Interventions and Community Resources  Fall Risk Prevention: Completed  Medi Set or Pill Pack: Completed (Comment: uses pill box )  Pharmacist: Declined  Registered Dietician: Completed  Zone Management Tools: Completed (Comment: dm and copd )         Goals Addressed                 This Visit's Progress     Conditions and Symptoms   No change     I will schedule office visits, as directed by my provider. I will keep my appointment or reschedule if I have to cancel. I will notify my provider of any barriers to my plan of care. I will follow my Zone Management tool to seek urgent or emergent care. I will notify my provider of any symptoms that indicate a worsening of my condition.     Barriers: none  Plan for overcoming my barriers: N/A  Confidence: 6/10  Anticipated Goal Completion Date: 9/1/2021         Self Monitoring   No change     Self-Monitored Blood Glucose - I will check my blood sugar random blood sugars and Other: ONCE WEEKLY     None Recently Recorded    Barriers: lack of motivation and lack of education  Plan for overcoming my barriers: [address] LOOK AT METER AND MAKE SURE WORKING   Confidence: 6/10  Anticipated Goal Completion Date: 8/1/2021 7/19/2021   PATIENT HAS NOT BEEN MONITORING BS. SHE REPORTS SHE JUST HAS 'NT BECAUSE HER BACK HURTS TO BAD               Prior to Admission medications    Medication Sig Start Date End Date Taking? Authorizing Provider   methylPREDNISolone (MEDROL, RICHARD,) 4 MG tablet Take by mouth.  9/8/21   Yaneli Millan MD   citalopram (CELEXA) 20 MG tablet TAKE 1 TABLET DAILY (NEED APPOINTMENT FOR FURTHER REFILLS) 7/20/21   Yaneli Millan MD   tiZANidine (ZANAFLEX) 4 MG tablet Take 1 tablet by mouth every 8 hours as needed (back and hip muscle pain) 6/21/21   Yaneli Millan MD   Continuous Blood Gluc  (DEXCOM G6 ) MRAIELA Use daily for blood sugar readings 6/21/21   Yaneli Millan MD   Continuous Blood Gluc Sensor (DEXCOM G6 SENSOR) MISC Use daily for blood sugar readings 6/21/21   Yaneli Millan MD   JANUVIA 100 MG tablet TAKE 1 TABLET DAILY 12/19/20   Yaneli Millan MD   famotidine (PEPCID) 20 MG tablet TAKE 1 TABLET TWICE A DAY (NEED APPOINTMENT FOR FURTHER REFILLS) 12/9/20   Yaneli Millan MD   levothyroxine (SYNTHROID) 150 MCG tablet TAKE 1 TABLET DAILY (NEED APPOINTMENT FOR FURTHER REFILLS) 12/9/20   Yaneli Millan MD   vitamin D3 (CHOLECALCIFEROL) 25 MCG (1000 UT) TABS tablet Take 1 tablet by mouth daily 12/8/20   Yaneli Millan MD   albuterol sulfate  (90 Base) MCG/ACT inhaler Inhale 2 puffs into the lungs every 6 hours as needed for Wheezing 11/23/20   Chantel Pizarro, APRN - CNP   meclizine (ANTIVERT) 25 MG tablet Take 1 tablet by mouth 3 times daily as needed for Dizziness 9/14/20   Yaneli Millan MD   Blood Glucose Monitoring Suppl (TRUE METRIX METER) w/Device KIT use to test sugar 5/13/19   Historical Provider, MD   blood glucose test strips (TRUETEST TEST) strip Test blood glucose fasting and 1 hour post lunch and dinner 5/12/19   Yaneli Millan MD   Lancets MISC Inject 1 each into the skin 3 times daily Use early in the morning fasting and 1 hour post lunch and dinner 5/12/19   Jennifer Valencia MD   aspirin 81 MG EC tablet Take 1 tablet by mouth daily 4/10/19   Jennifer Valencia MD       Future Appointments   Date Time Provider Sosa Barragan   2/10/2022  4:00 PM MD Sosa Botello 129

## 2021-09-21 NOTE — PATIENT INSTRUCTIONS
Patient Education        Chronic Obstructive Pulmonary Disease (COPD): Care Instructions  Your Care Instructions     Chronic obstructive pulmonary disease (COPD) is a general term for a group of lung diseases, including emphysema and chronic bronchitis. People with COPD have decreased airflow in and out of the lungs, which makes it hard to breathe. The airways also can get clogged with thick mucus. Cigarette smoking is a major cause of COPD. Although there is no cure for COPD, you can slow its progress. Following your treatment plan and taking care of yourself can help you feel better and live longer. Follow-up care is a key part of your treatment and safety. Be sure to make and go to all appointments, and call your doctor if you are having problems. It's also a good idea to know your test results and keep a list of the medicines you take. How can you care for yourself at home? Staying healthy    · Do not smoke. This is the most important step you can take to prevent more damage to your lungs. If you need help quitting, talk to your doctor about stop-smoking programs and medicines. These can increase your chances of quitting for good.     · Avoid colds and flu. Get a pneumococcal vaccine shot. If you have had one before, ask your doctor whether you need a second dose. Get the flu vaccine every fall. If you must be around people with colds or the flu, wash your hands often.     · Avoid secondhand smoke, air pollution, and high altitudes. Also avoid cold, dry air and hot, humid air. Stay at home with your windows closed when air pollution is bad. Medicines and oxygen therapy    · Take your medicines exactly as prescribed. Call your doctor if you think you are having a problem with your medicine. You may be taking medicines such as:  ? Bronchodilators. These help open your airways and make breathing easier. They are either short-acting (work for 6 to 9 hours) or long-acting (work for 24 hours).  You inhale most bronchodilators, so they start to act quickly. Always carry your quick-relief inhaler with you in case you need it while you are away from home. ? Corticosteroids (prednisone, budesonide). These reduce airway inflammation. They come in pill or inhaled form. You must take these medicines every day for them to work well.     · Ask your doctor or pharmacist if a spacer is right for you. A spacer may help you get more inhaled medicine to your lungs. If you use one, ask how to use it properly.     · Do not take any vitamins, over-the-counter medicine, or herbal products without talking to your doctor first.     · If your doctor prescribed antibiotics, take them as directed. Do not stop taking them just because you feel better. You need to take the full course of antibiotics.     · If you use oxygen therapy, use the flow rate your doctor has recommended. Don't change it without talking to your doctor first. Oxygen therapy boosts the amount of oxygen in your blood and helps you breathe easier. Activity    · Get regular exercise. Walking is an easy way to get exercise. Start out slowly, and walk a little more each day.     · Pay attention to your breathing. You are exercising too hard if you can't talk while you exercise.     · Take short rest breaks when doing household chores and other activities.     · Learn breathing methods--such as breathing through pursed lips--to help you become less short of breath.     · If your doctor has not set you up with a pulmonary rehabilitation program, ask if rehab is right for you. Rehab includes exercise programs, education about your disease and how to manage it, help with diet and other changes, and emotional support. Diet    · Eat regular, healthy meals. Use bronchodilators about 1 hour before you eat to make it easier to eat. Eat several small meals instead of three large ones.  Drink beverages at the end of the meal. Avoid foods that are hard to chew.     · Eat foods that makes it harder to get air into your lungs. Learning new ways to control your breathing may help. You may feel better and be able to do more. You can try three basic ways to control your breathing. They are pursed-lip breathing, diaphragmatic breathing, and breathing while bending. Use these methods when you are more short of breath than normal. Practice them often so you can do them well. Follow-up care is a key part of your treatment and safety. Be sure to make and go to all appointments, and call your doctor if you are having problems. It's also a good idea to know your test results and keep a list of the medicines you take. How can you care for yourself at home? · Pursed-lip breathing helps you breathe more air out so that your next breath can be deeper. For this type of breathing, you breathe in through your nose and out through your mouth while almost closing your lips. Breathe in for about 2 seconds, and breathe out for 4 to 6 seconds. Pursed-lip breathing decreases shortness of breath and improves your ability to exercise. · Diaphragmatic breathing helps your lungs expand so that they take in more air. ? Lie on your back, or prop yourself up on several pillows. ? Put one hand on your belly and the other on your chest. When you breathe in, push your belly out as far as possible. You should feel the hand on your belly move out, while the hand on your chest does not move. ? When you breathe out, you should feel the hand on your belly move in. When you can do this type of breathing well while lying down, learn to do it while sitting or standing. Many people with COPD find this breathing method helpful. ? Practice diaphragmatic breathing for 20 minutes, 2 or 3 times a day. · Breathing while bending forward at the waist may make breathing easier. It can reduce shortness of breath while you exercise or rest. It helps the diaphragm move more easily.  The diaphragm is a large muscle that separates your lungs from your belly. It helps draw air into your lungs as you breathe. When should you call for help? Call your doctor now or seek immediate medical care if:    · Your breathing methods do not help.     · Your shortness of breath gets worse.     · You cough up blood.     · You have swelling in your belly and legs.     · You have severe chest pain. Watch closely for changes in your health, and be sure to contact your doctor if you have any problems. Where can you learn more? Go to https://BidModo.MiSiedo. org and sign in to your [employer] account. Enter S831 in the [employer] box to learn more about \"Breathing Techniques for COPD: Care Instructions. \"     If you do not have an account, please click on the \"Sign Up Now\" link. Current as of: October 26, 2020               Content Version: 12.9  © 2006-2021 [employer]. Care instructions adapted under license by [facility] ([facility]). If you have questions about a medical condition or this instruction, always ask your healthcare professional. [address] any warranty or liability for your use of this information. Patient Education        Learning About COPD and Clearing Your Lungs  How does clearing your lungs affect COPD? When you have COPD, you may have too much mucus in your lungs. Learning to clear your lungs may help you save energy and improves your breathing. It may also help prevent lung infections. There are three ways to clear your lungs:  · Controlled coughing  · Postural drainage  · Chest percussion  How do you do controlled coughing? Coughing is how your body tries to get rid of mucus. But the kind of coughing you cannot control makes things worse. It causes your airways to close. It also traps the mucus in your lungs. Controlled coughing comes from deep in your lungs. It loosens mucus and moves it though your airways.  It is best to do it after you use your inhaler or other back of your lungs: Place two or three pillows under your hips. Use a small pillow under your head. Kneel over the pillows. Place your arms by your head. Use diaphragmatic breathing. How do you do chest percussion? Chest percussion means that you lightly tap your chest and back. The tapping loosens the mucus in your lungs. · Cup your hand, and lightly tap your chest and back. · Ask your doctor where the best spots are to tap. Avoid your spine and breastbone. · It may be easier to have someone do the tapping for you. Follow-up care is a key part of your treatment and safety. Be sure to make and go to all appointments, and call your doctor if you are having problems. It's also a good idea to know your test results and keep a list of the medicines you take. Where can you learn more? Go to https://mGaadipepiceweb.Netli. org and sign in to your [employer] account. Enter N994 in the [employer] box to learn more about \"Learning About COPD and Clearing Your Lungs. \"     If you do not have an account, please click on the \"Sign Up Now\" link. Current as of: October 26, 2020               Content Version: 12.9  © 2006-2021 [employer]. Care instructions adapted under license by [facility] ([facility]). If you have questions about a medical condition or this instruction, always ask your healthcare professional. [place] [address] any warranty or liability for your use of this information. Patient Education        Learning About COPD and How to Prevent Lung Infections  How do lung infections affect COPD? Lung infections like pneumonia and acute bronchitis are common causes of COPD flare-ups. And people who have COPD are more likely to get these lung infections, especially if they smoke. When you have COPD, it is important to know the symptoms of pneumonia and acute bronchitis and call your doctor if you have them.  Symptoms include:  · A cough that brings up more mucus than condition or this instruction, always ask your healthcare professional. [place] [address] any warranty or liability for your use of this information.

## 2021-09-22 RX ORDER — TIZANIDINE 4 MG/1
4 TABLET ORAL EVERY 8 HOURS PRN
Qty: 30 TABLET | Refills: 1 | Status: SHIPPED | OUTPATIENT
Start: 2021-09-22 | End: 2022-11-01 | Stop reason: DRUGHIGH

## 2021-10-01 ENCOUNTER — TELEPHONE (OUTPATIENT)
Dept: PRIMARY CARE CLINIC | Age: 76
End: 2021-10-01

## 2021-10-01 NOTE — TELEPHONE ENCOUNTER
Daughter is concerned about most recent xray from [facility]. Would like you to review and see what you think.

## 2021-10-04 ENCOUNTER — CARE COORDINATION (OUTPATIENT)
Dept: CARE COORDINATION | Age: 76
End: 2021-10-04

## 2021-10-04 NOTE — CARE COORDINATION
ACM called patient. Left message requesting return call for [facility] out reach. Contact information supplied.

## 2021-10-06 ENCOUNTER — TELEPHONE (OUTPATIENT)
Dept: PRIMARY CARE CLINIC | Age: 76
End: 2021-10-06

## 2021-10-06 NOTE — TELEPHONE ENCOUNTER
Crystal/ with [facility]  [phone]  Patient was scheduled for spine surgery  In pre-op she has an venous ulcer on her leg and had to cancel her surgery  The family concerned about her diabetes management and asking if you can call the daughter Yaz Henry) [phone]  She has a follow up with you in February as well, but wondering about getting her in sooner too.   Please advise

## 2021-10-07 ENCOUNTER — CARE COORDINATION (OUTPATIENT)
Dept: CARE COORDINATION | Age: 76
End: 2021-10-07

## 2021-10-07 DIAGNOSIS — E11.9 TYPE 2 DIABETES MELLITUS WITHOUT COMPLICATION, WITHOUT LONG-TERM CURRENT USE OF INSULIN (HCC): Primary | ICD-10-CM

## 2021-10-07 RX ORDER — EMPAGLIFLOZIN 25 MG/1
1 TABLET, FILM COATED ORAL DAILY
Qty: 90 TABLET | Refills: 1 | Status: SHIPPED | OUTPATIENT
Start: 2021-10-07 | End: 2021-10-08 | Stop reason: SDUPTHER

## 2021-10-07 NOTE — TELEPHONE ENCOUNTER
I just called her daughter this morning. LMTCB. I placed her on Jardiance for DM.   Take with Januvia

## 2021-10-07 NOTE — CARE COORDINATION
Ambulatory Care Coordination Note  10/7/2021  CM Risk Score: 2  Charlson 10 Year Mortality Risk Score: 47%     ACC: Rayne Herbert, RN    Summary Note: ACM received a call from patient. Patient advised ACM her back surgery has been canceled. During her preop appointment they noticed the sore on her leg. It looks slightly infected and they have done a culture. This was done at [address] staff called PCP to advise. PCP called in a new prescription for Jardiance. Patient chart notes also reveal that patient is aware and Dr. Kaitlynn Rush with daughter. Patient wanted ACM to be aware. She also wanted additional education and clarification on this matter. ACM again reviewed risks of diabetes and ulcers and nonhealing wounds. Patient agreed to start checking her blood sugar daily and logging results. AC had team member call patient and arrange for a 2-week follow-up with PCP for leg wound evaluation. Per patient she was advised to keep the area clean and dry and add Neosporin. ACM reviewed diabetic blood sugar monitoring, medication compliance and reporting changes in symptoms. Alban Barahona received counseling on the following healthy behaviors: SELF MANAGEMENT of chronic health conditions,with goal to improve quality of life and overall wellbeing. The patient is asked to make an attempt to improve diet and exercise patterns to aid in medical management. I have instructed Zach to complete a self tracking handout on Blood Sugars  and Blood Pressures  and instructed them to bring it with them to her next appointment. Discussed use, benefit, and side effects of prescribed medications. Barriers to medication compliance addressed. All patient questions answered. Pt voiced understanding.      Diabetes Assessment    Medic Alert ID: No  Meal Planning: None   How often do you test your blood sugar?: No Testing   Do you have barriers with adherence to non-pharmacologic self-management interventions? (Nutrition/Exercise/Self-Monitoring): No   Have you ever had to go to the ED for symptoms of low blood sugar?: No       Unhealing or open wounds       and   Lab Results   Component Value Date    LABA1C 7.0 (A) 09/09/2021    LABA1C 6.8 (H) 06/21/2021    LABA1C 7.2 (H) 02/24/2020     Lab Results   Component Value Date    LABMICR <1.20 06/21/2021    LDLCALC 79 06/21/2021    CREATININE 0.92 09/11/2021     COPD Assessment    Does the patient understand envrionmental exposure?: Yes  Is the patient able to verbalize Rescue vs. Long Acting medications?: Yes  Does the patient have a nebulizer?: No  Does the patient use a space with inhaled medications?: No     No patient-reported symptoms         Symptoms:     Have you had a recent diagnosis of pneumonia either by PCP or at a hospital?: No           Care Coordination Interventions    Program Enrollment: Rising Risk  Referral from Primary Care Provider: No  Suggested Interventions and Community Resources  Fall Risk Prevention: Completed  Medi Set or Pill Pack: Completed (Comment: uses pill box )  Pharmacist: Declined  Registered Dietician: Completed  Zone Management Tools: Completed (Comment: dm and copd )         Goals Addressed    None         Prior to Admission medications    Medication Sig Start Date End Date Taking? Authorizing Provider   empagliflozin (JARDIANCE) 25 MG tablet Take 1 tablet by mouth daily 10/7/21   Jessica Stratton MD   tiZANidine (ZANAFLEX) 4 MG tablet Take 1 tablet by mouth every 8 hours as needed (muscle back pain) 9/22/21   Jessica Stratton MD   methylPREDNISolone (MEDROL, RICHARD,) 4 MG tablet Take by mouth.  9/8/21   Jessica Stratton MD   citalopram (CELEXA) 20 MG tablet TAKE 1 TABLET DAILY (NEED APPOINTMENT FOR FURTHER REFILLS) 7/20/21   Jessica Stratton MD   Continuous Blood Gluc  (DEXCOM G6 ) MARIELA Use daily for blood sugar readings 6/21/21   Jessica Stratton MD   Continuous Blood Gluc Sensor (DEXCOM G6 SENSOR) MISC Use daily for blood sugar readings 6/21/21   Flory Sprague MD   JANUVIA 100 MG tablet TAKE 1 TABLET DAILY 12/19/20   Flory Sprague MD   famotidine (PEPCID) 20 MG tablet TAKE 1 TABLET TWICE A DAY (NEED APPOINTMENT FOR FURTHER REFILLS) 12/9/20   Flory Sprague MD   levothyroxine (SYNTHROID) 150 MCG tablet TAKE 1 TABLET DAILY (NEED APPOINTMENT FOR FURTHER REFILLS) 12/9/20   Flory Sprague MD   vitamin D3 (CHOLECALCIFEROL) 25 MCG (1000 UT) TABS tablet Take 1 tablet by mouth daily 12/8/20   Flory Sprague MD   albuterol sulfate  (90 Base) MCG/ACT inhaler Inhale 2 puffs into the lungs every 6 hours as needed for Wheezing 11/23/20   NILSON Juarez - CNP   meclizine (ANTIVERT) 25 MG tablet Take 1 tablet by mouth 3 times daily as needed for Dizziness 9/14/20   Flory Sprague MD   Blood Glucose Monitoring Suppl (TRUE METRIX METER) w/Device KIT use to test sugar 5/13/19   Historical Provider, MD   blood glucose test strips (TRUETEST TEST) strip Test blood glucose fasting and 1 hour post lunch and dinner 5/12/19   Flory Sprague MD   Lancets MISC Inject 1 each into the skin 3 times daily Use early in the morning fasting and 1 hour post lunch and dinner 5/12/19   Flory Sprague MD   aspirin 81 MG EC tablet Take 1 tablet by mouth daily 4/10/19   Flory Sprague MD       Future Appointments   Date Time Provider Sosa Barragan   2/10/2022  4:00 PM MD Sosa Bell

## 2021-10-07 NOTE — TELEPHONE ENCOUNTER
Pt called questing what new medication was for. I advised her of Dr. Enrrique Chapman message regarding this. She understood and will start med.

## 2021-10-07 NOTE — PATIENT INSTRUCTIONS
Patient Education        empagliflozin  Pronunciation:  EM pa gli FLOE zin  Brand:  Magdalena King  What is the most important information I should know about empagliflozin? You should not use empagliflozin if you have severe kidney disease, or if you are on dialysis. Taking empagliflozin can make you dehydrated, which could cause you to feel weak or dizzy (especially when you stand up). Empagliflozin can cause serious infections in the penis or vagina. Get medical help right away if you have burning, itching, odor, discharge, pain, tenderness, redness or swelling of the genital or rectal area, fever, or if you don't feel well. What is empagliflozin? Empagliflozin is used together with diet and exercise to improve blood sugar control in adults with type 2 diabetes mellitus. Empagliflozin is also used to lower the risk of death from heart attack, stroke, or heart failure in adults with type 2 diabetes who also have heart disease. Empagliflozin is not for treating type 1 diabetes. Empagliflozin may also be used for purposes not listed in this medication guide. What should I discuss with my healthcare provider before taking empagliflozin? You should not use empagliflozin if you are allergic to it, or if you have:  · severe kidney disease (or if you are on dialysis). Tell your doctor if you have ever had:  · liver or kidney disease;  · bladder infections or other urination problems;  · low blood pressure;  · heart problems;  · problems with your pancreas, including surgery;  · if you drink alcohol often; or  · if you are on a low salt diet. Follow your doctor's instructions about using this medicine if you are pregnant or you become pregnant. Controlling diabetes is very important during pregnancy, and having high blood sugar may cause complications in both the mother and the baby. You should not use empagliflozin during the second or third trimester of pregnancy.    You should not breastfeed while using this medicine. Empagliflozin is not approved for use by anyone younger than 25years old. How should I take empagliflozin? Follow all directions on your prescription label and read all medication guides or instruction sheets. Your doctor may occasionally change your dose. Use the medicine exactly as directed. You may take empagliflozin with or without food. Call your doctor if you are sick with vomiting or diarrhea, if you consume less food or fluid than usual, or if you are sweating more than usual.  Your blood sugar will need to be checked often, and you may also need to test the level of ketones your urine. Empagliflozin can cause life-threatening ketoacidosis (too much acid in the blood). Even if your blood sugar is normal, contact your doctor if a urine test shows that you have ketones in the urine. You may have low blood sugar (hypoglycemia) and feel very hungry, dizzy, irritable, confused, anxious, or shaky. To quickly treat hypoglycemia, eat or drink a fast-acting source of sugar (fruit juice, hard candy, crackers, raisins, or non-diet soda). Your doctor may prescribe a glucagon injection kit in case you have severe hypoglycemia. Be sure your family or close friends know how to give you this injection in an emergency. Also watch for signs of high blood sugar (hyperglycemia) such as increased thirst or urination. Blood sugar levels can be affected by stress, illness, surgery, exercise, alcohol use, or skipping meals. Ask your doctor before changing your dose or medication schedule. This medicine can affect the results of certain medical tests. Tell any doctor who treats you that you are using empagliflozin. Empagliflozin is only part of a treatment program that may also include diet, exercise, weight control, blood sugar testing, and special medical care. Follow your doctor's instructions very closely. Store at room temperature away from moisture and heat. What happens if I miss a dose?   Take the medicine as soon as you can, but skip the missed dose if it is almost time for your next dose. Do not take two doses at one time. What happens if I overdose? Seek emergency medical attention or call the Poison Help line at 1-491.295.3639. What should I avoid while taking empagliflozin? Avoid getting up too fast from a sitting or lying position, or you may feel dizzy. What are the possible side effects of empagliflozin? Get emergency medical help if you have signs of an allergic reaction: hives; difficult breathing; swelling of your face, lips, tongue, or throat. Seek medical attention right away if you have signs of a genital infection (penis or vagina): burning, itching, odor, discharge, pain, tenderness, redness or swelling of the genital or rectal area, fever, not feeling well. These symptoms may get worse quickly. Call your doctor at once if you have:  · little or no urination;  · dehydration symptoms --dizziness, weakness, feeling light-headed (like you might pass out);  · ketoacidosis (too much acid in the blood) --nausea, vomiting, stomach pain, confusion, unusual drowsiness, or trouble breathing; or  · signs of a bladder infection --pain or burning when you urinate, increased urination, blood in your urine, fever, pain in your pelvis or back. Older adults may be more likely to have side effects from this medicine. Common side effects may include:  · bladder infection; or  · vaginal yeast infection. This is not a complete list of side effects and others may occur. Call your doctor for medical advice about side effects. You may report side effects to FDA at 7-629-LMF-4602. What other drugs will affect empagliflozin? Tell your doctor about all your other medicines, especially:  · insulin or other oral diabetes medications; or  · a diuretic or \"water pill. \"  This list is not complete.  Other drugs may affect empagliflozin, including prescription and over-the-counter medicines, vitamins, and herbal If you have questions about a medical condition or this instruction, always ask your healthcare professional. [address] any warranty or liability for your use of this information. Patient Education        Wound Check: Care Instructions  Your Care Instructions  People have wounds that need care for many reasons. You may have a cut that needs care after surgery. You may have a cut or puncture wound from an accident. Or you may have a wound because of a condition like diabetes. Whatever the cause of your wound, there are things you can do to care for it at home. Your doctor may also want you to come back for a wound check. The wound check lets the doctor know how your wound is healing and if you need more treatment. Follow-up care is a key part of your treatment and safety. Be sure to make and go to all appointments, and call your doctor if you are having problems. It's also a good idea to know your test results and keep a list of the medicines you take. How can you care for yourself at home? · If your doctor told you how to care for your wound, follow your doctor's instructions. If you did not get instructions, follow this general advice:  ? You may cover the wound with a thin layer of petroleum jelly, such as Vaseline, and a nonstick bandage. ? Apply more petroleum jelly and replace the bandage as needed. · Keep the wound dry for the first 24 to 48 hours. After this, you can shower if your doctor okays it. Pat the wound dry. · Be safe with medicines. Read and follow all instructions on the label. ? If the doctor gave you a prescription medicine for pain, take it as prescribed. ? If you are not taking a prescription pain medicine, ask your doctor if you can take an over-the-counter medicine. · If your doctor prescribed antibiotics, take them as directed. Do not stop taking them just because you feel better. You need to take the full course of antibiotics.   · If you have stitches, do not remove them on your own. Your doctor will tell you when to come back to have them removed. · If you have Steri-Strips, leave them on until they fall off. · If possible, prop up the injured area on a pillow anytime you sit or lie down during the next 3 days. Try to keep it above the level of your heart. This will help reduce swelling. When should you call for help? Call your doctor now or seek immediate medical care if:    · You have new pain, or the pain gets worse.     · The skin near the wound is cold or pale or changes color.     · You have tingling, weakness, or numbness near the wound.     · The wound starts to bleed, and blood soaks through the bandage. Oozing small amounts of blood is normal.     · You have symptoms of infection, such as:  ? Increased pain, swelling, warmth, or redness. ? Red streaks leading from the wound. ? Pus draining from the wound. ? A fever. Watch closely for changes in your health, and be sure to contact your doctor if:    · You do not get better as expected. Where can you learn more? Go to [email or web address].Slidely. org and sign in to your [employer] account. Enter  in the [employer] box to learn more about \"Wound Check: Care Instructions. \"     If you do not have an account, please click on the \"Sign Up Now\" link. Current as of: July 1, 2021               Content Version: 13.0  © 2006-2021 [employer]wise, Inc[employer]. Care instructions adapted under license by [facility] ([facility]). If you have questions about a medical condition or this instruction, always ask your healthcare professional. [place] [address] any warranty or liability for your use of this information.

## 2021-10-08 ENCOUNTER — OFFICE VISIT (OUTPATIENT)
Dept: PRIMARY CARE CLINIC | Age: 76
End: 2021-10-08
Payer: MEDICARE

## 2021-10-08 VITALS
DIASTOLIC BLOOD PRESSURE: 70 MMHG | BODY MASS INDEX: 35 KG/M2 | SYSTOLIC BLOOD PRESSURE: 118 MMHG | OXYGEN SATURATION: 96 % | WEIGHT: 205 LBS | RESPIRATION RATE: 14 BRPM | TEMPERATURE: 98.1 F | HEIGHT: 64 IN | HEART RATE: 106 BPM

## 2021-10-08 DIAGNOSIS — Z00.00 ROUTINE GENERAL MEDICAL EXAMINATION AT A HEALTH CARE FACILITY: Primary | ICD-10-CM

## 2021-10-08 DIAGNOSIS — L03.116 LEFT LEG CELLULITIS: Primary | ICD-10-CM

## 2021-10-08 DIAGNOSIS — E11.9 TYPE 2 DIABETES MELLITUS WITHOUT COMPLICATION, WITHOUT LONG-TERM CURRENT USE OF INSULIN (HCC): ICD-10-CM

## 2021-10-08 DIAGNOSIS — I87.2 VENOUS INSUFFICIENCY OF LEFT LOWER EXTREMITY: ICD-10-CM

## 2021-10-08 PROCEDURE — 99214 OFFICE O/P EST MOD 30 MIN: CPT | Performed by: INTERNAL MEDICINE

## 2021-10-08 PROCEDURE — 3051F HG A1C>EQUAL 7.0%<8.0%: CPT | Performed by: INTERNAL MEDICINE

## 2021-10-08 PROCEDURE — G0439 PPPS, SUBSEQ VISIT: HCPCS | Performed by: INTERNAL MEDICINE

## 2021-10-08 RX ORDER — AMOXICILLIN AND CLAVULANATE POTASSIUM 875; 125 MG/1; MG/1
1 TABLET, FILM COATED ORAL 2 TIMES DAILY
Qty: 14 TABLET | Refills: 0 | Status: SHIPPED | OUTPATIENT
Start: 2021-10-08 | End: 2021-10-15

## 2021-10-08 RX ORDER — EMPAGLIFLOZIN 25 MG/1
1 TABLET, FILM COATED ORAL DAILY
Qty: 90 TABLET | Refills: 1 | Status: SHIPPED | OUTPATIENT
Start: 2021-10-08 | End: 2022-02-15 | Stop reason: SDUPTHER

## 2021-10-08 ASSESSMENT — ENCOUNTER SYMPTOMS
DIARRHEA: 0
WHEEZING: 0
NAUSEA: 0
SHORTNESS OF BREATH: 0
VOMITING: 0
ABDOMINAL PAIN: 0
COLOR CHANGE: 1
RHINORRHEA: 0
COUGH: 0

## 2021-10-08 ASSESSMENT — PATIENT HEALTH QUESTIONNAIRE - PHQ9
SUM OF ALL RESPONSES TO PHQ9 QUESTIONS 1 & 2: 0
SUM OF ALL RESPONSES TO PHQ QUESTIONS 1-9: 0
SUM OF ALL RESPONSES TO PHQ QUESTIONS 1-9: 0
2. FEELING DOWN, DEPRESSED OR HOPELESS: 0
SUM OF ALL RESPONSES TO PHQ QUESTIONS 1-9: 0
1. LITTLE INTEREST OR PLEASURE IN DOING THINGS: 0

## 2021-10-08 ASSESSMENT — LIFESTYLE VARIABLES
HOW OFTEN DO YOU HAVE A DRINK CONTAINING ALCOHOL: 0
AUDIT TOTAL SCORE: INCOMPLETE
HOW OFTEN DO YOU HAVE A DRINK CONTAINING ALCOHOL: NEVER
AUDIT-C TOTAL SCORE: INCOMPLETE

## 2021-10-08 NOTE — PROGRESS NOTES
Subjective:      Patient ID: Jose Petit is a 77 y.o. female    Foot ulcer   HPI  Pt presents with left lower leg redness and pain x 1 week. Started after bumping her leg against an object. Subsequently developed redness, swelling and 2 ulcers. No fever or chills. Originally scheduled lumbar surgery has since been cancelled pending leg eval.    Hx T2DM on Januvia. Hemoglobin A1C (%)   Date Value   09/09/2021 7.0 (A)   06/21/2021 6.8 (H)   02/24/2020 7.2 (H)   04/10/2019 7.7 (A)   11/15/2018 7.7     Past Medical History:   Diagnosis Date    Anxiety     DM [facility]diabetes mellitus) ([facility] [address] 4/14/2015    Hepatitis B infection     Hyperlipidemia     Hypothyroidism     Insomnia     Leg pain 4/14/2015    Post herpetic neuralgia     Unspecified sleep apnea     after seeing [facility] they state she is does not have sleep apnea. Not using cpap     Past Surgical History:   Procedure Laterality Date    ANKLE FRACTURE SURGERY  09/2016    DR ANDRADE  LT    BIOPSY / LIGATION TEMPORAL ARTERY Right 4/26/2019    RIGHT TEMPORAL ARTERY BIOPSY performed by Marline Valera MD at [facility]  2015    surgery for transgeminal neuraglia. Insertion of sponge for chronic pain    COLONOSCOPY  3/31/14    DR Rosio Mcconnell    COLONOSCOPY N/A 2/2/2021    COLONOSCOPY DIAGNOSTIC performed by Jazz Bailey MD at [address] [place] [address]      OTHER SURGICAL HISTORY Right     Trigeminal Neuralgia    CO COLON CA SCRN NOT  [address] IND N/A 7/24/2017    COLONOSCOPY performed by Jazz Bailey MD at [address] N/A 2/2/2021    EGD ESOPHAGOGASTRODUODENOSCOPY performed [facility] MD at [facility] Marital status:       Spouse name: Not on file    Number of children: 2    Years of education: Not on file    Highest education level: 12th grade   Occupational History    Occupation: retired    Tobacco Use    Smoking status: Former Smoker     Packs/day: 1.00     Years: 48.00     Pack years: 48.00     Types: Cigarettes     Quit date: 2013     Years since quittin.2    Smokeless tobacco: Never Used   Vaping Use    Vaping Use: Every day    Substances: Nicotine, Flavoring, low  dose    Substance and Sexual Activity    Alcohol use: No     Comment: very rarely    Drug use: No    Sexual activity: Not Currently     Partners: Male   Other Topics Concern    Not on file   Social History Narrative    Lives alone    Has stairs in home needs to go up/down. 2 children that help if needed      Social Determinants of Health     Financial Resource Strain: Low Risk     Difficulty of Paying Living Expenses: Not very hard   Food Insecurity: No Food Insecurity    Worried About Running Out of Food in the Last Year: Never true    Franky of Food in the Last Year: Never true   Transportation Needs:     Lack of Transportation (Medical):      Lack of Transportation (Non-Medical):    Physical Activity:     Days of Exercise per Week:     Minutes of Exercise per Session:    Stress:     Feeling of Stress :    Social Connections:     Frequency of Communication with Friends and Family:     Frequency of Social Gatherings with Friends and Family:     Attends Zoroastrian Services:     Active Member of Clubs or Organizations:     Attends Club or Organization Meetings:     Marital Status:    Intimate Partner Violence:     Fear of Current or Ex-Partner:     Emotionally Abused:     Physically Abused:     Sexually Abused:      Family History   Problem Relation Age of Onset    Stroke Mother     High Cholesterol Mother     Cancer Mother     Colon Cancer Neg Hx     Celiac Disease Neg Hx     Crohn's Disease Neg Hx      Allergies:  Cefdinir and Metformin and related  Patient Active Problem List   Diagnosis    Hepatitis B infection    Sleep apnea    Anxiety    Insomnia    Trigeminal neuralgia    Hyperlipidemia    Hypothyroidism    Dyspnea    DM (diabetes mellitus) (HCC)    Leg pain    Depression    Frequent headaches    Major depressive disorder, recurrent, in full remission ([facility] [address]    Cherry angioma    Diffuse photodamage of skin    Dilated pore of Solomon    GERD (gastroesophageal reflux disease)    Hidradenitis suppurativa    Lentigines    Multiple benign nevi    Poikiloderma    Seborrheic keratosis    Morbidly obese (HCC)    Chronic gastritis without bleeding    Polyp of sigmoid colon     Current Outpatient Medications on File Prior to Visit   Medication Sig Dispense Refill    tiZANidine (ZANAFLEX) 4 MG tablet Take 1 tablet by mouth every 8 hours as needed (muscle back pain) 30 tablet 1    methylPREDNISolone (MEDROL, RICHARD,) 4 MG tablet Take by mouth.  1 kit 0    citalopram (CELEXA) 20 MG tablet TAKE 1 TABLET DAILY (NEED APPOINTMENT FOR FURTHER REFILLS) 90 tablet 3    Continuous Blood Gluc  (DEXCOM G6 ) MARIELA Use daily for blood sugar readings 1 Device 1    Continuous Blood Gluc Sensor (DEXCOM G6 SENSOR) MISC Use daily for blood sugar readings 1 each 3    JANUVIA 100 MG tablet TAKE 1 TABLET DAILY 90 tablet 3    famotidine (PEPCID) 20 MG tablet TAKE 1 TABLET TWICE A DAY (NEED APPOINTMENT FOR FURTHER REFILLS) 180 tablet 3    levothyroxine (SYNTHROID) 150 MCG tablet TAKE 1 TABLET DAILY (NEED APPOINTMENT FOR FURTHER REFILLS) 90 tablet 3    vitamin D3 (CHOLECALCIFEROL) 25 MCG (1000 UT) TABS tablet Take 1 tablet by mouth daily 30 tablet 0    albuterol sulfate  (90 Base) MCG/ACT inhaler Inhale 2 puffs into the lungs every 6 hours as needed for Wheezing 1 Inhaler 0    meclizine (ANTIVERT) 25 MG tablet Take 1 tablet by mouth 3 times daily as needed for Dizziness 30 tablet 0    Blood Glucose Monitoring Suppl (TRUE METRIX METER) w/Device KIT use to test sugar  0    blood glucose test strips (TRUETEST TEST) strip Test blood glucose fasting and 1 hour post lunch and dinner 100 each 5    Lancets MISC Inject 1 each into the skin 3 times daily Use early in the morning fasting and 1 hour post lunch and dinner 100 each 5    aspirin 81 MG EC tablet Take 1 tablet by mouth daily 90 tablet 3    mupirocin (BACTROBAN) 2 % ointment        Current Facility-Administered Medications on File Prior to Visit   Medication Dose Route Frequency Provider Last Rate Last Admin    methylPREDNISolone acetate (DEPO-MEDROL) injection 60 mg  60 mg IntraMUSCular Once Fabienne Maria MD         Review of Systems   Constitutional: Negative for chills, diaphoresis, fatigue and fever. HENT: Negative for congestion and rhinorrhea. Respiratory: Negative for cough, shortness of breath and wheezing. Cardiovascular: Negative for chest pain. Gastrointestinal: Negative for abdominal pain, diarrhea, nausea and vomiting. Endocrine: Negative for cold intolerance and heat intolerance. Genitourinary: Negative for dysuria and frequency. Skin: Positive for color change and wound. Neurological: Negative for dizziness and light-headedness. Objective:   /70 (Site: Left Upper Arm, Cuff Size: Large Adult)   Pulse 106   Temp 98.1 °F (36.7 °C)   Resp 14   Ht 5' 4\" (1.626 m)   Wt 205 lb (93 kg)   LMP 02/28/1982   SpO2 96%   Breastfeeding No   BMI 35.19 kg/m²     Physical Exam  Constitutional:       General: She is not in acute distress. Appearance: She is not diaphoretic. Cardiovascular:      Rate and Rhythm: Normal rate and regular rhythm. Pulses: Normal pulses. Heart sounds: Normal heart sounds, S1 normal and S2 normal. No murmur heard. Pulmonary:      Effort: Pulmonary effort is normal. No respiratory distress. Breath sounds: Normal breath sounds. No wheezing or rales. Chest:      Chest wall: No tenderness. Abdominal:      General: Bowel sounds are normal.      Tenderness: There is no abdominal tenderness.    Musculoskeletal:      Comments: PT and DP pulses palpable b/l    Skin:     Comments: Left distal leg circumferential erythema and violaceous discoloration, pitting edema and two nickel-sized stage 2 ulcers with serous discharge and mild TTP and increased warmth   Neurological:      Mental Status: She is alert. Assessment:       Diagnosis Orders   1. Left leg cellulitis  amoxicillin-clavulanate (AUGMENTIN) 875-125 MG per tablet   2. Venous insufficiency of left lower extremity     3. Type 2 diabetes mellitus without complication, without long-term current use of insulin ([facility])  empagliflozin (JARDIANCE) 25 MG tablet       Plan:      No orders of the defined types were placed in this encounter. Orders Placed This Encounter   Medications    empagliflozin (JARDIANCE) 25 MG tablet     Sig: Take 1 tablet by mouth daily     Dispense:  90 tablet     Refill:  1    amoxicillin-clavulanate (AUGMENTIN) 875-125 MG per tablet     Sig: Take 1 tablet by mouth 2 times daily for 7 days     Dispense:  14 tablet     Refill:  0   Compression and foot elevation. Return in about 6 weeks (around 11/19/2021) for post op.

## 2021-10-08 NOTE — PROGRESS NOTES
Medicare Annual Wellness Visit  Name: Gelacio Hager Date: 10/8/2021   MRN: [id number] Sex: Female   Age: 77 y.o. Ethnicity: Non- / Non    : 1945 Race: White (non-)      Mikaela Zuleta is here for No chief complaint on file. Screenings for behavioral, psychosocial and functional/safety risks, and cognitive dysfunction are all negative except as indicated below. These results, as well as other patient data from the [address] [facility] [address] form, are documented in Flowsheets linked to this Encounter. Allergies   Allergen Reactions    Cefdinir      Diarrhea      Metformin And Related      Swelling leg         Prior to Visit Medications    Medication Sig Taking? Authorizing Provider   mupirocin (BACTROBAN) 2 % ointment   Historical Provider, MD   empagliflozin (JARDIANCE) 25 MG tablet Take 1 tablet by mouth daily  Juliette Le MD   amoxicillin-clavulanate (AUGMENTIN) 875-125 MG per tablet Take 1 tablet by mouth 2 times daily for 7 days  Juliette Le MD   tiZANidine (ZANAFLEX) 4 MG tablet Take 1 tablet by mouth every 8 hours as needed (muscle back pain)  Juliette Le MD   methylPREDNISolone (MEDROL, RICHARD,) 4 MG tablet Take by mouth.   Juliette Le MD   citalopram (CELEXA) 20 MG tablet TAKE 1 TABLET DAILY (NEED APPOINTMENT FOR FURTHER REFILLS)  Juliette Le MD   Continuous Blood Gluc  (DEXCOM G6 ) MARIELA Use daily for blood sugar readings  Juliette Le MD   Continuous Blood Gluc Sensor (DEXCOM G6 SENSOR) MISC Use daily for blood sugar readings  Juliette eL MD   JANUVIA 100 MG tablet TAKE 1 TABLET DAILY  Juliette Le MD   famotidine (PEPCID) 20 MG tablet TAKE 1 TABLET TWICE A DAY (NEED APPOINTMENT FOR FURTHER REFILLS)  Juliette Le MD   levothyroxine (SYNTHROID) 150 MCG tablet TAKE 1 TABLET DAILY (NEED APPOINTMENT FOR FURTHER REFILLS)  Juliette Le MD   vitamin D3 (CHOLECALCIFEROL) 25 MCG (1000 UT) TABS tablet Take 1 tablet by mouth daily  Juliette Le MD   albuterol sulfate  (90 Base) MCG/ACT inhaler Inhale 2 puffs into the lungs every 6 hours as needed for Wheezing  Chantel Pizarro, APRN - CNP   meclizine (ANTIVERT) 25 MG tablet Take 1 tablet by mouth 3 times daily as needed for Dizziness  Iglesia Valdes MD   Blood Glucose Monitoring Suppl (TRUE METRIX METER) w/Device KIT use to test sugar  Historical Provider, MD   blood glucose test strips (TRUETEST TEST) strip Test blood glucose fasting and 1 hour post lunch and dinner  Iglesia Valdes MD   Lancets MISC Inject 1 each into the skin 3 times daily Use early in the morning fasting and 1 hour post lunch and dinner  Iglesia Valdes MD   aspirin 81 MG EC tablet Take 1 tablet by mouth daily  Iglesia Valdes MD         Past Medical History:   Diagnosis Date    Anxiety     DM (diabetes mellitus) ([address] 4/14/2015    Hepatitis B infection     Hyperlipidemia     Hypothyroidism     Insomnia     Leg pain 4/14/2015    Post herpetic neuralgia     Unspecified sleep apnea     after seeing [facility] [place] they state she is does not have sleep apnea. Not using cpap       Past Surgical History:   Procedure Laterality Date    ANKLE FRACTURE SURGERY  09/2016    DR ANDRADE  LT    BIOPSY / LIGATION TEMPORAL ARTERY Right 4/26/2019    RIGHT TEMPORAL ARTERY BIOPSY performed by Anival Palafox MD at [facility]  2015    surgery for transgeminal neuraglia.   Insertion of sponge for chronic pain    COLONOSCOPY  3/31/14    DR Georgie Goldberg    COLONOSCOPY N/A 2/2/2021    COLONOSCOPY DIAGNOSTIC performed by Tomas Hammer MD at [facility]      OTHER SURGICAL HISTORY Right     Trigeminal Neuralgia    NE COLON CA SCRN NOT  [address] IND N/A 7/24/2017    COLONOSCOPY performed by Tomas Hammer MD at [address] N/A 2/2/2021    EGD ESOPHAGOGASTRODUODENOSCOPY performed by Tomas Hammer MD at [facility]         Family History   Problem Relation Age of Onset    Stroke Mother     High Cholesterol Mother     Cancer Mother     Colon Cancer Neg Hx     Celiac Disease Neg Hx     Crohn's Disease Neg Hx        CareTeam (Including outside providers/suppliers regularly involved in providing care):   Patient Care Team:  Shaw Turner MD as PCP - General (Internal Medicine)  Shaw Turner MD as PCP - [facility] Empaneled Provider  Betsy Silverman RN as [facility]    Wt Readings from Last 3 Encounters:   10/08/21 205 lb (93 kg)   08/10/21 203 lb 9.6 oz (92.4 kg)   06/21/21 203 lb (92.1 kg)     There were no vitals filed for this visit. There is no height or weight on file to calculate BMI. Based upon direct observation of the patient, evaluation of cognition reveals remote memory intact, recent memory impaired.     General Appearance: alert and oriented to person, place and time, well developed and well- nourished, in no acute distress  Skin: warm and dry, no rash or erythema  Head: normocephalic and atraumatic  Eyes: pupils equal, round, and reactive to light, extraocular eye movements intact, conjunctivae normal  ENT: tympanic membrane, external ear and ear canal normal bilaterally, nose without deformity, nasal mucosa and turbinates normal without polyps  Neck: supple and non-tender without mass, no thyromegaly or thyroid nodules, no cervical lymphadenopathy  Pulmonary/Chest: clear to auscultation bilaterally- no wheezes, rales or rhonchi, normal air movement, no respiratory distress  Cardiovascular: normal rate, regular rhythm, normal S1 and S2, no murmurs, rubs, clicks, or gallops, distal pulses intact, no carotid bruits  Abdomen: soft, non-tender, non-distended, normal bowel sounds, no masses or organomegaly  Extremities: no cyanosis, clubbing or edema  Neurologic: reflexes normal and symmetric, no cranial nerve deficit, gait, coordination and speech normal  Left lower leg erythema and violaceous discoloration   Patient's complete Health Risk Assessment and screening values have been reviewed and are found in [facility]. The following problems were reviewed today and where indicated follow up appointments were made and/or referrals ordered. Positive Risk Factor Screenings with Interventions:            General Health and ACP:  General  In general, how would you say your health is?: Good  In the past 7 days, have you experienced any of the following?  New or Increased Pain, New or Increased Fatigue, Loneliness, Social Isolation, Stress or Anger?: (!) New or Increased Pain  Do you get the social and emotional support that you need?: Yes  Do you have a Living Will?: (!) No  Advance Directives     Power of  Living Will ACP-Advance Directive ACP-Power of     Not on File Filed on 07/26/17 Filed Not on File      General Health Risk Interventions:  · none    Health Habits/Nutrition:  Health Habits/Nutrition  Do you exercise for at least 20 minutes 2-3 times per week?: (!) No  Have you lost any weight without trying in the past 3 months?: No  Do you eat only one meal per day?: No  Have you seen the dentist within the past year?: Yes     Health Habits/Nutrition Interventions:  · Inadequate physical activity:  patient agrees to exercise for at least 150 minutes/week     Safety:  Safety  Do you have working smoke detectors?: Yes  Have all throw rugs been removed or fastened?: (!) No  Do you have non-slip mats or surfaces in all bathtubs/showers?: (!) No  Do all of your stairways have a railing or banister?: Yes  Are your doorways, halls and stairs free of clutter?: Yes  Do you always fasten your seatbelt when you are in a car?: Yes  Safety Interventions:  · Home safety tips provided     Personalized Preventive Plan   Current Health Maintenance Status  Immunization History   Administered Date(s) Administered    COVID-19, Simpson Peter, PF, 30mcg/0.3mL 05/31/2021, 07/06/2021    Influenza A ([id number]) Vaccine PF IM 01/12/2010    Influenza Vaccine, unspecified formulation 09/01/2015,

## 2021-10-08 NOTE — PATIENT INSTRUCTIONS
Personalized Preventive Plan for Luis Nielson - 10/8/2021  Medicare offers a range of preventive health benefits. Some of the tests and screenings are paid in full while other may be subject to a deductible, co-insurance, and/or copay. Some of these benefits include a comprehensive review of your medical history including lifestyle, illnesses that may run in your family, and various assessments and screenings as appropriate. After reviewing your medical record and screening and assessments performed today your provider may have ordered immunizations, labs, imaging, and/or referrals for you. A list of these orders (if applicable) as well as your Preventive Care list are included within your After Visit Summary for your review. Other Preventive Recommendations:    · A preventive eye exam performed by an eye specialist is recommended every 1-2 years to screen for glaucoma; cataracts, macular degeneration, and other eye disorders. · A preventive dental visit is recommended every 6 months. · Try to get at least 150 minutes of exercise per week or 10,000 steps per day on a pedometer . · Order or download the FREE \"Exercise & Physical Activity: Your Everyday Guide\" from [employer]. Call 5-685.981.5676 or search [employer] online. · You need 6135-0041 mg of calcium and 7744-2498 IU of vitamin D per day. It is possible to meet your calcium requirement with diet alone, but a vitamin D supplement is usually necessary to meet this goal.  · When exposed to the sun, use a sunscreen that protects against both UVA and UVB radiation with an SPF of 30 or greater. Reapply every 2 to 3 hours or after sweating, drying off with a towel, or swimming. · Always wear a seat belt when traveling in a car. Always wear a helmet when riding a bicycle or motorcycle.

## 2021-10-08 NOTE — TELEPHONE ENCOUNTER
So the CXR shows pneumonia, but its healing since her admission at [facility]. For the aortic atherosclerosis, I'm not sure how much it is because I cannot see the actual picture.  However, it is not something new

## 2021-10-14 ENCOUNTER — CARE COORDINATION (OUTPATIENT)
Dept: CARE COORDINATION | Age: 76
End: 2021-10-14

## 2021-10-14 NOTE — CARE COORDINATION
Ambulatory Care Coordination Note  10/14/2021  CM Risk Score: 2  Charlson 10 Year Mortality Risk Score: 47%     ACC: Mignon Davis, RN    Summary Note: ACM spoke to patient. She reports that her leg ulcers are doing better. Per patient they are scabbed over. Patient was provided an antibiotic from PCP and reports compliant. Patient has been monitoring her blood sugars see flow sheet below. Blood Glucose Tracker 10/14/2021 10/13/2021 10/12/2021 10/11/2021   Before breakfast blood glucose 141 106 139 106     Blood Glucose Tracker 10/10/2021 10/9/2021   Before breakfast blood glucose 196 242     Patient is not taking her Jardiance. Per patient she was advised it was $143. Patient states she cannot afford. ACM called [facility]. ACM was advised medication was shipped out Tuesday patient's 90-day supply is $45. ACM advised patient. Patient advised to start taking the medication when she receives. Patient states she got the culture back that was completed at [facility] with negative results. Patient will call to see when they will reschedule her surgery. Patient denied any COPD signs and symptoms. ACM reviewed diabetic education and COPD education. Son Justice received counseling on the following healthy behaviors: SELF MANAGEMENT of chronic health conditions,with goal to improve quality of life and overall wellbeing. The patient is asked to make an attempt to improve diet and exercise patterns to aid in medical management. I have instructed Zach to complete a self tracking handout on Blood Sugars  and instructed them to bring it with them to her next appointment. Discussed use, benefit, and side effects of prescribed medications. Barriers to medication compliance addressed. All patient questions answered. Pt voiced understanding.    Diabetes Assessment    Medic Alert ID: No  Meal Planning: None   How often do you test your blood sugar?: Daily   Do you have barriers with adherence to non-pharmacologic self-management interventions? (Nutrition/Exercise/Self-Monitoring): No   Have you ever had to go to the ED for symptoms of low blood sugar?: No       No patient-reported symptoms   Do you have hyperglycemia symptoms?: No   Do you have hypoglycemia symptoms?: No   Last Blood Sugar Value: 141   Blood Sugar Monitoring Regimen: Morning Fasting       and   COPD Assessment    Does the patient understand envrionmental exposure?: Yes  Is the patient able to verbalize Rescue vs. Long Acting medications?: Yes  Does the patient have a nebulizer?: No  Does the patient use a space with inhaled medications?: No     No patient-reported symptoms         Symptoms:     Have you had a recent diagnosis of pneumonia either by PCP or at a hospital?: No           Care Coordination Interventions    Program Enrollment: Rising Risk  Referral from Primary Care Provider: No  Suggested Interventions and Community Resources  Fall Risk Prevention: Completed  Medi Set or Pill Pack: Completed (Comment: uses pill box )  Pharmacist: Declined  Registered Dietician: Completed  Zone Management Tools: Completed (Comment: dm and copd )         Goals Addressed                 This Visit's Progress     Conditions and Symptoms   Improving     I will schedule office visits, as directed by my provider. I will keep my appointment or reschedule if I have to cancel. I will notify my provider of any barriers to my plan of care. I will follow my Zone Management tool to seek urgent or emergent care. I will notify my provider of any symptoms that indicate a worsening of my condition.     Barriers: none  Plan for overcoming my barriers: N/A  Confidence: 6/10  Anticipated Goal Completion Date: 9/1/2021         Self Monitoring   Improving     Self-Monitored Blood Glucose - I will check my blood sugar random blood sugars and Other: ONCE WEEKLY     None Recently Recorded    Barriers: lack of motivation and lack of education  Plan for overcoming my barriers: WORK WITH ACM HAVE SON LOOK AT METER AND MAKE SURE WORKING   Confidence: 6/10  Anticipated Goal Completion Date: 12/1/2021 7/19/2021   PATIENT HAS NOT BEEN MONITORING BS. SHE REPORTS SHE JUST HAS 'NT BECAUSE HER BACK HURTS TO BAD   10/14/2021 PATIENT HAS BEEN MONITORING BS DAILY FOR PAST WEEK. Prior to Admission medications    Medication Sig Start Date End Date Taking? Authorizing Provider   mupirocin (BACTROBAN) 2 % ointment  9/28/21   Historical Provider, MD   empagliflozin (JARDIANCE) 25 MG tablet Take 1 tablet by mouth daily 10/8/21   Shanna Sosa MD   amoxicillin-clavulanate (AUGMENTIN) 875-125 MG per tablet Take 1 tablet by mouth 2 times daily for 7 days 10/8/21 10/15/21  Shanna Sosa MD   tiZANidine (ZANAFLEX) 4 MG tablet Take 1 tablet by mouth every 8 hours as needed (muscle back pain) 9/22/21   Shanna Sosa MD   methylPREDNISolone (MEDROL, RICHARD,) 4 MG tablet Take by mouth.  9/8/21   Shanna Sosa MD   citalopram (CELEXA) 20 MG tablet TAKE 1 TABLET DAILY (NEED APPOINTMENT FOR FURTHER REFILLS) 7/20/21   Shanna Sosa MD   Continuous Blood Gluc  (DEXCOM G6 ) MARIELA Use daily for blood sugar readings 6/21/21   Shanna Sosa MD   Continuous Blood Gluc Sensor (DEXCOM G6 SENSOR) MISC Use daily for blood sugar readings 6/21/21   Shanna Sosa MD   JANUVIA 100 MG tablet TAKE 1 TABLET DAILY 12/19/20   Shanna Sosa MD   famotidine (PEPCID) 20 MG tablet TAKE 1 TABLET TWICE A DAY (NEED APPOINTMENT FOR FURTHER REFILLS) 12/9/20   Shanna Sosa MD   levothyroxine (SYNTHROID) 150 MCG tablet TAKE 1 TABLET DAILY (NEED APPOINTMENT FOR FURTHER REFILLS) 12/9/20   Shanna Sosa MD   vitamin D3 (CHOLECALCIFEROL) 25 MCG (1000 UT) TABS tablet Take 1 tablet by mouth daily 12/8/20   Shanna Sosa MD   albuterol sulfate  (90 Base) MCG/ACT inhaler Inhale 2 puffs into the lungs every 6 hours as needed for Wheezing 11/23/20   Chantel A Janosik, APRN - CNP   meclizine (ANTIVERT) 25 MG tablet Take 1 tablet by mouth 3 times daily as needed for Dizziness 9/14/20   Mariza Cancino MD   Blood Glucose Monitoring Suppl (TRUE METRIX METER) w/Device KIT use to test sugar 5/13/19   Historical Provider, MD   blood glucose test strips (TRUETEST TEST) strip Test blood glucose fasting and 1 hour post lunch and dinner 5/12/19   Mariza Cancino MD   Lancets MISC Inject 1 each into the skin 3 times daily Use early in the morning fasting and 1 hour post lunch and dinner 5/12/19   Mariza Cancino MD   aspirin 81 MG EC tablet Take 1 tablet by mouth daily 4/10/19   Mariza Cancino MD       Future Appointments   Date Time Provider Sosa Barragan   2/10/2022  4:00 PM MD Sosa Gibbs

## 2021-10-24 ENCOUNTER — CARE COORDINATION (OUTPATIENT)
Dept: CARE COORDINATION | Age: 76
End: 2021-10-24

## 2021-10-24 NOTE — CARE COORDINATION
Ambulatory Care Coordination Note  10/24/2021  CM Risk Score: 2  Charlson 10 Year Mortality Risk Score: 47%     ACC: Cintia Delgado, RN    Summary Note: ACM spoke to patient. She reports her back surgery is rescheduled for November 11, 2021 with the [facility]. Patient states her back is still very painful. But she is managing well. ACM encouraged patient that if offered please considered inpatient rehab following her procedure. If not please have them set up home health care for her at her daughter's house. Patient reports blood sugars have been running good 120s to 150s. Patient reports the small ulcers on her legs are completely healed and the scabs have fallen off. Patient reports no hyper or hypoglycemia events. She reports no open sores at this time. Patient reports her COPD is also managed well no signs of shortness of breath or increased cough. Patient reports she is eating and drinking well bowel and bladder within normal limits. ACM reviewed diabetic and COPD zones. Fay Cruz received counseling on the following healthy behaviors: SELF MANAGEMENT of chronic health conditions,with goal to improve quality of life and overall wellbeing. The patient is asked to make an attempt to improve diet and exercise patterns to aid in medical management. I have instructed Zach to complete a self tracking handout on Blood Sugars , Blood Pressures  and Weights and instructed them to bring it with them to her next appointment. Discussed use, benefit, and side effects of prescribed medications. Barriers to medication compliance addressed. All patient questions answered. Pt voiced understanding. Diabetes Assessment    Medic Alert ID: No  Meal Planning: None   How often do you test your blood sugar?: Daily   Do you have barriers with adherence to non-pharmacologic self-management interventions?  (Nutrition/Exercise/Self-Monitoring): No   Have you ever had to go to the ED for symptoms of low blood sugar?: No       No patient-reported symptoms   Do you have hyperglycemia symptoms?: No   Do you have hypoglycemia symptoms?: No   Last Blood Sugar Value: 134   Blood Sugar Monitoring Regimen: Morning Fasting       and   COPD Assessment    Does the patient understand envrionmental exposure?: Yes  Is the patient able to verbalize Rescue vs. Long Acting medications?: Yes  Does the patient have a nebulizer?: No  Does the patient use a space with inhaled medications?: No     No patient-reported symptoms         Symptoms:     Have you had a recent diagnosis of pneumonia either by PCP or at a hospital?: Yes at Hospital  Have you seen your PCP for follow up or do you have an appointment scheduled?: Yes  Are you taking your antibiotics as prescribed?:  (Comment: COMPLETED LAST MONTH )  Symptom Course: improving             Care Coordination Interventions    Program Enrollment: Rising Risk  Referral from Primary Care Provider: No  Suggested Interventions and Community Resources  Fall Risk Prevention: Completed  Medi Set or Pill Pack: Completed (Comment: uses pill box )  Pharmacist: Declined  Registered Dietician: Completed  Zone Management Tools: Completed (Comment: dm and copd )         Goals Addressed                 This Visit's Progress     Conditions and Symptoms   Improving     I will schedule office visits, as directed by my provider. I will keep my appointment or reschedule if I have to cancel. I will notify my provider of any barriers to my plan of care. I will follow my Zone Management tool to seek urgent or emergent care. I will notify my provider of any symptoms that indicate a worsening of my condition.     Barriers: none  Plan for overcoming my barriers: N/A  Confidence: 6/10  Anticipated Goal Completion Date: 9/1/2021         Self Monitoring   Improving     Self-Monitored Blood Glucose - I will check my blood sugar random blood sugars and Other: ONCE WEEKLY     None Recently Recorded    Barriers: lack of motivation and lack of education  Plan for overcoming my barriers: WORK WITH ACM HAVE SON LOOK AT METER AND MAKE SURE WORKING   Confidence: 6/10  Anticipated Goal Completion Date: 12/1/2021 7/19/2021   PATIENT HAS NOT BEEN MONITORING BS. SHE REPORTS SHE JUST HAS 'NT BECAUSE HER BACK HURTS TO BAD   10/14/2021 PATIENT HAS BEEN MONITORING BS DAILY FOR PAST WEEK. Prior to Admission medications    Medication Sig Start Date End Date Taking? Authorizing Provider   mupirocin (BACTROBAN) 2 % ointment  9/28/21   Historical Provider, MD   empagliflozin (JARDIANCE) 25 MG tablet Take 1 tablet by mouth daily 10/8/21   Shaw Turner MD   tiZANidine (ZANAFLEX) 4 MG tablet Take 1 tablet by mouth every 8 hours as needed (muscle back pain) 9/22/21   Shaw Turner MD   methylPREDNISolone (MEDROL, RICHARD,) 4 MG tablet Take by mouth.  9/8/21   Shaw Turner MD   citalopram (CELEXA) 20 MG tablet TAKE 1 TABLET DAILY (NEED APPOINTMENT FOR FURTHER REFILLS) 7/20/21   Shaw Turner MD   Continuous Blood Gluc  (DEXCOM G6 ) MARIELA Use daily for blood sugar readings 6/21/21   Shaw Turner MD   Continuous Blood Gluc Sensor (DEXCOM G6 SENSOR) MISC Use daily for blood sugar readings 6/21/21   Shaw Turner MD   JANUVIA 100 MG tablet TAKE 1 TABLET DAILY 12/19/20   Shaw Turner MD   famotidine (PEPCID) 20 MG tablet TAKE 1 TABLET TWICE A DAY (NEED APPOINTMENT FOR FURTHER REFILLS) 12/9/20   Shaw Turner MD   levothyroxine (SYNTHROID) 150 MCG tablet TAKE 1 TABLET DAILY (NEED APPOINTMENT FOR FURTHER REFILLS) 12/9/20   Shaw Turner MD   vitamin D3 (CHOLECALCIFEROL) 25 MCG (1000 UT) TABS tablet Take 1 tablet by mouth daily 12/8/20   Shaw Turner MD   albuterol sulfate  (90 Base) MCG/ACT inhaler Inhale 2 puffs into the lungs every 6 hours as needed for Wheezing 11/23/20   Chantel Pizarro, APRN - CNP   meclizine (ANTIVERT) 25 MG tablet Take 1 tablet by mouth 3 times daily as needed for Dizziness 9/14/20   Shaw Turner MD   Blood Glucose Monitoring Suppl (TRUE METRIX METER) w/Device KIT use to test sugar 5/13/19   Historical Provider, MD   blood glucose test strips (TRUETEST TEST) strip Test blood glucose fasting and 1 hour post lunch and dinner 5/12/19   Ulises Cedeño MD   Lancets MISC Inject 1 each into the skin 3 times daily Use early in the morning fasting and 1 hour post lunch and dinner 5/12/19   Ulises Cedeño MD   aspirin 81 MG EC tablet Take 1 tablet by mouth daily 4/10/19   Ulises Cedeño MD       Future Appointments   Date Time Provider Sosa Barragan   2/10/2022  4:00 PM MD Sosa Decker

## 2021-11-02 DIAGNOSIS — B37.31 CANDIDAL VAGINITIS: ICD-10-CM

## 2021-11-02 RX ORDER — FLUCONAZOLE 150 MG/1
150 TABLET ORAL ONCE
Qty: 1 TABLET | Refills: 0 | Status: SHIPPED | OUTPATIENT
Start: 2021-11-02 | End: 2021-11-02

## 2021-11-02 NOTE — TELEPHONE ENCOUNTER
Patient called in after she finished her antibiotic she came down with a yeast infection and she is requesting a refill.     Rx request   Requested Prescriptions     Pending Prescriptions Disp Refills    fluconazole (DIFLUCAN) 150 MG tablet 1 tablet 0     Sig: Take 1 tablet by mouth once for 1 dose     LOV 10/8/2021  Next Visit Date:  Future Appointments   Date Time Provider Sosa Barragan   2/10/2022  4:00 PM MD Sosa James

## 2021-11-08 ENCOUNTER — NURSE TRIAGE (OUTPATIENT)
Dept: OTHER | Facility: CLINIC | Age: 76
End: 2021-11-08

## 2021-11-08 NOTE — TELEPHONE ENCOUNTER
Received call from Nik Alexander at [facility] with [employer]. Brief description of triage:    N/a - pt recommended by surgeon to f/u with pcp re: fatigue, therefore this RN did not triage. Pt calling to make apt per surgeon's recommendation. Care advice provided, patient verbalizes understanding; denies any other questions or concerns; instructed to call back for any new or worsening symptoms. Writer provided warm transfer to Nik Alexander at [facility] for appointment scheduling. Attention Provider: Thank you for allowing me to participate in the care of your patient. The patient was connected to triage in response to information provided to the ECC/PSC. Please do not respond through this encounter as the response is not directed to a shared pool. Reason for Disposition  Ilya Temple Caller has already spoken with another triager and has no further questions.     Protocols used: NO CONTACT OR DUPLICATE CONTACT CALL-ADULT-

## 2021-11-09 ENCOUNTER — OFFICE VISIT (OUTPATIENT)
Dept: PRIMARY CARE CLINIC | Age: 76
End: 2021-11-09
Payer: MEDICARE

## 2021-11-09 VITALS
HEART RATE: 90 BPM | BODY MASS INDEX: 34.76 KG/M2 | OXYGEN SATURATION: 96 % | SYSTOLIC BLOOD PRESSURE: 132 MMHG | HEIGHT: 64 IN | DIASTOLIC BLOOD PRESSURE: 72 MMHG | RESPIRATION RATE: 18 BRPM | WEIGHT: 203.6 LBS

## 2021-11-09 DIAGNOSIS — R53.83 FATIGUE, UNSPECIFIED TYPE: ICD-10-CM

## 2021-11-09 DIAGNOSIS — R06.09 EXERTIONAL DYSPNEA: Primary | ICD-10-CM

## 2021-11-09 PROCEDURE — 99214 OFFICE O/P EST MOD 30 MIN: CPT | Performed by: INTERNAL MEDICINE

## 2021-11-09 RX ORDER — FLUTICASONE FUROATE, UMECLIDINIUM BROMIDE AND VILANTEROL TRIFENATATE 100; 62.5; 25 UG/1; UG/1; UG/1
POWDER RESPIRATORY (INHALATION)
COMMUNITY
Start: 2021-10-05

## 2021-11-09 RX ORDER — IPRATROPIUM BROMIDE AND ALBUTEROL SULFATE 2.5; .5 MG/3ML; MG/3ML
SOLUTION RESPIRATORY (INHALATION)
COMMUNITY
Start: 2021-10-08

## 2021-11-09 ASSESSMENT — ENCOUNTER SYMPTOMS
SHORTNESS OF BREATH: 0
VOMITING: 0
WHEEZING: 0
ABDOMINAL PAIN: 0
DIARRHEA: 0
RHINORRHEA: 0
NAUSEA: 0
COUGH: 0
SINUS PAIN: 0
SINUS PRESSURE: 0
SORE THROAT: 0

## 2021-11-09 NOTE — PROGRESS NOTES
Subjective:      Patient ID: Yessenia Prado is a 77 y.o. female    Fatigue x \"many years\"  HPI  Pt presents with many years of fatigue, intense daytime sleepiness. Attests to snoring and gagging in sleep. Assoc exertional SOB, no CP, no palpitations. No hallucinations or sleep paralysis. Previous sleep study was negative for OSMIN in 2015. Hx anemia-hb stable. Postponed IV iron infusion. Planned for lumbar laminectomy and fusion for spinal stenosis tomorrow. However, referred for clearance due to the aforementioned. Already received pulmo clearance.     Surgery is intermediate risk. METS score: 4-10  Past Medical History:   Diagnosis Date    Anxiety     DM (diabetes mellitus) ([facility] [address] 4/14/2015    Hepatitis B infection     Hyperlipidemia     Hypothyroidism     Insomnia     Leg pain 4/14/2015    Post herpetic neuralgia     Unspecified sleep apnea     after seeing [facility] they state she is does not have sleep apnea. Not using cpap     Past Surgical History:   Procedure Laterality Date    ANKLE FRACTURE SURGERY  09/2016    DR ANDRADE  LT    BIOPSY / LIGATION TEMPORAL ARTERY Right 4/26/2019    RIGHT TEMPORAL ARTERY BIOPSY performed by Yu Parker MD at [facility]  2015    surgery for transgeminal neuraglia. Insertion of sponge for chronic pain    COLONOSCOPY  3/31/14    DR Lamine Ruiz    COLONOSCOPY N/A 2/2/2021    COLONOSCOPY DIAGNOSTIC performed by Migue Koroma MD at [facility]      OTHER SURGICAL HISTORY Right     Trigeminal Neuralgia    CT COLON CA SCRN NOT  [address] IND N/A 7/24/2017    COLONOSCOPY performed by Migue Koroma MD at [address] N/A 2/2/2021    EGD ESOPHAGOGASTRODUODENOSCOPY performed by Migue [facility]oma MD at [facility] Marital status:       Spouse name: Not on file    Number of children: 2    in the Last Year: Not on file    Unstable Housing in the Last Year: Not on file     Family History   Problem Relation Age of Onset    Stroke Mother     High Cholesterol Mother     Cancer Mother     Colon Cancer Neg Hx     Celiac Disease Neg Hx     Crohn's Disease Neg Hx      Allergies:  Cefdinir and Metformin and related  Patient Active Problem List   Diagnosis    Hepatitis B infection    Sleep apnea    Anxiety    Insomnia    Trigeminal neuralgia    Hyperlipidemia    Hypothyroidism    Dyspnea    DM [facility]diabetes mellitus) ([facility] [address]    Leg pain    Depression    Frequent headaches    Major depressive disorder, recurrent, in full remission ([facility] [address]    Cherry angioma    Diffuse photodamage of skin    Dilated pore of Solomon    GERD (gastroesophageal reflux disease)    Hidradenitis suppurativa    Lentigines    Multiple benign nevi    Poikiloderma    Seborrheic keratosis    Morbidly obese (HCC)    Chronic gastritis without bleeding    Polyp of sigmoid colon     Current Outpatient Medications on File Prior to Visit   Medication Sig Dispense Refill    ipratropium-albuterol (DUONEB) 0.5-2.5 (3) MG/3ML SOLN nebulizer solution       fluticasone-umeclidin-vilant (TRELEGY ELLIPTA) 100-62.5-25 MCG/INH AEPB       empagliflozin (JARDIANCE) 25 MG tablet Take 1 tablet by mouth daily 90 tablet 1    citalopram (CELEXA) 20 MG tablet TAKE 1 TABLET DAILY (NEED APPOINTMENT FOR FURTHER REFILLS) 90 tablet 3    Continuous Blood Gluc  (DEXCOM G6 ) MARIELA Use daily for blood sugar readings 1 Device 1    Continuous Blood Gluc Sensor (DEXCOM G6 SENSOR) MISC Use daily for blood sugar readings 1 each 3    JANUVIA 100 MG tablet TAKE 1 TABLET DAILY 90 tablet 3    famotidine (PEPCID) 20 MG tablet TAKE 1 TABLET TWICE A DAY (NEED APPOINTMENT FOR FURTHER REFILLS) 180 tablet 3    levothyroxine (SYNTHROID) 150 MCG tablet TAKE 1 TABLET DAILY (NEED APPOINTMENT FOR FURTHER REFILLS) 90 tablet 3    albuterol sulfate  (90 Base) MCG/ACT inhaler Inhale 2 puffs into the lungs every 6 hours as needed for Wheezing 1 Inhaler 0    Blood Glucose Monitoring Suppl (TRUE METRIX METER) w/Device KIT use to test sugar  0    blood glucose test strips (TRUETEST TEST) strip Test blood glucose fasting and 1 hour post lunch and dinner 100 each 5    Lancets MISC Inject 1 each into the skin 3 times daily Use early in the morning fasting and 1 hour post lunch and dinner 100 each 5    mupirocin (BACTROBAN) 2 % ointment  (Patient not taking: Reported on 11/9/2021)      tiZANidine (ZANAFLEX) 4 MG tablet Take 1 tablet by mouth every 8 hours as needed (muscle back pain) (Patient not taking: Reported on 11/9/2021) 30 tablet 1    vitamin D3 (CHOLECALCIFEROL) 25 MCG (1000 UT) TABS tablet Take 1 tablet by mouth daily (Patient not taking: Reported on 11/9/2021) 30 tablet 0    meclizine (ANTIVERT) 25 MG tablet Take 1 tablet by mouth 3 times daily as needed for Dizziness (Patient not taking: Reported on 11/9/2021) 30 tablet 0    aspirin 81 MG EC tablet Take 1 tablet by mouth daily (Patient not taking: Reported on 11/9/2021) 90 tablet 3     Current Facility-Administered Medications on File Prior to Visit   Medication Dose Route Frequency Provider Last Rate Last Admin    methylPREDNISolone acetate (DEPO-MEDROL) injection 60 mg  60 mg IntraMUSCular Once Lety Garcia MD           Review of Systems   Constitutional: Negative for chills, diaphoresis, fatigue and fever. HENT: Negative for congestion, ear discharge, ear pain, rhinorrhea, sinus pressure, sinus pain, sneezing and sore throat. Respiratory: Negative for cough, shortness of breath and wheezing. Cardiovascular: Negative for chest pain. Gastrointestinal: Negative for abdominal pain, diarrhea, nausea and vomiting. Endocrine: Negative for cold intolerance and heat intolerance. Genitourinary: Negative for dysuria and frequency.    Neurological: Negative for dizziness and light-headedness. Objective:   /72   Pulse 90   Resp 18   Ht 5' 4\" (1.626 m)   Wt 203 lb 9.6 oz (92.4 kg)   LMP 02/28/1982   SpO2 96%   BMI 34.95 kg/m²     Physical Exam  Constitutional:       General: She is not in acute distress. Appearance: She is not diaphoretic. Cardiovascular:      Rate and Rhythm: Normal rate and regular rhythm. Pulses: Normal pulses. Heart sounds: Normal heart sounds, S1 normal and S2 normal.   Pulmonary:      Effort: Pulmonary effort is normal. No respiratory distress. Breath sounds: Normal breath sounds. No wheezing or rales. Chest:      Chest wall: No tenderness. Abdominal:      General: Bowel sounds are normal.      Tenderness: There is no abdominal tenderness. Neurological:      General: No focal deficit present. Mental Status: She is alert. Cranial Nerves: No cranial nerve deficit. Assessment:       Diagnosis Orders   1. Exertional dyspnea  NM MYOCARDIAL SPECT REST EXERCISE OR RX   2. Fatigue, unspecified type  Baseline Diagnostic Sleep Study    Vitamin D 25 Hydroxy    NM MYOCARDIAL SPECT REST EXERCISE OR RX     Plan:      Orders Placed This Encounter   Procedures    NM MYOCARDIAL SPECT REST EXERCISE OR RX     Standing Status:   Future     Standing Expiration Date:   11/9/2022     Order Specific Question:   Reason for Exam?     Answer:   Shortness of breath     Order Specific Question:   Procedure Type     Answer:   Rx     Order Specific Question:   Reason for exam:     Answer:   exertional SOB and fatigue    Vitamin D 25 Hydroxy     Standing Status:   Future     Standing Expiration Date:   11/9/2022    Baseline Diagnostic Sleep Study     Patients with abnormal results will be assessed and referred to the sleep  as needed.      Standing Status:   Future     Standing Expiration Date:   5/9/2023     Scheduling Instructions:      Scheduling and Pre-Certification: [phone]      The following must be completed and FAXED to [phone]      1) Sleep Evaluation Orders Form      2) Office note justifying study      3) Demographic info / insurance card     Order Specific Question:   Adult or Pediatric     Answer:   Adult Study (>7 Years)     Order Specific Question:   Location For Sleep Study     Answer:   [place]     Order Specific Question:   Select Sleep Lab Location     Answer:   [facility]     Comments:   [place]     No orders of the defined types were placed in this encounter. Based on aforementioned symptoms, pt is advised to complete stress test before surgery. Surgery is currently not recommended. No follow-ups on file.

## 2021-11-10 ENCOUNTER — TELEPHONE (OUTPATIENT)
Dept: FAMILY MEDICINE CLINIC | Age: 76
End: 2021-11-10

## 2021-11-10 NOTE — TELEPHONE ENCOUNTER
----- Message from Randall Shipman sent at 11/9/2021  5:54 PM EST -----  Subject: Message to Provider    QUESTIONS  Information for Provider? [facility] called regarding paperwork that   was to be faxed over for pt pre anesthesia testing, they have no received   it. Please contact them asap, pt is scheduled for mini tomorrow,   11/10/21  ---------------------------------------------------------------------------  --------------  Marifer RIZO  What is the best way for the office to contact you? Do not leave any   message, patient will call back for answer  Preferred Call Back Phone Number? [phone]  ---------------------------------------------------------------------------  --------------  SCRIPT ANSWERS  Relationship to Patient? Third Party  Representative Name?  Stephanie Hill NP-[facility]

## 2021-11-28 RX ORDER — SITAGLIPTIN 100 MG/1
TABLET, FILM COATED ORAL
Qty: 90 TABLET | Refills: 3 | Status: SHIPPED | OUTPATIENT
Start: 2021-11-28

## 2021-11-30 ENCOUNTER — CARE COORDINATION (OUTPATIENT)
Dept: CARE COORDINATION | Age: 76
End: 2021-11-30

## 2021-11-30 ENCOUNTER — TELEPHONE (OUTPATIENT)
Dept: PRIMARY CARE CLINIC | Age: 76
End: 2021-11-30

## 2021-11-30 ASSESSMENT — ENCOUNTER SYMPTOMS: DYSPNEA ASSOCIATED WITH: EXERTION

## 2021-11-30 NOTE — TELEPHONE ENCOUNTER
ACM spoke to patient. She reports she is still having problems with a yeast infection. Back on November 2, 2021you prescribed 1 Diflucan. She is requesting a refill as she continues to have yeast signs and symptoms.

## 2021-11-30 NOTE — CARE COORDINATION
Ambulatory Care Coordination Note  11/30/2021  CM Risk Score: 2  Anibal Mortality Risk Score: [unfilled]    ACC: Rayne Herbert, RN    Summary Note:   ACM spoke to patient. She reports not doing well at all. Patient seems depressed due to her pushed back of her back surgery. Patient states she is now scheduled to have a stress test and a sleep study. These test must be completed before [facility] will consider her surgery. ACM spoke with patient regarding her diabetes she reports she is monitoring twice weekly with numbers 120s to 130s. Patient denied any hypoglycemic events. Patient denied any open sores or wounds. ACM reviewed COPD. She reports at rest she has no SOB. Patient does report shortness of breath with exertion. Early November patient reported a yeast infection from multiple antibiotic therapies. She is requesting an additional refill on Diflucan. Patient reports she is still having some yeast infection signs and symptoms. ACM sent PCP telephone encounter to address patient's needs. Alban Barahona received counseling on the following healthy behaviors: SELF MANAGEMENT of chronic health conditions,with goal to improve quality of life and overall wellbeing. The patient is asked to make an attempt to improve diet and exercise patterns to aid in medical management. I have instructed Nadyara to complete a self tracking handout on Blood Sugars , Blood Pressures  and Weights and instructed them to bring it with them to her next appointment. Discussed use, benefit, and side effects of prescribed medications. Barriers to medication compliance addressed. All patient questions answered. Pt voiced understanding. Diabetes Assessment    Medic Alert ID: No  Meal Planning: None   How often do you test your blood sugar?: Daily (Comment: 2X WEEKLY )   Do you have barriers with adherence to non-pharmacologic self-management interventions?  (Nutrition/Exercise/Self-Monitoring): No   Have you ever had to go to Recorded    Barriers: lack of motivation and lack of education  Plan for overcoming my barriers: WORK WITH ACM HAVE SON LOOK AT METER AND MAKE SURE WORKING   Confidence: 6/10  Anticipated Goal Completion Date: 12/1/2021 7/19/2021   PATIENT HAS NOT BEEN MONITORING BS. SHE REPORTS SHE JUST HAS 'NT BECAUSE HER BACK HURTS TO BAD   10/14/2021 PATIENT HAS BEEN MONITORING BS DAILY FOR PAST WEEK. Prior to Admission medications    Medication Sig Start Date End Date Taking?  Authorizing Provider   JANUVIA 100 MG tablet TAKE 1 TABLET DAILY 11/28/21   Valentina Barroso MD   ipratropium-albuterol (DUONEB) 0.5-2.5 (3) MG/3ML SOLN nebulizer solution  10/8/21   Historical Provider, MD   fluticasone-umeclidin-vilant (Andry Nip) 575-74.4-35 MCG/INH AEPB  10/5/21   Historical Provider, MD   mupirocin (BACTROBAN) 2 % ointment  9/28/21   Historical Provider, MD   empagliflozin (JARDIANCE) 25 MG tablet Take 1 tablet by mouth daily 10/8/21   Valentina Barroso MD   tiZANidine (ZANAFLEX) 4 MG tablet Take 1 tablet by mouth every 8 hours as needed (muscle back pain)  Patient not taking: Reported on 11/9/2021 9/22/21   Valentina Barroso MD   citalopram (CELEXA) 20 MG tablet TAKE 1 TABLET DAILY (NEED APPOINTMENT FOR FURTHER REFILLS) 7/20/21   Valentina Barroso MD   Continuous Blood Gluc  (DEXCOM G6 ) MARIELA Use daily for blood sugar readings 6/21/21   Valentina Barroso MD   Continuous Blood Gluc Sensor (DEXCOM G6 SENSOR) MISC Use daily for blood sugar readings 6/21/21   Valentina Barroso MD   famotidine (PEPCID) 20 MG tablet TAKE 1 TABLET TWICE A DAY (NEED APPOINTMENT FOR FURTHER REFILLS) 12/9/20   Valentina Barroso MD   levothyroxine (SYNTHROID) 150 MCG tablet TAKE 1 TABLET DAILY (NEED APPOINTMENT FOR FURTHER REFILLS) 12/9/20   Valentina Barroso MD   vitamin D3 (CHOLECALCIFEROL) 25 MCG (1000 UT) TABS tablet Take 1 tablet by mouth daily  Patient not taking: Reported on 11/9/2021 12/8/20   Valentina Barroso MD   albuterol sulfate  (90 Base) MCG/ACT inhaler Inhale 2 puffs into the lungs every 6 hours as needed for Wheezing 11/23/20   Chantel Pizarro, APRN - CNP   meclizine (ANTIVERT) 25 MG tablet Take 1 tablet by mouth 3 times daily as needed for Dizziness  Patient not taking: Reported on 11/9/2021 9/14/20   Danielle Sanabria MD   Blood Glucose Monitoring Suppl (TRUE METRIX METER) w/Device KIT use to test sugar 5/13/19   Historical Provider, MD   blood glucose test strips (TRUETEST TEST) strip Test blood glucose fasting and 1 hour post lunch and dinner 5/12/19   Danielle Sanabria MD   Lancets MISC Inject 1 each into the skin 3 times daily Use early in the morning fasting and 1 hour post lunch and dinner 5/12/19   Danielle Sanabria MD   aspirin 81 MG EC tablet Take 1 tablet by mouth daily  Patient not taking: Reported on 11/9/2021 4/10/19   Danielle Sanabria MD       Future Appointments   Date Time Provider Sosa Barragan   12/10/2021  8:00 AM [place] [facility] ROOM 2 [facility] Fac RAD   12/10/2021  9:00 AM [place] [facility] 2 [facility] Fac RAD   12/10/2021  9:30 AM [facility] STRESS ROOM 1 [facility] STRESS [facility] Fac RAD   12/10/2021 10:30 AM [place] [facility] 2 [facility] Fac RAD   2/10/2022  4:00 PM MD Sosa Ahumada

## 2021-12-02 ENCOUNTER — OFFICE VISIT (OUTPATIENT)
Dept: PRIMARY CARE CLINIC | Age: 76
End: 2021-12-02
Payer: MEDICARE

## 2021-12-02 VITALS
HEIGHT: 64 IN | SYSTOLIC BLOOD PRESSURE: 118 MMHG | OXYGEN SATURATION: 97 % | RESPIRATION RATE: 18 BRPM | DIASTOLIC BLOOD PRESSURE: 64 MMHG | BODY MASS INDEX: 34.66 KG/M2 | HEART RATE: 89 BPM | WEIGHT: 203 LBS

## 2021-12-02 DIAGNOSIS — N89.8 VAGINAL ITCHING: Primary | ICD-10-CM

## 2021-12-02 DIAGNOSIS — N89.8 VAGINAL ITCHING: ICD-10-CM

## 2021-12-02 LAB
BACTERIA: NEGATIVE /HPF
BILIRUBIN URINE: NEGATIVE
BLOOD, URINE: NEGATIVE
CLARITY: CLEAR
COLOR: YELLOW
EPITHELIAL CELLS, UA: ABNORMAL /HPF (ref 0–5)
GLUCOSE URINE: >=1000 MG/DL
HYALINE CASTS: ABNORMAL /HPF (ref 0–5)
KETONES, URINE: NEGATIVE MG/DL
LEUKOCYTE ESTERASE, URINE: NEGATIVE
NITRITE, URINE: NEGATIVE
PH UA: 5 (ref 5–9)
PROTEIN UA: NEGATIVE MG/DL
RBC UA: ABNORMAL /HPF (ref 0–5)
SPECIFIC GRAVITY UA: 1.03 (ref 1–1.03)
UROBILINOGEN, URINE: 0.2 E.U./DL
WBC UA: ABNORMAL /HPF (ref 0–5)

## 2021-12-02 PROCEDURE — 99213 OFFICE O/P EST LOW 20 MIN: CPT | Performed by: INTERNAL MEDICINE

## 2021-12-02 ASSESSMENT — ENCOUNTER SYMPTOMS
COUGH: 0
SHORTNESS OF BREATH: 0
WHEEZING: 0
DIARRHEA: 0
ABDOMINAL PAIN: 0
VOMITING: 0
NAUSEA: 0

## 2021-12-02 NOTE — PROGRESS NOTES
Subjective:      Patient ID: Gisele Nelson is a 77 y.o. female    Vaginal itch x 6 weeks   HPI  Pt presents with 6 weeks of recurrent vagnal itch, no discharge. No ulcers, no painful or bloody urination, no frequency of urination. Attests to use of antibiotics. Hx T2DM  Hemoglobin A1C (%)   Date Value   09/09/2021 7.0 (A)   06/21/2021 6.8 (H)   02/24/2020 7.2 (H)   04/10/2019 7.7 (A)   11/15/2018 7.7     Past Medical History:   Diagnosis Date    Anxiety     DM (diabetes mellitus) ([facility] [address] 4/14/2015    Hepatitis B infection     Hyperlipidemia     Hypothyroidism     Insomnia     Leg pain 4/14/2015    Post herpetic neuralgia     Unspecified sleep apnea     after seeing [facility] they state she is does not have sleep apnea. Not using cpap     Past Surgical History:   Procedure Laterality Date    ANKLE FRACTURE SURGERY  09/2016    DR ANDRADE  LT    BIOPSY / LIGATION TEMPORAL ARTERY Right 4/26/2019    RIGHT TEMPORAL ARTERY BIOPSY performed by Gia Onofre [facility] at [facility]  2015    surgery for transgeminal neuraglia. Insertion of sponge for chronic pain    COLONOSCOPY  3/31/14    DR Ana Heredia    COLONOSCOPY N/A 2/2/2021    COLONOSCOPY DIAGNOSTIC performed by Amelia Ray MD at [address] [place] [address]      OTHER SURGICAL HISTORY Right     Trigeminal Neuralgia    ID COLON CA SCRN NOT  [address] IND N/A 7/24/2017    COLONOSCOPY performed by Amelia Ray MD at [address] [facility] N/A 2/2/2021    EGD ESOPHAGOGASTRODUODENOSCOPY performed by [facility] MD at [facility] Marital status:       Spouse name: Not on file    Number of children: 2    Years of education: Not on file    Highest education level: 12th grade   Occupational History    Occupation: retired    Tobacco Use    Smoking status: Former Smoker     Packs/day: 1.00     Years: 48.00 Pack years: 48.00     Types: Cigarettes     Quit date: 2013     Years since quittin.3    Smokeless tobacco: Never Used   Vaping Use    Vaping Use: Every day    Substances: Nicotine, Flavoring, low  dose    Substance and Sexual Activity    Alcohol use: No     Comment: very rarely    Drug use: No    Sexual activity: Not Currently     Partners: Male   Other Topics Concern    Not on file   Social History Narrative    Lives alone    Has stairs in home needs to go up/down. 2 children that help if needed      Social Determinants of Health     Financial Resource Strain: Low Risk     Difficulty of Paying Living Expenses: Not very hard   Food Insecurity: No Food Insecurity    Worried About Running Out of Food in the Last Year: Never true    Franky of Food in the Last Year: Never true   Transportation Needs:     Lack of Transportation (Medical): Not on file    Lack of Transportation (Non-Medical):  Not on file   Physical Activity:     Days of Exercise per Week: Not on file    Minutes of Exercise per Session: Not on file   Stress:     Feeling of Stress : Not on file   Social Connections:     Frequency of Communication with Friends and Family: Not on file    Frequency of Social Gatherings with Friends and Family: Not on file    Attends Nondenominational Services: Not on file    Active Member of [address] [place] [address] [employer] or Organizations: Not on file    Attends Club or Organization Meetings: Not on file    Marital Status: Not on file   Intimate Partner Violence:     Fear of Current or Ex-Partner: Not on file    Emotionally Abused: Not on file    Physically Abused: Not on file    Sexually Abused: Not on file   Housing Stability:     Unable to Pay for Housing in the Last Year: Not on file    Number of [place] in the Last Year: Not on file    Unstable Housing in the Last Year: Not on file     Family History   Problem Relation Age of Onset    Stroke Mother     High Cholesterol Mother     Cancer Mother     Colon Cancer Neg Hx     Celiac Disease Neg Hx     Crohn's Disease Neg Hx      Allergies:  Cefdinir and Metformin and related  Patient Active Problem List   Diagnosis    Hepatitis B infection    Sleep apnea    Anxiety    Insomnia    Trigeminal neuralgia    Hyperlipidemia    Hypothyroidism    Dyspnea    DM (diabetes mellitus) ([facility] [address]    Leg pain    Depression    Frequent headaches    [facility]    Cherry angioma    Diffuse photodamage of skin    Dilated pore of Solomon    GERD (gastroesophageal reflux disease)    Hidradenitis suppurativa    Lentigines    Multiple benign nevi    Poikiloderma    Seborrheic keratosis    Morbidly obese (HCC)    Chronic gastritis without bleeding    Polyp of sigmoid colon     Current Outpatient Medications on File Prior to Visit   Medication Sig Dispense Refill    JANUVIA 100 MG tablet TAKE 1 TABLET DAILY 90 tablet 3    ipratropium-albuterol (DUONEB) 0.5-2.5 (3) MG/3ML SOLN nebulizer solution       fluticasone-umeclidin-vilant (TRELEGY ELLIPTA) 100-62.5-25 MCG/INH AEPB       empagliflozin (JARDIANCE) 25 MG tablet Take 1 tablet by mouth daily 90 tablet 1    citalopram (CELEXA) 20 MG tablet TAKE 1 TABLET DAILY (NEED APPOINTMENT FOR FURTHER REFILLS) 90 tablet 3    Continuous Blood Gluc  (DEXCOM G6 ) MARIELA Use daily for blood sugar readings 1 Device 1    Continuous Blood Gluc Sensor (DEXCOM G6 SENSOR) MISC Use daily for blood sugar readings 1 each 3    famotidine (PEPCID) 20 MG tablet TAKE 1 TABLET TWICE A DAY (NEED APPOINTMENT FOR FURTHER REFILLS) 180 tablet 3    levothyroxine (SYNTHROID) 150 MCG tablet TAKE 1 TABLET DAILY (NEED APPOINTMENT FOR FURTHER REFILLS) 90 tablet 3    albuterol sulfate  (90 Base) MCG/ACT inhaler Inhale 2 puffs into the lungs every 6 hours as needed for Wheezing 1 Inhaler 0    Blood Glucose Monitoring Suppl (TRUE METRIX METER) w/Device KIT use to test sugar  0    blood glucose test strips (TRUETEST TEST) strip Test blood glucose fasting and 1 hour post lunch and dinner 100 each 5    Lancets MISC Inject 1 each into the skin 3 times daily Use early in the morning fasting and 1 hour post lunch and dinner 100 each 5    mupirocin (BACTROBAN) 2 % ointment  (Patient not taking: Reported on 11/9/2021)      tiZANidine (ZANAFLEX) 4 MG tablet Take 1 tablet by mouth every 8 hours as needed (muscle back pain) (Patient not taking: Reported on 11/9/2021) 30 tablet 1    vitamin D3 (CHOLECALCIFEROL) 25 MCG (1000 UT) TABS tablet Take 1 tablet by mouth daily (Patient not taking: Reported on 11/9/2021) 30 tablet 0    meclizine (ANTIVERT) 25 MG tablet Take 1 tablet by mouth 3 times daily as needed for Dizziness (Patient not taking: Reported on 11/9/2021) 30 tablet 0    aspirin 81 MG EC tablet Take 1 tablet by mouth daily (Patient not taking: Reported on 11/9/2021) 90 tablet 3     Current Facility-Administered Medications on File Prior to Visit   Medication Dose Route Frequency Provider Last Rate Last Admin    methylPREDNISolone acetate (DEPO-MEDROL) injection 60 mg  60 mg IntraMUSCular Once Flory Sprague MD         Review of Systems   Constitutional: Negative for chills, diaphoresis, fatigue and fever. HENT: Negative for congestion, ear discharge and ear pain. Respiratory: Negative for cough, shortness of breath and wheezing. Cardiovascular: Negative for chest pain. Gastrointestinal: Negative for abdominal pain, diarrhea, nausea and vomiting. Endocrine: Negative for cold intolerance and heat intolerance. Genitourinary: Negative for dysuria, frequency and hematuria. Neurological: Negative for dizziness and light-headedness. Objective:   /64   Pulse 89   Resp 18   Ht 5' 4\" (1.626 m)   Wt 203 lb (92.1 kg)   LMP 02/28/1982   SpO2 97%   BMI 34.84 kg/m²     Physical Exam  Exam conducted with a chaperone present.    Constitutional:       General: She is not in acute distress. Appearance: She is not diaphoretic. Cardiovascular:      Rate and Rhythm: Normal rate and regular rhythm. Heart sounds: Normal heart sounds, S1 normal and S2 normal.   Pulmonary:      Effort: Pulmonary effort is normal. No respiratory distress. Breath sounds: Normal breath sounds. No wheezing or rales. Chest:      Chest wall: No tenderness. Abdominal:      Tenderness: There is no abdominal tenderness. Genitourinary:     Comments: Pelvic and speculum exams:   Normal external female genitalia. Speculum inserted, light yellow dishcarge sampel taken   Bimanual exam-no masses, no CMT b/l. Assessment:       Diagnosis Orders   1. Vaginal itching  Wet Prep, Genital    Urinalysis    URINALYSIS, MICRO     Plan:      Orders Placed This Encounter   Procedures    Wet Prep, Genital     Standing Status:   Future     Number of Occurrences:   1     Standing Expiration Date:   12/2/2022    Urinalysis     Standing Status:   Future     Number of Occurrences:   1     Standing Expiration Date:   12/2/2022    URINALYSIS, MICRO     Standing Status:   Future     Number of Occurrences:   1     Standing Expiration Date:   12/2/2022     No orders of the defined types were placed in this encounter. No follow-ups on file.

## 2021-12-03 LAB
CLUE CELLS: ABNORMAL
TRICHOMONAS PREP: ABNORMAL
TRICHOMONAS VAGINALIS SCREEN: NEGATIVE
YEAST WET PREP: ABNORMAL

## 2021-12-04 DIAGNOSIS — B37.31 CANDIDAL VAGINITIS: Primary | ICD-10-CM

## 2021-12-04 RX ORDER — FLUCONAZOLE 150 MG/1
TABLET ORAL
Qty: 2 TABLET | Refills: 0 | Status: SHIPPED | OUTPATIENT
Start: 2021-12-04 | End: 2022-02-15 | Stop reason: ALTCHOICE

## 2021-12-05 RX ORDER — LEVOTHYROXINE SODIUM 0.15 MG/1
TABLET ORAL
Qty: 90 TABLET | Refills: 3 | Status: SHIPPED | OUTPATIENT
Start: 2021-12-05

## 2021-12-05 RX ORDER — FAMOTIDINE 20 MG/1
TABLET, FILM COATED ORAL
Qty: 180 TABLET | Refills: 3 | Status: SHIPPED | OUTPATIENT
Start: 2021-12-05

## 2021-12-10 ENCOUNTER — HOSPITAL ENCOUNTER (OUTPATIENT)
Dept: NON INVASIVE DIAGNOSTICS | Age: 76
Discharge: HOME | End: 2021-12-10
Payer: MEDICARE

## 2021-12-10 ENCOUNTER — HOSPITAL ENCOUNTER (OUTPATIENT)
Dept: NUCLEAR MEDICINE | Age: 76
Discharge: HOME | End: 2021-12-12
Payer: MEDICARE

## 2021-12-10 DIAGNOSIS — R06.09 EXERTIONAL DYSPNEA: ICD-10-CM

## 2021-12-10 DIAGNOSIS — R53.83 FATIGUE, UNSPECIFIED TYPE: ICD-10-CM

## 2021-12-10 LAB
LV EF: 85 %
LVEF MODALITY: NORMAL

## 2021-12-10 PROCEDURE — 78452 HT MUSCLE IMAGE SPECT MULT: CPT

## 2021-12-10 PROCEDURE — 78452 HT MUSCLE IMAGE SPECT MULT: CPT | Performed by: INTERNAL MEDICINE

## 2021-12-10 PROCEDURE — 6360000002 HC RX W HCPCS: Performed by: INTERNAL MEDICINE

## 2021-12-10 PROCEDURE — 2580000003 HC RX 258: Performed by: INTERNAL MEDICINE

## 2021-12-10 PROCEDURE — A9502 TC99M TETROFOSMIN: HCPCS | Performed by: INTERNAL MEDICINE

## 2021-12-10 PROCEDURE — 93017 CV STRESS TEST TRACING ONLY: CPT

## 2021-12-10 PROCEDURE — 3430000000 HC RX DIAGNOSTIC RADIOPHARMACEUTICAL: Performed by: INTERNAL MEDICINE

## 2021-12-10 RX ORDER — SODIUM CHLORIDE 0.9 % (FLUSH) 0.9 %
10 SYRINGE (ML) INJECTION PRN
Status: COMPLETED | OUTPATIENT
Start: 2021-12-10 | End: 2021-12-10

## 2021-12-10 RX ADMIN — TETROFOSMIN 33.8 MILLICURIE: 1.38 INJECTION, POWDER, LYOPHILIZED, FOR SOLUTION INTRAVENOUS at 09:43

## 2021-12-10 RX ADMIN — TETROFOSMIN 11.9 MILLICURIE: 1.38 INJECTION, POWDER, LYOPHILIZED, FOR SOLUTION INTRAVENOUS at 08:30

## 2021-12-10 RX ADMIN — Medication 10 ML: at 09:43

## 2021-12-10 RX ADMIN — Medication 10 ML: at 08:30

## 2021-12-10 RX ADMIN — REGADENOSON 0.4 MG: 0.08 INJECTION, SOLUTION INTRAVENOUS at 09:43

## 2021-12-10 RX ADMIN — Medication 10 ML: at 09:40

## 2021-12-14 ENCOUNTER — CARE COORDINATION (OUTPATIENT)
Dept: CARE COORDINATION | Age: 76
End: 2021-12-14

## 2021-12-14 NOTE — CARE COORDINATION
ACM called patient. Left message requesting a return call for [facility] out reach contact information supplied.

## 2022-01-03 ENCOUNTER — CARE COORDINATION (OUTPATIENT)
Dept: CARE COORDINATION | Age: 77
End: 2022-01-03

## 2022-01-03 NOTE — CARE COORDINATION
Ambulatory Care Coordination Note  1/3/2022  CM Risk Score: 2  Charlson 10 Year Mortality Risk Score: 47%     ACC: Nelda Koyanagi, RN    Summary Note: ACM spoke to patient. She reports doing well overall. She does continue to have back pain. Patient is going to schedule her Covid booster and pneumonia vaccine. Patient reports diabetes managed no hyper or hypoglycemia events. Patient denies any open sores or wounds. Patient reports COPD stable. ACM reviewed diabetes and COPD zones. ACM encouraged patient to monitor health symptoms closely and follow-up with her providers. Patient states she has all her medications and she is compliant. ACM will consider graduation discharge from [facility] program at next encounter. Erika Joe received counseling on the following healthy behaviors: SELF MANAGEMENT of chronic health conditions,with goal to improve quality of life and overall wellbeing. The patient is asked to make an attempt to improve diet and exercise patterns to aid in medical management. I have instructed Zach to complete a self tracking handout on Blood Sugars , Blood Pressures  and Weights and instructed them to bring it with them to her next appointment. Discussed use, benefit, and side effects of prescribed medications. Barriers to medication compliance addressed. All patient questions answered. Pt voiced understanding. Diabetes Assessment    Medic Alert ID: No  Meal Planning: None   How often do you test your blood sugar?: Daily (Comment: 2X WEEKLY )   Do you have barriers with adherence to non-pharmacologic self-management interventions?  (Nutrition/Exercise/Self-Monitoring): No   Have you ever had to go to the ED for symptoms of low blood sugar?: No       No patient-reported symptoms   Do you have hyperglycemia symptoms?: No   Do you have hypoglycemia symptoms?: No   Last Blood Sugar Value: 146   Blood Sugar Trends: No Change       and   COPD Assessment    Does the patient understand envrionmental exposure?: Yes  Is the patient able to verbalize Rescue vs. Long Acting medications?: Yes  Does the patient have a nebulizer?: No  Does the patient use a space with inhaled medications?: No     No patient-reported symptoms         Symptoms:     Symptom course: stable  Breathlessness: none  Increase use of rapid acting/rescue inhaled medications?: No  Change in chronic cough?: No/At Baseline  Change in sputum?: No/At Baseline  Self Monitoring - SaO2: No  Have you had a recent diagnosis of pneumonia either by PCP or at a hospital?: No           Care Coordination Interventions    Program Enrollment: Rising Risk  Referral from Primary Care Provider: No  Suggested Interventions and Community Resources  Fall Risk Prevention: Completed  Medi Set or Pill Pack: Completed (Comment: uses pill box )  Pharmacist: Declined  Registered Dietician: Completed  Zone Management Tools: Completed (Comment: dm and copd )         Goals Addressed    None         Prior to Admission medications    Medication Sig Start Date End Date Taking?  Authorizing Provider   levothyroxine (SYNTHROID) 150 MCG tablet TAKE 1 TABLET DAILY (NEED APPOINTMENT FOR FURTHER REFILLS) 12/5/21   Lea Archibald MD   famotidine (PEPCID) 20 MG tablet TAKE 1 TABLET TWICE A DAY (NEED APPOINTMENT FOR FURTHER REFILLS) 12/5/21   Lea Archibald MD   fluconazole (DIFLUCAN) 150 MG tablet Take 1 tablet every 72 hours 12/4/21   Lea Archibald MD   JANUVIA 100 MG tablet TAKE 1 TABLET DAILY 11/28/21   Lea Archibald MD   ipratropium-albuterol (DUONEB) 0.5-2.5 (3) MG/3ML SOLN nebulizer solution  10/8/21   Historical Provider, MD   fluticasone-umeclidin-vilant (Tamara Bryanna) 186-99.2-76 MCG/INH AEPB  10/5/21   Historical Provider, MD   mupirocin [facility]) 2 % ointment  9/28/21   Historical Provider, MD   empagliflozin (JARDIANCE) 25 MG tablet Take 1 tablet by mouth daily 10/8/21   Lea Archibald MD   tiZANidine (ZANAFLEX) 4 MG tablet Take 1 tablet by mouth every 8 hours as needed (muscle back pain)  Patient not taking: Reported on 11/9/2021 9/22/21   Theadora Osler, MD   citalopram (CELEXA) 20 MG tablet TAKE 1 TABLET DAILY (NEED APPOINTMENT FOR FURTHER REFILLS) 7/20/21   Theadora Osler, MD   Continuous Blood Gluc  (DEXCOM G6 ) MARIELA Use daily for blood sugar readings 6/21/21   Theadora Osler, MD   Continuous Blood Gluc Sensor (DEXCOM G6 SENSOR) MISC Use daily for blood sugar readings 6/21/21   Theadora Osler, MD   vitamin D3 (CHOLECALCIFEROL) 25 MCG (1000 UT) TABS tablet Take 1 tablet by mouth daily  Patient not taking: Reported on 11/9/2021 12/8/20   Theadora Osler, MD   albuterol sulfate  (90 Base) MCG/ACT inhaler Inhale 2 puffs into the lungs every 6 hours as needed for Wheezing 11/23/20   Chantel Pizarro, APRN - CNP   meclizine (ANTIVERT) 25 MG tablet Take 1 tablet by mouth 3 times daily as needed for Dizziness  Patient not taking: Reported on 11/9/2021 9/14/20   Theadora Osler, MD   Blood Glucose Monitoring Suppl (TRUE METRIX METER) w/Device KIT use to test sugar 5/13/19   Historical Provider, MD   blood glucose test strips (TRUETEST TEST) strip Test blood glucose fasting and 1 hour post lunch and dinner 5/12/19   Theadora Osler, MD   Lancets MISC Inject 1 each into the skin 3 times daily Use early in the morning fasting and 1 hour post lunch and dinner 5/12/19   Theadora Osler, MD   aspirin 81 MG EC tablet Take 1 tablet by mouth daily  Patient not taking: Reported on 11/9/2021 4/10/19   Theadora Osler, MD       Future Appointments   Date Time Provider Sosa Barragan   2/10/2022  4:00 PM Theadora Osler, MD [place]

## 2022-02-04 ENCOUNTER — CARE COORDINATION (OUTPATIENT)
Dept: CARE COORDINATION | Age: 77
End: 2022-02-04

## 2022-02-04 DIAGNOSIS — M51.36 LUMBAR DEGENERATIVE DISC DISEASE: Primary | ICD-10-CM

## 2022-02-04 NOTE — CARE COORDINATION
MONITORING BS. SHE REPORTS SHE JUST HAS 'NT BECAUSE HER BACK HURTS TO BAD   10/14/2021 PATIENT HAS BEEN MONITORING BS DAILY FOR PAST WEEK. Prior to Admission medications    Medication Sig Start Date End Date Taking?  Authorizing Provider   levothyroxine (SYNTHROID) 150 MCG tablet TAKE 1 TABLET DAILY (NEED APPOINTMENT FOR FURTHER REFILLS) 12/5/21   Mertha Oppenheim, MD   famotidine (PEPCID) 20 MG tablet TAKE 1 TABLET TWICE A DAY (NEED APPOINTMENT FOR FURTHER REFILLS) 12/5/21   Mertha Oppenheim, MD   fluconazole (DIFLUCAN) 150 MG tablet Take 1 tablet every 72 hours 12/4/21   Mertha Oppenheim, MD   JANUVIA 100 MG tablet TAKE 1 TABLET DAILY 11/28/21   Mertha Oppenheim, MD   ipratropium-albuterol (DUONEB) 0.5-2.5 (3) MG/3ML SOLN nebulizer solution  10/8/21   Historical Provider, MD   fluticasone-umeclidin-vilant (Levester Alice) 269-51.6-10 MCG/INH AEPB  10/5/21   Historical Provider, MD   mupirocin (BACTROBAN) 2 % ointment  9/28/21   Historical Provider, MD   empagliflozin (JARDIANCE) 25 MG tablet Take 1 tablet by mouth daily 10/8/21   Mertha Oppenheim, MD   tiZANidine (ZANAFLEX) 4 MG tablet Take 1 tablet by mouth every 8 hours as needed (muscle back pain)  Patient not taking: Reported on 11/9/2021 9/22/21   Mertha Oppenheim, MD   citalopram (CELEXA) 20 MG tablet TAKE 1 TABLET DAILY (NEED APPOINTMENT FOR FURTHER REFILLS) 7/20/21   Mertha Oppenheim, MD   Continuous Blood Gluc  (DEXCOM G6 ) MARIELA Use daily for blood sugar readings 6/21/21   Mertha Oppenheim, MD   Continuous Blood Gluc Sensor (DEXCOM G6 SENSOR) MISC Use daily for blood sugar readings 6/21/21   Mertha Oppenheim, MD   vitamin D3 (CHOLECALCIFEROL) 25 MCG (1000 UT) TABS tablet Take 1 tablet by mouth daily  Patient not taking: Reported on 11/9/2021 12/8/20   Mertha Oppenheim, MD   albuterol sulfate  (90 Base) MCG/ACT inhaler Inhale 2 puffs into the lungs every 6 hours as needed for Wheezing 11/23/20   Chantel Pizarro APRN - CNP   meclizine (ANTIVERT) 25 MG tablet Take 1 tablet by mouth 3 times daily as needed for Dizziness  Patient not taking: Reported on 11/9/2021 9/14/20   Patricia Cheng MD   Blood Glucose Monitoring Suppl (TRUE METRIX METER) w/Device KIT use to test sugar 5/13/19   Historical Provider, MD   blood glucose test strips (TRUETEST TEST) strip Test blood glucose fasting and 1 hour post lunch and dinner 5/12/19   Patricia Cheng MD   Lancets MISC Inject 1 each into the skin 3 times daily Use early in the morning fasting and 1 hour post lunch and dinner 5/12/19   Patricia Cheng MD   aspirin 81 MG EC tablet Take 1 tablet by mouth daily  Patient not taking: Reported on 11/9/2021 4/10/19   Patricia Cheng MD       Future Appointments   Date Time Provider Sosa Barragan   2/10/2022  4:00 PM MD Sosa Yao

## 2022-02-04 NOTE — LETTER
2/4/2022    Milly Grant  [address] [place] [address]  [facility] [address]     Congratulations on the progress you have made improving and taking charge of your health! Your recent follow up with Bel Ray MD finds you doing well and are no longer in need of Care Coordination services. I know you will continue to use the knowledge and tools you have gained to continue down a healthy path. As you have demonstrated that you are able to successfully manage your health and wellness, I will no longer contact you on a regular basis. Again, congratulations and please know if there are any changes or you have a need for my services in the future, you may always contact me for questions or concerns.   In good health,       Shi Joya RN   [phone]

## 2022-02-15 ENCOUNTER — OFFICE VISIT (OUTPATIENT)
Dept: PRIMARY CARE CLINIC | Age: 77
End: 2022-02-15
Payer: MEDICARE

## 2022-02-15 ENCOUNTER — TELEPHONE (OUTPATIENT)
Dept: PRIMARY CARE CLINIC | Age: 77
End: 2022-02-15

## 2022-02-15 VITALS
DIASTOLIC BLOOD PRESSURE: 68 MMHG | HEART RATE: 73 BPM | BODY MASS INDEX: 35.03 KG/M2 | HEIGHT: 64 IN | WEIGHT: 205.2 LBS | TEMPERATURE: 96.8 F | SYSTOLIC BLOOD PRESSURE: 126 MMHG | RESPIRATION RATE: 18 BRPM | OXYGEN SATURATION: 97 %

## 2022-02-15 DIAGNOSIS — F33.42 MAJOR DEPRESSIVE DISORDER, RECURRENT, IN FULL REMISSION (HCC): ICD-10-CM

## 2022-02-15 DIAGNOSIS — I85.00 ESOPHAGEAL VARICES WITHOUT BLEEDING, UNSPECIFIED ESOPHAGEAL VARICES TYPE (HCC): ICD-10-CM

## 2022-02-15 DIAGNOSIS — J44.9 CHRONIC OBSTRUCTIVE PULMONARY DISEASE, UNSPECIFIED COPD TYPE (HCC): ICD-10-CM

## 2022-02-15 DIAGNOSIS — D69.6 ANEMIA WITH LOW PLATELET COUNT (HCC): ICD-10-CM

## 2022-02-15 DIAGNOSIS — E11.9 TYPE 2 DIABETES MELLITUS WITHOUT COMPLICATION, WITHOUT LONG-TERM CURRENT USE OF INSULIN (HCC): Primary | ICD-10-CM

## 2022-02-15 PROCEDURE — 99214 OFFICE O/P EST MOD 30 MIN: CPT | Performed by: INTERNAL MEDICINE

## 2022-02-15 RX ORDER — EMPAGLIFLOZIN 25 MG/1
1 TABLET, FILM COATED ORAL DAILY
Qty: 90 TABLET | Refills: 3 | Status: SHIPPED | OUTPATIENT
Start: 2022-02-15 | End: 2022-08-02 | Stop reason: SINTOL

## 2022-02-15 ASSESSMENT — ENCOUNTER SYMPTOMS
DIARRHEA: 0
NAUSEA: 0
RHINORRHEA: 0
SINUS PRESSURE: 0
SORE THROAT: 0
VOMITING: 0
COUGH: 0
ABDOMINAL PAIN: 0
SINUS PAIN: 0
WHEEZING: 0
SHORTNESS OF BREATH: 0

## 2022-02-15 NOTE — PROGRESS NOTES
Subjective:      Patient ID: Omar Florez is a 77 y.o. female    Follow up   HPI   Pt presents for follow up regarding T2DM. Meds januvia  Cholesterol controlled. Aspirin held, per hematologist, due to [address] Currently on IV infusion. Depression and anxiety stable on Celexa. Hemoglobin A1C (%)   Date Value   09/09/2021 7.0 (A)   06/21/2021 6.8 (H)   02/24/2020 7.2 (H)   04/10/2019 7.7 (A)   11/15/2018 7.7     Esophageal varices-stable   COPD- controlled    Past Medical History:   Diagnosis Date    Anxiety     DM (diabetes mellitus) ([facility] [facility] 4/14/2015    Hepatitis B infection     Hyperlipidemia     Hypothyroidism     Insomnia     Leg pain 4/14/2015    Post herpetic neuralgia     Unspecified sleep apnea     after seeing [facility] they state she is does not have sleep apnea. Not using cpap     Past Surgical History:   Procedure Laterality Date    ANKLE FRACTURE SURGERY  09/2016    DR ANDRADE  LT    BIOPSY / LIGATION TEMPORAL ARTERY Right 4/26/2019    RIGHT TEMPORAL ARTERY BIOPSY performed by Dennie Capes, [facility] at [facility]  2015    surgery for transgeminal neuraglia. Insertion of sponge for chronic pain    COLONOSCOPY  3/31/14    DR Efraín Monique    COLONOSCOPY N/A 2/2/2021    COLONOSCOPY DIAGNOSTIC performed by Justin Cronin MD at [address] [place] [address]      OTHER SURGICAL HISTORY Right     Trigeminal Neuralgia    [place] COLON CA SCRN NOT  [address] IND N/A 7/24/2017    COLONOSCOPY performed by Justin Cronin MD at [address] N/A 2/2/2021    EGD ESOPHAGOGASTRODUODENOSCOPY performed by Justin Cronin MD at [facility] Marital status:       Spouse name: Not on file    Number of children: 2    Years of education: Not on file    Highest education level: 12th grade   Occupational History    Occupation: retired    Tobacco Use    Smoking status: Former Smoker     Packs/day: 1.00     Years: 48.00     Pack years: 48.00     Types: Cigarettes     Quit date: 2013     Years since quittin.5    Smokeless tobacco: Never Used   Vaping Use    Vaping Use: Every day    Substances: Nicotine, Flavoring, low  dose    Substance and Sexual Activity    Alcohol use: No     Comment: very rarely    Drug use: No    Sexual activity: Not Currently     Partners: Male   Other Topics Concern    Not on file   Social History Narrative    Lives alone    Has stairs in home needs to go up/down. 2 children that help if needed      Social Determinants of Health     Financial Resource Strain: Low Risk     Difficulty of Paying Living Expenses: Not very hard   Food Insecurity: No Food Insecurity    Worried About Running Out of Food in the Last Year: Never true    Franky of Food in the Last Year: Never true   Transportation Needs:     Lack of Transportation (Medical): Not on file    Lack of Transportation (Non-Medical):  Not on file   Physical Activity:     Days of Exercise per Week: Not on file    Minutes of Exercise per Session: Not on file   Stress:     Feeling of Stress : Not on file   Social Connections:     Frequency of Communication with Friends and Family: Not on file    Frequency of Social Gatherings with Friends and Family: Not on file    Attends Restorationist Services: Not on file    Active Member of [address] [place] [address] or Organizations: Not on file    Attends Club or Organization Meetings: Not on file    Marital Status: Not on file   Intimate Partner Violence:     Fear of Current or Ex-Partner: Not on file    Emotionally Abused: Not on file    Physically Abused: Not on file    Sexually Abused: Not on file   Housing Stability:     Unable to Pay for Housing in the Last Year: Not on file    Number of [place] in the Last Year: Not on file    Unstable Housing in the Last Year: Not on file     Family History   Problem Relation Age of Onset    Stroke Mother     High Cholesterol Mother     Cancer Mother     Colon Cancer Neg Hx     Celiac Disease Neg Hx     Crohn's Disease Neg Hx      Allergies:  Cefdinir and Metformin and related  Patient Active Problem List   Diagnosis    Hepatitis B infection    Sleep apnea    Anxiety    Insomnia    Trigeminal neuralgia    Hyperlipidemia    Hypothyroidism    Dyspnea    DM (diabetes mellitus) ([facility]    Leg pain    Depression    Frequent headaches    Major depressive disorder, recurrent, in full remission ([facility] [facility]    Cherry angioma    Diffuse photodamage of skin    Dilated pore of Solomon    GERD (gastroesophageal reflux disease)    Hidradenitis suppurativa    Lentigines    Multiple benign nevi    Poikiloderma    Seborrheic keratosis    Morbidly obese (HCC)    Chronic gastritis without bleeding    Polyp of sigmoid colon    Esophageal varices without bleeding, unspecified esophageal varices type (HCC)    Chronic obstructive pulmonary disease, unspecified COPD type (HCC)    Anemia with low platelet count (HCC)     Current Outpatient Medications on File Prior to Visit   Medication Sig Dispense Refill    levothyroxine (SYNTHROID) 150 MCG tablet TAKE 1 TABLET DAILY (NEED APPOINTMENT FOR FURTHER REFILLS) 90 tablet 3    famotidine (PEPCID) 20 MG tablet TAKE 1 TABLET TWICE A DAY (NEED APPOINTMENT FOR FURTHER REFILLS) 180 tablet 3    JANUVIA 100 MG tablet TAKE 1 TABLET DAILY 90 tablet 3    ipratropium-albuterol (DUONEB) 0.5-2.5 (3) MG/3ML SOLN nebulizer solution       fluticasone-umeclidin-vilant (TRELEGY ELLIPTA) 100-62.5-25 MCG/INH AEPB       tiZANidine (ZANAFLEX) 4 MG tablet Take 1 tablet by mouth every 8 hours as needed (muscle back pain) 30 tablet 1    citalopram (CELEXA) 20 MG tablet TAKE 1 TABLET DAILY (NEED APPOINTMENT FOR FURTHER REFILLS) 90 tablet 3    Continuous Blood Gluc  (DEXCOM G6 ) MARIELA Use daily for blood sugar readings 1 Device 1    Continuous Blood Gluc Sensor (DEXCOM G6 SENSOR) MISC Use daily for blood sugar readings 1 each 3    vitamin D3 (CHOLECALCIFEROL) 25 MCG (1000 UT) TABS tablet Take 1 tablet by mouth daily 30 tablet 0    albuterol sulfate  (90 Base) MCG/ACT inhaler Inhale 2 puffs into the lungs every 6 hours as needed for Wheezing 1 Inhaler 0    Blood Glucose Monitoring Suppl (TRUE METRIX METER) w/Device KIT use to test sugar  0    blood glucose test strips (TRUETEST TEST) strip Test blood glucose fasting and 1 hour post lunch and dinner 100 each 5    Lancets MISC Inject 1 each into the skin 3 times daily Use early in the morning fasting and 1 hour post lunch and dinner 100 each 5    aspirin 81 MG EC tablet Take 1 tablet by mouth daily (Patient not taking: Reported on 11/9/2021) 90 tablet 3     Current Facility-Administered Medications on File Prior to Visit   Medication Dose Route Frequency Provider Last Rate Last Admin    methylPREDNISolone acetate (DEPO-MEDROL) injection 60 mg  60 mg IntraMUSCular Once Juventino Law MD         Review of Systems   Constitutional: Negative for chills, diaphoresis, fatigue and fever. HENT: Negative for congestion, ear discharge, ear pain, rhinorrhea, sinus pressure, sinus pain, sneezing and sore throat. Respiratory: Negative for cough, shortness of breath and wheezing. Cardiovascular: Negative for chest pain. Gastrointestinal: Negative for abdominal pain, diarrhea, nausea and vomiting. Endocrine: Negative for cold intolerance and heat intolerance. Genitourinary: Negative for dysuria and frequency. Neurological: Negative for dizziness and light-headedness. Psychiatric/Behavioral: Negative for dysphoric mood. The patient is not nervous/anxious. Objective:   /68   Pulse 73   Temp 96.8 °F (36 °C)   Resp 18   Ht 5' 4\" (1.626 m)   Wt 205 lb 3.2 oz (93.1 kg)   LMP 02/28/1982   SpO2 97%   BMI 35.22 kg/m²     Physical Exam  Constitutional:       General: She is not in acute distress. Appearance: She is not diaphoretic. Cardiovascular:      Rate and Rhythm: Normal rate and regular rhythm. Pulses: Normal pulses. Heart sounds: Normal heart sounds, S1 normal and S2 normal.   Pulmonary:      Effort: Pulmonary effort is normal. No respiratory distress. Breath sounds: Normal breath sounds. No wheezing or rales. Chest:      Chest wall: No tenderness. Abdominal:      General: Bowel sounds are normal.      Tenderness: There is no abdominal tenderness. Neurological:      Mental Status: She is alert. Assessment:       Diagnosis Orders   1. Type 2 diabetes mellitus without complication, without long-term current use of insulin ([facility]) Controlled empagliflozin (JARDIANCE) 25 MG tablet    Hemoglobin A1C   2. Esophageal varices without bleeding, unspecified esophageal varices type ([facility]) Stable    3. Chronic obstructive pulmonary disease, unspecified COPD type ([facility] [address] Stable    4. Anemia with low platelet count ([facility]) Stable    5. Major depressive disorder, recurrent, in full remission [facility] Controlled        Plan:      Orders Placed This Encounter   Procedures    Hemoglobin A1C     Standing Status:   Future     Standing Expiration Date:   2/15/2023     Orders Placed This Encounter   Medications    empagliflozin (JARDIANCE) 25 MG tablet     Sig: Take 1 tablet by mouth daily     Dispense:  90 tablet     Refill:  3     Return in about 6 months (around 8/15/2022) for follow up, with PCP.

## 2022-07-18 RX ORDER — CITALOPRAM 20 MG/1
TABLET ORAL
Qty: 90 TABLET | Refills: 1 | Status: SHIPPED | OUTPATIENT
Start: 2022-07-18

## 2022-07-25 ENCOUNTER — TELEPHONE (OUTPATIENT)
Dept: PRIMARY CARE CLINIC | Age: 77
End: 2022-07-25

## 2022-07-25 DIAGNOSIS — R11.2 NAUSEA AND VOMITING, INTRACTABILITY OF VOMITING NOT SPECIFIED, UNSPECIFIED VOMITING TYPE: Primary | ICD-10-CM

## 2022-07-25 RX ORDER — ONDANSETRON 4 MG/1
4 TABLET, FILM COATED ORAL 3 TIMES DAILY PRN
Qty: 30 TABLET | Refills: 1 | Status: SHIPPED | OUTPATIENT
Start: 2022-07-25 | End: 2022-07-27

## 2022-07-25 NOTE — TELEPHONE ENCOUNTER
Pt is on cefazolin 2g every 8hrs IV but it is is making her nauseas and sick.  Wants to know what you would like to do

## 2022-07-27 ENCOUNTER — TELEPHONE (OUTPATIENT)
Dept: INFECTIOUS DISEASES | Age: 77
End: 2022-07-27

## 2022-07-27 ENCOUNTER — OFFICE VISIT (OUTPATIENT)
Dept: PRIMARY CARE CLINIC | Age: 77
End: 2022-07-27
Payer: MEDICARE

## 2022-07-27 VITALS
BODY MASS INDEX: 34.66 KG/M2 | HEART RATE: 98 BPM | SYSTOLIC BLOOD PRESSURE: 118 MMHG | HEIGHT: 65 IN | OXYGEN SATURATION: 99 % | DIASTOLIC BLOOD PRESSURE: 68 MMHG | WEIGHT: 208 LBS | TEMPERATURE: 97.1 F

## 2022-07-27 DIAGNOSIS — Z86.19 HX OF SEPSIS: ICD-10-CM

## 2022-07-27 DIAGNOSIS — R11.2 NAUSEA AND VOMITING, INTRACTABILITY OF VOMITING NOT SPECIFIED, UNSPECIFIED VOMITING TYPE: ICD-10-CM

## 2022-07-27 DIAGNOSIS — L02.415 ABSCESS OF RIGHT HIP: ICD-10-CM

## 2022-07-27 DIAGNOSIS — L02.415 ABSCESS OF RIGHT HIP: Primary | ICD-10-CM

## 2022-07-27 PROBLEM — N18.30 CHRONIC RENAL DISEASE, STAGE III (HCC): Status: ACTIVE | Noted: 2022-07-27

## 2022-07-27 PROCEDURE — 99214 OFFICE O/P EST MOD 30 MIN: CPT | Performed by: INTERNAL MEDICINE

## 2022-07-27 PROCEDURE — 1123F ACP DISCUSS/DSCN MKR DOCD: CPT | Performed by: INTERNAL MEDICINE

## 2022-07-27 RX ORDER — CEFAZOLIN SODIUM 10 G/1
INJECTION, POWDER, FOR SOLUTION INTRAVENOUS
COMMUNITY
Start: 2022-07-19 | End: 2022-08-02 | Stop reason: ALTCHOICE

## 2022-07-27 RX ORDER — ONDANSETRON 4 MG/1
4 TABLET, FILM COATED ORAL 3 TIMES DAILY PRN
Qty: 15 TABLET | Refills: 0 | Status: SHIPPED | OUTPATIENT
Start: 2022-07-27

## 2022-07-27 RX ORDER — SULFAMETHOXAZOLE AND TRIMETHOPRIM 800; 160 MG/1; MG/1
1 TABLET ORAL 2 TIMES DAILY
Qty: 56 TABLET | Refills: 0 | Status: SHIPPED | OUTPATIENT
Start: 2022-07-27 | End: 2022-08-24

## 2022-07-27 RX ORDER — CEFAZOLIN SODIUM 2 G/100ML
2 INJECTION, SOLUTION INTRAVENOUS EVERY 8 HOURS
COMMUNITY
Start: 2022-07-15 | End: 2022-07-28

## 2022-07-27 SDOH — ECONOMIC STABILITY: FOOD INSECURITY: WITHIN THE PAST 12 MONTHS, THE FOOD YOU BOUGHT JUST DIDN'T LAST AND YOU DIDN'T HAVE MONEY TO GET MORE.: NEVER TRUE

## 2022-07-27 SDOH — ECONOMIC STABILITY: FOOD INSECURITY: WITHIN THE PAST 12 MONTHS, YOU WORRIED THAT YOUR FOOD WOULD RUN OUT BEFORE YOU GOT MONEY TO BUY MORE.: NEVER TRUE

## 2022-07-27 ASSESSMENT — ENCOUNTER SYMPTOMS
DIARRHEA: 0
COUGH: 0
ABDOMINAL PAIN: 0
SHORTNESS OF BREATH: 0
COLOR CHANGE: 1
VOMITING: 0
WHEEZING: 0
NAUSEA: 0

## 2022-07-27 ASSESSMENT — PATIENT HEALTH QUESTIONNAIRE - PHQ9
5. POOR APPETITE OR OVEREATING: 3
10. IF YOU CHECKED OFF ANY PROBLEMS, HOW DIFFICULT HAVE THESE PROBLEMS MADE IT FOR YOU TO DO YOUR WORK, TAKE CARE OF THINGS AT HOME, OR GET ALONG WITH OTHER PEOPLE: 0
SUM OF ALL RESPONSES TO PHQ QUESTIONS 1-9: 16
6. FEELING BAD ABOUT YOURSELF - OR THAT YOU ARE A FAILURE OR HAVE LET YOURSELF OR YOUR FAMILY DOWN: 0
8. MOVING OR SPEAKING SO SLOWLY THAT OTHER PEOPLE COULD HAVE NOTICED. OR THE OPPOSITE, BEING SO FIGETY OR RESTLESS THAT YOU HAVE BEEN MOVING AROUND A LOT MORE THAN USUAL: 2
SUM OF ALL RESPONSES TO PHQ QUESTIONS 1-9: 16
2. FEELING DOWN, DEPRESSED OR HOPELESS: 3
SUM OF ALL RESPONSES TO PHQ QUESTIONS 1-9: 16
1. LITTLE INTEREST OR PLEASURE IN DOING THINGS: 3
7. TROUBLE CONCENTRATING ON THINGS, SUCH AS READING THE NEWSPAPER OR WATCHING TELEVISION: 1
3. TROUBLE FALLING OR STAYING ASLEEP: 1
SUM OF ALL RESPONSES TO PHQ QUESTIONS 1-9: 16
SUM OF ALL RESPONSES TO PHQ9 QUESTIONS 1 & 2: 6
4. FEELING TIRED OR HAVING LITTLE ENERGY: 3

## 2022-07-27 ASSESSMENT — COLUMBIA-SUICIDE SEVERITY RATING SCALE - C-SSRS
1. WITHIN THE PAST MONTH, HAVE YOU WISHED YOU WERE DEAD OR WISHED YOU COULD GO TO SLEEP AND NOT WAKE UP?: NO
2. HAVE YOU ACTUALLY HAD ANY THOUGHTS OF KILLING YOURSELF?: NO
6. HAVE YOU EVER DONE ANYTHING, STARTED TO DO ANYTHING, OR PREPARED TO DO ANYTHING TO END YOUR LIFE?: NO

## 2022-07-27 ASSESSMENT — SOCIAL DETERMINANTS OF HEALTH (SDOH): HOW HARD IS IT FOR YOU TO PAY FOR THE VERY BASICS LIKE FOOD, HOUSING, MEDICAL CARE, AND HEATING?: NOT HARD AT ALL

## 2022-07-27 NOTE — TELEPHONE ENCOUNTER
Patient currently being seen by Dr Kal Trent. Concern is for a LE Wound-?. Advised to reach out to Dr Jay Cavazos to approve an appt for tomorrow, 7/28/22. Answering Serv. Ph# provided, at [phone], to consult with him directly. An appt is available at 2pm, 7/28/22, currently.

## 2022-07-27 NOTE — PROGRESS NOTES
Subjective:      Patient ID: Maria Del Rosario Marrero is a 69 y.o. female    Hip abscess x 3 days   HPI  Pt presents with 3 days of right hip pain and swelling. This has since increased in size. No fever or chills, no antecedent hip trauma. Assoc nausea, no vomiting. Hx s/p L4-L5 root foraminotomies and synovial cyst decompression on 6/8/2022 for severe lumbar canal stenosis at L4-L5 with intraspinal synovial cyst and spondylolisthesis. Readmitted for surgical site wound infection resulting in MSSA bacteremia and sepsis. She underwent surgical site reexploration with excisional debridement and wound washout. Still on IC cefazolin on PICC line. Surgical site is now healing well. CBC and CMP at F unremarkable. Past Medical History:   Diagnosis Date    Anxiety     DM (diabetes mellitus) ([facility] [address] 4/14/2015    Hepatitis B infection     Hyperlipidemia     Hypothyroidism     Insomnia     Leg pain 4/14/2015    Post herpetic neuralgia     Unspecified sleep apnea     after seeing [facility] they state she is does not have sleep apnea. Not using cpap     Past Surgical History:   Procedure Laterality Date    ANKLE FRACTURE SURGERY  09/2016    DR ANDRADE  LT    BIOPSY / LIGATION TEMPORAL ARTERY Right 4/26/2019    RIGHT TEMPORAL ARTERY BIOPSY performed by Brayden Zaragoza MD at [address] [place] [address],  [address]  2015    surgery for transgeminal neuraglia.   Insertion of sponge for chronic pain    COLONOSCOPY  3/31/14    DR Sheldon Ao    COLONOSCOPY N/A 2/2/2021    COLONOSCOPY DIAGNOSTIC performed by Heather Haney MD at [facility] (CERVIX STATUS UNKNOWN)      PARTIAL    LYMPHADENECTOMY      OTHER SURGICAL HISTORY Right     Trigeminal Neuralgia    [place] COLON CA SCRN NOT  [address] [place] [address] N/A 7/24/2017    COLONOSCOPY performed by Heather Haney MD at [facility] 2/2/2021    EGD ESOPHAGOGASTRODUODENOSCOPY performed by Heather Haney MD at [place] [address] Socioeconomic History    Marital status:      Spouse name: Not on file    Number of children: 2    Years of education: Not on file    Highest education level: 12th grade   Occupational History    Occupation: retired    Tobacco Use    Smoking status: Former     Packs/day: 1.00     Years: 48.00     Pack years: 48.00     Types: Cigarettes     Quit date: 2013     Years since quittin.0    Smokeless tobacco: Never   Vaping Use    Vaping Use: Every day    Substances: Nicotine, Flavoring, low  dose    Substance and Sexual Activity    Alcohol use: No     Comment: very rarely    Drug use: No    Sexual activity: Not Currently     Partners: Male   Other Topics Concern    Not on file   Social History Narrative    Lives alone    Has stairs in home needs to go up/down.     2 children that help if needed      Social Determinants of Health     Financial Resource Strain: Low Risk     Difficulty of Paying Living Expenses: Not hard at all   Food Insecurity: No Food Insecurity    Worried About Running Out of Food in the Last Year: Never true    Ran Out of Food in the Last Year: Never true   Transportation Needs: Not on file   Physical Activity: Not on file   Stress: Not on file   Social Connections: Not on file   Intimate Partner Violence: Not on file   Housing Stability: Not on file     Family History   Problem Relation Age of Onset    Stroke Mother     High Cholesterol Mother     Cancer Mother     Colon Cancer Neg Hx     Celiac Disease Neg Hx     Crohn's Disease Neg Hx      Allergies:  Cefdinir and Metformin and related  Patient Active Problem List   Diagnosis    Hepatitis B infection    Sleep apnea    Anxiety    Insomnia    Trigeminal neuralgia    Hyperlipidemia    Hypothyroidism    Dyspnea    DM (diabetes mellitus) ([address]    Leg pain    Depression    Frequent headaches    Major depressive disorder, recurrent, in full remission ([facility] [address]    Cherry angioma    Diffuse photodamage of skin    Dilated pore of Solomon    GERD (gastroesophageal reflux disease)    Hidradenitis suppurativa    Lentigines    Multiple benign nevi    Poikiloderma    Seborrheic keratosis    Morbidly obese (HCC)    Chronic gastritis without bleeding    Polyp of sigmoid colon    Esophageal varices without bleeding, unspecified esophageal varices type (HCC)    Chronic obstructive pulmonary disease, unspecified COPD type ([facility] [address] [facility]    Anemia with low platelet count ([facility] [address]    Chronic renal disease, stage III ([facility] [[id number]]     Current Outpatient Medications on File Prior to Visit   Medication Sig Dispense Refill    ceFAZolin (ANCEF) 2-4 GM/100ML-% SOLN IVPB (premix) Infuse 2 g intravenously in the morning and 2 g at noon and 2 g in the evening.       ceFAZolin (ANCEF) 10 g injection       citalopram (CELEXA) 20 MG tablet TAKE 1 TABLET DAILY (NEED APPOINTMENT FOR FURTHER REFILLS) 90 tablet 1    levothyroxine (SYNTHROID) 150 MCG tablet TAKE 1 TABLET DAILY (NEED APPOINTMENT FOR FURTHER REFILLS) 90 tablet 3    famotidine (PEPCID) 20 MG tablet TAKE 1 TABLET TWICE A DAY (NEED APPOINTMENT FOR FURTHER REFILLS) 180 tablet 3    JANUVIA 100 MG tablet TAKE 1 TABLET DAILY 90 tablet 3    ipratropium-albuterol (DUONEB) 0.5-2.5 (3) MG/3ML SOLN nebulizer solution       fluticasone-umeclidin-vilant (TRELEGY ELLIPTA) 100-62.5-25 MCG/INH AEPB       Continuous Blood Gluc  (DEXCOM G6 ) MARIELA Use daily for blood sugar readings 1 Device 1    Continuous Blood Gluc Sensor (DEXCOM G6 SENSOR) MISC Use daily for blood sugar readings 1 each 3    vitamin D3 (CHOLECALCIFEROL) 25 MCG (1000 UT) TABS tablet Take 1 tablet by mouth daily 30 tablet 0    albuterol sulfate  (90 Base) MCG/ACT inhaler Inhale 2 puffs into the lungs every 6 hours as needed for Wheezing 1 Inhaler 0    Blood Glucose Monitoring Suppl (TRUE METRIX METER) w/Device KIT use to test sugar  0    blood glucose test strips (TRUETEST TEST) strip Test blood glucose fasting and 1 hour post lunch and dinner 100 each 5 Lancets MISC Inject 1 each into the skin 3 times daily Use early in the morning fasting and 1 hour post lunch and dinner 100 each 5    aspirin 81 MG EC tablet Take 1 tablet by mouth daily 90 tablet 3    empagliflozin (JARDIANCE) 25 MG tablet Take 1 tablet by mouth daily (Patient not taking: Reported on 7/27/2022) 90 tablet 3    tiZANidine (ZANAFLEX) 4 MG tablet Take 1 tablet by mouth every 8 hours as needed (muscle back pain) (Patient not taking: Reported on 7/27/2022) 30 tablet 1     Current Facility-Administered Medications on File Prior to Visit   Medication Dose Route Frequency Provider Last Rate Last Admin    methylPREDNISolone acetate (DEPO-MEDROL) injection 60 mg  60 mg IntraMUSCular Once Caren Sanford MD           Review of Systems   Constitutional:  Negative for chills, diaphoresis, fatigue and fever. HENT:  Negative for congestion, ear discharge and ear pain. Respiratory:  Negative for cough, shortness of breath and wheezing. Cardiovascular:  Negative for chest pain. Gastrointestinal:  Negative for abdominal pain, diarrhea, nausea and vomiting. Endocrine: Negative for cold intolerance and heat intolerance. Genitourinary:  Negative for dysuria and frequency. Musculoskeletal:  Positive for arthralgias. Skin:  Positive for color change and wound. Neurological:  Negative for dizziness and light-headedness. Psychiatric/Behavioral:  Negative for dysphoric mood. The patient is not nervous/anxious. Objective:   /68 (Site: Left Lower Arm, Position: Sitting, Cuff Size: Medium Adult)   Pulse 98   Temp 97.1 °F (36.2 °C)   Ht 5' 5\" (1.651 m)   Wt 208 lb (94.3 kg)   LMP 02/28/1982   SpO2 99%   BMI 34.61 kg/m²     Physical Exam  Constitutional:       General: She is in acute distress (hip pain). Appearance: She is not diaphoretic. Cardiovascular:      Rate and Rhythm: Normal rate and regular rhythm. Pulses: Normal pulses.       Heart sounds: Normal heart sounds, S1 normal and S2 normal.   Pulmonary:      Effort: Pulmonary effort is normal. No respiratory distress. Breath sounds: Normal breath sounds. No wheezing or rales. Chest:      Chest wall: No tenderness. Abdominal:      General: Bowel sounds are normal.      Tenderness: There is no abdominal tenderness. Musculoskeletal:      Comments: Well healed longitudinal lumbar spine scar. Dressing clean and dry, no erythema or TTP    Right lateral hip 5cm markedly tender erythematous induration with central fluctuance. Area wiped with alcohol, infiltrated with 1% lidocaine. Incision made in fluctuant region, purulent and bloody discharge yielded(2ml). Procedure well-tolerated. Neurological:      Mental Status: She is alert. Assessment:      Diagnosis Orders   1. Abscess of right hip  [facility]    Culture, Aerobic and Anaerobic    sulfamethoxazole-trimethoprim (BACTRIM DS) 800-160 MG per tablet    INCISION AND DRAINAGE      2. Hx of sepsis  [facility]      3.  Nausea and vomiting, intractability of vomiting not specified, unspecified vomiting type  ondansetron (ZOFRAN) 4 MG tablet        Plan:      Orders Placed This Encounter   Procedures    INCISION AND DRAINAGE    Culture, Aerobic and Anaerobic     Standing Status:   Future     Number of Occurrences:   1     Standing Expiration Date:   7/27/2023    [facility]     Referral Priority:   Routine     Referral Type:   Eval and Treat     Referral Reason:   Specialty Services Required     Referred to Provider:   Lul Canchola MD     Requested Specialty:   Infectious Diseases     Number of Visits Requested:   1     Orders Placed This Encounter   Medications    ondansetron (ZOFRAN) 4 MG tablet     Sig: Take 1 tablet by mouth 3 times daily as needed for Nausea or Vomiting     Dispense:  15 tablet     Refill:  0 sulfamethoxazole-trimethoprim (BACTRIM DS) 800-160 MG per tablet     Sig: Take 1 tablet by mouth in the morning and 1 tablet before bedtime. Do all this for 28 days. Dispense:  56 tablet     Refill:  0   Normal vital signs and recent WBC. Return if symptoms worsen or fail to improve.

## 2022-07-28 ENCOUNTER — OFFICE VISIT (OUTPATIENT)
Dept: INFECTIOUS DISEASES | Age: 77
End: 2022-07-28
Payer: MEDICARE

## 2022-07-28 VITALS
BODY MASS INDEX: 34.61 KG/M2 | TEMPERATURE: 97.8 F | DIASTOLIC BLOOD PRESSURE: 60 MMHG | SYSTOLIC BLOOD PRESSURE: 98 MMHG | HEART RATE: 100 BPM | WEIGHT: 208 LBS

## 2022-07-28 DIAGNOSIS — T81.42XA DEEP INCISIONAL SURGICAL SITE INFECTION: ICD-10-CM

## 2022-07-28 DIAGNOSIS — L02.91 ABSCESS: Primary | ICD-10-CM

## 2022-07-28 PROCEDURE — 99204 OFFICE O/P NEW MOD 45 MIN: CPT | Performed by: INTERNAL MEDICINE

## 2022-07-28 NOTE — PROGRESS NOTES
Subjective:      Patient ID: Angelo Gibson is a 69 y.o. female who presents today for:  Chief Complaint   Patient presents with    New Patient     Ref form Dr. Jamaal BELLAMY hip abscess - sepsis     Patient here as a new patient referral for a right hip abscess. Patient is currently under treatment with IV cefazolin for a postoperative spinal surgery infection that she had treated at [facility]. She was seen by infectious disease DrPretty Julio at that time. She was due to finish therapy soon she said her back is feeling better overall no issues with her PICC line. The last several days she did have more pain on the right hip area. She saw her primary care provider and there was an enlarging erythematous lesion seen on the right hip. She says it is exquisitely tender. She had some degree of drainage yesterday in the outpatient setting with purulent material recovered and sent for culture she was already on cefazolin so antibiotics with Bactrim were started. Patient still says she is having quite a bit of pain she cannot put any weight on that side she is having a hard time being examined she said it was more painful than when she had the spinal surgery present. No fevers no confusion at this time    Past Medical History:   Diagnosis Date    Anxiety     DM (diabetes mellitus) ([facility] [address] 4/14/2015    Hepatitis B infection     Hyperlipidemia     Hypothyroidism     Insomnia     Leg pain 4/14/2015    Post herpetic neuralgia     Unspecified sleep apnea     after seeing [facility] they state she is does not have sleep apnea. Not using cpap     Past Surgical History:   Procedure Laterality Date    ANKLE FRACTURE SURGERY  09/2016    DR ANDRADE  LT    BIOPSY / LIGATION TEMPORAL ARTERY Right 4/26/2019    RIGHT TEMPORAL ARTERY BIOPSY performed by Aristeo De MD at [address] [place] [address] [address]  2015    surgery for transgeminal neuraglia.   Insertion of sponge for chronic pain    COLONOSCOPY  3/31/14    DR POND    COLONOSCOPY N/A 2/2/2021    COLONOSCOPY DIAGNOSTIC performed by Alicia Kurtz MD at [address] [facility] (CERVIX STATUS UNKNOWN)      PARTIAL    LYMPHADENECTOMY      OTHER SURGICAL HISTORY Right     Trigeminal Neuralgia    CT COLON CA SCRN NOT  [address] [facility] N/A 7/24/2017    COLONOSCOPY performed by Alicia Kurtz MD at [facility] 2/2/2021    EGD ESOPHAGOGASTRODUODENOSCOPY performed by Alicia Kurtz MD at [facility]     Allergies   Allergen Reactions    Cefdinir      Diarrhea      Metformin And Related      Swelling leg         Review of Systems  See HPI    Objective:   BP 98/60   Pulse 100   Temp 97.8 °F (36.6 °C)   Wt 208 lb (94.3 kg)   LMP 02/28/1982   BMI 34.61 kg/m²     General: Patient appears ok at the present time. NAD  Skin: no new rashes, a 4 cm area of erythema by 3 cm with some induration there is also violaceous central discoloration and then a central area that had been opened with some small amount of seropurulent material there is no clear fluctuance from what I can tell there is no clear crepitus around the areas  HEENT:  Neck is supple, No subconjunctival hemorrhages, no oral exudates  Heart: S1 S2  Lungs: clear bilaterally   Abdomen: soft, ND, NTTP,   Back :no CVA tenderness  Extrem: No edema, non tender  Neuro exam: CN II-XII intact  Psych: cooperative      Labs: I have reviewed all lab results by electronic record, including most recent CBC, metabolic panel, and pertinent abnormalities were addressed from an infectious disease perspective. Trends are being monitored over time. Radiology:  I have reviewed imaging results per electronic record and most pertinent abnormalities are being addressed from an infectious disease standpoint  Assessment:       Diagnosis Orders   1. Abscess  CT PELVIS WO CONTRAST Additional Contrast? None      2. Deep incisional surgical site infection        Patient here for new hip lesion discovered. This appears like it could be a more superficial skin abscess but she does have quite a significant amount of pain more than I would expect to see. It is also violaceous in color. I do not have any other clear evidence of a necrotizing or deep process from exam although is quite limited from what she allow me to do here. He is nontoxic appearing she was already on cefazolin certainly could acquire MRSA in the interim but there is no clear etiology otherwise      Plan:   Would like to do a CT scan exclude a deeper focus of infection here. As soon as possible but may be difficult outpatient setting. She is also on IV antibiotics already so a consolidation of therapy may need to take place. Would like her evaluated though by the emergency room given her history of progressing rapidly with previous staph infection. For cultures only gram stain showing gram-positive cocci in clusters patient and daughter agreed to be evaluated by [facility]. will call the ER    Orders Placed This Encounter   Procedures    CT PELVIS WO CONTRAST Additional Contrast? None     Standing Status:   Future     Standing Expiration Date:   7/28/2023     Order Specific Question:   Additional Contrast?     Answer:   None     Order Specific Question:   Reason for exam:     Answer:   pain and swelling right hip     No orders of the defined types were placed in this encounter.             Raymundo Horne MD

## 2022-07-29 ENCOUNTER — TELEPHONE (OUTPATIENT)
Dept: INFECTIOUS DISEASES | Age: 77
End: 2022-07-29

## 2022-07-29 ENCOUNTER — HOSPITAL ENCOUNTER (EMERGENCY)
Age: 77
Discharge: HOME | End: 2022-07-29
Payer: MEDICARE

## 2022-07-29 ENCOUNTER — APPOINTMENT (OUTPATIENT)
Dept: CT IMAGING | Age: 77
End: 2022-07-29
Payer: MEDICARE

## 2022-07-29 VITALS
DIASTOLIC BLOOD PRESSURE: 79 MMHG | WEIGHT: 208 LBS | OXYGEN SATURATION: 96 % | HEART RATE: 99 BPM | BODY MASS INDEX: 34.66 KG/M2 | SYSTOLIC BLOOD PRESSURE: 168 MMHG | HEIGHT: 65 IN | RESPIRATION RATE: 17 BRPM | TEMPERATURE: 98.5 F

## 2022-07-29 DIAGNOSIS — K74.60 CIRRHOSIS OF LIVER WITH ASCITES, UNSPECIFIED HEPATIC CIRRHOSIS TYPE (HCC): ICD-10-CM

## 2022-07-29 DIAGNOSIS — L02.91 ABSCESS: Primary | ICD-10-CM

## 2022-07-29 DIAGNOSIS — L03.317 CELLULITIS OF BUTTOCK: Primary | ICD-10-CM

## 2022-07-29 DIAGNOSIS — R18.8 CIRRHOSIS OF LIVER WITH ASCITES, UNSPECIFIED HEPATIC CIRRHOSIS TYPE (HCC): ICD-10-CM

## 2022-07-29 LAB
ALBUMIN SERPL-MCNC: 2.9 G/DL (ref 3.5–4.6)
ALP BLD-CCNC: 211 U/L (ref 40–130)
ALT SERPL-CCNC: 6 U/L (ref 0–33)
ANION GAP SERPL CALCULATED.3IONS-SCNC: 9 MEQ/L (ref 9–15)
AST SERPL-CCNC: 59 U/L (ref 0–35)
BASOPHILS ABSOLUTE: 0 K/UL (ref 0–0.2)
BASOPHILS RELATIVE PERCENT: 0.8 %
BILIRUB SERPL-MCNC: 0.3 MG/DL (ref 0.2–0.7)
BUN BLDV-MCNC: 8 MG/DL (ref 8–23)
CALCIUM SERPL-MCNC: 8.6 MG/DL (ref 8.5–9.9)
CHLORIDE BLD-SCNC: 103 MEQ/L (ref 95–107)
CO2: 26 MEQ/L (ref 20–31)
CREAT SERPL-MCNC: 0.93 MG/DL (ref 0.5–0.9)
EOSINOPHILS ABSOLUTE: 0.1 K/UL (ref 0–0.7)
EOSINOPHILS RELATIVE PERCENT: 2.2 %
GFR AFRICAN AMERICAN: >60
GFR AFRICAN AMERICAN: >60
GFR NON-AFRICAN AMERICAN: 58.6
GFR NON-AFRICAN AMERICAN: >60
GLOBULIN: 3.5 G/DL (ref 2.3–3.5)
GLUCOSE BLD-MCNC: 214 MG/DL (ref 70–99)
HCT VFR BLD CALC: 34.4 % (ref 37–47)
HEMOGLOBIN: 10.8 G/DL (ref 12–16)
HYPOCHROMIA: ABNORMAL
LACTIC ACID: 2.8 MMOL/L (ref 0.5–2.2)
LYMPHOCYTES ABSOLUTE: 0.6 K/UL (ref 1–4.8)
LYMPHOCYTES RELATIVE PERCENT: 16.9 %
MCH RBC QN AUTO: 25 PG (ref 27–31.3)
MCHC RBC AUTO-ENTMCNC: 31.4 % (ref 33–37)
MCV RBC AUTO: 79.4 FL (ref 82–100)
MICROCYTES: ABNORMAL
MONOCYTES ABSOLUTE: 0.3 K/UL (ref 0.2–0.8)
MONOCYTES RELATIVE PERCENT: 9.5 %
NEUTROPHILS ABSOLUTE: 2.4 K/UL (ref 1.4–6.5)
NEUTROPHILS RELATIVE PERCENT: 70.6 %
OVALOCYTES: ABNORMAL
PDW BLD-RTO: 17.1 % (ref 11.5–14.5)
PERFORMED ON: NORMAL
PLATELET # BLD: 111 K/UL (ref 130–400)
PLATELET SLIDE REVIEW: ABNORMAL
POC CREATININE WHOLE BLOOD: 0.9
POC CREATININE: 0.9 MG/DL (ref 0.6–1.2)
POC SAMPLE TYPE: NORMAL
POIKILOCYTES: ABNORMAL
POLYCHROMASIA: ABNORMAL
POTASSIUM SERPL-SCNC: 4 MEQ/L (ref 3.4–4.9)
PROCALCITONIN: 0.13 NG/ML (ref 0–0.15)
RBC # BLD: 4.34 M/UL (ref 4.2–5.4)
SODIUM BLD-SCNC: 138 MEQ/L (ref 135–144)
TOTAL PROTEIN: 6.4 G/DL (ref 6.3–8)
WBC # BLD: 3.3 K/UL (ref 4.8–10.8)

## 2022-07-29 PROCEDURE — 2580000003 HC RX 258: Performed by: PHYSICIAN ASSISTANT

## 2022-07-29 PROCEDURE — 99285 EMERGENCY DEPT VISIT HI MDM: CPT

## 2022-07-29 PROCEDURE — 87040 BLOOD CULTURE FOR BACTERIA: CPT

## 2022-07-29 PROCEDURE — 83605 ASSAY OF LACTIC ACID: CPT

## 2022-07-29 PROCEDURE — 36415 COLL VENOUS BLD VENIPUNCTURE: CPT

## 2022-07-29 PROCEDURE — 6360000004 HC RX CONTRAST MEDICATION: Performed by: PHYSICIAN ASSISTANT

## 2022-07-29 PROCEDURE — 80053 COMPREHEN METABOLIC PANEL: CPT

## 2022-07-29 PROCEDURE — 84145 PROCALCITONIN (PCT): CPT

## 2022-07-29 PROCEDURE — 74177 CT ABD & PELVIS W/CONTRAST: CPT

## 2022-07-29 PROCEDURE — 85025 COMPLETE CBC W/AUTO DIFF WBC: CPT

## 2022-07-29 RX ORDER — SODIUM CHLORIDE 9 MG/ML
INJECTION, SOLUTION INTRAVENOUS CONTINUOUS
Status: DISCONTINUED | OUTPATIENT
Start: 2022-07-29 | End: 2022-07-29 | Stop reason: HOSPADM

## 2022-07-29 RX ADMIN — SODIUM CHLORIDE: 9 INJECTION, SOLUTION INTRAVENOUS at 15:43

## 2022-07-29 RX ADMIN — IOPAMIDOL 50 ML: 612 INJECTION, SOLUTION INTRAVENOUS at 16:05

## 2022-07-29 ASSESSMENT — ENCOUNTER SYMPTOMS
RHINORRHEA: 0
SHORTNESS OF BREATH: 0
SORE THROAT: 0
COLOR CHANGE: 0
ABDOMINAL PAIN: 0
ABDOMINAL DISTENTION: 0
CONSTIPATION: 0
EYE DISCHARGE: 0

## 2022-07-29 ASSESSMENT — PAIN - FUNCTIONAL ASSESSMENT: PAIN_FUNCTIONAL_ASSESSMENT: 0-10

## 2022-07-29 NOTE — ED PROVIDER NOTES
[facility] ED  eMERGENCY dEPARTMENT eNCOUnter      Pt Name: Angelo Gibson  MRN: [id number]  Matildegfshaye 1945  Date of evaluation: 7/29/2022  Provider: Michael Mancilla PA-C    CHIEF COMPLAINT       Chief Complaint   Patient presents with    Abscess     Abscess to right hip x1wk         HISTORY OF PRESENT ILLNESS   (Location/Symptom, Timing/Onset,Context/Setting, Quality, Duration, Modifying Factors, Severity)  Note limiting factors. Angelo Gibson is a 69 y.o. female who presents to the emergency department with complaints of right hip pain, abscess, which patient states has been progressively worse over the last 1 week. She had spinal surgery done at the [facility] approximately 1 month ago, she states she has had problems with the right hip since that time, she was diagnosed with an infection did become septic at one time, she is currently on cefazolin which she receives at home. She is followed by her regular family physician, as well as infectious disease through [facility] at [place]. Patient states 2 days ago she had the abscess to her right hip drained, a wound culture was sent off, but has not resulted yet. Patient went to [facility] yesterday to be seen, she states there was a 3-hour wait, she did not want to wait so she left. She contacted her family doctor who advised her to come to the ED here at [address] for further evaluation, CT scan to determine the depth, or if there is presence of deep abscess. Past medical history significant for hepatitis B, hypothyroidism, diabetes, anxiety, hyperlipidemia, chronic leg pain. HPI    NursingNotes were reviewed. REVIEW OF SYSTEMS    (2-9 systems for level 4, 10 or more for level 5)     Review of Systems   Constitutional:  Negative for activity change and appetite change. HENT:  Negative for congestion, ear discharge, ear pain, nosebleeds, rhinorrhea and sore throat. Eyes:  Negative for discharge.    Respiratory: Negative for shortness of breath. Cardiovascular:  Negative for chest pain, palpitations and leg swelling. Gastrointestinal:  Negative for abdominal distention, abdominal pain and constipation. Genitourinary:  Negative for difficulty urinating and dysuria. Musculoskeletal:  Negative for arthralgias. Skin:  Positive for wound. Negative for color change. Abscess to right hip   Neurological:  Negative for dizziness, syncope, numbness and headaches. Psychiatric/Behavioral:  Negative for agitation and confusion. Except as noted above the remainder of the review of systems was reviewed and negative. PAST MEDICAL HISTORY     Past Medical History:   Diagnosis Date    Anxiety     DM (diabetes mellitus) ([facility] [address] 4/14/2015    Hepatitis B infection     Hyperlipidemia     Hypothyroidism     Insomnia     Leg pain 4/14/2015    Post herpetic neuralgia     Unspecified sleep apnea     after seeing [facility] they state she is does not have sleep apnea. Not using cpap         SURGICALHISTORY       Past Surgical History:   Procedure Laterality Date    ANKLE FRACTURE SURGERY  09/2016    DR ANDRADE  LT    BIOPSY / LIGATION TEMPORAL ARTERY Right 4/26/2019    RIGHT TEMPORAL ARTERY BIOPSY performed by Tosin Lubin MD at [address] [place] [address] [address]  2015    surgery for transgeminal neuraglia.   Insertion of sponge for chronic pain    COLONOSCOPY  3/31/14    DR Shira Sanabria    COLONOSCOPY N/A 2/2/2021    COLONOSCOPY DIAGNOSTIC performed by Attila Sultana MD at [address] (CERVIX STATUS UNKNOWN)      PARTIAL    LYMPHADENECTOMY      OTHER SURGICAL HISTORY Right     Trigeminal Neuralgia    AZ COLON CA SCRN NOT  [address] [facility] N/A 7/24/2017    COLONOSCOPY performed by Attila Sultana MD at [facility] 2/2/2021    EGD ESOPHAGOGASTRODUODENOSCOPY performed by Attila Sultana MD at [address] [facility]       Discharge Medication List as of 7/29/2022  5:22 PM        CONTINUE these medications which have NOT CHANGED    Details   ceFAZolin (ANCEF) 10 g injection Historical Med      ondansetron (ZOFRAN) 4 MG tablet Take 1 tablet by mouth 3 times daily as needed for Nausea or Vomiting, Disp-15 tablet, R-0Normal      sulfamethoxazole-trimethoprim (BACTRIM DS) 800-160 MG per tablet Take 1 tablet by mouth in the morning and 1 tablet before bedtime. Do all this for 28 days. , Disp-56 tablet, R-0Normal      citalopram (CELEXA) 20 MG tablet TAKE 1 TABLET DAILY (NEED APPOINTMENT FOR FURTHER REFILLS), Disp-90 tablet, R-1Normal      empagliflozin (JARDIANCE) 25 MG tablet Take 1 tablet by mouth daily, Disp-90 tablet, R-3Normal      levothyroxine (SYNTHROID) 150 MCG tablet TAKE 1 TABLET DAILY (NEED APPOINTMENT FOR FURTHER REFILLS), Disp-90 tablet, R-3Normal      famotidine (PEPCID) 20 MG tablet TAKE 1 TABLET TWICE A DAY (NEED APPOINTMENT FOR FURTHER REFILLS), Disp-180 tablet, R-3Normal      JANUVIA 100 MG tablet TAKE 1 TABLET DAILY, Disp-90 tablet, R-3Normal      ipratropium-albuterol (DUONEB) 0.5-2.5 (3) MG/3ML SOLN nebulizer solution Historical Med      fluticasone-umeclidin-vilant (TRELEGY ELLIPTA) 100-62.5-25 MCG/INH AEPB Historical Med      tiZANidine (ZANAFLEX) 4 MG tablet Take 1 tablet by mouth every 8 hours as needed (muscle back pain), Disp-30 tablet, R-1Normal      Continuous Blood Gluc  (DEXCOM G6 ) MARIELA Use daily for blood sugar readings, Disp-1 Device, R-1Normal      Continuous Blood Gluc Sensor (DEXCOM G6 SENSOR) MISC Use daily for blood sugar readings, Disp-1 each, R-3Normal      vitamin D3 (CHOLECALCIFEROL) 25 MCG (1000 UT) TABS tablet Take 1 tablet by mouth daily, Disp-30 tablet,R-0Normal      albuterol sulfate  (90 Base) MCG/ACT inhaler Inhale 2 puffs into the lungs every 6 hours as needed for Wheezing, Disp-1 Inhaler,R-0Normal      Blood Glucose Monitoring Suppl (TRUE METRIX METER) w/Device KIT use to test sugar, level 5)     ED Triage Vitals [07/29/22 1501]   BP Temp Temp Source Heart Rate Resp SpO2 Height Weight   (!) 168/79 98.5 °F (36.9 °C) Oral 99 17 96 % 5' 5\" (1.651 m) 208 lb (94.3 kg)       Physical Exam  Vitals and nursing note reviewed. Constitutional:       General: She is not in acute distress. Appearance: She is well-developed. She is not ill-appearing, toxic-appearing or diaphoretic. HENT:      Head: Normocephalic. Nose: No congestion. Mouth/Throat:      Mouth: Mucous membranes are moist.      Pharynx: No oropharyngeal exudate or posterior oropharyngeal erythema. Eyes:      Extraocular Movements: Extraocular movements intact. Conjunctiva/sclera: Conjunctivae normal.      Pupils: Pupils are equal, round, and reactive to light. Neck:      Vascular: No JVD. Trachea: No tracheal deviation. Cardiovascular:      Rate and Rhythm: Normal rate. Pulses: Normal pulses. Heart sounds: Normal heart sounds. No murmur heard. No friction rub. No gallop. Pulmonary:      Effort: Pulmonary effort is normal. No tachypnea, accessory muscle usage, respiratory distress or retractions. Breath sounds: No stridor. No wheezing, rhonchi or rales. Chest:      Chest wall: No tenderness. Abdominal:      General: Abdomen is flat. Bowel sounds are normal. There is no distension or abdominal bruit. Palpations: There is no shifting dullness, fluid wave, hepatomegaly, splenomegaly, mass or pulsatile mass. Tenderness: There is no abdominal tenderness. There is no right CVA tenderness, left CVA tenderness, guarding or rebound. Negative signs include Bowen's sign, Rovsing's sign and McBurney's sign. Musculoskeletal:         General: No deformity. Cervical back: Normal range of motion and neck supple. No rigidity. Skin:     General: Skin is warm and dry. Capillary Refill: Capillary refill takes less than 2 seconds. Coloration: Skin is not jaundiced. modulation, iterative reconstruction, and/or weight based dosing                                                       ED BEDSIDE ULTRASOUND:   Performed by ED Physician - none    LABS:  Labs Reviewed   COMPREHENSIVE METABOLIC PANEL - Abnormal; Notable for the following components:       Result Value    Glucose 214 (*)     Creatinine 0.93 (*)     GFR Non- 58.6 (*)     Albumin 2.9 (*)     Alkaline Phosphatase 211 (*)     AST 59 (*)     All other components within normal limits   CBC WITH AUTO DIFFERENTIAL - Abnormal; Notable for the following components:    WBC 3.3 (*)     Hemoglobin 10.8 (*)     Hematocrit 34.4 (*)     MCV 79.4 (*)     MCH 25.0 (*)     MCHC 31.4 (*)     RDW 17.1 (*)     Platelets 019 (*)     Lymphocytes Absolute 0.6 (*)     All other components within normal limits   LACTIC ACID - Abnormal; Notable for the following components:    Lactic Acid 2.8 (*)     All other components within normal limits   POCT CREATININE - URINE - Normal   CULTURE, BLOOD 1    Narrative:     ORDER#: [id number]                          ORDERED BY: Steve Mendes  SOURCE: Blood                              COLLECTED:  07/29/22 15:38  ANTIBIOTICS AT [facility].:                      RECEIVED :  07/29/22 15:38   CULTURE, BLOOD 2    Narrative:     ORDER#: [id number]                          ORDERED BY: Steve Mendes  SOURCE: Blood                              COLLECTED:  07/29/22 15:47  ANTIBIOTICS AT [facility]AINAB.:                      RECEIVED :  07/29/22 15:47   PROCALCITONIN   POCT VENOUS       All other labs were within normal range or not returned as of this dictation.     EMERGENCY DEPARTMENT COURSE and DIFFERENTIAL DIAGNOSIS/MDM:   Vitals:    Vitals:    07/29/22 1501   BP: (!) 168/79   Pulse: 99   Resp: 17   Temp: 98.5 °F (36.9 °C)   TempSrc: Oral   SpO2: 96%   Weight: 208 lb (94.3 kg)   Height: 5' 5\" (1.651 m)            MDM  Number of Diagnoses or Management Options  Cellulitis of buttock  Cirrhosis of liver with ascites, unspecified hepatic cirrhosis type [facility])  Diagnosis management comments: Patient presented to the emerged part with complaint of right hip pain, concerns for abscess. She states that she had had back surgery previously which did become infected, she had been admitted to the [facility] clinic for wound care sepsis secondary to infected spine surgery. She states she has now developed an abscess to her right buttock, and concerned for worsening abscess or infection. She had been recently seen by her infectious disease doctor yesterday as well as her primary doctor for the same issue, she is currently on Ancef for which she received she is receiving IV, in addition Bactrim oral tablets were added. CT scan of the hip and pelvis shows a localized cellulitis, but no signs for fluid accumulation, or abscess. I did speak with her infectious disease Dr. Kailey Polk here at [facility] who had reviewed lab work, as well as CT, advised at this time no additional changes, continue on IV antibiotics, as well as oral Bactrim. Follow-up with infectious disease within next 48 hours, as well as her surgeon at the [facility] clinic. Should she have any worsening or changes symptoms, she was advised to return to the ED for reevaluation. Amount and/or Complexity of Data Reviewed  Decide to obtain previous medical records or to obtain history from someone other than the patient: yes        CRITICAL CARE TIME   Total Critical Care time was 0 minutes, excluding separately reportableprocedures. There was a high probability of clinicallysignificant/life threatening deterioration in the patient's condition which required my urgent intervention. CONSULTS:  None    PROCEDURES:  Unless otherwise noted below, none     Procedures    FINAL IMPRESSION      1. Cellulitis of buttock    2.  Cirrhosis of liver with ascites, unspecified hepatic cirrhosis type [facility])          DISPOSITION/PLAN   DISPOSITION Decision To Discharge 07/29/2022 05:19:40 PM      PATIENT REFERRED TO:  Fabienne Maria MD  [address] [place] [address]  [phone]    In 3 days      Matteo Espinal MD  [address] [facility]  [address]  [address] [facility]  [phone]    In 1 week      DISCHARGE MEDICATIONS:  Discharge Medication List as of 7/29/2022  5:22 PM             (Please note that portions of this note were completed with a voice recognition program.  Efforts were made to edit the dictations but occasionally words are mis-transcribed.)    Michael Mancilla PA-C (electronically signed)  Attending Emergency Physician         Michael Mancilla PA-C  08/01/22 [id number]

## 2022-07-29 NOTE — TELEPHONE ENCOUNTER
Call to patient M# and spoke with daughter Ashlyn Connelly. Advised at that time, Dr Radha Sanchez does not agree with Contrast. Patient is to go to a [facility] if continues to be worse. Call to [facility], spoke with a Tech, she states due to the nature of the order, hip with Abscess, it is highly recommended to complete with contrast, although it can still be done as written. Ashlyn Connelly, the daughter just called back and she has been advised to call central scheduling as the order can be completed as written, or the patient can go to the ER.

## 2022-07-29 NOTE — TELEPHONE ENCOUNTER
for [facility] called this morning and advised the Order for CT Pelvis would need to be changed to JENNIFER Stewart". Apparently the patient called to have this Scheduled this morning. Patient apparently did not go to the ER Dept at [facility] as planned after discussion with ID Physician, Dr Bartolo Ahumada. Page to Dr Bartolo Ahumada, he states No Change in the order, patient is already on Bactrim, concern is for possible complications with the kidney. Patient to be directed to [facility] ER if she continues to be worse. Return call to  at [phone], advised to this \"Stat\" order will be changed to \"Routine\" and will be W/o Contrast, per Dr Bartolo Ahumada. Will reach out to patient.

## 2022-07-29 NOTE — DISCHARGE INSTRUCTIONS
New on current IV antibiotics, as well as Bactrim, contact Dr. Jeniffer Shannon for follow-up within 1 week. Follow-up with your surgeon through the [facility], [facility] clinic.

## 2022-07-30 LAB — WOUND/ABSCESS: NORMAL

## 2022-08-02 ENCOUNTER — OFFICE VISIT (OUTPATIENT)
Dept: PRIMARY CARE CLINIC | Age: 77
End: 2022-08-02
Payer: MEDICARE

## 2022-08-02 VITALS
DIASTOLIC BLOOD PRESSURE: 64 MMHG | OXYGEN SATURATION: 96 % | HEIGHT: 65 IN | BODY MASS INDEX: 34.66 KG/M2 | WEIGHT: 208 LBS | HEART RATE: 52 BPM | RESPIRATION RATE: 18 BRPM | TEMPERATURE: 97.5 F | SYSTOLIC BLOOD PRESSURE: 124 MMHG

## 2022-08-02 DIAGNOSIS — Z98.890 STATUS POST LUMBAR SPINE SURGERY FOR DECOMPRESSION OF SPINAL CORD: ICD-10-CM

## 2022-08-02 DIAGNOSIS — E03.9 HYPOTHYROIDISM, UNSPECIFIED TYPE: ICD-10-CM

## 2022-08-02 DIAGNOSIS — Z00.00 PREVENTATIVE HEALTH CARE: ICD-10-CM

## 2022-08-02 DIAGNOSIS — N18.30 STAGE 3 CHRONIC KIDNEY DISEASE, UNSPECIFIED WHETHER STAGE 3A OR 3B CKD (HCC): ICD-10-CM

## 2022-08-02 DIAGNOSIS — L02.415 ABSCESS OF RIGHT HIP: Primary | ICD-10-CM

## 2022-08-02 DIAGNOSIS — Z87.891 PERSONAL HISTORY OF TOBACCO USE: ICD-10-CM

## 2022-08-02 PROCEDURE — 99214 OFFICE O/P EST MOD 30 MIN: CPT | Performed by: INTERNAL MEDICINE

## 2022-08-02 PROCEDURE — 1123F ACP DISCUSS/DSCN MKR DOCD: CPT | Performed by: INTERNAL MEDICINE

## 2022-08-02 PROCEDURE — S0630 REMOVAL OF SUTURES: HCPCS | Performed by: INTERNAL MEDICINE

## 2022-08-02 RX ORDER — ONDANSETRON 4 MG/1
4 TABLET, FILM COATED ORAL 3 TIMES DAILY PRN
Qty: 15 TABLET | Refills: 0 | Status: CANCELLED | OUTPATIENT
Start: 2022-08-02

## 2022-08-02 RX ORDER — MELATONIN
1000 DAILY
Qty: 90 TABLET | Refills: 3 | Status: SHIPPED | OUTPATIENT
Start: 2022-08-02

## 2022-08-02 ASSESSMENT — ENCOUNTER SYMPTOMS
ABDOMINAL PAIN: 0
NAUSEA: 0
WHEEZING: 0
COUGH: 0
DIARRHEA: 0
SHORTNESS OF BREATH: 0
VOMITING: 0

## 2022-08-02 NOTE — PROGRESS NOTES
children: 2    Years of education: Not on file    Highest education level: 12th grade   Occupational History    Occupation: retired    Tobacco Use    Smoking status: Former     Packs/day: 1.00     Years: 48.00     Pack years: 48.00     Types: Cigarettes     Quit date: 2013     Years since quittin.0    Smokeless tobacco: Never   Vaping Use    Vaping Use: Every day    Substances: Nicotine, Flavoring, low  dose    Substance and Sexual Activity    Alcohol use: No     Comment: very rarely    Drug use: No    Sexual activity: Not Currently     Partners: Male   Other Topics Concern    Not on file   Social History Narrative    Lives alone    Has stairs in home needs to go up/down.     2 children that help if needed      Social Determinants of Health     Financial Resource Strain: Low Risk     Difficulty of Paying Living Expenses: Not hard at all   Food Insecurity: No Food Insecurity    Worried About Running Out of Food in the Last Year: Never true    Ran Out of Food in the Last Year: Never true   Transportation Needs: Not on file   Physical Activity: Not on file   Stress: Not on file   Social Connections: Not on file   Intimate Partner Violence: Not on file   Housing Stability: Not on file     Family History   Problem Relation Age of Onset    Stroke Mother     High Cholesterol Mother     Cancer Mother     Colon Cancer Neg Hx     Celiac Disease Neg Hx     Crohn's Disease Neg Hx      Allergies:  Cefdinir and Metformin and related  Patient Active Problem List   Diagnosis    Hepatitis B infection    Sleep apnea    Anxiety    Insomnia    Trigeminal neuralgia    Hyperlipidemia    Hypothyroidism    Dyspnea    DM (diabetes mellitus) ([facility]    Leg pain    Depression    Frequent headaches    Major depressive disorder, recurrent, in full remission ([facility]    Cherry angioma    Diffuse photodamage of skin    Dilated pore of Solomon    GERD (gastroesophageal reflux disease)    Hidradenitis suppurativa    Lentigines    Multiple benign nevi    Poikiloderma    Seborrheic keratosis    Morbidly obese (HCC)    Chronic gastritis without bleeding    Polyp of sigmoid colon    Esophageal varices without bleeding, unspecified esophageal varices type ([facility]    Chronic obstructive pulmonary disease, unspecified COPD type ([facility]    Anemia with low platelet count ([facility]    Chronic renal disease, stage III ([facility] [756756]     Current Outpatient Medications on File Prior to Visit   Medication Sig Dispense Refill    ondansetron (ZOFRAN) 4 MG tablet Take 1 tablet by mouth 3 times daily as needed for Nausea or Vomiting 15 tablet 0    sulfamethoxazole-trimethoprim (BACTRIM DS) 800-160 MG per tablet Take 1 tablet by mouth in the morning and 1 tablet before bedtime. Do all this for 28 days.  56 tablet 0    citalopram (CELEXA) 20 MG tablet TAKE 1 TABLET DAILY (NEED APPOINTMENT FOR FURTHER REFILLS) 90 tablet 1    levothyroxine (SYNTHROID) 150 MCG tablet TAKE 1 TABLET DAILY (NEED APPOINTMENT FOR FURTHER REFILLS) 90 tablet 3    famotidine (PEPCID) 20 MG tablet TAKE 1 TABLET TWICE A DAY (NEED APPOINTMENT FOR FURTHER REFILLS) 180 tablet 3    JANUVIA 100 MG tablet TAKE 1 TABLET DAILY 90 tablet 3    ipratropium-albuterol (DUONEB) 0.5-2.5 (3) MG/3ML SOLN nebulizer solution       fluticasone-umeclidin-vilant (TRELEGY ELLIPTA) 100-62.5-25 MCG/INH AEPB       Continuous Blood Gluc  (DEXCOM G6 ) MARIELA Use daily for blood sugar readings 1 Device 1    Continuous Blood Gluc Sensor (DEXCOM G6 SENSOR) MISC Use daily for blood sugar readings 1 each 3    Blood Glucose Monitoring Suppl (TRUE METRIX METER) w/Device KIT use to test sugar  0    blood glucose test strips (TRUETEST TEST) strip Test blood glucose fasting and 1 hour post lunch and dinner 100 each 5    Lancets MISC Inject 1 each into the skin 3 times daily Use early in the morning fasting and 1 hour post lunch and dinner 100 each 5    tiZANidine (ZANAFLEX) 4 MG tablet Take 1 tablet by mouth every 8 hours as needed (muscle back pain) 30 tablet 1    albuterol sulfate  (90 Base) MCG/ACT inhaler Inhale 2 puffs into the lungs every 6 hours as needed for Wheezing 1 Inhaler 0    aspirin 81 MG EC tablet Take 1 tablet by mouth daily (Patient not taking: No sig reported) 90 tablet 3     Current Facility-Administered Medications on File Prior to Visit   Medication Dose Route Frequency Provider Last Rate Last Admin    methylPREDNISolone acetate (DEPO-MEDROL) injection 60 mg  60 mg IntraMUSCular Once Flory Sprague MD           Review of Systems   Constitutional:  Positive for fatigue. Negative for chills, diaphoresis and fever. HENT:  Negative for congestion, ear discharge and ear pain. Respiratory:  Negative for cough, shortness of breath and wheezing. Cardiovascular:  Negative for chest pain. Gastrointestinal:  Negative for abdominal pain, diarrhea, nausea and vomiting. Endocrine: Negative for cold intolerance and heat intolerance. Genitourinary:  Negative for dysuria and frequency. Skin:  Positive for wound. Neurological:  Negative for dizziness and light-headedness. Objective:   /64   Pulse 52   Temp 97.5 °F (36.4 °C)   Resp 18   Ht 5' 5\" (1.651 m)   Wt 208 lb (94.3 kg)   LMP 02/28/1982   SpO2 96%   BMI 34.61 kg/m²     Physical Exam  Constitutional:       General: She is not in acute distress. Appearance: She is not diaphoretic. Cardiovascular:      Rate and Rhythm: Normal rate and regular rhythm. Heart sounds: Normal heart sounds, S1 normal and S2 normal.   Pulmonary:      Effort: Pulmonary effort is normal. No respiratory distress. Breath sounds: Normal breath sounds. No wheezing or rales. Chest:      Chest wall: No tenderness. Abdominal:      General: Bowel sounds are normal.      Tenderness: There is no abdominal tenderness. Skin:     Comments: Well-healed midline lumbar scar with 8 sutures in place. No erythema or edema. All sutures removed without event.      Right lateral hip 5cm nontender erythematous induration with two 1cm stage 2 ulcers still with minimal seropurulent discharge. Cleaned with alcohol and dressing applied    Right PICC line in place-no erythema or TTP of insertion site. Neurological:      Mental Status: She is alert. Assessment:       Diagnosis Orders   1. Abscess of right hip Stable     will continue Bactrim       2. Status post lumbar spine surgery for decompression of spinal cord   - ND REMOVAL OF SUTURES    surg site well-healed       3. Preventative health care  vitamin D3 (CHOLECALCIFEROL) 25 MCG (1000 UT) TABS tablet    Vitamin D 25 Hydroxy      4. Stage 3 chronic kidney disease, unspecified whether stage 3a or 3b CKD (HCC) Stable       5. Personal history of tobacco use  CT lung screen [Initial/Annual]      6. Hypothyroidism, unspecified type Inadequately Controlled TSH with Reflex    likely cause of fatigue. pt will increase Synthroid frequency        Plan:      Orders Placed This Encounter   Procedures    CT lung screen [Initial/Annual]     Age: 69 y.o. Smoking History:   Social History    Tobacco Use      Smoking status: Former        Packs/day: 1.00        Years: 48.00        Pack years: 50        Types: Cigarettes        Quit date: 2013        Years since quittin.0      Smokeless tobacco: Never    Vaping Use      Vaping Use: Every day        Substances: Nicotine, Flavoring, low  dose     Alcohol use: No      Comment: very rarely    Drug use: No    Pack years: 48  10/20/2020     Standing Status:   Future     Standing Expiration Date:   2023     Order Specific Question:   Is there documentation of shared decision making? Answer:   Yes     Order Specific Question:   Does the patient show any signs or symptoms of lung cancer? Answer:   No     Order Specific Question:   Is this the first (baseline) CT or an annual exam?     Answer:    Annual [2]     Order Specific Question:   Is this a low dose CT or a routine CT? Answer:   Low Dose CT [1]     Order Specific Question:   Smoking Status? Answer: Former [4]     Order Specific Question:   Date quit smoking? (must be within 15 years)     Answer:   7/24/2013     Order Specific Question:   Smoking packs per day? Answer:   1     Order Specific Question:   Years smoking? Answer:   48    TSH with Reflex     Standing Status:   Future     Standing Expiration Date:   8/2/2023    Vitamin D 25 Hydroxy     Standing Status:   Future     Standing Expiration Date:   8/2/2023     - MO REMOVAL OF SUTURES     Orders Placed This Encounter   Medications    vitamin D3 (CHOLECALCIFEROL) 25 MCG (1000 UT) TABS tablet     Sig: Take 1 tablet by mouth in the morning. Dispense:  90 tablet     Refill:  3     Return in about 3 months (around 11/2/2022) for follow up, with PCP.

## 2022-08-03 ENCOUNTER — CARE COORDINATION (OUTPATIENT)
Dept: CARE COORDINATION | Age: 77
End: 2022-08-03

## 2022-08-03 LAB
BLOOD CULTURE, ROUTINE: NORMAL
CULTURE, BLOOD 2: NORMAL

## 2022-08-03 NOTE — CARE COORDINATION
Attempted to contact patient for care coordination discussion and ER follow up   Unable to reach patient by phone. Message left regarding purpose of call. Number provided and call back requested.

## 2022-08-05 ENCOUNTER — TELEPHONE (OUTPATIENT)
Dept: PRIMARY CARE CLINIC | Age: 77
End: 2022-08-05

## 2022-08-05 NOTE — TELEPHONE ENCOUNTER
Viraj from [employer] is calling to notify you she is refusing [address]. She lets the nurse come but wants no therapy. His # is [phone].

## 2022-08-06 NOTE — TELEPHONE ENCOUNTER
Pls call her and let her know I think it's a very good idea to allow HHc including all forms of therapy recommended by her surgeons.  This will assist in her recovery

## 2022-08-08 ENCOUNTER — TELEPHONE (OUTPATIENT)
Dept: PRIMARY CARE CLINIC | Age: 77
End: 2022-08-08

## 2022-08-12 ENCOUNTER — CARE COORDINATION (OUTPATIENT)
Dept: CARE COORDINATION | Age: 77
End: 2022-08-12

## 2022-08-12 NOTE — CARE COORDINATION
I called to discuss care coordination with Daryl Hernández. I explained my role along with the process of care coordinaiton. She states that she has had a lot of improvement with her overall health.   She says that she is cooking cleaning and taking short walks  She denies the need for care coordination at this time  I have provided her with my contact information and I have asked her to call me with any questions or concerns

## 2022-10-04 ENCOUNTER — OFFICE VISIT (OUTPATIENT)
Dept: PRIMARY CARE CLINIC | Age: 77
End: 2022-10-04
Payer: MEDICARE

## 2022-10-04 VITALS
OXYGEN SATURATION: 99 % | SYSTOLIC BLOOD PRESSURE: 126 MMHG | WEIGHT: 198.9 LBS | HEART RATE: 98 BPM | RESPIRATION RATE: 18 BRPM | BODY MASS INDEX: 33.14 KG/M2 | DIASTOLIC BLOOD PRESSURE: 68 MMHG | TEMPERATURE: 97.3 F | HEIGHT: 65 IN

## 2022-10-04 DIAGNOSIS — R51.9 RIGHT FACIAL PAIN: ICD-10-CM

## 2022-10-04 DIAGNOSIS — Z00.00 HEALTH CARE MAINTENANCE: ICD-10-CM

## 2022-10-04 DIAGNOSIS — I87.2 VENOUS INSUFFICIENCY OF LEFT LEG: ICD-10-CM

## 2022-10-04 DIAGNOSIS — L02.415 ABSCESS OF RIGHT HIP: ICD-10-CM

## 2022-10-04 DIAGNOSIS — R10.10 PAIN OF UPPER ABDOMEN: Primary | ICD-10-CM

## 2022-10-04 PROCEDURE — 96372 THER/PROPH/DIAG INJ SC/IM: CPT | Performed by: INTERNAL MEDICINE

## 2022-10-04 PROCEDURE — G0008 ADMIN INFLUENZA VIRUS VAC: HCPCS | Performed by: INTERNAL MEDICINE

## 2022-10-04 PROCEDURE — 90694 VACC AIIV4 NO PRSRV 0.5ML IM: CPT | Performed by: INTERNAL MEDICINE

## 2022-10-04 PROCEDURE — 99215 OFFICE O/P EST HI 40 MIN: CPT | Performed by: INTERNAL MEDICINE

## 2022-10-04 PROCEDURE — 1123F ACP DISCUSS/DSCN MKR DOCD: CPT | Performed by: INTERNAL MEDICINE

## 2022-10-04 RX ORDER — DICYCLOMINE HYDROCHLORIDE 10 MG/1
10 CAPSULE ORAL 4 TIMES DAILY
Qty: 360 CAPSULE | Refills: 1 | Status: SHIPPED | OUTPATIENT
Start: 2022-10-04

## 2022-10-04 RX ORDER — KETOROLAC TROMETHAMINE 10 MG/1
10 TABLET, FILM COATED ORAL EVERY 6 HOURS PRN
Qty: 20 TABLET | Refills: 0 | Status: SHIPPED | OUTPATIENT
Start: 2022-10-04 | End: 2022-11-01 | Stop reason: ALTCHOICE

## 2022-10-04 RX ORDER — KETOROLAC TROMETHAMINE 30 MG/ML
60 INJECTION, SOLUTION INTRAMUSCULAR; INTRAVENOUS ONCE
Status: COMPLETED | OUTPATIENT
Start: 2022-10-04 | End: 2022-10-04

## 2022-10-04 RX ADMIN — KETOROLAC TROMETHAMINE 60 MG: 30 INJECTION, SOLUTION INTRAMUSCULAR; INTRAVENOUS at 15:53

## 2022-10-04 ASSESSMENT — ENCOUNTER SYMPTOMS
NAUSEA: 0
SINUS PRESSURE: 0
SHORTNESS OF BREATH: 0
SINUS PAIN: 0
RHINORRHEA: 0
WHEEZING: 0
VOMITING: 0
ABDOMINAL PAIN: 1
DIARRHEA: 0
COLOR CHANGE: 1
COUGH: 0
SORE THROAT: 0

## 2022-10-18 ENCOUNTER — HOSPITAL ENCOUNTER (OUTPATIENT)
Dept: CT IMAGING | Age: 77
Discharge: HOME | End: 2022-10-20
Payer: MEDICARE

## 2022-10-18 DIAGNOSIS — R10.10 PAIN OF UPPER ABDOMEN: ICD-10-CM

## 2022-10-18 PROCEDURE — 74176 CT ABD & PELVIS W/O CONTRAST: CPT

## 2022-10-19 ENCOUNTER — HOSPITAL ENCOUNTER (OUTPATIENT)
Dept: WOUND CARE | Age: 77
Discharge: HOME | End: 2022-10-19

## 2022-10-19 DIAGNOSIS — Q50.39 OVARIAN ANOMALY: Primary | ICD-10-CM

## 2022-10-28 ENCOUNTER — OFFICE VISIT (OUTPATIENT)
Dept: GASTROENTEROLOGY | Age: 77
End: 2022-10-28
Payer: MEDICARE

## 2022-10-28 VITALS
BODY MASS INDEX: 35.94 KG/M2 | DIASTOLIC BLOOD PRESSURE: 60 MMHG | HEART RATE: 90 BPM | OXYGEN SATURATION: 98 % | SYSTOLIC BLOOD PRESSURE: 112 MMHG | WEIGHT: 216 LBS

## 2022-10-28 DIAGNOSIS — K74.60 CIRRHOSIS OF LIVER WITH ASCITES, UNSPECIFIED HEPATIC CIRRHOSIS TYPE (HCC): Primary | ICD-10-CM

## 2022-10-28 DIAGNOSIS — I71.43 INFRARENAL ABDOMINAL AORTIC ANEURYSM (AAA) WITHOUT RUPTURE: ICD-10-CM

## 2022-10-28 DIAGNOSIS — R18.8 CIRRHOSIS OF LIVER WITH ASCITES, UNSPECIFIED HEPATIC CIRRHOSIS TYPE (HCC): Primary | ICD-10-CM

## 2022-10-28 DIAGNOSIS — E66.01 SEVERE OBESITY (BMI 35.0-39.9) WITH COMORBIDITY (HCC): ICD-10-CM

## 2022-10-28 PROCEDURE — 1123F ACP DISCUSS/DSCN MKR DOCD: CPT | Performed by: SPECIALIST

## 2022-10-28 PROCEDURE — 99203 OFFICE O/P NEW LOW 30 MIN: CPT | Performed by: SPECIALIST

## 2022-10-28 ASSESSMENT — ENCOUNTER SYMPTOMS
NAUSEA: 0
EYES NEGATIVE: 1
BLOOD IN STOOL: 0
ANAL BLEEDING: 0
ABDOMINAL DISTENTION: 0
DIARRHEA: 0
ABDOMINAL PAIN: 0
VOMITING: 0
CONSTIPATION: 0
GASTROINTESTINAL NEGATIVE: 1
RESPIRATORY NEGATIVE: 1
RECTAL PAIN: 0

## 2022-10-28 NOTE — PROGRESS NOTES
Gastroenterology Clinic Visit    Jason Campos  [id number]  Chief Complaint   Patient presents with    Abdominal Pain       HPI: 69 y.o. female presents to the clinic with history of abdominal discomfort and fatigue, patient had a recent CT scan done which showed changes of cirrhosis with small amount of fluid in the pelvis. ,  Patient had EGD earlier in 2021 which showed grade 1 through 2 esophageal varices and changes of portal hypertensive gastropathy, no history of hematemesis melena or jaundice.,  Many years ago patient had contracted jaundice etiology unclear, history of alcohol use.,  Has a history of diabetes, has been experiencing occasional epigastric discomfort and occasional early fullness, patient reports having occasional watery and yellowish stool. No history of rectal bleeding.,  No intolerance to fatty meals. Reports occasional pruritus. Does not smoke does not drink alcohol. Had recent back surgery complicated by infection of the surgical site. Review of Systems   Constitutional: Negative. HENT: Negative. Eyes: Negative. Respiratory: Negative. History of COPD   Cardiovascular: Negative. No cardiac issues. Gastrointestinal: Negative. Negative for abdominal distention, abdominal pain, anal bleeding, blood in stool, constipation, diarrhea, nausea, rectal pain and vomiting. Abdominal bloating   Endocrine: Negative. History of diabetes   Genitourinary: Negative. Musculoskeletal: Negative. Skin: Negative. Allergic/Immunologic: Negative for food allergies. Neurological: Negative. Hematological: Negative. Psychiatric/Behavioral: Negative.         Past Medical History:   Diagnosis Date    Anxiety     DM (diabetes mellitus) ([facility] 4/14/2015    Hepatitis B infection     Hyperlipidemia     Hypothyroidism     Insomnia     Leg pain 4/14/2015    Post herpetic neuralgia     Unspecified sleep apnea     after seeing [facility] they state she is does not have sleep apnea. Not using cpap      Past Surgical History:   Procedure Laterality Date    ANKLE FRACTURE SURGERY  09/2016    DR ANDRADE  LT    BIOPSY / LIGATION TEMPORAL ARTERY Right 4/26/2019    RIGHT TEMPORAL ARTERY BIOPSY performed by Delores Arias MD at [address] [place] [address],  [address]  2015    surgery for transgeminal neuraglia. Insertion of sponge for chronic pain    COLONOSCOPY  3/31/14    DR Monika Puga    COLONOSCOPY N/A 2/2/2021    COLONOSCOPY DIAGNOSTIC performed by Akash Thomason MD at [address] (CERVIX STATUS UNKNOWN)      PARTIAL    LYMPHADENECTOMY      OTHER SURGICAL HISTORY Right     Trigeminal Neuralgia    AR COLON CA SCRN NOT  [address]  IND N/A 7/24/2017    COLONOSCOPY performed by Akash Thomason MD at [facility] 2/2/2021    EGD ESOPHAGOGASTRODUODENOSCOPY performed by Akash Thomason MD at [facility]     Current Outpatient Medications on File Prior to Visit   Medication Sig Dispense Refill    dicyclomine (BENTYL) 10 MG capsule Take 1 capsule by mouth 4 times daily 360 capsule 1    ketorolac (TORADOL) 10 MG tablet Take 1 tablet by mouth every 6 hours as needed for Pain Take after eating 20 tablet 0    vitamin D3 (CHOLECALCIFEROL) 25 MCG (1000 UT) TABS tablet Take 1 tablet by mouth in the morning.  90 tablet 3    ondansetron (ZOFRAN) 4 MG tablet Take 1 tablet by mouth 3 times daily as needed for Nausea or Vomiting 15 tablet 0    citalopram (CELEXA) 20 MG tablet TAKE 1 TABLET DAILY (NEED APPOINTMENT FOR FURTHER REFILLS) 90 tablet 1    levothyroxine (SYNTHROID) 150 MCG tablet TAKE 1 TABLET DAILY (NEED APPOINTMENT FOR FURTHER REFILLS) 90 tablet 3    famotidine (PEPCID) 20 MG tablet TAKE 1 TABLET TWICE A DAY (NEED APPOINTMENT FOR FURTHER REFILLS) 180 tablet 3    JANUVIA 100 MG tablet TAKE 1 TABLET DAILY 90 tablet 3    ipratropium-albuterol (DUONEB) 0.5-2.5 (3) MG/3ML SOLN nebulizer solution       fluticasone-umeclidin-vilant (TRELEGY ELLIPTA) 100-62.5-25 MCG/INH AEPB       tiZANidine (ZANAFLEX) 4 MG tablet Take 1 tablet by mouth every 8 hours as needed (muscle back pain) 30 tablet 1    Continuous Blood Gluc  (DEXCOM G6 ) MARIELA Use daily for blood sugar readings 1 Device 1    Continuous Blood Gluc Sensor (DEXCOM G6 SENSOR) MISC Use daily for blood sugar readings 1 each 3    albuterol sulfate  (90 Base) MCG/ACT inhaler Inhale 2 puffs into the lungs every 6 hours as needed for Wheezing 1 Inhaler 0    Blood Glucose Monitoring Suppl (TRUE METRIX METER) w/Device KIT use to test sugar  0    blood glucose test strips (TRUETEST TEST) strip Test blood glucose fasting and 1 hour post lunch and dinner 100 each 5    Lancets MISC Inject 1 each into the skin 3 times daily Use early in the morning fasting and 1 hour post lunch and dinner 100 each 5    aspirin 81 MG EC tablet Take 1 tablet by mouth daily 90 tablet 3     Current Facility-Administered Medications on File Prior to Visit   Medication Dose Route Frequency Provider Last Rate Last Admin    methylPREDNISolone acetate (DEPO-MEDROL) injection 60 mg  60 mg IntraMUSCular Once Sharri Lawler MD         Family History   Problem Relation Age of Onset    Stroke Mother     High Cholesterol Mother     Cancer Mother     Colon Cancer Neg Hx     Celiac Disease Neg Hx     Crohn's Disease Neg Hx       Social History     Socioeconomic History    Marital status:      Number of children: 2    Highest education level: 12th grade   Occupational History    Occupation: retired    Tobacco Use    Smoking status: Former     Packs/day: 1.00     Years: 48.00     Pack years: 48.00     Types: Cigarettes     Quit date: 2013     Years since quittin.2    Smokeless tobacco: Never   Vaping Use    Vaping Use: Every day    Substances: Nicotine, Flavoring, low  dose    Substance and Sexual Activity    Alcohol use: No     Comment: very rarely    Drug use: No    Sexual activity: Not Currently Partners: Male   Social History Narrative    Lives alone    Has stairs in home needs to go up/down. 2 children that help if needed      Social Determinants of Health     Financial Resource Strain: Low Risk     Difficulty of Paying Living Expenses: Not hard at all   Food Insecurity: No Food Insecurity    Worried About [address] in the Last Year: Never true    [address] in the Last Year: Never true       Blood pressure 112/60, pulse 90, weight 216 lb (98 kg), last menstrual period 02/28/1982, SpO2 98 %, not currently breastfeeding. Physical Exam  Constitutional:       Appearance: She is well-developed. Comments: No cutaneous stigmata of chronic liver disease   HENT:      Head: Normocephalic and atraumatic. Eyes:      Conjunctiva/sclera: Conjunctivae normal.      Pupils: Pupils are equal, round, and reactive to light. Cardiovascular:      Rate and Rhythm: Normal rate. Pulmonary:      Effort: Pulmonary effort is normal.   Abdominal:      General: Bowel sounds are normal.      Palpations: Abdomen is soft. Comments: Obese, soft no clinical evidence of any significant ascites   Musculoskeletal:         General: Normal range of motion. Cervical back: Normal range of motion. Skin:     General: Skin is warm. Neurological:      Mental Status: She is alert.       Comments: Alert and oriented x3 no asterixis       Laboratory, Pathology, Radiology reviewed indetail with relevant important investigations summarized below:  Lab Results   Component Value Date    WBC 3.3 (L) 07/29/2022    HGB 10.8 (L) 07/29/2022    HCT 34.4 (L) 07/29/2022    MCV 79.4 (L) 07/29/2022     (L) 07/29/2022     Lab Results   Component Value Date    ALT 6 07/29/2022    AST 59 (H) 07/29/2022    ALKPHOS 211 (H) 07/29/2022    BILITOT 0.3 07/29/2022       CT ABDOMEN PELVIS WO CONTRAST Additional Contrast? Oral    Result Date: 10/19/2022  EXAMINATION: CT OF THE ABDOMEN AND PELVIS WITHOUT CONTRAST 10/18/2022 2:55 pm TECHNIQUE: CT of the abdomen and pelvis was performed without the administration of intravenous contrast. Multiplanar reformatted images are provided for review. Automated exposure control, iterative reconstruction, and/or weight based adjustment of the mA/kV was utilized to reduce the radiation dose to as low as reasonably achievable. COMPARISON: July 29, 2022 HISTORY: ORDERING SYSTEM PROVIDED HISTORY: Pain of upper abdomen TECHNOLOGIST PROVIDED HISTORY: Additional Contrast?->Oral What reading provider will be dictating this exam?->CRC FINDINGS: Lower Chest: Heart size is within normal limits. Trace pericardial fluid is present. The lung bases are clear aside from mild dependent atelectasis noted bilaterally. Organs: The liver has a cirrhotic morphology with relative enlargement of the left hepatic lobe and surface contour nodularity and volume loss, specifically in the right lobe. No obvious solid hepatic mass within the limitations of this non-contrast enhanced exam.  No evidence of intrahepatic biliary ductal dilatation. The gallbladder is unremarkable. The spleen is enlarged, measuring 13.3 cm in craniocaudal dimension. There is also recanalization of the umbilical vein. The unenhanced pancreas is normal in appearance. The kidneys are without hydronephrosis or radiopaque calculus. A small volume of perihepatic and perisplenic ascites is present. GI/Bowel: No evidence of a bowel obstruction, free air or pneumatosis. Portions of the colon are decompressed, limiting assessment for mucosal based abnormalities. There is underdistention versus circumferential wall thickening of the proximal and mid duodenum. No definite mechelle-duodenal inflammatory changes although ascitic fluid adjacent to the duodenum does limit assessment for adjacent fat stranding. Stomach and duodenal sweep demonstrate no acute abnormality. The appendix is normal. Pelvis:  The urinary bladder is distended without obvious abnormality. The uterus is surgically absent. No obvious right ovarian abnormality. Left ovary is slightly enlarged compared with the right, measuring 3.1 x 2.1 cm. A moderate amount of ascites is present in the pelvis. No pelvic lymphadenopathy. Peritoneum/Retroperitoneum: The abdominal aorta is heavily calcified and mildly aneurysmal measuring 3 cm along the infrarenal segment. No retroperitoneal lymphadenopathy. Bones/Soft Tissues: No acute osseous abnormality or evidence of an aggressive osseous lesion. There are postsurgical changes related to L4-L5 laminectomy. There is underdistention versus circumferential wall thickening involving the proximal and mid duodenum. Duodenitis cannot be excluded. Cirrhosis of the liver with sequelae of portal hypertension including a small-to-moderate amount of ascites (mostly in the pelvis), splenomegaly and recanalization of the umbilical vein. Asymmetric size of the ovaries (left greater than right). Given the discrepancy in the prominent size/appearance of the left ovary given the patient's age, correlation with pelvic ultrasound is recommended. 3 cm infrarenal abdominal aortic aneurysm. See below recommendations for imaging follow-up. RECOMMENDATIONS: For management of fusiform AAA: 3.0-3.4 cm AAA, recommend follow-up every 3 years. Note: Recommend Vascular consultation if a fusiform AAA enlarges by >0.5 cm in 6 months or >1 cm in 1 year or for a saccular AAA of any size. References: Daniel Ferraris Radiol 2013; 12(58):994-943; J Vasc Surg. 2018; 67:2-77       Endoscopic investigations:     Assessmentand Plan:  69 y.o. female with history of cirrhosis. Most probably Tasha Hinojosa, patient had hepatitis many years ago details are unknown.,  Recent CT scan showed minimal ascites in the pelvis. Small to moderate amount of ascites in the pelvis splenomegaly. ,  Also has a 3 to 3.4 cm infrarenal abdominal aortic aneurysm. ,  Cirrhosis most probably Tasha Hinojosa however needs rule out other causes. We will check hepatitis B surface antigen and hep C antibody and also check AMA, patient has elevated alk phos. No significant elevation of transaminases and bilirubin is normal albumin is 2.8. Last EGD was in February 2021 which showed grade 1-2 esophageal varices and portal hypertensive gastropathy.,  Will schedule EGD for follow-up of esophageal varices, will refer to vascular surgeon Dr. Gaby Mccullough for evaluation of abdominal aortic aneurysm. Patient advised on a low-salt diet and does not want to take diuretics now. Diagnosis Orders   1. Cirrhosis of liver with ascites, unspecified hepatic cirrhosis type (HCC)  EGD    CBC with Auto Differential    Comprehensive Metabolic Panel    Hepatitis B Surface Antigen    Hepatitis C Antibody    MITOCHONDRIAL ANTIBODIES, M2, IGG    Protime-INR      2. Severe obesity (BMI 35.0-39. 9) with comorbidity ([address]  EGD      3. Infrarenal abdominal aortic aneurysm (AAA) without rupture  GUILLERMO - Vaishnavi Bocanegra MD, Vascular Surgery, [place]        Return in about 3 months (around 1/28/2023). Carey Justice MD   Staff Gastroenterologist  [facility]    Please note this report has been partially produced using speech recognition software and may cause contain errors related to thatsystem including grammar, punctuation and spelling as well as words and phrases that may seem inappropriate. If there are questions or concerns please feel free to contact me to clarify.

## 2022-11-01 ENCOUNTER — OFFICE VISIT (OUTPATIENT)
Dept: PRIMARY CARE CLINIC | Age: 77
End: 2022-11-01
Payer: MEDICARE

## 2022-11-01 VITALS
BODY MASS INDEX: 36.25 KG/M2 | RESPIRATION RATE: 18 BRPM | HEART RATE: 98 BPM | WEIGHT: 212.3 LBS | DIASTOLIC BLOOD PRESSURE: 64 MMHG | SYSTOLIC BLOOD PRESSURE: 124 MMHG | OXYGEN SATURATION: 100 % | HEIGHT: 64 IN

## 2022-11-01 VITALS
HEART RATE: 98 BPM | RESPIRATION RATE: 18 BRPM | SYSTOLIC BLOOD PRESSURE: 124 MMHG | DIASTOLIC BLOOD PRESSURE: 64 MMHG | BODY MASS INDEX: 36.44 KG/M2 | OXYGEN SATURATION: 100 % | HEIGHT: 64 IN

## 2022-11-01 DIAGNOSIS — G89.29 CHRONIC NECK PAIN: ICD-10-CM

## 2022-11-01 DIAGNOSIS — R51.9 RIGHT FACIAL PAIN: Primary | ICD-10-CM

## 2022-11-01 DIAGNOSIS — M54.2 CHRONIC NECK PAIN: ICD-10-CM

## 2022-11-01 DIAGNOSIS — Z00.00 MEDICARE ANNUAL WELLNESS VISIT, SUBSEQUENT: Primary | ICD-10-CM

## 2022-11-01 PROCEDURE — 99214 OFFICE O/P EST MOD 30 MIN: CPT | Performed by: INTERNAL MEDICINE

## 2022-11-01 PROCEDURE — G0439 PPPS, SUBSEQ VISIT: HCPCS | Performed by: INTERNAL MEDICINE

## 2022-11-01 PROCEDURE — 1123F ACP DISCUSS/DSCN MKR DOCD: CPT | Performed by: INTERNAL MEDICINE

## 2022-11-01 RX ORDER — TIZANIDINE 2 MG/1
2 TABLET ORAL EVERY 8 HOURS PRN
Qty: 10 TABLET | Refills: 0 | Status: SHIPPED | OUTPATIENT
Start: 2022-11-01

## 2022-11-01 RX ORDER — HYDROCODONE BITARTRATE AND ACETAMINOPHEN 5; 325 MG/1; MG/1
1 TABLET ORAL EVERY 6 HOURS PRN
Qty: 14 TABLET | Refills: 0 | Status: SHIPPED | OUTPATIENT
Start: 2022-11-01 | End: 2022-11-08

## 2022-11-01 ASSESSMENT — PATIENT HEALTH QUESTIONNAIRE - PHQ9
6. FEELING BAD ABOUT YOURSELF - OR THAT YOU ARE A FAILURE OR HAVE LET YOURSELF OR YOUR FAMILY DOWN: 0
3. TROUBLE FALLING OR STAYING ASLEEP: 0
SUM OF ALL RESPONSES TO PHQ QUESTIONS 1-9: 0
1. LITTLE INTEREST OR PLEASURE IN DOING THINGS: 0
9. THOUGHTS THAT YOU WOULD BE BETTER OFF DEAD, OR OF HURTING YOURSELF: 0
SUM OF ALL RESPONSES TO PHQ QUESTIONS 1-9: 0
SUM OF ALL RESPONSES TO PHQ QUESTIONS 1-9: 0
10. IF YOU CHECKED OFF ANY PROBLEMS, HOW DIFFICULT HAVE THESE PROBLEMS MADE IT FOR YOU TO DO YOUR WORK, TAKE CARE OF THINGS AT HOME, OR GET ALONG WITH OTHER PEOPLE: 0
SUM OF ALL RESPONSES TO PHQ QUESTIONS 1-9: 0
7. TROUBLE CONCENTRATING ON THINGS, SUCH AS READING THE NEWSPAPER OR WATCHING TELEVISION: 0
5. POOR APPETITE OR OVEREATING: 0
8. MOVING OR SPEAKING SO SLOWLY THAT OTHER PEOPLE COULD HAVE NOTICED. OR THE OPPOSITE, BEING SO FIGETY OR RESTLESS THAT YOU HAVE BEEN MOVING AROUND A LOT MORE THAN USUAL: 0
SUM OF ALL RESPONSES TO PHQ9 QUESTIONS 1 & 2: 0
4. FEELING TIRED OR HAVING LITTLE ENERGY: 0
2. FEELING DOWN, DEPRESSED OR HOPELESS: 0

## 2022-11-01 ASSESSMENT — COLUMBIA-SUICIDE SEVERITY RATING SCALE - C-SSRS
2. HAVE YOU ACTUALLY HAD ANY THOUGHTS OF KILLING YOURSELF?: NO
1. WITHIN THE PAST MONTH, HAVE YOU WISHED YOU WERE DEAD OR WISHED YOU COULD GO TO SLEEP AND NOT WAKE UP?: NO
6. HAVE YOU EVER DONE ANYTHING, STARTED TO DO ANYTHING, OR PREPARED TO DO ANYTHING TO END YOUR LIFE?: NO

## 2022-11-01 ASSESSMENT — ENCOUNTER SYMPTOMS
NAUSEA: 0
SHORTNESS OF BREATH: 0
COUGH: 0
ABDOMINAL PAIN: 0
DIARRHEA: 0
VOMITING: 0
WHEEZING: 0

## 2022-11-01 NOTE — PROGRESS NOTES
Euro  Subjective:      Patient ID: Jaime Chand is a 69 y.o. female    Neck and facial pain f/u  HPI  Pt presents for follow up regarding chronic intermittent right facial and neck pain. Right facial pain radiating to the head and neck is sharp, shooting rated 10/10. Temporal artery biopsy negative for GCA. CT facial bones negative. Hx trigeminal neuralgia s/p surgery. Attests to seeing black spots, no flashes of light. No fever or chills. Brain MRI 2 yrs ago was negative. Pain improved on 600-800mg ibuprofen. No slurred speech or one-sided weakness. No dizziness or LOC. Unsuccessful at making appt with neuro Dr. Dany Zhao. Past Medical History:   Diagnosis Date    Anxiety     DM (diabetes mellitus) ([facility] [place] [address] 4/14/2015    Hepatitis B infection     Hyperlipidemia     Hypothyroidism     Insomnia     Leg pain 4/14/2015    Post herpetic neuralgia     Unspecified sleep apnea     after seeing [facility] they state she is does not have sleep apnea. Not using cpap     Past Surgical History:   Procedure Laterality Date    ANKLE FRACTURE SURGERY  09/2016    DR ANDRADE  LT    BIOPSY / LIGATION TEMPORAL ARTERY Right 4/26/2019    RIGHT TEMPORAL ARTERY BIOPSY performed by Ligia Rutledge MD at [address] [place] [address],  [address]  2015    surgery for transgeminal neuraglia. Insertion of sponge for chronic pain    COLONOSCOPY  3/31/14    DR Sandra Parada    COLONOSCOPY N/A 2/2/2021    COLONOSCOPY DIAGNOSTIC performed by Francine Kelley MD at [address] (CERVIX STATUS UNKNOWN)      PARTIAL    LYMPHADENECTOMY      OTHER SURGICAL HISTORY Right     Trigeminal Neuralgia    NH COLON CA SCRN NOT  [address] [place] [address] IND N/A 7/24/2017    COLONOSCOPY performed by Francine Kelley MD at [facility] [address] 2/2/2021    EGD ESOPHAGOGASTRODUODENOSCOPY performed by Francine Kelley MD at [place] [address] History    Marital status:       Spouse name: Not on file Number of children: 2    Years of education: Not on file    Highest education level: 12th grade   Occupational History    Occupation: retired    Tobacco Use    Smoking status: Former     Packs/day: 1.00     Years: 48.00     Pack years: 48.00     Types: Cigarettes     Quit date: 2013     Years since quittin.2    Smokeless tobacco: Never   Vaping Use    Vaping Use: Every day    Substances: Nicotine, Flavoring, low  dose    Substance and Sexual Activity    Alcohol use: No     Comment: very rarely    Drug use: No    Sexual activity: Not Currently     Partners: Male   Other Topics Concern    Not on file   Social History Narrative    Lives alone    Has stairs in home needs to go up/down.     2 children that help if needed      Social Determinants of Health     Financial Resource Strain: Low Risk     Difficulty of Paying Living Expenses: Not hard at all   Food Insecurity: No Food Insecurity    Worried About Running Out of Food in the Last Year: Never true    Ran Out of Food in the Last Year: Never true   Transportation Needs: Not on file   Physical Activity: Inactive    Days of Exercise per Week: 0 days    Minutes of Exercise per Session: 0 min   Stress: Not on file   Social Connections: Not on file   Intimate Partner Violence: Not on file   Housing Stability: Not on file     Family History   Problem Relation Age of Onset    Stroke Mother     High Cholesterol Mother     Cancer Mother     Colon Cancer Neg Hx     Celiac Disease Neg Hx     Crohn's Disease Neg Hx      Allergies:  Cefdinir and Metformin and related  Patient Active Problem List   Diagnosis    Hepatitis B infection    Sleep apnea    Anxiety    Insomnia    Trigeminal neuralgia    Hyperlipidemia    Hypothyroidism    Dyspnea    DM (diabetes mellitus) ([facility] [place] [address]    Leg pain    Depression    Frequent headaches    Major depressive disorder, recurrent, in full remission ([facility] [address]    Cherry angioma    Diffuse photodamage of skin    Dilated pore of Solomon    GERD (gastroesophageal reflux disease)    Hidradenitis suppurativa    Lentigines    Multiple benign nevi    Poikiloderma    Seborrheic keratosis    Morbidly obese (HCC)    Chronic gastritis without bleeding    Polyp of sigmoid colon    Esophageal varices without bleeding, unspecified esophageal varices type (HCC)    Chronic obstructive pulmonary disease, unspecified COPD type ([facility] [address]    Anemia with low platelet count ([facility] [address]    Chronic renal disease, stage III ([facility] [address] [[id number]]     Current Outpatient Medications on File Prior to Visit   Medication Sig Dispense Refill    dicyclomine (BENTYL) 10 MG capsule Take 1 capsule by mouth 4 times daily 360 capsule 1    vitamin D3 (CHOLECALCIFEROL) 25 MCG (1000 UT) TABS tablet Take 1 tablet by mouth in the morning.  90 tablet 3    ondansetron (ZOFRAN) 4 MG tablet Take 1 tablet by mouth 3 times daily as needed for Nausea or Vomiting 15 tablet 0    citalopram (CELEXA) 20 MG tablet TAKE 1 TABLET DAILY (NEED APPOINTMENT FOR FURTHER REFILLS) 90 tablet 1    levothyroxine (SYNTHROID) 150 MCG tablet TAKE 1 TABLET DAILY (NEED APPOINTMENT FOR FURTHER REFILLS) 90 tablet 3    famotidine (PEPCID) 20 MG tablet TAKE 1 TABLET TWICE A DAY (NEED APPOINTMENT FOR FURTHER REFILLS) 180 tablet 3    JANUVIA 100 MG tablet TAKE 1 TABLET DAILY 90 tablet 3    ipratropium-albuterol (DUONEB) 0.5-2.5 (3) MG/3ML SOLN nebulizer solution       fluticasone-umeclidin-vilant (TRELEGY ELLIPTA) 100-62.5-25 MCG/INH AEPB       Continuous Blood Gluc  (DEXCOM G6 ) MARIELA Use daily for blood sugar readings 1 Device 1    Continuous Blood Gluc Sensor (DEXCOM G6 SENSOR) MISC Use daily for blood sugar readings 1 each 3    albuterol sulfate  (90 Base) MCG/ACT inhaler Inhale 2 puffs into the lungs every 6 hours as needed for Wheezing 1 Inhaler 0    Blood Glucose Monitoring Suppl (TRUE METRIX METER) w/Device KIT use to test sugar  0    blood glucose test strips (TRUETEST TEST) strip Test blood glucose fasting and 1 hour post lunch and dinner 100 each 5    Lancets MISC Inject 1 each into the skin 3 times daily Use early in the morning fasting and 1 hour post lunch and dinner 100 each 5    aspirin 81 MG EC tablet Take 1 tablet by mouth daily 90 tablet 3     Current Facility-Administered Medications on File Prior to Visit   Medication Dose Route Frequency Provider Last Rate Last Admin    methylPREDNISolone acetate (DEPO-MEDROL) injection 60 mg  60 mg IntraMUSCular Once Gia Cruz MD         Review of Systems   Constitutional:  Negative for chills, diaphoresis, fatigue and fever. HENT:  Negative for congestion, ear discharge and ear pain. Eyes:  Positive for visual disturbance. Respiratory:  Negative for cough, shortness of breath and wheezing. Cardiovascular:  Negative for chest pain. Gastrointestinal:  Negative for abdominal pain, diarrhea, nausea and vomiting. Endocrine: Negative for cold intolerance and heat intolerance. Genitourinary:  Negative for dysuria and frequency. Musculoskeletal:  Positive for neck pain. Negative for neck stiffness. Neurological:  Positive for headaches. Negative for dizziness and light-headedness. Objective:   /64   Pulse 98   Resp 18   Ht 5' 4\" (1.626 m)   Wt 212 lb 4.8 oz (96.3 kg)   LMP 02/28/1982   SpO2 100%   BMI 36.44 kg/m²     Physical Exam  Constitutional:       General: She is not in acute distress. Appearance: She is not diaphoretic. HENT:      Head:      Comments: Right temporal and parietal scalp TTP  Cardiovascular:      Rate and Rhythm: Normal rate and regular rhythm. Pulses: Normal pulses. Heart sounds: Normal heart sounds, S1 normal and S2 normal.   Pulmonary:      Effort: Pulmonary effort is normal. No respiratory distress. Breath sounds: Normal breath sounds. No wheezing or rales. Chest:      Chest wall: No tenderness. Abdominal:      General: Bowel sounds are normal.      Tenderness: There is no abdominal tenderness. Neurological:      General: No focal deficit present. Mental Status: She is alert. Cranial Nerves: No cranial nerve deficit. Motor: No weakness. Psychiatric:         Mood and Affect: Mood normal.         Thought Content: Thought content normal.     Assessment:       Diagnosis Orders   1. Right facial pain  MRI BRAIN WO CONTRAST    External Referral to Neurology    HYDROcodone-acetaminophen (NORCO) 5-325 MG per tablet      2. Chronic neck pain  MRI CERVICAL SPINE WO CONTRAST    External Referral to Neurology    HYDROcodone-acetaminophen ([address]) 5-325 MG per tablet        Plan:      Orders Placed This Encounter   Procedures    MRI CERVICAL SPINE WO CONTRAST     Standing Status:   Future     Standing Expiration Date:   11/1/2023    MRI BRAIN WO CONTRAST     Standing Status:   Future     Standing Expiration Date:   11/1/2023    External Referral to Neurology     Referral Priority:   Routine     Referral Type:   Eval and Treat     Referral Reason:   Specialty Services Required     Requested Specialty:   Neurology     Number of Visits Requested:   1     Orders Placed This Encounter   Medications    tiZANidine (ZANAFLEX) 2 MG tablet     Sig: Take 1 tablet by mouth every 8 hours as needed (neck pain)     Dispense:  10 tablet     Refill:  0    HYDROcodone-acetaminophen (NORCO) 5-325 MG per tablet     Sig: Take 1 tablet by mouth every 6 hours as needed for Pain for up to 7 days. Intended supply: 7 days. Take lowest dose possible to manage pain     Dispense:  14 tablet     Refill:  0     Reduce doses taken as pain becomes manageable     No follow-ups on file.

## 2022-11-01 NOTE — PROGRESS NOTES
Medicare Annual Wellness Visit    Marycarmen Stewart is here for No chief complaint on file. Assessment & Plan   Medicare annual wellness visit, subsequent    Recommendations for Preventive Services Due: see orders and patient instructions/AVS.  Recommended screening schedule for the next 5-10 years is provided to the patient in written form: see Patient Instructions/AVS.     Return for Medicare Annual Wellness Visit in 1 year. Subjective       Patient's complete Health Risk Assessment and screening values have been reviewed and are found in Flowsheets. The following problems were reviewed today and where indicated follow up appointments were made and/or referrals ordered. Positive Risk Factor Screenings with Interventions:       Tobacco Use:  Tobacco Use: Medium Risk    Smoking Tobacco Use: Former    Smokeless Tobacco Use: Never    Passive Exposure: Not on file     E-cigarette/Vaping       Questions Responses    E-cigarette/Vaping Use Current Every Day User    Start Date     Passive Exposure     Quit Date     Counseling Given     Comments           Substance Use - Tobacco Interventions:  Quit smoking 10 yrs ago          Opioid Risk: (Low risk score <55) Opioid risk score: 20    Patient is low risk for opioid use disorder or overdose.   Last PDMP [place] as Reviewed:  Review User Review Instant Review Result               General Health and ACP:  General  In general, how would you say your health is?: Fair  In the past 7 days, have you experienced any of the following: New or Increased Pain, New or Increased Fatigue, Loneliness, Social Isolation, Stress or Anger?: (!) Yes  Select all that apply: (!) New or Increased Pain  Do you get the social and emotional support that you need?: Yes  Do you have a Living Will?: Yes    Advance Directives       Power of  Living Will ACP-Advance Directive ACP-Power of     Not on File Filed on 07/26/17 Filed Not on File        General Health Risk Interventions:  Pain issues: referral placed to neuroNorco, medication adjusted- [place] and ZAnaflex     Health Habits/Nutrition:  Physical Activity: Inactive    Days of Exercise per Week: 0 days    Minutes of Exercise per Session: 0 min     Have you lost any weight without trying in the past 3 months?: No     Have you seen the dentist within the past year?: Yes  Health Habits/Nutrition Interventions:  Inadequate physical activity:  patient is not ready to increase his/her physical activity level at this time    Hearing/Vision:  Do you or your family notice any trouble with your hearing that hasn't been managed with hearing aids?: No  Do you have difficulty driving, watching TV, or doing any of your daily activities because of your eyesight?: No  Have you had an eye exam within the past year?: (!) No  No results found. Hearing/Vision Interventions:  None             Objective   Vitals:    11/01/22 1454   BP: 124/64   Pulse: 98   Resp: 18   SpO2: 100%   Height: 5' 4\" (1.626 m)      Body mass index is 36.44 kg/m². Allergies   Allergen Reactions    Cefdinir      Diarrhea      Metformin And Related      Swelling leg     Prior to Visit Medications    Medication Sig Taking? Authorizing Provider   dicyclomine (BENTYL) 10 MG capsule Take 1 capsule by mouth 4 times daily Yes Louie Castillo MD   vitamin D3 (CHOLECALCIFEROL) 25 MCG (1000 UT) TABS tablet Take 1 tablet by mouth in the morning.  Yes Louie Castillo MD   ondansetron (ZOFRAN) 4 MG tablet Take 1 tablet by mouth 3 times daily as needed for Nausea or Vomiting Yes Louie Castillo MD   citalopram (CELEXA) 20 MG tablet TAKE 1 TABLET DAILY (NEED APPOINTMENT FOR FURTHER REFILLS) Yes Louie Castillo MD   levothyroxine (SYNTHROID) 150 MCG tablet TAKE 1 TABLET DAILY (NEED APPOINTMENT FOR FURTHER REFILLS) Yes Louie Castillo MD   famotidine (PEPCID) 20 MG tablet TAKE 1 TABLET TWICE A DAY (NEED APPOINTMENT FOR FURTHER REFILLS) Yes Louie Castillo MD   JANUVIA 100 MG tablet TAKE 1 TABLET DAILY Yes Louie Castillo MD ipratropium-albuterol (DUONEB) 0.5-2.5 (3) MG/3ML SOLN nebulizer solution  Yes Historical Provider, MD   fluticasone-umeclidin-vilant (Dariana Dago) 100-62.5-25 MCG/INH AEPB  Yes Historical Provider, MD   Continuous Blood Gluc  (DEXCOM G6 ) MARIELA Use daily for blood sugar readings Yes Adolfo Layton MD   Continuous Blood Gluc Sensor (DEXCOM G6 SENSOR) MISC Use daily for blood sugar readings Yes Adolfo Layton MD   albuterol sulfate  (90 Base) MCG/ACT inhaler Inhale 2 puffs into the lungs every 6 hours as needed for Wheezing Yes Chantel Pizarro, NILSON - CNP   Blood Glucose Monitoring Suppl (TRUE METRIX METER) w/Device KIT use to test sugar Yes Historical Provider, MD   blood glucose test strips (TRUETEST TEST) strip Test blood glucose fasting and 1 hour post lunch and dinner Yes Adolfo Layton MD   Lancets MISC Inject 1 each into the skin 3 times daily Use early in the morning fasting and 1 hour post lunch and dinner Yes Adolfo Layton MD   aspirin 81 MG EC tablet Take 1 tablet by mouth daily Yes Adolfo Layton MD   tiZANidine (ZANAFLEX) 2 MG tablet Take 1 tablet by mouth every 8 hours as needed (neck pain)  Adolfo Layton MD   HYDROcodone-acetaminophen (NORCO) 5-325 MG per tablet Take 1 tablet by mouth every 6 hours as needed for Pain for up to 7 days. Intended supply: 7 days.  Take lowest dose possible to manage pain  Adolfo Layton MD       [facility] (Including outside providers/suppliers regularly involved in providing care):   Patient Care Team:  Adolfo Layton MD as PCP - General (Internal Medicine)  Adolfo Layton MD as PCP - [facility] Provider     Reviewed and updated this visit:  Allergies  Meds

## 2022-11-01 NOTE — PATIENT INSTRUCTIONS
Personalized Preventive Plan for Abbie Ruiz - 11/1/2022  Medicare offers a range of preventive health benefits. Some of the tests and screenings are paid in full while other may be subject to a deductible, co-insurance, and/or copay. Some of these benefits include a comprehensive review of your medical history including lifestyle, illnesses that may run in your family, and various assessments and screenings as appropriate. After reviewing your medical record and screening and assessments performed today your provider may have ordered immunizations, labs, imaging, and/or referrals for you. A list of these orders (if applicable) as well as your Preventive Care list are included within your After Visit Summary for your review. Other Preventive Recommendations:    A preventive eye exam performed by an eye specialist is recommended every 1-2 years to screen for glaucoma; cataracts, macular degeneration, and other eye disorders. A preventive dental visit is recommended every 6 months. Try to get at least 150 minutes of exercise per week or 10,000 steps per day on a pedometer . Order or download the FREE \"Exercise & Physical Activity: Your Everyday Guide\" from [employer]. Call 8-227.532.3703 or search [employer] online. You need 8499-5588 mg of calcium and 6052-9625 IU of vitamin D per day. It is possible to meet your calcium requirement with diet alone, but a vitamin D supplement is usually necessary to meet this goal.  When exposed to the sun, use a sunscreen that protects against both UVA and UVB radiation with an SPF of 30 or greater. Reapply every 2 to 3 hours or after sweating, drying off with a towel, or swimming. Always wear a seat belt when traveling in a car. Always wear a helmet when riding a bicycle or motorcycle.

## 2022-11-02 ENCOUNTER — HOSPITAL ENCOUNTER (OUTPATIENT)
Dept: WOUND CARE | Age: 77
Discharge: HOME | End: 2022-11-02
Payer: MEDICARE

## 2022-11-02 VITALS
RESPIRATION RATE: 18 BRPM | HEART RATE: 90 BPM | DIASTOLIC BLOOD PRESSURE: 82 MMHG | TEMPERATURE: 97.1 F | SYSTOLIC BLOOD PRESSURE: 154 MMHG

## 2022-11-02 DIAGNOSIS — L97.929 VENOUS ULCER OF LEFT LOWER EXTREMITY WITH VARICOSE VEINS (HCC): ICD-10-CM

## 2022-11-02 DIAGNOSIS — I83.029 VENOUS ULCER OF LEFT LOWER EXTREMITY WITH VARICOSE VEINS (HCC): ICD-10-CM

## 2022-11-02 PROCEDURE — 99203 OFFICE O/P NEW LOW 30 MIN: CPT | Performed by: SURGERY

## 2022-11-02 PROCEDURE — 99212 OFFICE O/P EST SF 10 MIN: CPT

## 2022-11-02 NOTE — DISCHARGE INSTRUCTIONS
Wound Clinic Physician Orders and Discharge [address] [facility]  [address]   [place], [address] [facility]  Telephone: [phone]    NAME:  Sterling Styles OF BIRTH:  [id number]  MEDICAL RECORD NUMBER:  [id number]  DATE: 11/2/22    Congratulations!! You have completed your treatment. 1. Return to your Primary Care Physician for all your health issues. 2. Resume your ordinary activities as tolerated. 3. Take your medications as prescribed by your primary care physician. 4. Check your skin daily for cracks, bruises, sores, or dryness. Use a moisturizer as needed. 5. Clean and dry your skin, using mild soap and warm water (not hot). 6. Avoid alcohol and caffeine and do not smoke. 7. Maintain a nutritious diet. 8. Avoid pressure on your wound site. Keep your legs elevated above the level of the heart whenever possible. 9. If wound opens up again make an appointment with the wound clinic. 10. Follow up with Infectious Disease. 6. Dr. Nita Peraza office with call you to set up testing for your legs    [address]  PLEASE CALL IF YOU DEVELOP ANOTHER WOUND. [phone]               Electronically signed by Aristeo Mcneill MD on 11/2/2022 at 12:24 PM

## 2022-11-02 NOTE — PROGRESS NOTES
Nancy Robertson 38                                                   Progress Note and Procedure Note      Nathaniel Lucero  MEDICAL RECORD NUMBER:  [id number]  AGE: 69 y.o. GENDER: female  : 1945  EPISODE DATE:  2022    Subjective:     Chief Complaint   Patient presents with    Wound Check     NO OPEN WOUNDS SEEN         HISTORY of PRESENT ILLNESS HPI     Nathaniel Lucero is a 69 y.o. female who presents today for wound/ulcer evaluation. History of Wound Context: Patient is a 70 y/o female with history of left leg venous ulcer. Patient does not recall when this started. Currently the scab fell off today and the ulcer is healed. Patient also had a right superficial hip abscess after spine surgery that has also healed. She has had issues with recurrent infections, but she has not followed up with ID recently. Wound/Ulcer Pain Timing/Severity: none  Quality of pain: N/A  Severity:  0 / 10   Modifying Factors: None  Associated Signs/Symptoms: none    PAST MEDICAL HISTORY        Diagnosis Date    Anxiety     DM (diabetes mellitus) ([facility] [address] 2015    Hepatitis B infection     Hyperlipidemia     Hypothyroidism     Insomnia     Leg pain 2015    Post herpetic neuralgia     Unspecified sleep apnea     after seeing [facility][note 3] they state she is does not have sleep apnea. Not using cpap       PAST SURGICAL HISTORY    Past Surgical History:   Procedure Laterality Date    ANKLE FRACTURE SURGERY  2016    DR ANDRADE  LT    BIOPSY / LIGATION TEMPORAL ARTERY Right 2019    RIGHT TEMPORAL ARTERY BIOPSY performed by Vernon Herrera MD at [address] [place] [address] [address]      surgery for transgeminal neuraglia.   Insertion of sponge for chronic pain    COLONOSCOPY  3/31/14    DR Ranjana Tilley    COLONOSCOPY N/A 2021    COLONOSCOPY DIAGNOSTIC performed by Kimberly Lindsey MD at [address] [facility] ([address] [place] [address])      PARTIAL    LYMPHADENECTOMY      OTHER SURGICAL HISTORY Right Trigeminal Neuralgia    [place] COLON CA SCRN NOT HI RSK IND N/A 2017    COLONOSCOPY performed by Enoch Goldberg MD at [facility] 2021    EGD ESOPHAGOGASTRODUODENOSCOPY performed by Enoch Goldberg MD at [address] [place] [address]    Family History   Problem Relation Age of Onset    Stroke Mother     High Cholesterol Mother     Cancer Mother     Colon Cancer Neg Hx     Celiac Disease Neg Hx     Crohn's Disease Neg Hx        SOCIAL HISTORY    Social History     Tobacco Use    Smoking status: Former     Packs/day: 1.00     Years: 48.00     Pack years: 48.00     Types: Cigarettes     Quit date: 2013     Years since quittin.2    Smokeless tobacco: Never   Vaping Use    Vaping Use: Every day    Substances: Nicotine, Flavoring, low  dose    Substance Use Topics    Alcohol use: No     Comment: very rarely    Drug use: No       ALLERGIES    Allergies   Allergen Reactions    Cefdinir      Diarrhea      Metformin And Related      Swelling leg       MEDICATIONS    Current Outpatient Medications on File Prior to Encounter   Medication Sig Dispense Refill    tiZANidine (ZANAFLEX) 2 MG tablet Take 1 tablet by mouth every 8 hours as needed (neck pain) 10 tablet 0    HYDROcodone-acetaminophen (NORCO) 5-325 MG per tablet Take 1 tablet by mouth every 6 hours as needed for Pain for up to 7 days. Intended supply: 7 days. Take lowest dose possible to manage pain 14 tablet 0    dicyclomine (BENTYL) 10 MG capsule Take 1 capsule by mouth 4 times daily 360 capsule 1    vitamin D3 (CHOLECALCIFEROL) 25 MCG (1000 UT) TABS tablet Take 1 tablet by mouth in the morning.  90 tablet 3    ondansetron (ZOFRAN) 4 MG tablet Take 1 tablet by mouth 3 times daily as needed for Nausea or Vomiting 15 tablet 0    citalopram (CELEXA) 20 MG tablet TAKE 1 TABLET DAILY (NEED APPOINTMENT FOR FURTHER REFILLS) 90 tablet 1    levothyroxine (SYNTHROID) 150 MCG tablet TAKE 1 TABLET DAILY (NEED APPOINTMENT FOR FURTHER REFILLS) 90 tablet 3    famotidine (PEPCID) 20 MG tablet TAKE 1 TABLET TWICE A DAY (NEED APPOINTMENT FOR FURTHER REFILLS) 180 tablet 3    JANUVIA 100 MG tablet TAKE 1 TABLET DAILY 90 tablet 3    ipratropium-albuterol (DUONEB) 0.5-2.5 (3) MG/3ML SOLN nebulizer solution       fluticasone-umeclidin-vilant (TRELEGY ELLIPTA) 100-62.5-25 MCG/INH AEPB       Continuous Blood Gluc  (DEXCOM G6 ) MARIELA Use daily for blood sugar readings 1 Device 1    Continuous Blood Gluc Sensor (DEXCOM G6 SENSOR) MISC Use daily for blood sugar readings 1 each 3    albuterol sulfate  (90 Base) MCG/ACT inhaler Inhale 2 puffs into the lungs every 6 hours as needed for Wheezing 1 Inhaler 0    Blood Glucose Monitoring Suppl (TRUE METRIX METER) w/Device KIT use to test sugar  0    blood glucose test strips (TRUETEST TEST) strip Test blood glucose fasting and 1 hour post lunch and dinner 100 each 5    Lancets MISC Inject 1 each into the skin 3 times daily Use early in the morning fasting and 1 hour post lunch and dinner 100 each 5    aspirin 81 MG EC tablet Take 1 tablet by mouth daily 90 tablet 3     Current Facility-Administered Medications on File Prior to Encounter   Medication Dose Route Frequency Provider Last Rate Last Admin    methylPREDNISolone acetate (DEPO-MEDROL) injection 60 mg  60 mg IntraMUSCular Once Jv Atkinson MD           REVIEW OF SYSTEMS    Constitutional: negative  Respiratory: negative  Cardiovascular: negative  Allergic/Immunologic: negative    Objective:      BP (!) 154/82   Pulse 90   Temp 97.1 °F (36.2 °C) (Temporal)   Resp 18   LMP 02/28/1982     Wt Readings from Last 3 Encounters:   11/01/22 212 lb 4.8 oz (96.3 kg)   10/28/22 216 lb (98 kg)   10/04/22 198 lb 14.4 oz (90.2 kg)       PHYSICAL EXAM    Constitutional:   Well nourished and well developed. Appears neat and clean. Patient is alert, oriented x3, and in no apparent distress.      Respiratory:  Respiratory effort is easy and symmetric bilaterally. Rate is normal at rest and on room air. Vascular:  left dp 2+  Extremities positive for trace edema left leg. Left ankle with hemosiderin deposits. Neurological:   Sensation is normal to lower extremities. Dermatological:  Wound description noted in wound assessment. Psychiatric:  Judgement and insight intact. Short and long term memory intact. No evidence of depression, anxiety, or agitation. Patient is calm, cooperative, and communicative. Appropriate interactions and affect. Assessment:      Active Hospital Problems    Diagnosis Date Noted    Venous ulcer of left lower extremity with varicose veins ([facility] [address] [I83.029, L97.929] 11/02/2022     Priority: Medium      Post Debridement Measurements:  Wound/Ulcer Descriptions are Pre Debridement except measurements:       Incision 04/26/19 Head Right (Active)   Number of days: 4444           Plan:     F/u with I/d  F/u in my office for venous insuffiencey testing. Treatment Note please see attached Discharge Instructions    Written patient dismissal instructions given to patient and signed by patient or POA. Discharge Instructions         Wound Clinic Physician Orders and Discharge [address] [facility] [address]   [facility], [address] [facility]  Telephone: [phone]    NAME:  Freddy Dominique OF BIRTH:  1945  MEDICAL RECORD NUMBER:  [id number]  DATE: 11/2/22    Congratulations!! You have completed your treatment. 1. Return to your Primary Care Physician for all your health issues. 2. Resume your ordinary activities as tolerated. 3. Take your medications as prescribed by your primary care physician. 4. Check your skin daily for cracks, bruises, sores, or dryness. Use a moisturizer as needed. 5. Clean and dry your skin, using mild soap and warm water (not hot). 6. Avoid alcohol and caffeine and do not smoke. 7. Maintain a nutritious diet.    8. Avoid pressure on your wound site. Keep your legs elevated above the level of the heart whenever possible. 9. If wound opens up again make an appointment with the wound clinic. 10. Follow up with Infectious Disease. 6. Dr. Pamela Madden office with call you to set up testing for your legs    [address]  PLEASE CALL IF YOU DEVELOP ANOTHER WOUND. [phone]               Electronically signed by Nalini Melara MD on 11/2/2022 at 12:24 PM          Electronically signed by Nalini Melara MD on 11/2/2022 at 12:26 PM

## 2022-11-03 ENCOUNTER — HOSPITAL ENCOUNTER (OUTPATIENT)
Age: 77
Setting detail: OUTPATIENT SURGERY
Discharge: HOME | End: 2022-11-03
Attending: SPECIALIST | Admitting: SPECIALIST
Payer: MEDICARE

## 2022-11-03 ENCOUNTER — ANESTHESIA EVENT (OUTPATIENT)
Dept: ENDOSCOPY | Age: 77
End: 2022-11-03
Payer: MEDICARE

## 2022-11-03 ENCOUNTER — ANESTHESIA (OUTPATIENT)
Dept: ENDOSCOPY | Age: 77
End: 2022-11-03
Payer: MEDICARE

## 2022-11-03 VITALS
HEART RATE: 93 BPM | SYSTOLIC BLOOD PRESSURE: 139 MMHG | RESPIRATION RATE: 18 BRPM | DIASTOLIC BLOOD PRESSURE: 72 MMHG | HEIGHT: 63 IN | WEIGHT: 216 LBS | TEMPERATURE: 98.5 F | BODY MASS INDEX: 38.27 KG/M2 | OXYGEN SATURATION: 95 %

## 2022-11-03 PROBLEM — I85.10 SECONDARY ESOPHAGEAL VARICES WITHOUT BLEEDING (HCC): Status: ACTIVE | Noted: 2022-02-15

## 2022-11-03 LAB
CHP ED QC CHECK: NORMAL
GLUCOSE BLD-MCNC: 171 MG/DL
GLUCOSE BLD-MCNC: 171 MG/DL (ref 70–99)
PERFORMED ON: ABNORMAL

## 2022-11-03 PROCEDURE — 7100000011 HC PHASE II RECOVERY - ADDTL 15 MIN: Performed by: SPECIALIST

## 2022-11-03 PROCEDURE — 7100000010 HC PHASE II RECOVERY - FIRST 15 MIN: Performed by: SPECIALIST

## 2022-11-03 PROCEDURE — 2580000003 HC RX 258: Performed by: SPECIALIST

## 2022-11-03 PROCEDURE — 2720000010 HC SURG SUPPLY STERILE: Performed by: SPECIALIST

## 2022-11-03 PROCEDURE — 43244 EGD VARICES LIGATION: CPT | Performed by: SPECIALIST

## 2022-11-03 PROCEDURE — 6360000002 HC RX W HCPCS: Performed by: NURSE ANESTHETIST, CERTIFIED REGISTERED

## 2022-11-03 PROCEDURE — 3700000000 HC ANESTHESIA ATTENDED CARE: Performed by: SPECIALIST

## 2022-11-03 PROCEDURE — 3609017100 HC EGD: Performed by: SPECIALIST

## 2022-11-03 PROCEDURE — 2580000003 HC RX 258

## 2022-11-03 PROCEDURE — 3700000001 HC ADD 15 MINUTES (ANESTHESIA): Performed by: SPECIALIST

## 2022-11-03 PROCEDURE — 2709999900 HC NON-CHARGEABLE SUPPLY: Performed by: SPECIALIST

## 2022-11-03 RX ORDER — SODIUM CHLORIDE 0.9 % (FLUSH) 0.9 %
5-40 SYRINGE (ML) INJECTION PRN
Status: DISCONTINUED | OUTPATIENT
Start: 2022-11-03 | End: 2022-11-03 | Stop reason: HOSPADM

## 2022-11-03 RX ORDER — PROPOFOL 10 MG/ML
INJECTION, EMULSION INTRAVENOUS PRN
Status: DISCONTINUED | OUTPATIENT
Start: 2022-11-03 | End: 2022-11-03 | Stop reason: SDUPTHER

## 2022-11-03 RX ORDER — SODIUM CHLORIDE 0.9 % (FLUSH) 0.9 %
5-40 SYRINGE (ML) INJECTION EVERY 12 HOURS SCHEDULED
Status: DISCONTINUED | OUTPATIENT
Start: 2022-11-03 | End: 2022-11-03 | Stop reason: HOSPADM

## 2022-11-03 RX ORDER — SUCRALFATE ORAL 1 G/10ML
1 SUSPENSION ORAL 4 TIMES DAILY
Qty: 150 ML | Refills: 3 | Status: SHIPPED | OUTPATIENT
Start: 2022-11-03

## 2022-11-03 RX ORDER — SODIUM CHLORIDE 9 MG/ML
INJECTION, SOLUTION INTRAVENOUS PRN
Status: DISCONTINUED | OUTPATIENT
Start: 2022-11-03 | End: 2022-11-03 | Stop reason: HOSPADM

## 2022-11-03 RX ORDER — ONDANSETRON 2 MG/ML
4 INJECTION INTRAMUSCULAR; INTRAVENOUS
Status: DISCONTINUED | OUTPATIENT
Start: 2022-11-03 | End: 2022-11-03 | Stop reason: HOSPADM

## 2022-11-03 RX ORDER — SODIUM CHLORIDE 9 MG/ML
INJECTION, SOLUTION INTRAVENOUS CONTINUOUS
Status: DISCONTINUED | OUTPATIENT
Start: 2022-11-03 | End: 2022-11-03 | Stop reason: HOSPADM

## 2022-11-03 RX ORDER — MAGNESIUM HYDROXIDE 1200 MG/15ML
LIQUID ORAL PRN
Status: DISCONTINUED | OUTPATIENT
Start: 2022-11-03 | End: 2022-11-03 | Stop reason: ALTCHOICE

## 2022-11-03 RX ORDER — ONDANSETRON 2 MG/ML
INJECTION INTRAMUSCULAR; INTRAVENOUS PRN
Status: DISCONTINUED | OUTPATIENT
Start: 2022-11-03 | End: 2022-11-03 | Stop reason: SDUPTHER

## 2022-11-03 RX ORDER — SODIUM CHLORIDE 9 MG/ML
INJECTION, SOLUTION INTRAVENOUS
Status: COMPLETED
Start: 2022-11-03 | End: 2022-11-03

## 2022-11-03 RX ADMIN — PROPOFOL 100 MG: 10 INJECTION, EMULSION INTRAVENOUS at 10:23

## 2022-11-03 RX ADMIN — SODIUM CHLORIDE: 9 INJECTION, SOLUTION INTRAVENOUS at 09:49

## 2022-11-03 RX ADMIN — ONDANSETRON 4 MG: 2 INJECTION INTRAMUSCULAR; INTRAVENOUS at 10:18

## 2022-11-03 RX ADMIN — PROPOFOL 200 MG: 10 INJECTION, EMULSION INTRAVENOUS at 10:12

## 2022-11-03 ASSESSMENT — ENCOUNTER SYMPTOMS: SHORTNESS OF BREATH: 1

## 2022-11-03 ASSESSMENT — PAIN - FUNCTIONAL ASSESSMENT: PAIN_FUNCTIONAL_ASSESSMENT: 0-10

## 2022-11-03 NOTE — ANESTHESIA PRE PROCEDURE
Department of Anesthesiology  Preprocedure Note       Name:  Miguel Angel Rodrigues   Age:  69 y.o.  :  1945                                          MRN:  [id number]         Date:  11/3/2022      Surgeon: Bel Espinoza):  Nupur Freed MD    Procedure: Procedure(s):  EGD ESOPHAGOGASTRODUODENOSCOPY    Medications prior to admission:   Prior to Admission medications    Medication Sig Start Date End Date Taking? Authorizing Provider   tiZANidine (ZANAFLEX) 2 MG tablet Take 1 tablet by mouth every 8 hours as needed (neck pain) 22   Shira Aguirre MD   HYDROcodone-acetaminophen (NORCO) 5-325 MG per tablet Take 1 tablet by mouth every 6 hours as needed for Pain for up to 7 days. Intended supply: 7 days. Take lowest dose possible to manage pain 22  Shira Aguirre MD   dicyclomine (BENTYL) 10 MG capsule Take 1 capsule by mouth 4 times daily 10/4/22   Shira Aguirre MD   vitamin D3 (CHOLECALCIFEROL) 25 MCG (1000 UT) TABS tablet Take 1 tablet by mouth in the morning.  22   Shira Aguirre MD   ondansetron (ZOFRAN) 4 MG tablet Take 1 tablet by mouth 3 times daily as needed for Nausea or Vomiting 22   Shira Aguirre MD   citalopram (CELEXA) 20 MG tablet TAKE 1 TABLET DAILY (NEED APPOINTMENT FOR FURTHER REFILLS) 22   Shira Aguirre MD   levothyroxine (SYNTHROID) 150 MCG tablet TAKE 1 TABLET DAILY (NEED APPOINTMENT FOR FURTHER REFILLS) 21   Shira Aguirre MD   famotidine (PEPCID) 20 MG tablet TAKE 1 TABLET TWICE A DAY (NEED APPOINTMENT FOR FURTHER REFILLS) 21   Shira Aguirre MD   JANUVIA 100 MG tablet TAKE 1 TABLET DAILY 21   Shira Aguirre MD   ipratropium-albuterol (DUONEB) 0.5-2.5 (3) MG/3ML SOLN nebulizer solution  10/8/21   Historical Provider, MD   fluticasone-umeclidin-vilant ([address]) 100-62.5-25 MCG/INH AEPB  10/5/21   Historical Provider, MD   Continuous Blood Gluc  (Nilda Christian) MARIELA Use daily for blood sugar readings 21   Shira Aguirre MD   Continuous Blood Gluc Sensor ([facility]) MISC Use daily for blood sugar readings 6/21/21   Yusra Chau MD   albuterol sulfate  (90 Base) MCG/ACT inhaler Inhale 2 puffs into the lungs every 6 hours as needed for Wheezing 11/23/20   NILSON Juarez - CNP   Blood Glucose Monitoring Suppl (TRUE METRIX METER) w/Device KIT use to test sugar 5/13/19   Historical Provider, MD   blood glucose test strips (TRUETEST TEST) strip Test blood glucose fasting and 1 hour post lunch and dinner 5/12/19   Ysura Chau MD   Lancets MISC Inject 1 each into the skin 3 times daily Use early in the morning fasting and 1 hour post lunch and dinner 5/12/19   Yusra Chau MD   aspirin 81 MG EC tablet Take 1 tablet by mouth daily 4/10/19   Yusra Chau MD       Current medications:    Current Facility-Administered Medications   Medication Dose Route Frequency Provider Last Rate Last Admin    0.9 % sodium chloride infusion   IntraVENous Continuous Nubia Hernandez  mL/hr at 11/03/22 0949 New Bag at 11/03/22 0949    sterile water for irrigation    PRN Nubia Hernandez MD   1,000 mL at 11/03/22 0950       Allergies: Allergies   Allergen Reactions    Cefdinir      Diarrhea      Metformin And Related      Swelling leg       Problem List:    Patient Active Problem List   Diagnosis Code    Hepatitis B infection B19.10    Sleep apnea G47.30    Anxiety F41.9    Insomnia G47.00    Trigeminal neuralgia G50.0    Hyperlipidemia E78.5    Hypothyroidism E03.9    Dyspnea R06.00    DM (diabetes mellitus) ([facility] [address] E11.9    Leg pain M79.606    Depression F32. 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    Seborrheic keratosis L82.1    Morbidly obese (HCC) E66.01    Chronic gastritis without bleeding K29.50    Polyp of sigmoid colon K63.5    Esophageal varices without bleeding, unspecified esophageal varices type (HCC) I85.00    Chronic obstructive pulmonary disease, unspecified COPD type (HCC) J44.9    Anemia with low platelet count (HCC) D69.6    Chronic renal disease, stage III ([facility] [[id number]] N18.30    Venous ulcer of left lower extremity with varicose veins (HCC) I83.029, L97.929       Past Medical History:        Diagnosis Date    Anxiety     DM (diabetes mellitus) ([facility] 2015    Hepatitis B infection     Hyperlipidemia     Hypothyroidism     Insomnia     Leg pain 2015    Post herpetic neuralgia     Unspecified sleep apnea     after seeing [facility] they state she is does not have sleep apnea. Not using cpap       Past Surgical History:        Procedure Laterality Date    ANKLE FRACTURE SURGERY  2016    DR ANDRADE  LT    BIOPSY / LIGATION TEMPORAL ARTERY Right 2019    RIGHT TEMPORAL ARTERY BIOPSY performed by Marline Ramos MD at [place] [address]    surgery for transgeminal neuraglia.   Insertion of sponge for chronic pain    COLONOSCOPY  3/31/14    DR Camila Melgar    COLONOSCOPY N/A 2021    COLONOSCOPY DIAGNOSTIC performed by Umer Perera MD at [facility] (CERVIX STATUS UNKNOWN)      PARTIAL    LYMPHADENECTOMY      OTHER SURGICAL HISTORY Right     Trigeminal Neuralgia    [place] COLON CA SCRN NOT  [address] IND N/A 2017    COLONOSCOPY performed by Umer Perera MD at [address] N/A 2021    EGD ESOPHAGOGASTRODUODENOSCOPY performed by Umer Perera MD at [address] History:    Social History     Tobacco Use    Smoking status: Former     Packs/day: 1.00     Years: 48.00     Pack years: 48.00     Types: Cigarettes     Quit date: 2013     Years since quittin.2    Smokeless tobacco: Never   Substance Use Topics  Alcohol use: Yes     Comment: very rarely                                Counseling given: Not Answered      Vital Signs (Current):   Vitals:    11/03/22 0945   BP: (!) 129/58   Pulse: 83   Resp: 18   Temp: 36.9 °C (98.5 °F)   TempSrc: Temporal   SpO2: 96%   Weight: 216 lb (98 kg)   Height: 5' 3\" (1.6 m)                                              BP Readings from Last 3 Encounters:   11/03/22 (!) 129/58   11/02/22 (!) 154/82   11/01/22 124/64       NPO Status: Time of last liquid consumption: 2300                        Time of last solid consumption: 2000                        Date of last liquid consumption: 11/02/22                        Date of last solid food consumption: 11/02/22    BMI:   Wt Readings from Last 3 Encounters:   11/03/22 216 lb (98 kg)   11/01/22 212 lb 4.8 oz (96.3 kg)   10/28/22 216 lb (98 kg)     Body mass index is 38.26 kg/m². CBC:   Lab Results   Component Value Date/Time    WBC 3.3 07/29/2022 03:15 PM    RBC 4.34 07/29/2022 03:15 PM    RBC 5.34 11/21/2011 02:29 PM    HGB 10.8 07/29/2022 03:15 PM    HCT 34.4 07/29/2022 03:15 PM    MCV 79.4 07/29/2022 03:15 PM    RDW 17.1 07/29/2022 03:15 PM     07/29/2022 03:15 PM       CMP:   Lab Results   Component Value Date/Time     07/29/2022 03:15 PM    K 4.0 07/29/2022 03:15 PM     07/29/2022 03:15 PM    CO2 26 07/29/2022 03:15 PM    BUN 8 07/29/2022 03:15 PM    CREATININE 0.9 07/29/2022 03:48 PM    CREATININE 0.93 07/29/2022 03:15 PM    GFRAA >60 07/29/2022 03:48 PM    LABGLOM >60 07/29/2022 03:48 PM    GLUCOSE 171 11/03/2022 09:52 AM    GLUCOSE 189 06/21/2022 02:30 PM    PROT 6.4 07/29/2022 03:15 PM    CALCIUM 8.6 07/29/2022 03:15 PM    BILITOT 0.3 07/29/2022 03:15 PM    ALKPHOS 211 07/29/2022 03:15 PM    AST 59 07/29/2022 03:15 PM    ALT 6 07/29/2022 03:15 PM       POC Tests: No results for input(s): POCGLU, POCNA, POCK, POCCL, POCBUN, POCHEMO, POCHCT in the last 72 hours.     Coags:   Lab Results   Component Value Date/Time    PROTIME 15.7 06/21/2022 02:30 PM    INR 1.4 06/21/2022 02:30 PM    APTT 28 06/21/2022 02:30 PM       HCG (If Applicable): No results found for: PREGTESTUR, PREGSERUM, HCG, HCGQUANT     ABGs: No results found for: PHART, PO2ART, ODC7PLF, BTK2SGH, BEART, S9REDAZO     Type & Screen (If Applicable):  No results found for: LABABO, LABRH    Drug/Infectious Status (If Applicable):  No results found for: HIV, HEPCAB    COVID-19 Screening (If Applicable):   Lab Results   Component Value Date/Time    COVID19 NOT DETECTED 06/21/2022 02:30 PM           Anesthesia Evaluation  Patient summary reviewed and Nursing notes reviewed  Airway: Mallampati: II          Dental: normal exam         Pulmonary:normal exam    (+) COPD:  shortness of breath:  sleep apnea:  asthma:                            Cardiovascular:                      Neuro/Psych:   (+) neuromuscular disease:, headaches:, psychiatric history:            GI/Hepatic/Renal:   (+) GERD:, hepatitis: B, liver disease: portal hypertension,           Endo/Other:    (+) DiabetesType II DM, , hypothyroidism::., .                 Abdominal:             Vascular: Other Findings:           Anesthesia Plan      MAC     ASA 3       Induction: intravenous. Anesthetic plan and risks discussed with patient.                         NILSON Montes De Oca - YANNA   11/3/2022

## 2022-11-03 NOTE — DISCHARGE INSTRUCTIONS
Upper Discharge Instructions    Patient Name: Seth Catalan  Patient ID: [id number]  YOB: 1945  Procedure: [facility]  Referring Physician: [unfilled]  Procedure Date: 11/3/2022    Recommendations: Follow-up appointment with endoscopist in 2 weeks. Follow-up appointment with family physician in 4 weeks. Reports of your procedure and these recommendations have been sent to:  [unfilled]    Sedative medication given for procedures can slow your reaction time and coordination for many hours. If you receive medications, it is important for your safety to follow the instructions below for the remainder of the day:  BE TAKEN directly home from the center and rest quietly. DO NOT resume normal activities until tomorrow. Do NOT drive, return to work, or operate any machinery or power tools. Do NOT make any important personal or business decisions, sign any legal papers or perform any activity that depends on your full concentration power or mental judgement. Do NOT drink any alcohol or take nerve or sleeping drugs. They add to the effects of the medicine still present in your body. If a biopsy or polyp removal is performed during the procedure, there is a slight risk of bleeding. If you had a biopsy or a polyp removed, we suggest that you follow the instructions below:  Do NOT take aspirin or similar anti-inflammatory medicine for *** days. Do NOT exercise, jog, or do any heavy lifting or straining for 1 day. Potential common after effects and treatment following the procedure:  Mild sore throat - treat with throat lozenges and gargle with warm salt water. Mild abdominal pain, bloating, or excessive gas - rest, eat lightly, and use a heating pad. Redness and/or swelling where the IV was - apply heat and elevate. Symptoms to report to your physician:  Severe sore throat or inability to swallow and/or eat usual diet. Chills or fever above 101 degrees occurring within 24 hours after procedure.   Pain in chest or neck. Severe continuing abdominal pain, vomiting, nausea, or bleeding. Swelling to the neck area. IV site stays red and swollen for more than 2 days. Persistent black bowel movement (may indicate hidden blood). In the case of an emergency, please go to the emergency room. If you are not having an emergency but are having some of the above symptoms please call the doctor's office at:  Dr. Carla Berg [phone]   @[email or web address]      I have read and understand the above instructions:          ____________________________         ____________________________  Patient or Patient Rep. Signature   Witness Signature      Date: 11/3/2022  Time:

## 2022-11-03 NOTE — ANESTHESIA POSTPROCEDURE EVALUATION
Department of Anesthesiology  Postprocedure Note    Patient: Tena Fair  MRN: [id number]  YOB: 1945  Date of evaluation: 11/3/2022      Procedure Summary     Date: 11/03/22 Room / Location: [address] [place] [address] 01 / [facility]    Anesthesia Start: 1010 Anesthesia Stop: 1026    Procedure: EGD ESOPHAGOGASTRODUODENOSCOPY Diagnosis:       Cirrhosis of liver with ascites, unspecified hepatic cirrhosis type ([facility] [address]      Severe obesity (BMI 35.0-35.9 with comorbidity) ([facility] [address]      (Cirrhosis of liver with ascites, unspecified hepatic cirrhosis type ([facility] [address] [K74.60, R18.8])      (Severe obesity (BMI 35.0-35.9 with comorbidity) ([facility] [address] [E66.01, Z68.35])    Surgeons: Aide Walls MD Responsible Provider: NILSON Tinajero CRNA    Anesthesia Type: MAC ASA Status: 3          Anesthesia Type: No value filed.     Francisca Phase I:      Francisca Phase II:        Anesthesia Post Evaluation    Patient location during evaluation: bedside  Patient participation: complete - patient participated  Level of consciousness: awake and awake and alert  Airway patency: patent  Nausea & Vomiting: no nausea and no vomiting  Complications: no  Cardiovascular status: blood pressure returned to baseline and hemodynamically stable  Respiratory status: acceptable  Hydration status: euvolemic

## 2022-11-03 NOTE — H&P
Patient Name: Regina Alvarez  : 1945  MRN: [id number]  DATE: 22      ENDOSCOPY  History and Physical    Procedure:    [] Diagnostic Colonoscopy       [] Screening Colonoscopy  [x] EGD      [] ERCP      [] EUS       [] Other    [x] Previous office notes/History and Physical reviewed from the patients chart. Please see EMR for further details of HPI. I have examined the patient's status immediately prior to the procedure and:      Indications/HPI:    []Abdominal Pain  []Cancer- GI/Lung  []Fhx of colon CA/polyps  []History of Polyps  []Masseys   []Melena  []Abnormal Imaging  []Dysphagia    []Persistent Pneumonia  []Anemia  []Food Impaction  []History of Polyps  []GI Bleed  []Pulmonary nodule/Mass  []Change in bowel habits []Heartburn/Reflux  []Rectal Bleed (BRBPR)  []Chest Pain - Non Cardiac []Heme (+) Stoo  l[]Ulcers  []Constipation  []Hemoptysis   []Varices  []Diarrhea  []Hypoxemia  []Nausea/Vomiting  []Screening   []Crohns/Colitis  []Other: h/o cirrhosis, dyspepsia    Anesthesia:   [x] MAC [] Moderate Sedation   [] General   [] None     ROS: 12 pt Review of Symptoms was negative unless mentioned above    Medications:   Prior to Admission medications    Medication Sig Start Date End Date Taking? Authorizing Provider   tiZANidine (ZANAFLEX) 2 MG tablet Take 1 tablet by mouth every 8 hours as needed (neck pain) 22   Jv Atkinson MD   HYDROcodone-acetaminophen (NORCO) 5-325 MG per tablet Take 1 tablet by mouth every 6 hours as needed for Pain for up to 7 days. Intended supply: 7 days. Take lowest dose possible to manage pain 22  Jv Atkinson MD   dicyclomine (BENTYL) 10 MG capsule Take 1 capsule by mouth 4 times daily 10/4/22   Jv Atkinson MD   vitamin D3 (CHOLECALCIFEROL) 25 MCG (1000 UT) TABS tablet Take 1 tablet by mouth in the morning.  22   Jv Atkinson MD   ondansetron (ZOFRAN) 4 MG tablet Take 1 tablet by mouth 3 times daily as needed for Nausea or Vomiting 22   Jv Atkinson MD citalopram (CELEXA) 20 MG tablet TAKE 1 TABLET DAILY (NEED APPOINTMENT FOR FURTHER REFILLS) 7/18/22   Aretha Tsai MD   levothyroxine (SYNTHROID) 150 MCG tablet TAKE 1 TABLET DAILY (NEED APPOINTMENT FOR FURTHER REFILLS) 12/5/21   Aretha Tsai MD   famotidine (PEPCID) 20 MG tablet TAKE 1 TABLET TWICE A DAY (NEED APPOINTMENT FOR FURTHER REFILLS) 12/5/21   Aretha Tsai MD   JANUVIA 100 MG tablet TAKE 1 TABLET DAILY 11/28/21   Aretha Tsai MD   ipratropium-albuterol (DUONEB) 0.5-2.5 (3) MG/3ML SOLN nebulizer solution  10/8/21   Historical Provider, MD   fluticasone-umeclidin-vilant (Melody [place]fax) 100-62.5-25 MCG/INH AEPB  10/5/21   Historical Provider, MD   Continuous Blood Gluc  (DEXCOM G6 ) MARIELA Use daily for blood sugar readings 6/21/21   Aretha Tsai MD   Continuous Blood Gluc Sensor (DEXCOM G6 SENSOR) MISC Use daily for blood sugar readings 6/21/21   Aretha Tsai MD   albuterol sulfate  (90 Base) MCG/ACT inhaler Inhale 2 puffs into the lungs every 6 hours as needed for Wheezing 11/23/20   NILSON Juarez - CNP   Blood Glucose Monitoring Suppl (TRUE METRIX METER) w/Device KIT use to test sugar 5/13/19   Historical Provider, MD   blood glucose test strips (TRUETEST TEST) strip Test blood glucose fasting and 1 hour post lunch and dinner 5/12/19   Aretha Tsai MD   Lancets MISC Inject 1 each into the skin 3 times daily Use early in the morning fasting and 1 hour post lunch and dinner 5/12/19   Aretha Tsai MD   aspirin 81 MG EC tablet Take 1 tablet by mouth daily 4/10/19   Aretha Tsai MD       Allergies:    Allergies   Allergen Reactions    Cefdinir      Diarrhea      Metformin And Related      Swelling leg        History of allergic reaction to anesthesia:  No    Past Medical History:  Past Medical History:   Diagnosis Date    Anxiety     [facility] 4/14/2015    Hepatitis B infection     Hyperlipidemia     Hypothyroidism     Insomnia     Leg pain 4/14/2015    Post herpetic neuralgia Unspecified sleep apnea     after seeing [facility] they state she is does not have sleep apnea. Not using cpap       Past Surgical History:  Past Surgical History:   Procedure Laterality Date    ANKLE FRACTURE SURGERY  2016    DR ANDRADE  LT    BIOPSY / LIGATION TEMPORAL ARTERY Right 2019    RIGHT TEMPORAL ARTERY BIOPSY performed by Judith Nroton MD at [address] [place] [address],  [id number]      surgery for transgeminal neuraglia. Insertion of sponge for chronic pain    COLONOSCOPY  3/31/14    DR Fanta Pierre    COLONOSCOPY N/A 2021    COLONOSCOPY DIAGNOSTIC performed by Enoch Goldberg MD at [address] [facility] (CERVIX STATUS UNKNOWN)      PARTIAL    LYMPHADENECTOMY      OTHER SURGICAL HISTORY Right     Trigeminal Neuralgia    ME COLON CA SCRN NOT  [address]  IND N/A 2017    COLONOSCOPY performed by Enoch Goldberg MD at [facility] 2021    EGD ESOPHAGOGASTRODUODENOSCOPY performed by Enoch Goldberg MD at [facility] History:  Social History     Tobacco Use    Smoking status: Former     Packs/day: 1.00     Years: 48.00     Pack years: 48.00     Types: Cigarettes     Quit date: 2013     Years since quittin.2    Smokeless tobacco: Never   Vaping Use    Vaping Use: Former    Substances: Nicotine, Flavoring, low  dose    Substance Use Topics    Alcohol use: Yes     Comment: very rarely    Drug use: No       Vital Signs: There were no vitals filed for this visit. Physical Exam:  Cardiac:  [x]WNL  []Comments:  Pulmonary:  [x]WNL   []Comments:   Neuro/Mental Status:  [x]WNL  []Comments:  Abdominal:  [x]WNL    []Comments:  Other:   []WNL  []Comments:    Informed Consent:  The risks and benefits of the procedure have been discussed with either the patient or if they cannot consent, their representative. Assessment:  Patient examined and appropriate for planned sedation and procedure.      Plan:  Proceed with planned sedation and procedure as above.     Eugenio Rodríguez MD  9:39 AM

## 2022-11-09 ENCOUNTER — HOSPITAL ENCOUNTER (OUTPATIENT)
Dept: MRI IMAGING | Age: 77
Discharge: HOME | End: 2022-11-11
Payer: MEDICARE

## 2022-11-09 DIAGNOSIS — R51.9 RIGHT FACIAL PAIN: ICD-10-CM

## 2022-11-09 DIAGNOSIS — G89.29 CHRONIC NECK PAIN: ICD-10-CM

## 2022-11-09 DIAGNOSIS — M54.2 CHRONIC NECK PAIN: ICD-10-CM

## 2022-11-09 PROCEDURE — 72141 MRI NECK SPINE W/O DYE: CPT

## 2022-11-09 PROCEDURE — 70551 MRI BRAIN STEM W/O DYE: CPT

## 2022-11-17 ENCOUNTER — TELEPHONE (OUTPATIENT)
Dept: PRIMARY CARE CLINIC | Age: 77
End: 2022-11-17

## 2022-11-28 ENCOUNTER — OFFICE VISIT (OUTPATIENT)
Dept: PRIMARY CARE CLINIC | Age: 77
End: 2022-11-28
Payer: MEDICARE

## 2022-11-28 VITALS
DIASTOLIC BLOOD PRESSURE: 66 MMHG | TEMPERATURE: 97.9 F | WEIGHT: 199 LBS | OXYGEN SATURATION: 97 % | BODY MASS INDEX: 35.26 KG/M2 | HEIGHT: 63 IN | RESPIRATION RATE: 18 BRPM | SYSTOLIC BLOOD PRESSURE: 126 MMHG | HEART RATE: 78 BPM

## 2022-11-28 DIAGNOSIS — E11.9 TYPE 2 DIABETES MELLITUS WITHOUT COMPLICATION, WITHOUT LONG-TERM CURRENT USE OF INSULIN (HCC): ICD-10-CM

## 2022-11-28 DIAGNOSIS — K74.60 HEPATIC CIRRHOSIS, UNSPECIFIED HEPATIC CIRRHOSIS TYPE, UNSPECIFIED WHETHER ASCITES PRESENT (HCC): ICD-10-CM

## 2022-11-28 DIAGNOSIS — E03.9 HYPOTHYROIDISM, UNSPECIFIED TYPE: ICD-10-CM

## 2022-11-28 DIAGNOSIS — L29.9 PRURITUS: Primary | ICD-10-CM

## 2022-11-28 DIAGNOSIS — Z00.00 PREVENTATIVE HEALTH CARE: ICD-10-CM

## 2022-11-28 LAB
HBA1C MFR BLD: 6.8 % (ref 4.8–5.9)
TSH REFLEX: 1.83 UIU/ML (ref 0.44–3.86)

## 2022-11-28 PROCEDURE — 1123F ACP DISCUSS/DSCN MKR DOCD: CPT | Performed by: INTERNAL MEDICINE

## 2022-11-28 PROCEDURE — 99214 OFFICE O/P EST MOD 30 MIN: CPT | Performed by: INTERNAL MEDICINE

## 2022-11-28 RX ORDER — HYDROXYZINE HYDROCHLORIDE 25 MG/1
25 TABLET, FILM COATED ORAL EVERY 8 HOURS PRN
Qty: 30 TABLET | Refills: 2 | Status: SHIPPED | OUTPATIENT
Start: 2022-11-28 | End: 2022-12-08

## 2022-11-28 RX ORDER — RIMEGEPANT SULFATE 75 MG/75MG
TABLET, ORALLY DISINTEGRATING ORAL
COMMUNITY

## 2022-11-28 ASSESSMENT — ENCOUNTER SYMPTOMS
SORE THROAT: 0
COUGH: 0
SINUS PRESSURE: 0
ABDOMINAL PAIN: 0
WHEEZING: 0
VOMITING: 0
SHORTNESS OF BREATH: 0
SINUS PAIN: 0
RHINORRHEA: 0
NAUSEA: 0
DIARRHEA: 0

## 2022-11-28 NOTE — PROGRESS NOTES
Subjective:      Patient ID: Xenia Lopez is a 69 y.o. female    Itch x 1 week   HPI  Pt presents with 1 week of generalized itching. Hx liver cirrhosis with mild ascitics. Alkaline phosphatase elevated. Work up ongoing by GI. Follows with neuro for facial pain. Past Surgical History:   Procedure Laterality Date    ANKLE FRACTURE SURGERY  2016    DR ANDRADE  LT    BIOPSY / LIGATION TEMPORAL ARTERY Right 2019    RIGHT TEMPORAL ARTERY BIOPSY performed by Zachary Tomas MD at [address] [place] [address],  [address]      surgery for transgeminal neuraglia. Insertion of sponge for chronic pain    COLONOSCOPY  3/31/14    DR Francy Fleming    COLONOSCOPY N/A 2021    COLONOSCOPY DIAGNOSTIC performed by Jackson Garcia MD at [address] [facility] (CERVIX STATUS UNKNOWN)      PARTIAL    LYMPHADENECTOMY      OTHER SURGICAL HISTORY Right     Trigeminal Neuralgia    CO COLON CA SCRN NOT  [address]  IND N/A 2017    COLONOSCOPY performed by Jackson Garcia MD at [facility] 2021    EGD ESOPHAGOGASTRODUODENOSCOPY performed by Jackson Garcia MD at [address] [facility] 11/3/2022    EGD ESOPHAGOGASTRODUODENOSCOPY performed by Jackson Garcia MD at [address] [facility] History    Marital status:       Spouse name: Not on file    Number of children: 2    Years of education: Not on file    Highest education level: 12th grade   Occupational History    Occupation: retired    Tobacco Use    Smoking status: Former     Packs/day: 1.00     Years: 48.00     Pack years: 48.00     Types: Cigarettes     Quit date: 2013     Years since quittin.3    Smokeless tobacco: Never   Vaping Use    Vaping Use: Former    Substances: Nicotine, Flavoring, low  dose    Substance and Sexual Activity    Alcohol use: Yes     Comment: very rarely    Drug use: No    Sexual activity: Not Currently     Partners: Male Other Topics Concern    Not on file   Social History Narrative    Lives alone    Has stairs in home needs to go up/down.     2 children that help if needed      Social Determinants of Health     Financial Resource Strain: Low Risk     Difficulty of Paying Living Expenses: Not hard at all   Food Insecurity: No Food Insecurity    Worried About Running Out of Food in the Last Year: Never true    Ran Out of Food in the Last Year: Never true   Transportation Needs: Not on file   Physical Activity: Inactive    Days of Exercise per Week: 0 days    Minutes of Exercise per Session: 0 min   Stress: Not on file   Social Connections: Not on file   Intimate Partner Violence: Not on file   Housing Stability: Not on file     Family History   Problem Relation Age of Onset    Stroke Mother     High Cholesterol Mother     Cancer Mother     Colon Cancer Neg Hx     Celiac Disease Neg Hx     Crohn's Disease Neg Hx      Allergies:  Cefdinir and Metformin and related  Patient Active Problem List   Diagnosis    Hepatitis B infection    Sleep apnea    Anxiety    Insomnia    Trigeminal neuralgia    Hyperlipidemia    Hypothyroidism    Dyspnea    DM (diabetes mellitus) ([facility]    Leg pain    Depression    Frequent headaches    Major depressive disorder, recurrent, in full remission ([facility]    Cherry angioma    Diffuse photodamage of skin    Dilated pore of Solomon    GERD (gastroesophageal reflux disease)    Hidradenitis suppurativa    Lentigines    Multiple benign nevi    Poikiloderma    Seborrheic keratosis    Morbidly obese (HCC)    Chronic gastritis without bleeding    Polyp of sigmoid colon    Secondary esophageal varices without bleeding (HCC)    Chronic obstructive pulmonary disease, unspecified COPD type ([facility]    Anemia with low platelet count ([facility]    Chronic renal disease, stage III ([facility] [857947]    Venous ulcer of left lower extremity with varicose veins ([facility]     Current Outpatient Medications on File Prior to Visit   Medication Sig Dispense Refill    Rimegepant Sulfate (NURTEC) 75 MG TBDP Take by mouth      sucralfate (CARAFATE) 1 GM/10ML suspension Take 10 mLs by mouth 4 times daily 150 mL 3    tiZANidine (ZANAFLEX) 2 MG tablet Take 1 tablet by mouth every 8 hours as needed (neck pain) 10 tablet 0    dicyclomine (BENTYL) 10 MG capsule Take 1 capsule by mouth 4 times daily 360 capsule 1    vitamin D3 (CHOLECALCIFEROL) 25 MCG (1000 UT) TABS tablet Take 1 tablet by mouth in the morning.  90 tablet 3    ondansetron (ZOFRAN) 4 MG tablet Take 1 tablet by mouth 3 times daily as needed for Nausea or Vomiting 15 tablet 0    citalopram (CELEXA) 20 MG tablet TAKE 1 TABLET DAILY (NEED APPOINTMENT FOR FURTHER REFILLS) 90 tablet 1    levothyroxine (SYNTHROID) 150 MCG tablet TAKE 1 TABLET DAILY (NEED APPOINTMENT FOR FURTHER REFILLS) 90 tablet 3    famotidine (PEPCID) 20 MG tablet TAKE 1 TABLET TWICE A DAY (NEED APPOINTMENT FOR FURTHER REFILLS) 180 tablet 3    JANUVIA 100 MG tablet TAKE 1 TABLET DAILY 90 tablet 3    ipratropium-albuterol (DUONEB) 0.5-2.5 (3) MG/3ML SOLN nebulizer solution       fluticasone-umeclidin-vilant (TRELEGY ELLIPTA) 100-62.5-25 MCG/INH AEPB       Continuous Blood Gluc  (DEXCOM G6 ) MARIELA Use daily for blood sugar readings 1 Device 1    Continuous Blood Gluc Sensor (DEXCOM G6 SENSOR) MISC Use daily for blood sugar readings 1 each 3    albuterol sulfate  (90 Base) MCG/ACT inhaler Inhale 2 puffs into the lungs every 6 hours as needed for Wheezing 1 Inhaler 0    Blood Glucose Monitoring Suppl (TRUE METRIX METER) w/Device KIT use to test sugar  0    blood glucose test strips (TRUETEST TEST) strip Test blood glucose fasting and 1 hour post lunch and dinner 100 each 5    Lancets MISC Inject 1 each into the skin 3 times daily Use early in the morning fasting and 1 hour post lunch and dinner 100 each 5    aspirin 81 MG EC tablet Take 1 tablet by mouth daily 90 tablet 3     Current Facility-Administered Medications on File Prior to Visit   Medication Dose Route Frequency Provider Last Rate Last Admin    methylPREDNISolone acetate (DEPO-MEDROL) injection 60 mg  60 mg IntraMUSCular Once Nabil Norman MD           Review of Systems   Constitutional:  Negative for chills, diaphoresis, fatigue and fever. HENT:  Negative for congestion, ear discharge, ear pain, rhinorrhea, sinus pressure, sinus pain, sneezing and sore throat. Respiratory:  Negative for cough, shortness of breath and wheezing. Cardiovascular:  Negative for chest pain. Gastrointestinal:  Negative for abdominal pain, diarrhea, nausea and vomiting. Endocrine: Negative for cold intolerance and heat intolerance. Genitourinary:  Negative for dysuria and frequency. Skin:  Negative for rash. Neurological:  Negative for dizziness and light-headedness. Objective:   /66   Pulse 78   Temp 97.9 °F (36.6 °C)   Resp 18   Ht 5' 3\" (1.6 m)   Wt 199 lb (90.3 kg)   LMP 02/28/1982   SpO2 97%   BMI 35.25 kg/m²     Physical Exam  Constitutional:       General: She is not in acute distress. Appearance: She is not diaphoretic. Cardiovascular:      Rate and Rhythm: Normal rate and regular rhythm. Pulses: Normal pulses. Heart sounds: Normal heart sounds, S1 normal and S2 normal.   Pulmonary:      Effort: Pulmonary effort is normal. No respiratory distress. Breath sounds: Normal breath sounds. No wheezing or rales. Chest:      Chest wall: No tenderness. Abdominal:      General: Bowel sounds are normal.      Tenderness: There is no abdominal tenderness. Skin:     Coloration: Skin is not jaundiced. Findings: No bruising, erythema or rash. Neurological:      Mental Status: She is alert. Assessment:       Diagnosis Orders   1. Pruritus  hydrOXYzine HCl (ATARAX) 25 MG tablet      2.  Hepatic cirrhosis, unspecified hepatic cirrhosis type, unspecified whether ascites present ([facility]          Plan:      No orders of the defined types were placed in this encounter. Will complete labs per GI and follow with neuro. No follow-ups on file.

## 2022-11-29 LAB — VITAMIN D 25-HYDROXY: 43.1 NG/ML

## 2022-12-02 RX ORDER — FAMOTIDINE 20 MG/1
TABLET, FILM COATED ORAL
Qty: 180 TABLET | Refills: 3 | Status: SHIPPED | OUTPATIENT
Start: 2022-12-02

## 2022-12-02 RX ORDER — LEVOTHYROXINE SODIUM 0.15 MG/1
TABLET ORAL
Qty: 90 TABLET | Refills: 3 | Status: SHIPPED | OUTPATIENT
Start: 2022-12-02

## 2022-12-08 RX ORDER — SITAGLIPTIN 100 MG/1
TABLET, FILM COATED ORAL
Qty: 90 TABLET | Refills: 3 | Status: SHIPPED | OUTPATIENT
Start: 2022-12-08

## 2022-12-19 ENCOUNTER — OFFICE VISIT (OUTPATIENT)
Dept: GASTROENTEROLOGY | Age: 77
End: 2022-12-19
Payer: MEDICARE

## 2022-12-19 VITALS — OXYGEN SATURATION: 98 % | WEIGHT: 197 LBS | BODY MASS INDEX: 34.91 KG/M2 | HEART RATE: 62 BPM | HEIGHT: 63 IN

## 2022-12-19 DIAGNOSIS — K74.60 CIRRHOSIS OF LIVER WITH ASCITES, UNSPECIFIED HEPATIC CIRRHOSIS TYPE (HCC): ICD-10-CM

## 2022-12-19 DIAGNOSIS — K74.60 CIRRHOSIS OF LIVER WITHOUT ASCITES, UNSPECIFIED HEPATIC CIRRHOSIS TYPE (HCC): Primary | ICD-10-CM

## 2022-12-19 DIAGNOSIS — R18.8 CIRRHOSIS OF LIVER WITH ASCITES, UNSPECIFIED HEPATIC CIRRHOSIS TYPE (HCC): ICD-10-CM

## 2022-12-19 LAB
ALBUMIN SERPL-MCNC: 3.5 G/DL (ref 3.5–4.6)
ALP BLD-CCNC: 130 U/L (ref 40–130)
ALT SERPL-CCNC: 15 U/L (ref 0–33)
ANION GAP SERPL CALCULATED.3IONS-SCNC: 13 MEQ/L (ref 9–15)
ANISOCYTOSIS: ABNORMAL
AST SERPL-CCNC: 22 U/L (ref 0–35)
BASOPHILS ABSOLUTE: 0.1 K/UL (ref 0–0.2)
BASOPHILS RELATIVE PERCENT: 3 %
BILIRUB SERPL-MCNC: 0.6 MG/DL (ref 0.2–0.7)
BUN BLDV-MCNC: 10 MG/DL (ref 8–23)
CALCIUM SERPL-MCNC: 8.6 MG/DL (ref 8.5–9.9)
CHLORIDE BLD-SCNC: 109 MEQ/L (ref 95–107)
CO2: 25 MEQ/L (ref 20–31)
CREAT SERPL-MCNC: 0.84 MG/DL (ref 0.5–0.9)
EOSINOPHILS ABSOLUTE: 0.2 K/UL (ref 0–0.7)
EOSINOPHILS RELATIVE PERCENT: 5 %
GFR SERPL CREATININE-BSD FRML MDRD: >60 ML/MIN/{1.73_M2}
GLOBULIN: 2.8 G/DL (ref 2.3–3.5)
GLUCOSE BLD-MCNC: 137 MG/DL (ref 70–99)
HCT VFR BLD CALC: 31.1 % (ref 37–47)
HEMOGLOBIN: 9.4 G/DL (ref 12–16)
HEPATITIS B SURFACE ANTIGEN INTERPRETATION: NORMAL
HYPOCHROMIA: ABNORMAL
INR BLD: 1.1
LYMPHOCYTES ABSOLUTE: 0.8 K/UL (ref 1–4.8)
LYMPHOCYTES RELATIVE PERCENT: 18 %
MCH RBC QN AUTO: 20.1 PG (ref 27–31.3)
MCHC RBC AUTO-ENTMCNC: 30.2 % (ref 33–37)
MCV RBC AUTO: 66.7 FL (ref 79.4–94.8)
MICROCYTES: ABNORMAL
MONOCYTES ABSOLUTE: 0.3 K/UL (ref 0.2–0.8)
MONOCYTES RELATIVE PERCENT: 5.7 %
NEUTROPHILS ABSOLUTE: 3.1 K/UL (ref 1.4–6.5)
NEUTROPHILS RELATIVE PERCENT: 69 %
OVALOCYTES: ABNORMAL
PDW BLD-RTO: 19.6 % (ref 11.5–14.5)
PLATELET # BLD: 142 K/UL (ref 130–400)
PLATELET SLIDE REVIEW: NORMAL
POIKILOCYTES: ABNORMAL
POTASSIUM SERPL-SCNC: 4 MEQ/L (ref 3.4–4.9)
PROTHROMBIN TIME: 14.3 SEC (ref 12.3–14.9)
RBC # BLD: 4.67 M/UL (ref 4.2–5.4)
SCHISTOCYTES: ABNORMAL
SODIUM BLD-SCNC: 147 MEQ/L (ref 135–144)
TARGET CELLS: ABNORMAL
TOTAL PROTEIN: 6.3 G/DL (ref 6.3–8)
WBC # BLD: 4.5 K/UL (ref 4.8–10.8)

## 2022-12-19 PROCEDURE — 1123F ACP DISCUSS/DSCN MKR DOCD: CPT | Performed by: SPECIALIST

## 2022-12-19 PROCEDURE — 99213 OFFICE O/P EST LOW 20 MIN: CPT | Performed by: SPECIALIST

## 2022-12-19 RX ORDER — NADOLOL 20 MG/1
20 TABLET ORAL DAILY
Qty: 30 TABLET | Refills: 3 | Status: SHIPPED | OUTPATIENT
Start: 2022-12-19

## 2022-12-19 ASSESSMENT — ENCOUNTER SYMPTOMS
ABDOMINAL PAIN: 0
EYES NEGATIVE: 1
ANAL BLEEDING: 0
GASTROINTESTINAL NEGATIVE: 1
CONSTIPATION: 0
NAUSEA: 0
ABDOMINAL DISTENTION: 0
RESPIRATORY NEGATIVE: 1
DIARRHEA: 0
RECTAL PAIN: 0
BLOOD IN STOOL: 0
VOMITING: 0

## 2022-12-19 NOTE — PROGRESS NOTES
Gastroenterology Clinic Follow up Visit    Linda Tate  [id number]  Chief Complaint   Patient presents with    Follow-up     Patient c/o itching predominantly on hands and feet        HPI and A/P at last visit summarized below:  Patient is here for follow-up, EGD showed grade 3 esophageal varices which were banded and also has portal hypertensive gastropathy, had some chest discomfort for few days after the procedure, patient feels okay now, no hematemesis or melena, CT of the abdomen done in October 2022 showed no focal liver lesion. Reports pruritus involving the extremities. AMA viral serology are all pending    Review of Systems   Constitutional: Negative. HENT: Negative. Eyes: Negative. Respiratory: Negative. Cardiovascular: Negative. Gastrointestinal: Negative. Negative for abdominal distention, abdominal pain, anal bleeding, blood in stool, constipation, diarrhea, nausea, rectal pain and vomiting. No GI bleeding   Endocrine: Negative. Genitourinary: Negative. Musculoskeletal: Negative. Skin: Negative. Allergic/Immunologic: Negative for food allergies. Neurological: Negative. Hematological: Negative. Psychiatric/Behavioral: Negative. Past medical history, past surgical history, medication list, social and familyhistory reviewed    Pulse 62, height 5' 3\" (1.6 m), weight 197 lb (89.4 kg), last menstrual period 02/28/1982, SpO2 98 %, not currently breastfeeding. Physical Exam  Constitutional:       Appearance: She is well-developed. HENT:      Head: Normocephalic and atraumatic. Eyes:      Conjunctiva/sclera: Conjunctivae normal.      Pupils: Pupils are equal, round, and reactive to light. Cardiovascular:      Rate and Rhythm: Normal rate. Pulmonary:      Effort: Pulmonary effort is normal.   Abdominal:      General: Bowel sounds are normal.      Palpations: Abdomen is soft.       Comments: Soft nontender no palpable mass   Musculoskeletal: General: Normal range of motion. Cervical back: Normal range of motion. Comments: No pedal edema   Skin:     General: Skin is warm. Neurological:      Mental Status: She is alert. Comments: No asterixis       Laboratory, Pathology, Radiology reviewed in detail with relevantimportant investigations summarized below:    No results for input(s): WBC, HGB, HCT, MCV, PLT in the last 720 hours. Lab Results   Component Value Date    ALT 6 07/29/2022    AST 59 (H) 07/29/2022    ALKPHOS 211 (H) 07/29/2022    BILITOT 0.3 07/29/2022     No results found. Endoscopic investigations:     Assessment and Plan:  Linda Tate 69 y.o. female for follow up. Cirrhosis complicated by portal hypertension with esophageal varices and portal hypertensive gastropathy, patient had endoscopic variceal ligation. ,  We will put the patient on nonselective beta-blocker nadolol 20 mg once a day, and to get the blood test to rule out possible viral hepatitis and PBC. Follow-up EGD in 4 weeks      No follow-ups on file. Benita Pacheco MD   StaffGastroenterologist  [facility]    Please note this report has been partially produced using speech recognitionsoftware  and may cause contain errors related to that system including grammar, punctuation and spelling as well as words andphrases that may seem inappropriate. If there are questions or concerns please feel free to contact me to clarify.

## 2022-12-20 LAB — HEPATITIS C ANTIBODY: NONREACTIVE

## 2022-12-22 LAB — MITOCHONDRIAL M2 AB, IGG: 0.7 U/ML (ref 0–4)

## 2023-01-07 RX ORDER — CITALOPRAM 20 MG/1
TABLET ORAL
Qty: 90 TABLET | Refills: 1 | Status: SHIPPED | OUTPATIENT
Start: 2023-01-07

## 2023-01-17 ENCOUNTER — TELEPHONE (OUTPATIENT)
Dept: PRIMARY CARE CLINIC | Age: 78
End: 2023-01-17

## 2023-01-17 DIAGNOSIS — L29.9 PRURITUS: Primary | ICD-10-CM

## 2023-01-17 RX ORDER — HYDROXYZINE HYDROCHLORIDE 25 MG/1
25 TABLET, FILM COATED ORAL EVERY 8 HOURS PRN
Qty: 30 TABLET | Refills: 0 | Status: SHIPPED | OUTPATIENT
Start: 2023-01-17 | End: 2023-01-27

## 2023-01-17 NOTE — TELEPHONE ENCOUNTER
Her daughter Michael Duran is calling to ask you if she is taking a prescription for itching due to her cirrhosis and if not, her daughter is asking if she can buy over the counter Claritin?

## 2023-01-26 ENCOUNTER — HOSPITAL ENCOUNTER (OUTPATIENT)
Age: 78
Setting detail: OUTPATIENT SURGERY
Discharge: HOME | End: 2023-01-26
Attending: SPECIALIST | Admitting: SPECIALIST
Payer: MEDICARE

## 2023-01-26 ENCOUNTER — ANESTHESIA (OUTPATIENT)
Dept: ENDOSCOPY | Age: 78
End: 2023-01-26
Payer: MEDICARE

## 2023-01-26 ENCOUNTER — ANESTHESIA EVENT (OUTPATIENT)
Dept: ENDOSCOPY | Age: 78
End: 2023-01-26
Payer: MEDICARE

## 2023-01-26 VITALS
RESPIRATION RATE: 16 BRPM | HEART RATE: 91 BPM | OXYGEN SATURATION: 95 % | TEMPERATURE: 99.3 F | SYSTOLIC BLOOD PRESSURE: 168 MMHG | WEIGHT: 199 LBS | HEIGHT: 63 IN | DIASTOLIC BLOOD PRESSURE: 91 MMHG | BODY MASS INDEX: 35.26 KG/M2

## 2023-01-26 LAB
GLUCOSE BLD-MCNC: 165 MG/DL (ref 70–99)
PERFORMED ON: ABNORMAL

## 2023-01-26 PROCEDURE — 2500000003 HC RX 250 WO HCPCS: Performed by: NURSE ANESTHETIST, CERTIFIED REGISTERED

## 2023-01-26 PROCEDURE — 6360000002 HC RX W HCPCS: Performed by: NURSE ANESTHETIST, CERTIFIED REGISTERED

## 2023-01-26 PROCEDURE — 3700000000 HC ANESTHESIA ATTENDED CARE: Performed by: SPECIALIST

## 2023-01-26 PROCEDURE — 2580000003 HC RX 258: Performed by: SPECIALIST

## 2023-01-26 PROCEDURE — 2720000010 HC SURG SUPPLY STERILE: Performed by: SPECIALIST

## 2023-01-26 PROCEDURE — 43244 EGD VARICES LIGATION: CPT | Performed by: SPECIALIST

## 2023-01-26 PROCEDURE — 6370000000 HC RX 637 (ALT 250 FOR IP): Performed by: SPECIALIST

## 2023-01-26 PROCEDURE — 2580000003 HC RX 258

## 2023-01-26 PROCEDURE — 3700000001 HC ADD 15 MINUTES (ANESTHESIA): Performed by: SPECIALIST

## 2023-01-26 PROCEDURE — 7100000010 HC PHASE II RECOVERY - FIRST 15 MIN: Performed by: SPECIALIST

## 2023-01-26 PROCEDURE — 7100000011 HC PHASE II RECOVERY - ADDTL 15 MIN: Performed by: SPECIALIST

## 2023-01-26 PROCEDURE — 2709999900 HC NON-CHARGEABLE SUPPLY: Performed by: SPECIALIST

## 2023-01-26 PROCEDURE — 3609017100 HC EGD: Performed by: SPECIALIST

## 2023-01-26 RX ORDER — LIDOCAINE HYDROCHLORIDE 20 MG/ML
INJECTION, SOLUTION INFILTRATION; PERINEURAL PRN
Status: DISCONTINUED | OUTPATIENT
Start: 2023-01-26 | End: 2023-01-26 | Stop reason: SDUPTHER

## 2023-01-26 RX ORDER — MAGNESIUM HYDROXIDE 1200 MG/15ML
LIQUID ORAL PRN
Status: DISCONTINUED | OUTPATIENT
Start: 2023-01-26 | End: 2023-01-26 | Stop reason: ALTCHOICE

## 2023-01-26 RX ORDER — SODIUM CHLORIDE 0.9 % (FLUSH) 0.9 %
5-40 SYRINGE (ML) INJECTION EVERY 12 HOURS SCHEDULED
Status: DISCONTINUED | OUTPATIENT
Start: 2023-01-26 | End: 2023-01-26 | Stop reason: HOSPADM

## 2023-01-26 RX ORDER — SUCRALFATE ORAL 1 G/10ML
1 SUSPENSION ORAL 4 TIMES DAILY
Qty: 1200 ML | Refills: 0 | Status: SHIPPED | OUTPATIENT
Start: 2023-01-26

## 2023-01-26 RX ORDER — SODIUM CHLORIDE 9 MG/ML
INJECTION, SOLUTION INTRAVENOUS CONTINUOUS
Status: DISCONTINUED | OUTPATIENT
Start: 2023-01-26 | End: 2023-01-26 | Stop reason: HOSPADM

## 2023-01-26 RX ORDER — CELECOXIB 200 MG/1
200 CAPSULE ORAL 2 TIMES DAILY
COMMUNITY

## 2023-01-26 RX ORDER — PROPOFOL 10 MG/ML
INJECTION, EMULSION INTRAVENOUS PRN
Status: DISCONTINUED | OUTPATIENT
Start: 2023-01-26 | End: 2023-01-26 | Stop reason: SDUPTHER

## 2023-01-26 RX ORDER — SIMETHICONE 20 MG/.3ML
EMULSION ORAL PRN
Status: DISCONTINUED | OUTPATIENT
Start: 2023-01-26 | End: 2023-01-26 | Stop reason: ALTCHOICE

## 2023-01-26 RX ORDER — SODIUM CHLORIDE 9 MG/ML
INJECTION, SOLUTION INTRAVENOUS PRN
Status: DISCONTINUED | OUTPATIENT
Start: 2023-01-26 | End: 2023-01-26 | Stop reason: HOSPADM

## 2023-01-26 RX ORDER — SODIUM CHLORIDE 9 MG/ML
INJECTION, SOLUTION INTRAVENOUS
Status: COMPLETED
Start: 2023-01-26 | End: 2023-01-26

## 2023-01-26 RX ORDER — SODIUM CHLORIDE 0.9 % (FLUSH) 0.9 %
5-40 SYRINGE (ML) INJECTION PRN
Status: DISCONTINUED | OUTPATIENT
Start: 2023-01-26 | End: 2023-01-26 | Stop reason: HOSPADM

## 2023-01-26 RX ADMIN — SODIUM CHLORIDE: 9 INJECTION, SOLUTION INTRAVENOUS at 09:19

## 2023-01-26 RX ADMIN — PROPOFOL 300 MG: 10 INJECTION, EMULSION INTRAVENOUS at 09:47

## 2023-01-26 RX ADMIN — LIDOCAINE HYDROCHLORIDE 60 MG: 20 INJECTION, SOLUTION INFILTRATION; PERINEURAL at 09:47

## 2023-01-26 ASSESSMENT — ENCOUNTER SYMPTOMS: SHORTNESS OF BREATH: 1

## 2023-01-26 ASSESSMENT — PAIN - FUNCTIONAL ASSESSMENT: PAIN_FUNCTIONAL_ASSESSMENT: NONE - DENIES PAIN

## 2023-01-26 ASSESSMENT — PAIN SCALES - GENERAL: PAINLEVEL_OUTOF10: 0

## 2023-01-26 NOTE — H&P
Patient Name: Gisele Nelson  : 1945  MRN: [id number]  DATE: 23      ENDOSCOPY  History and Physical    Procedure:    [] Diagnostic Colonoscopy       [] Screening Colonoscopy  [x] EGD      [] ERCP      [] EUS       [] Other    [x] Previous office notes/History and Physical reviewed from the patients chart. Please see EMR for further details of HPI. I have examined the patient's status immediately prior to the procedure and:      Indications/HPI:    []Abdominal Pain  []Cancer- GI/Lung  []Fhx of colon CA/polyps  []History of Polyps  []Masseys   []Melena  []Abnormal Imaging  []Dysphagia    []Persistent Pneumonia  []Anemia  []Food Impaction  []History of Polyps  []GI Bleed  []Pulmonary nodule/Mass  []Change in bowel habits []Heartburn/Reflux  []Rectal Bleed (BRBPR)  []Chest Pain - Non Cardiac []Heme (+) Stoo  l[]Ulcers  []Constipation  []Hemoptysis   []Varices  []Diarrhea  []Hypoxemia  []Nausea/Vomiting  []Screening   []Crohns/Colitis  []Other: cirrhosis,h/o esophageal varices. Anesthesia:   [x] MAC [] Moderate Sedation   [] General   [] None     ROS: 12 pt Review of Symptoms was negative unless mentioned above    Medications:   Prior to Admission medications    Medication Sig Start Date End Date Taking?  Authorizing Provider   celecoxib (CELEBREX) 200 MG capsule Take 200 mg by mouth 2 times daily   Yes Historical Provider, MD   hydrOXYzine HCl (ATARAX) 25 MG tablet Take 1 tablet by mouth every 8 hours as needed for Itching 23  Quintin Espinoza MD   citalopram (CELEXA) 20 MG tablet Take 1 tablet daily 23   Quintin Espinoza MD   nadolol (CORGARD) 20 MG tablet Take 1 tablet by mouth daily 22   Amelia Ray MD   JANUVIA 100 MG tablet TAKE 1 TABLET DAILY 22   Quintin Espinoza MD   famotidine (PEPCID) 20 MG tablet TAKE 1 TABLET TWICE A DAY (NEED APPOINTMENT FOR FURTHER REFILLS) 22   Quintin Espinoza MD   levothyroxine (SYNTHROID) 150 MCG tablet TAKE 1 TABLET DAILY (NEED APPOINTMENT FOR FURTHER REFILLS) 12/2/22   Fabienne Maria MD   Rimegepant Sulfate (NURTEC) 75 MG TBDP Take by mouth  Patient not taking: Reported on 1/26/2023    Historical Provider, MD   sucralfate (CARAFATE) 1 GM/10ML suspension Take 10 mLs by mouth 4 times daily  Patient not taking: Reported on 1/26/2023 11/3/22   Alexis Alas MD   tiZANidine (ZANAFLEX) 2 MG tablet Take 1 tablet by mouth every 8 hours as needed (neck pain) 11/1/22   Fabienne Maria MD   dicyclomine (BENTYL) 10 MG capsule Take 1 capsule by mouth 4 times daily  Patient not taking: Reported on 1/26/2023 10/4/22   Fabienne Maria MD   vitamin D3 (CHOLECALCIFEROL) 25 MCG (1000 UT) TABS tablet Take 1 tablet by mouth in the morning. 8/2/22   Fabienne Maria MD   ondansetron (ZOFRAN) 4 MG tablet Take 1 tablet by mouth 3 times daily as needed for Nausea or Vomiting 7/27/22   Fabienne Maria MD   ipratropium-albuterol (DUONEB) 0.5-2.5 (3) MG/3ML SOLN nebulizer solution  10/8/21   Historical Provider, MD   fluticasone-umeclidin-vilant (Ronita Ates) 100-62.5-25 MCG/INH AEPB  10/5/21   Historical Provider, MD   Continuous Blood Gluc  (DEXCOM G6 ) MARIELA Use daily for blood sugar readings 6/21/21   Fabienne Maria MD   Continuous Blood Gluc Sensor (DEXCOM G6 SENSOR) MISC Use daily for blood sugar readings 6/21/21   Fabienne Maria MD   albuterol sulfate  (90 Base) MCG/ACT inhaler Inhale 2 puffs into the lungs every 6 hours as needed for Wheezing 11/23/20   NILSON Juarez - CNP   Blood Glucose Monitoring Suppl (TRUE METRIX METER) w/Device KIT use to test sugar 5/13/19   Historical Provider, MD   blood glucose test strips (TRUETEST TEST) strip Test blood glucose fasting and 1 hour post lunch and dinner 5/12/19   Fabienne Maria MD   Lancets MISC Inject 1 each into the skin 3 times daily Use early in the morning fasting and 1 hour post lunch and dinner 5/12/19   Fabienne Maria MD   aspirin 81 MG EC tablet Take 1 tablet by mouth daily 4/10/19   Fabienne Maria MD       Allergies:    Allergies Allergen Reactions    Cefdinir      Diarrhea      Metformin And Related      Swelling leg        History of allergic reaction to anesthesia:  No    Past Medical History:  Past Medical History:   Diagnosis Date    Anxiety     DM (diabetes mellitus) ([address] 2015    Hepatitis B infection     Hyperlipidemia     Hypothyroidism     Insomnia     Leg pain 2015    Post herpetic neuralgia     Unspecified sleep apnea     after seeing [facility] they state she is does not have sleep apnea. Not using cpap       Past Surgical History:  Past Surgical History:   Procedure Laterality Date    ANKLE FRACTURE SURGERY  2016    DR ANDRADE  LT    BIOPSY / LIGATION TEMPORAL ARTERY Right 2019    RIGHT TEMPORAL ARTERY BIOPSY performed by Yu Parker MD at [address] [place] [address], [address]      surgery for transgeminal neuraglia.   Insertion of sponge for chronic pain    COLONOSCOPY  3/31/14    DR Lamine Ruiz    COLONOSCOPY N/A 2021    COLONOSCOPY DIAGNOSTIC performed by Migue Koroma MD at [facility] (CERVIX STATUS UNKNOWN)      PARTIAL    LYMPHADENECTOMY      OTHER SURGICAL HISTORY Right     Trigeminal Neuralgia    [place] COLON CA SCRN NOT  [address] [place] [address] IND N/A 2017    COLONOSCOPY performed by Migue Koroma MD at [facility] 2021    EGD ESOPHAGOGASTRODUODENOSCOPY performed by Migue Koroma MD at [facility] [employer] [facility] 11/3/2022    EGD ESOPHAGOGASTRODUODENOSCOPY performed by Migue [facility] MD at [facility] History:  Social History     Tobacco Use    Smoking status: Former     Packs/day: 1.00     Years: 48.00     Pack years: 48.00     Types: Cigarettes     Quit date: 2013     Years since quittin.5    Smokeless tobacco: Never   Vaping Use    Vaping Use: Former    Substances: Nicotine, Flavoring, low  dose    Substance Use Topics    Alcohol use: Yes     Comment: very rarely    Drug use: No       Vital Signs:   Vitals:    01/26/23 0856   BP: (!) 141/69   Pulse: 70   Resp: 16   Temp: 99.3 °F (37.4 °C)   SpO2: 96%        Physical Exam:  Cardiac:  [x]WNL  []Comments:  Pulmonary:  [x]WNL   []Comments:   Neuro/Mental Status:  [x]WNL  []Comments:  Abdominal:  [x]WNL    []Comments:  Other:   []WNL  []Comments:    Informed Consent:  The risks and benefits of the procedure have been discussed with either the patient or if they cannot consent, their representative. Assessment:  Patient examined and appropriate for planned sedation and procedure. Plan:  Proceed with planned sedation and procedure as above.     Alexis Alas MD  9:42 AM

## 2023-01-26 NOTE — ANESTHESIA PRE PROCEDURE
Department of Anesthesiology  Preprocedure Note       Name:  Zach Aguirre   Age:  77 y.o.  :  1945                                          MRN:  [id number]         Date:  2023      Surgeon: Surgeon(s):  Mei Kitchen MD    Procedure: Procedure(s):  EGD ESOPHAGOGASTRODUODENOSCOPY    Medications prior to admission:   Prior to Admission medications    Medication Sig Start Date End Date Taking? Authorizing Provider   hydrOXYzine HCl (ATARAX) 25 MG tablet Take 1 tablet by mouth every 8 hours as needed for Itching 23  Yesenia Viramontes MD   citalopram (CELEXA) 20 MG tablet Take 1 tablet daily 23   Yesenia Viramontes MD   nadolol (CORGARD) 20 MG tablet Take 1 tablet by mouth daily 22   Mei Kitchen MD   JANUVIA 100 MG tablet TAKE 1 TABLET DAILY 22   Yesenia Viramontes MD   famotidine (PEPCID) 20 MG tablet TAKE 1 TABLET TWICE A DAY (NEED APPOINTMENT FOR FURTHER REFILLS) 22   Yesenia Viramontes MD   levothyroxine (SYNTHROID) 150 MCG tablet TAKE 1 TABLET DAILY (NEED APPOINTMENT FOR FURTHER REFILLS) 22   Yesenia Viramontes MD   Rimegepant Sulfate (NURTEC) 75 MG TBDP Take by mouth    Historical Provider, MD   sucralfate (CARAFATE) 1 GM/10ML suspension Take 10 mLs by mouth 4 times daily 11/3/22   Mei Kitchen MD   tiZANidine (ZANAFLEX) 2 MG tablet Take 1 tablet by mouth every 8 hours as needed (neck pain) 22   Yesenia Viramontes MD   dicyclomine (BENTYL) 10 MG capsule Take 1 capsule by mouth 4 times daily 10/4/22   Yesenia Viramontes MD   vitamin D3 (CHOLECALCIFEROL) 25 MCG (1000 UT) TABS tablet Take 1 tablet by mouth in the morning. 22   Yesenia Viramontes MD   ondansetron (ZOFRAN) 4 MG tablet Take 1 tablet by mouth 3 times daily as needed for Nausea or Vomiting 22   Yesenia Viramontes MD   ipratropium-albuterol (DUONEB) 0.5-2.5 (3) MG/3ML SOLN nebulizer solution  10/8/21   Historical Provider, MD   fluticasone-umeclidin-vilant (TRELEGY ELLIPTA) 100-62.5-25 MCG/INH AEPB  10/5/21   Historical Provider, MD   Continuous Blood  Gluc  (DEXCOM G6 ) MARIELA Use daily for blood sugar readings 6/21/21   Ami Hermosillo MD   Continuous Blood Gluc Sensor (DEXCOM G6 SENSOR) MISC Use daily for blood sugar readings 6/21/21   Ami Hermosillo MD   albuterol sulfate  (90 Base) MCG/ACT inhaler Inhale 2 puffs into the lungs every 6 hours as needed for Wheezing 11/23/20   Chantel Meza Comp, APRN - CNP   Blood Glucose Monitoring Suppl (TRUE METRIX METER) w/Device KIT use to test sugar 5/13/19   Historical Provider, MD   blood glucose test strips (TRUETEST TEST) strip Test blood glucose fasting and 1 hour post lunch and dinner 5/12/19   Ami Hermosillo MD   Lancets MISC Inject 1 each into the skin 3 times daily Use early in the morning fasting and 1 hour post lunch and dinner 5/12/19   Ami Hermosillo MD   aspirin 81 MG EC tablet Take 1 tablet by mouth daily 4/10/19   Ami Hermosillo MD       Current medications:    Current Facility-Administered Medications   Medication Dose Route Frequency Provider Last Rate Last Admin    methylPREDNISolone acetate (DEPO-MEDROL) injection 60 mg  60 mg IntraMUSCular Once Ami Hermosillo MD         Current Outpatient Medications   Medication Sig Dispense Refill    hydrOXYzine HCl (ATARAX) 25 MG tablet Take 1 tablet by mouth every 8 hours as needed for Itching 30 tablet 0    citalopram (CELEXA) 20 MG tablet Take 1 tablet daily 90 tablet 1    nadolol (CORGARD) 20 MG tablet Take 1 tablet by mouth daily 30 tablet 3    JANUVIA 100 MG tablet TAKE 1 TABLET DAILY 90 tablet 3    famotidine (PEPCID) 20 MG tablet TAKE 1 TABLET TWICE A DAY (NEED APPOINTMENT FOR FURTHER REFILLS) 180 tablet 3    levothyroxine (SYNTHROID) 150 MCG tablet TAKE 1 TABLET DAILY (NEED APPOINTMENT FOR FURTHER REFILLS) 90 tablet 3    Rimegepant Sulfate (NURTEC) 75 MG TBDP Take by mouth      sucralfate (CARAFATE) 1 GM/10ML suspension Take 10 mLs by mouth 4 times daily 150 mL 3    tiZANidine (ZANAFLEX) 2 MG tablet Take 1 tablet by mouth every 8 hours as needed (neck pain) 10 tablet 0    dicyclomine (BENTYL) 10 MG capsule Take 1 capsule by mouth 4 times daily 360 capsule 1    vitamin D3 (CHOLECALCIFEROL) 25 MCG (1000 UT) TABS tablet Take 1 tablet by mouth in the morning. 90 tablet 3    ondansetron (ZOFRAN) 4 MG tablet Take 1 tablet by mouth 3 times daily as needed for Nausea or Vomiting 15 tablet 0    ipratropium-albuterol (DUONEB) 0.5-2.5 (3) MG/3ML SOLN nebulizer solution       fluticasone-umeclidin-vilant (TRELEGY ELLIPTA) 100-62.5-25 MCG/INH AEPB       Continuous Blood Gluc  (DEXCOM G6 ) MARIELA Use daily for blood sugar readings 1 Device 1    Continuous Blood Gluc Sensor (DEXCOM G6 SENSOR) MISC Use daily for blood sugar readings 1 each 3    albuterol sulfate  (90 Base) MCG/ACT inhaler Inhale 2 puffs into the lungs every 6 hours as needed for Wheezing 1 Inhaler 0    Blood Glucose Monitoring Suppl (TRUE METRIX METER) w/Device KIT use to test sugar  0    blood glucose test strips (TRUETEST TEST) strip Test blood glucose fasting and 1 hour post lunch and dinner 100 each 5    Lancets MISC Inject 1 each into the skin 3 times daily Use early in the morning fasting and 1 hour post lunch and dinner 100 each 5    aspirin 81 MG EC tablet Take 1 tablet by mouth daily 90 tablet 3       Allergies: Allergies   Allergen Reactions    Cefdinir      Diarrhea      Metformin And Related      Swelling leg       Problem List:    Patient Active Problem List   Diagnosis Code    Hepatitis B infection B19.10    Sleep apnea G47.30    Anxiety F41.9    Insomnia G47.00    Trigeminal neuralgia G50.0    Hyperlipidemia E78.5    Hypothyroidism E03.9    Dyspnea R06.00    DM (diabetes mellitus) [facility] E11.9    Leg pain M79.606    Depression F32. 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    Seborrheic keratosis L82.1    Morbidly obese (HCC) E66.01    Chronic gastritis without bleeding K29.50    Polyp of sigmoid colon K63.5    Secondary esophageal varices without bleeding (HCC) I85.10    Chronic obstructive pulmonary disease, unspecified COPD type (HCC) J44.9    Anemia with low platelet count (HCC) D69.6    Chronic renal disease, stage III (HCC) [059262] N18.30    Venous ulcer of left lower extremity with varicose veins (HCC) I83.029, L97.929       Past Medical History:        Diagnosis Date    Anxiety     DM (diabetes mellitus) [facility] 4/14/2015    Hepatitis B infection     Hyperlipidemia     Hypothyroidism     Insomnia     Leg pain 4/14/2015    Post herpetic neuralgia     Unspecified sleep apnea     after seeing [facility] they state she is does not have sleep apnea. Not using cpap       Past Surgical History:        Procedure Laterality Date    ANKLE FRACTURE SURGERY  09/2016    DR ANDRADE  LT    BIOPSY / LIGATION TEMPORAL ARTERY Right 4/26/2019    RIGHT TEMPORAL ARTERY BIOPSY performed by Ti Matias MD at [facility]  2015    surgery for transgeminal neuraglia.   Insertion of sponge for chronic pain    COLONOSCOPY  3/31/14    DR Camacho Spearing    COLONOSCOPY N/A 2/2/2021    COLONOSCOPY DIAGNOSTIC performed by Courtney Miller MD at [facility] (CERVIX STATUS UNKNOWN)      PARTIAL    LYMPHADENECTOMY      OTHER SURGICAL HISTORY Right     Trigeminal Neuralgia    [place] COLON CA SCRN NOT  [address] IND N/A 7/24/2017    COLONOSCOPY performed by Courtney Miller MD at [address] N/A 2/2/2021    EGD ESOPHAGOGASTRODUODENOSCOPY performed by Courtney Miller MD at [address] [place] [address] 11/3/2022    EGD ESOPHAGOGASTRODUODENOSCOPY performed by Alexis Alas MD at [facility]       Social History:    Social History     Tobacco Use    Smoking status: Former     Packs/day: 1.00     Years: 48.00     Pack years: 48.00     Types: Cigarettes     Quit date: 2013     Years since quittin.5    Smokeless tobacco: Never   Substance Use Topics    Alcohol use: Yes     Comment: very rarely                                Counseling given: Not Answered      Vital Signs (Current): There were no vitals filed for this visit.                                            BP Readings from Last 3 Encounters:   22 126/66   22 139/72   22 (!) 154/82       NPO Status:                                                                                 BMI:   Wt Readings from Last 3 Encounters:   22 197 lb (89.4 kg)   22 199 lb (90.3 kg)   22 216 lb (98 kg)     There is no height or weight on file to calculate BMI.    CBC:   Lab Results   Component Value Date/Time    WBC 4.5 2022 03:30 PM    RBC 4.67 2022 03:30 PM    RBC 5.34 2011 02:29 PM    HGB 9.4 2022 03:30 PM    HCT 31.1 2022 03:30 PM    MCV 66.7 2022 03:30 PM    RDW 19.6 2022 03:30 PM     2022 03:30 PM       CMP:   Lab Results   Component Value Date/Time     2022 03:30 PM    K 4.0 2022 03:30 PM     2022 03:30 PM    CO2 25 2022 03:30 PM    BUN 10 2022 03:30 PM    CREATININE 0.84 2022 03:30 PM    GFRAA >60 2022 03:48 PM    LABGLOM >60.0 2022 03:30 PM    GLUCOSE 137 2022 03:30 PM    GLUCOSE 189 2022 02:30 PM    PROT 6.3 2022 03:30 PM    CALCIUM 8.6 2022 03:30 PM    BILITOT 0.6 2022 03:30 PM    ALKPHOS 130 2022 03:30 PM    AST 22 2022 03:30 PM    ALT 15 2022 03:30 PM       POC Tests: No results for input(s): POCGLU, POCNA, POCK, POCCL, POCBUN, POCHEMO, POCHCT in the last 72 hours.    Coags:   Lab Results   Component Value Date/Time    PROTIME 14.3 12/19/2022 03:30 PM    INR 1.1 12/19/2022 03:30 PM    APTT 28 06/21/2022 02:30 PM       HCG (If Applicable): No results found for: PREGTESTUR, PREGSERUM, HCG, HCGQUANT     ABGs: No results found for: PHART, PO2ART, QWD0UKF, VDL6AWI, BEART, S9TGBAIF     Type & Screen (If Applicable):  No results found for: LABABO, LABRH    Drug/Infectious Status (If Applicable):  Lab Results   Component Value Date/Time    HEPCAB NONREACTIVE 12/19/2022 03:30 PM       COVID-19 Screening (If Applicable):   Lab Results   Component Value Date/Time    COVID19 NOT DETECTED 06/21/2022 02:30 PM           Anesthesia Evaluation  Patient summary reviewed and Nursing notes reviewed  Airway: Mallampati: II  TM distance: >3 FB   Neck ROM: full  Mouth opening: > = 3 FB   Dental:          Pulmonary:normal exam  breath sounds clear to auscultation  (+) COPD:  shortness of breath:  sleep apnea:                             Cardiovascular:    (+) hyperlipidemia      ECG reviewed  Rhythm: regular  Rate: normal    Stress test reviewed                Neuro/Psych:   (+) headaches:, psychiatric history:            GI/Hepatic/Renal:   (+) GERD:, hepatitis: B, liver disease: portal hypertension, esophageal varices,           Endo/Other:    (+) Diabetes, hypothyroidism::., .                 Abdominal:             Vascular: negative vascular ROS. Other Findings:           Anesthesia Plan      MAC     ASA 3       Induction: intravenous. Anesthetic plan and risks discussed with patient. Plan discussed with attending.                     NILSON Banks - YANNA   1/26/2023

## 2023-01-26 NOTE — ANESTHESIA POSTPROCEDURE EVALUATION
Department of Anesthesiology  Postprocedure Note    Patient: Luis Nielson  MRN: [id number]  YOB: 1945  Date of evaluation: 1/26/2023      Procedure Summary     Date: 01/26/23 Room / Location: [facility] OR 01 / Eligio Mcginnis    Anesthesia Start: 9169 Anesthesia Stop:     Procedure: EGD ESOPHAGOGASTRODUODENOSCOPY WITH BANDING Diagnosis:       Cirrhosis of liver without ascites, unspecified hepatic cirrhosis type ([facility]      (Cirrhosis of liver without ascites, unspecified hepatic cirrhosis type ([facility] [K74.60])    Surgeons: Obey Pabon MD Responsible Provider: NILSON Fountain CRNA    Anesthesia Type: MAC ASA Status: 3          Anesthesia Type: No value filed.     Francisca Phase I: Francisca Score: 10    Francisca Phase II:        Anesthesia Post Evaluation    Patient location during evaluation: bedside  Patient participation: complete - patient participated  Level of consciousness: awake and awake and alert  Pain score: 0  Airway patency: patent  Nausea & Vomiting: no nausea and no vomiting  Complications: no  Cardiovascular status: blood pressure returned to baseline and hemodynamically stable  Respiratory status: acceptable and spontaneous ventilation  Hydration status: euvolemic

## 2023-03-03 ENCOUNTER — OFFICE VISIT (OUTPATIENT)
Dept: GASTROENTEROLOGY | Age: 78
End: 2023-03-03
Payer: MEDICARE

## 2023-03-03 VITALS — BODY MASS INDEX: 35.97 KG/M2 | WEIGHT: 203 LBS | HEIGHT: 63 IN | OXYGEN SATURATION: 97 % | HEART RATE: 87 BPM

## 2023-03-03 DIAGNOSIS — K74.60 CIRRHOSIS OF LIVER WITHOUT ASCITES, UNSPECIFIED HEPATIC CIRRHOSIS TYPE (HCC): Primary | ICD-10-CM

## 2023-03-03 PROCEDURE — G8484 FLU IMMUNIZE NO ADMIN: HCPCS | Performed by: SPECIALIST

## 2023-03-03 PROCEDURE — 1123F ACP DISCUSS/DSCN MKR DOCD: CPT | Performed by: SPECIALIST

## 2023-03-03 PROCEDURE — G8427 DOCREV CUR MEDS BY ELIG CLIN: HCPCS | Performed by: SPECIALIST

## 2023-03-03 PROCEDURE — 99213 OFFICE O/P EST LOW 20 MIN: CPT | Performed by: SPECIALIST

## 2023-03-03 PROCEDURE — 1090F PRES/ABSN URINE INCON ASSESS: CPT | Performed by: SPECIALIST

## 2023-03-03 PROCEDURE — G8400 PT W/DXA NO RESULTS DOC: HCPCS | Performed by: SPECIALIST

## 2023-03-03 PROCEDURE — 1036F TOBACCO NON-USER: CPT | Performed by: SPECIALIST

## 2023-03-03 PROCEDURE — G8417 CALC BMI ABV UP PARAM F/U: HCPCS | Performed by: SPECIALIST

## 2023-03-03 ASSESSMENT — ENCOUNTER SYMPTOMS
DIARRHEA: 0
GASTROINTESTINAL NEGATIVE: 1
ANAL BLEEDING: 0
ABDOMINAL DISTENTION: 0
VOMITING: 0
ABDOMINAL PAIN: 0
CONSTIPATION: 0
RESPIRATORY NEGATIVE: 1
NAUSEA: 0
RECTAL PAIN: 0
EYES NEGATIVE: 1
BLOOD IN STOOL: 0

## 2023-03-03 NOTE — PROGRESS NOTES
Gastroenterology Clinic Follow up Visit    Aislinn Fonseca  [id number]  Chief Complaint   Patient presents with    Follow-up     Patient is here today to follow up after her EGD. Patient reports abdominal soreness and pressure. Denies any trouble swallowing. HPI and A/P at last visit summarized below:  Patient is here for follow-up. EGD showed some remaining varices which were banded and also has portal hypertensive gastropathy. CT of the abdomen showed changes of cirrhosis and also has a 3 cm infrarenal abdominal aortic aneurysm and is waiting to be seen by vascular surgery. Patient is on nadolol 20 mg a day , reports itching relieved by Claritin and also by cream.  Antimitochondrial antibody was negative, patient was experiencing some chest pain after last variceal banding. No history of GI bleeding. Review of Systems   Constitutional: Negative. HENT: Negative. Eyes: Negative. Respiratory: Negative. Cardiovascular: Negative. Gastrointestinal: Negative. Negative for abdominal distention, abdominal pain, anal bleeding, blood in stool, constipation, diarrhea, nausea, rectal pain and vomiting. No GI bleeding no ascites   Endocrine: Negative. Genitourinary: Negative. Musculoskeletal: Negative. Skin: Negative. History of pruritus   Allergic/Immunologic: Positive for food allergies. Neurological: Negative. Hematological: Negative. Psychiatric/Behavioral: Negative. Past medical history, past surgical history, medication list, social and familyhistory reviewed    Pulse 87, height 5' 3\" (1.6 m), weight 203 lb (92.1 kg), last menstrual period 02/28/1982, SpO2 97 %, not currently breastfeeding. Physical Exam  Constitutional:       Appearance: She is well-developed. HENT:      Head: Normocephalic and atraumatic. Eyes:      Conjunctiva/sclera: Conjunctivae normal.      Pupils: Pupils are equal, round, and reactive to light.    Cardiovascular:      Rate and Rhythm: Normal rate. Pulmonary:      Effort: Pulmonary effort is normal.   Abdominal:      General: Bowel sounds are normal.      Palpations: Abdomen is soft. Comments: Soft nontender no ascites. Musculoskeletal:         General: Normal range of motion. Cervical back: Normal range of motion. Comments: No leg edema   Skin:     General: Skin is warm. Neurological:      Mental Status: She is alert. Comments: No asterixis       Laboratory, Pathology, Radiology reviewed in detail with relevantimportant investigations summarized below:    No results for input(s): WBC, HGB, HCT, MCV, PLT in the last 720 hours. Lab Results   Component Value Date    ALT 15 12/19/2022    AST 22 12/19/2022    ALKPHOS 130 12/19/2022    BILITOT 0.6 12/19/2022     No results found. Endoscopic investigations:     Assessment and Plan:  Divya Lacey 69 y.o. female for follow up. Cirrhosis complicated by esophageal varices status post endoscopic variceal ligation, status remains stable and no evidence of any other hepatic decompensation, continue nadolol 2 mg a day. No follow-ups on file. Ankit Boogie MD   StaffGastroenterologist  [facility]    Please note this report has been partially produced using speech recognitionsoftware  and may cause contain errors related to that system including grammar, punctuation and spelling as well as words andphrases that may seem inappropriate. If there are questions or concerns please feel free to contact me to clarify.

## 2023-03-16 ENCOUNTER — TELEPHONE (OUTPATIENT)
Dept: GASTROENTEROLOGY | Age: 78
End: 2023-03-16

## 2023-03-16 NOTE — TELEPHONE ENCOUNTER
----- Message from Kristie Merchant sent at 3/15/2023  1:32 PM EDT -----  Patients daughter would like you to call her regarding referral. Her name is  Jenny [facility] [phone]

## 2023-03-28 ENCOUNTER — TELEPHONE (OUTPATIENT)
Dept: GASTROENTEROLOGY | Age: 78
End: 2023-03-28

## 2023-03-28 RX ORDER — CHOLESTYRAMINE 4 G/9G
1 POWDER, FOR SUSPENSION ORAL 2 TIMES DAILY
Qty: 60 PACKET | Refills: 2 | Status: SHIPPED | OUTPATIENT
Start: 2023-03-28

## 2023-03-28 NOTE — TELEPHONE ENCOUNTER
Patient called and said she has persistent itching, tried some cream without any relief. We will try bile acid binding agent and prescription for cholestyramine sent to pharmacy.

## 2023-03-28 NOTE — TELEPHONE ENCOUNTER
The patient daughter said her mother is having itching and feels tired from the medication Nadolol. Is there something else that can be called in instead of this medication.

## 2023-03-31 NOTE — TELEPHONE ENCOUNTER
Lm on the voicemail informing the daughter Merari Carolnia, that Dr. Gabriel Mcdowell called in a prescription.

## 2023-05-20 DIAGNOSIS — K74.60 HEPATIC CIRRHOSIS, UNSPECIFIED HEPATIC CIRRHOSIS TYPE, UNSPECIFIED WHETHER ASCITES PRESENT (HCC): ICD-10-CM

## 2023-05-22 RX ORDER — NADOLOL 20 MG/1
20 TABLET ORAL DAILY
Qty: 90 TABLET | Refills: 3 | OUTPATIENT
Start: 2023-05-22

## 2023-06-20 RX ORDER — CITALOPRAM 20 MG/1
TABLET ORAL
Qty: 90 TABLET | Refills: 3 | Status: SHIPPED | OUTPATIENT
Start: 2023-06-20

## 2023-07-03 DIAGNOSIS — K74.60 HEPATIC CIRRHOSIS, UNSPECIFIED HEPATIC CIRRHOSIS TYPE, UNSPECIFIED WHETHER ASCITES PRESENT (HCC): ICD-10-CM

## 2023-07-05 RX ORDER — NADOLOL 20 MG/1
20 TABLET ORAL DAILY
Qty: 90 TABLET | Refills: 3 | Status: SHIPPED | OUTPATIENT
Start: 2023-07-05

## 2023-07-06 ENCOUNTER — OFFICE VISIT (OUTPATIENT)
Dept: GASTROENTEROLOGY | Age: 78
End: 2023-07-06
Payer: MEDICARE

## 2023-07-06 VITALS
WEIGHT: 191 LBS | OXYGEN SATURATION: 93 % | HEIGHT: 63 IN | BODY MASS INDEX: 33.84 KG/M2 | TEMPERATURE: 97.4 F | HEART RATE: 74 BPM

## 2023-07-06 DIAGNOSIS — K74.60 CIRRHOSIS OF LIVER WITHOUT ASCITES, UNSPECIFIED HEPATIC CIRRHOSIS TYPE (HCC): Primary | ICD-10-CM

## 2023-07-06 DIAGNOSIS — R10.84 GENERALIZED ABDOMINAL PAIN: ICD-10-CM

## 2023-07-06 DIAGNOSIS — R19.7 DIARRHEA, UNSPECIFIED TYPE: ICD-10-CM

## 2023-07-06 PROCEDURE — 1123F ACP DISCUSS/DSCN MKR DOCD: CPT | Performed by: SPECIALIST

## 2023-07-06 PROCEDURE — 99213 OFFICE O/P EST LOW 20 MIN: CPT | Performed by: SPECIALIST

## 2023-07-06 ASSESSMENT — ENCOUNTER SYMPTOMS
ABDOMINAL PAIN: 1
EYES NEGATIVE: 1
ANAL BLEEDING: 0
CONSTIPATION: 0
RESPIRATORY NEGATIVE: 1
VOMITING: 0
BLOOD IN STOOL: 0
DIARRHEA: 1
RECTAL PAIN: 0
NAUSEA: 0
ABDOMINAL DISTENTION: 0

## 2023-07-13 LAB
REASON FOR REJECTION: NORMAL
REJECTED TEST: NORMAL

## 2023-07-20 ENCOUNTER — HOSPITAL ENCOUNTER (OUTPATIENT)
Dept: CT IMAGING | Age: 78
Discharge: HOME | End: 2023-07-22
Payer: MEDICARE

## 2023-07-20 DIAGNOSIS — R10.84 GENERALIZED ABDOMINAL PAIN: ICD-10-CM

## 2023-07-20 DIAGNOSIS — K74.60 CIRRHOSIS OF LIVER WITHOUT ASCITES, UNSPECIFIED HEPATIC CIRRHOSIS TYPE (HCC): ICD-10-CM

## 2023-07-20 PROCEDURE — 6360000004 HC RX CONTRAST MEDICATION: Performed by: SPECIALIST

## 2023-07-20 PROCEDURE — 2580000003 HC RX 258: Performed by: SPECIALIST

## 2023-07-20 PROCEDURE — 74177 CT ABD & PELVIS W/CONTRAST: CPT

## 2023-07-20 RX ORDER — 0.9 % SODIUM CHLORIDE 0.9 %
50 INTRAVENOUS SOLUTION INTRAVENOUS ONCE
Status: COMPLETED | OUTPATIENT
Start: 2023-07-20 | End: 2023-07-20

## 2023-07-20 RX ADMIN — SODIUM CHLORIDE 50 ML: 9 INJECTION, SOLUTION INTRAVENOUS at 13:17

## 2023-07-20 RX ADMIN — IOPAMIDOL 100 ML: 612 INJECTION, SOLUTION INTRAVENOUS at 13:17

## 2023-08-03 ENCOUNTER — TELEPHONE (OUTPATIENT)
Dept: GASTROENTEROLOGY | Age: 78
End: 2023-08-03

## 2023-08-25 DIAGNOSIS — K74.60 HEPATIC CIRRHOSIS, UNSPECIFIED HEPATIC CIRRHOSIS TYPE, UNSPECIFIED WHETHER ASCITES PRESENT (HCC): ICD-10-CM

## 2023-08-28 RX ORDER — CHOLESTYRAMINE 4 G/9G
POWDER, FOR SUSPENSION ORAL
Qty: 180 EACH | Refills: 3 | Status: SHIPPED | OUTPATIENT
Start: 2023-08-28

## 2023-10-27 ENCOUNTER — HOSPITAL ENCOUNTER (OUTPATIENT)
Dept: LAB | Age: 78
Discharge: HOME | End: 2023-10-27
Payer: MEDICARE

## 2023-10-27 LAB
ANION GAP SERPL CALCULATED.3IONS-SCNC: 14 MEQ/L (ref 9–15)
BUN SERPL-MCNC: 8 MG/DL (ref 8–23)
CALCIUM SERPL-MCNC: 8.5 MG/DL (ref 8.5–9.9)
CHLORIDE SERPL-SCNC: 105 MEQ/L (ref 95–107)
CO2 SERPL-SCNC: 20 MEQ/L (ref 20–31)
CREAT SERPL-MCNC: 0.86 MG/DL (ref 0.5–0.9)
GLUCOSE SERPL-MCNC: 161 MG/DL (ref 70–99)
POTASSIUM SERPL-SCNC: 4.5 MEQ/L (ref 3.4–4.9)
SODIUM SERPL-SCNC: 139 MEQ/L (ref 135–144)

## 2023-10-27 PROCEDURE — 80048 BASIC METABOLIC PNL TOTAL CA: CPT

## 2023-10-27 PROCEDURE — 36415 COLL VENOUS BLD VENIPUNCTURE: CPT

## 2023-11-28 ENCOUNTER — HOSPITAL ENCOUNTER (OUTPATIENT)
Dept: CT IMAGING | Age: 78
Discharge: HOME | End: 2023-11-30
Attending: SURGERY
Payer: MEDICARE

## 2023-11-28 DIAGNOSIS — I71.43 INFRARENAL ABDOMINAL AORTIC ANEURYSM (AAA) WITHOUT RUPTURE (HCC): ICD-10-CM

## 2023-11-28 PROCEDURE — 74174 CTA ABD&PLVS W/CONTRAST: CPT

## 2023-11-28 PROCEDURE — 6360000004 HC RX CONTRAST MEDICATION: Performed by: SURGERY

## 2023-11-28 RX ADMIN — IOPAMIDOL 100 ML: 612 INJECTION, SOLUTION INTRAVENOUS at 13:45

## 2024-02-28 ENCOUNTER — TELEPHONE (OUTPATIENT)
Dept: PRIMARY CARE CLINIC | Age: 79
End: 2024-02-28

## 2024-02-28 DIAGNOSIS — Z12.31 VISIT FOR SCREENING MAMMOGRAM: Primary | ICD-10-CM

## 2024-02-28 NOTE — TELEPHONE ENCOUNTER
Order placed, though she does not need to get any more after 76 years. But if she wants it's been ordered

## 2024-03-01 ENCOUNTER — TELEPHONE (OUTPATIENT)
Dept: PRIMARY CARE CLINIC | Age: 79
End: 2024-03-01

## 2024-03-01 NOTE — TELEPHONE ENCOUNTER
----- Message from Mena Mcneal sent at 2/29/2024  4:07 PM EST -----  Subject: Message to Provider    QUESTIONS  Information for Provider? Patient called and is asking the mammo order be   ammended to a DIAGNOSTIC MAMMOGRAM. Patient wants this completed at [facility]. Please advise. Thank you   ---------------------------------------------------------------------------  --------------  CALL BACK INFO  [phone]; OK to leave message on voicemail  ---------------------------------------------------------------------------  --------------  SCRIPT ANSWERS  Relationship to Patient? Self

## 2024-03-05 ENCOUNTER — TELEPHONE (OUTPATIENT)
Dept: PRIMARY CARE CLINIC | Age: 79
End: 2024-03-05

## 2024-03-05 NOTE — TELEPHONE ENCOUNTER
----- Message from Joselin Caban MA sent at 3/5/2024  8:12 AM EST -----  Subject: Message to Provider    QUESTIONS  Information for Provider? + Covid yesterday. Congestion, nose, cough, body   aches. Requesting Rx. Please call Daughter Jerica Bos[facility] in [place]  ---------------------------------------------------------------------------  --------------  CALL BACK INFO  [phone]; OK to leave message on voicemail  ---------------------------------------------------------------------------  --------------  SCRIPT ANSWERS  Relationship to Patient? Other/Third Party  Representative Name? Jerica Antoine  Is the representative on the Communication Release of Information (HOLLEY)   form in Epic? Yes

## 2024-03-06 ENCOUNTER — TELEPHONE (OUTPATIENT)
Dept: PRIMARY CARE CLINIC | Age: 79
End: 2024-03-06

## 2024-03-06 DIAGNOSIS — U07.1 COVID-19: Primary | ICD-10-CM

## 2024-03-06 NOTE — TELEPHONE ENCOUNTER
Patient states was given a script for paxlovid and the pharmacy doesn't carry it asking for a paper script to take it to a pharmacy of her choice and daughter will be by to pick it up this afternoon.

## 2024-04-09 ENCOUNTER — TELEPHONE (OUTPATIENT)
Dept: PRIMARY CARE | Facility: CLINIC | Age: 79
End: 2024-04-09
Payer: COMMERCIAL

## 2024-04-09 NOTE — TELEPHONE ENCOUNTER
SHE CALLED STATES SHE WOULD LIKE TO BE A NEW PATIENT OF YOURS AND HER  IS A PATIENT OF YOURS . SILVER ACEVES- 05/08/1941 - PASSED

## 2024-05-02 ENCOUNTER — OFFICE VISIT (OUTPATIENT)
Dept: GASTROENTEROLOGY | Age: 79
End: 2024-05-02
Payer: MEDICARE

## 2024-05-02 VITALS — WEIGHT: 210 LBS | OXYGEN SATURATION: 96 % | HEART RATE: 68 BPM | BODY MASS INDEX: 37.21 KG/M2 | HEIGHT: 63 IN

## 2024-05-02 DIAGNOSIS — K74.69 OTHER CIRRHOSIS OF LIVER (HCC): Primary | ICD-10-CM

## 2024-05-02 DIAGNOSIS — R18.8 OTHER ASCITES: ICD-10-CM

## 2024-05-02 DIAGNOSIS — K74.69 OTHER CIRRHOSIS OF LIVER (HCC): ICD-10-CM

## 2024-05-02 LAB
ALBUMIN SERPL-MCNC: 3.2 G/DL (ref 3.5–4.6)
ALP SERPL-CCNC: 111 U/L (ref 40–130)
ALT SERPL-CCNC: 13 U/L (ref 0–33)
AMMONIA PLAS-SCNC: 19 UMOL/L (ref 11–51)
ANION GAP SERPL CALCULATED.3IONS-SCNC: 10 MEQ/L (ref 9–15)
ANISOCYTOSIS BLD QL SMEAR: ABNORMAL
AST SERPL-CCNC: 28 U/L (ref 0–35)
BASOPHILS # BLD: 0.1 K/UL (ref 0–0.2)
BASOPHILS NFR BLD: 1 %
BILIRUB SERPL-MCNC: 0.6 MG/DL (ref 0.2–0.7)
BUN SERPL-MCNC: 10 MG/DL (ref 8–23)
CALCIUM SERPL-MCNC: 8.3 MG/DL (ref 8.5–9.9)
CHLORIDE SERPL-SCNC: 105 MEQ/L (ref 95–107)
CO2 SERPL-SCNC: 25 MEQ/L (ref 20–31)
CREAT SERPL-MCNC: 1.12 MG/DL (ref 0.5–0.9)
EOSINOPHIL # BLD: 0 K/UL (ref 0–0.7)
EOSINOPHIL NFR BLD: 1.4 %
ERYTHROCYTE [DISTWIDTH] IN BLOOD BY AUTOMATED COUNT: 22.7 % (ref 11.5–14.5)
GLOBULIN SER CALC-MCNC: 3.1 G/DL (ref 2.3–3.5)
GLUCOSE SERPL-MCNC: 125 MG/DL (ref 70–99)
HCT VFR BLD AUTO: 28 % (ref 37–47)
HGB BLD-MCNC: 7.5 G/DL (ref 12–16)
HYPOCHROMIA BLD QL SMEAR: ABNORMAL
LYMPHOCYTES # BLD: 0.8 K/UL (ref 1–4.8)
LYMPHOCYTES NFR BLD: 14 %
MCH RBC QN AUTO: 18.6 PG (ref 27–31.3)
MCHC RBC AUTO-ENTMCNC: 26.8 % (ref 33–37)
MCV RBC AUTO: 69.3 FL (ref 79.4–94.8)
MICROCYTES BLD QL SMEAR: ABNORMAL
MONOCYTES # BLD: 0.2 K/UL (ref 0.2–0.8)
MONOCYTES NFR BLD: 3.8 %
NEUTROPHILS # BLD: 4.7 K/UL (ref 1.4–6.5)
NEUTS SEG NFR BLD: 81 %
OVALOCYTES BLD QL SMEAR: ABNORMAL
PATH INTERP BLD-IMP: YES
PLATELET # BLD AUTO: 185 K/UL (ref 130–400)
PLATELET BLD QL SMEAR: ADEQUATE
POIKILOCYTOSIS BLD QL SMEAR: ABNORMAL
POLYCHROMASIA BLD QL SMEAR: ABNORMAL
POTASSIUM SERPL-SCNC: 4.7 MEQ/L (ref 3.4–4.9)
PROT SERPL-MCNC: 6.3 G/DL (ref 6.3–8)
RBC # BLD AUTO: 4.04 M/UL (ref 4.2–5.4)
SCHISTOCYTES BLD QL SMEAR: ABNORMAL
SLIDE REVIEW: ABNORMAL
SMUDGE CELLS BLD QL SMEAR: 11.4
SODIUM SERPL-SCNC: 140 MEQ/L (ref 135–144)
TARGETS BLD QL SMEAR: ABNORMAL
WBC # BLD AUTO: 5.8 K/UL (ref 4.8–10.8)

## 2024-05-02 PROCEDURE — 1036F TOBACCO NON-USER: CPT | Performed by: SPECIALIST

## 2024-05-02 PROCEDURE — G8427 DOCREV CUR MEDS BY ELIG CLIN: HCPCS | Performed by: SPECIALIST

## 2024-05-02 PROCEDURE — 99214 OFFICE O/P EST MOD 30 MIN: CPT | Performed by: SPECIALIST

## 2024-05-02 PROCEDURE — G8400 PT W/DXA NO RESULTS DOC: HCPCS | Performed by: SPECIALIST

## 2024-05-02 PROCEDURE — G8417 CALC BMI ABV UP PARAM F/U: HCPCS | Performed by: SPECIALIST

## 2024-05-02 PROCEDURE — 1090F PRES/ABSN URINE INCON ASSESS: CPT | Performed by: SPECIALIST

## 2024-05-02 PROCEDURE — 1123F ACP DISCUSS/DSCN MKR DOCD: CPT | Performed by: SPECIALIST

## 2024-05-02 ASSESSMENT — ENCOUNTER SYMPTOMS
RESPIRATORY NEGATIVE: 1
ANAL BLEEDING: 0
VOMITING: 0
CONSTIPATION: 0
ABDOMINAL PAIN: 0
RECTAL PAIN: 0
ABDOMINAL DISTENTION: 0
NAUSEA: 0
DIARRHEA: 1
BLOOD IN STOOL: 0
EYES NEGATIVE: 1

## 2024-05-02 NOTE — PROGRESS NOTES
Gastroenterology Clinic Follow up Visit    Zach Aguirre  [id number]  Chief Complaint   Patient presents with    Follow-up     The swelling ascites, seems to be getting worse, significant pain       HPI and A/P at last visit summarized below: Patient is here for follow-up, she has a history of cirrhosis complicated by esophageal varices and had endoscopic variceal ligation in the past.  His last 1 to 2 months patient has been experiencing abdominal discomfort and a sensation of bloating and feels fatigue and and feels drowsy.  No history of hematemesis or melena.  Takes ibuprofen occasionally for back pain, patient also reports swelling of her lower extremities      Review of Systems   Constitutional:  Positive for fatigue.   HENT: Negative.     Eyes: Negative.    Respiratory: Negative.          COPD   Cardiovascular: Negative.    Gastrointestinal:  Positive for diarrhea. Negative for abdominal distention, abdominal pain, anal bleeding, blood in stool, constipation, nausea, rectal pain and vomiting.        Abdominal discomfort   Endocrine: Negative.         Diabetes   Genitourinary: Negative.    Musculoskeletal: Negative.    Skin: Negative.    Allergic/Immunologic: Negative for food allergies.   Neurological: Negative.    Hematological: Negative.    Psychiatric/Behavioral: Negative.          Past medical history, past surgical history, medication list, social and familyhistory reviewed    Pulse 68, height 1.6 m (5' 3\"), weight 95.3 kg (210 lb), last menstrual period 02/28/1982, SpO2 96 %, not currently breastfeeding.    Physical Exam  Constitutional:       Appearance: She is well-developed.   HENT:      Head: Normocephalic and atraumatic.   Eyes:      Conjunctiva/sclera: Conjunctivae normal.      Pupils: Pupils are equal, round, and reactive to light.   Cardiovascular:      Rate and Rhythm: Normal rate.   Pulmonary:      Effort: Pulmonary effort is normal.   Abdominal:      General: Bowel sounds are normal.

## 2024-05-03 ENCOUNTER — TELEPHONE (OUTPATIENT)
Dept: GASTROENTEROLOGY | Age: 79
End: 2024-05-03

## 2024-05-03 DIAGNOSIS — N28.9 RENAL INSUFFICIENCY: Primary | ICD-10-CM

## 2024-05-03 DIAGNOSIS — K74.69 OTHER CIRRHOSIS OF LIVER (HCC): ICD-10-CM

## 2024-05-03 DIAGNOSIS — K74.69 OTHER CIRRHOSIS OF LIVER (HCC): Primary | ICD-10-CM

## 2024-05-03 LAB
AFP-TM SERPL-MCNC: 2.7 UG/L
PATH INTERP BLD-IMP: NORMAL

## 2024-05-03 RX ORDER — POTASSIUM CHLORIDE 20 MEQ/1
20 TABLET, EXTENDED RELEASE ORAL DAILY
Qty: 30 TABLET | Refills: 5 | Status: SHIPPED | OUTPATIENT
Start: 2024-05-03

## 2024-05-03 NOTE — TELEPHONE ENCOUNTER
Lab results reviewed and serum creatinine is 1.12 with a GFR of 50.3, notified to patient about this and nephrology referral was made, by mistake a prescription for potassium chloride was sent to patient's pharmacy thinking that her potassium was low but later I found out her potassium is normal and I called the pharmacy and advised him to delete the prescription.

## 2024-05-04 DIAGNOSIS — K74.69 OTHER CIRRHOSIS OF LIVER (HCC): ICD-10-CM

## 2024-05-04 DIAGNOSIS — D64.9 ANEMIA, UNSPECIFIED TYPE: Primary | ICD-10-CM

## 2024-05-06 ENCOUNTER — TELEPHONE (OUTPATIENT)
Dept: GASTROENTEROLOGY | Age: 79
End: 2024-05-06

## 2024-05-06 ENCOUNTER — APPOINTMENT (OUTPATIENT)
Dept: ULTRASOUND IMAGING | Age: 79
DRG: 433 | End: 2024-05-06
Payer: MEDICARE

## 2024-05-06 ENCOUNTER — APPOINTMENT (OUTPATIENT)
Dept: CT IMAGING | Age: 79
DRG: 433 | End: 2024-05-06
Payer: MEDICARE

## 2024-05-06 ENCOUNTER — HOSPITAL ENCOUNTER (INPATIENT)
Age: 79
LOS: 3 days | Discharge: HOME HEALTH | DRG: 433 | End: 2024-05-09
Attending: STUDENT IN AN ORGANIZED HEALTH CARE EDUCATION/TRAINING PROGRAM | Admitting: STUDENT IN AN ORGANIZED HEALTH CARE EDUCATION/TRAINING PROGRAM
Payer: MEDICARE

## 2024-05-06 DIAGNOSIS — N18.31 STAGE 3A CHRONIC KIDNEY DISEASE (HCC): ICD-10-CM

## 2024-05-06 DIAGNOSIS — D64.9 SYMPTOMATIC ANEMIA: Primary | ICD-10-CM

## 2024-05-06 DIAGNOSIS — R60.0 PERIPHERAL EDEMA: ICD-10-CM

## 2024-05-06 DIAGNOSIS — R18.8 OTHER ASCITES: ICD-10-CM

## 2024-05-06 DIAGNOSIS — K75.81 LIVER CIRRHOSIS SECONDARY TO NASH (HCC): ICD-10-CM

## 2024-05-06 DIAGNOSIS — K74.60 LIVER CIRRHOSIS SECONDARY TO NASH (HCC): ICD-10-CM

## 2024-05-06 PROBLEM — J96.01 ACUTE HYPOXIC RESPIRATORY FAILURE (HCC): Status: ACTIVE | Noted: 2024-05-06

## 2024-05-06 LAB
ACANTHOCYTES BLD QL SMEAR: ABNORMAL
ALBUMIN SERPL-MCNC: 3.2 G/DL (ref 3.5–4.6)
ALP SERPL-CCNC: 104 U/L (ref 40–130)
ALT SERPL-CCNC: 13 U/L (ref 0–33)
ANION GAP SERPL CALCULATED.3IONS-SCNC: 8 MEQ/L (ref 9–15)
ANISOCYTOSIS BLD QL SMEAR: ABNORMAL
AST SERPL-CCNC: 29 U/L (ref 0–35)
BASOPHILS # BLD: 0.1 K/UL (ref 0–0.2)
BASOPHILS NFR BLD: 1 %
BILIRUB SERPL-MCNC: 0.9 MG/DL (ref 0.2–0.7)
BNP BLD-MCNC: 184 PG/ML
BUN SERPL-MCNC: 11 MG/DL (ref 8–23)
CALCIUM SERPL-MCNC: 8.2 MG/DL (ref 8.5–9.9)
CHLORIDE SERPL-SCNC: 105 MEQ/L (ref 95–107)
CO2 SERPL-SCNC: 27 MEQ/L (ref 20–31)
CREAT SERPL-MCNC: 1.15 MG/DL (ref 0.5–0.9)
DACRYOCYTES BLD QL SMEAR: ABNORMAL
EOSINOPHIL # BLD: 0.1 K/UL (ref 0–0.7)
EOSINOPHIL NFR BLD: 1.8 %
ERYTHROCYTE [DISTWIDTH] IN BLOOD BY AUTOMATED COUNT: 22.4 % (ref 11.5–14.5)
GLOBULIN SER CALC-MCNC: 2.9 G/DL (ref 2.3–3.5)
GLUCOSE SERPL-MCNC: 176 MG/DL (ref 70–99)
HCT VFR BLD AUTO: 27.4 % (ref 37–47)
HGB BLD-MCNC: 7.3 G/DL (ref 12–16)
HYPOCHROMIA BLD QL SMEAR: ABNORMAL
LYMPHOCYTES # BLD: 1.1 K/UL (ref 1–4.8)
LYMPHOCYTES NFR BLD: 22.4 %
MAGNESIUM SERPL-MCNC: 2 MG/DL (ref 1.7–2.4)
MCH RBC QN AUTO: 18.7 PG (ref 27–31.3)
MCHC RBC AUTO-ENTMCNC: 26.6 % (ref 33–37)
MCV RBC AUTO: 70.1 FL (ref 79.4–94.8)
MICROCYTES BLD QL SMEAR: ABNORMAL
MONOCYTES # BLD: 0.4 K/UL (ref 0.2–0.8)
MONOCYTES NFR BLD: 8.5 %
NEUTROPHILS # BLD: 3.3 K/UL (ref 1.4–6.5)
NEUTS SEG NFR BLD: 65.9 %
OVALOCYTES BLD QL SMEAR: ABNORMAL
PERFORMED ON: ABNORMAL
PLATELET # BLD AUTO: 155 K/UL (ref 130–400)
PLATELET BLD QL SMEAR: ADEQUATE
POC CREATININE: 1.4 MG/DL (ref 0.6–1.2)
POC SAMPLE TYPE: ABNORMAL
POIKILOCYTOSIS BLD QL SMEAR: ABNORMAL
POTASSIUM SERPL-SCNC: 4.1 MEQ/L (ref 3.4–4.9)
PROT SERPL-MCNC: 6.1 G/DL (ref 6.3–8)
RBC # BLD AUTO: 3.91 M/UL (ref 4.2–5.4)
SLIDE REVIEW: ABNORMAL
SODIUM SERPL-SCNC: 140 MEQ/L (ref 135–144)
TROPONIN, HIGH SENSITIVITY: 39 NG/L (ref 0–19)
TROPONIN, HIGH SENSITIVITY: 40 NG/L (ref 0–19)
TROPONIN, HIGH SENSITIVITY: 42 NG/L (ref 0–19)
WBC # BLD AUTO: 5 K/UL (ref 4.8–10.8)

## 2024-05-06 PROCEDURE — 83735 ASSAY OF MAGNESIUM: CPT

## 2024-05-06 PROCEDURE — 71275 CT ANGIOGRAPHY CHEST: CPT

## 2024-05-06 PROCEDURE — 83880 ASSAY OF NATRIURETIC PEPTIDE: CPT

## 2024-05-06 PROCEDURE — 85025 COMPLETE CBC W/AUTO DIFF WBC: CPT

## 2024-05-06 PROCEDURE — 80053 COMPREHEN METABOLIC PANEL: CPT

## 2024-05-06 PROCEDURE — 84484 ASSAY OF TROPONIN QUANT: CPT

## 2024-05-06 PROCEDURE — 1210000000 HC MED SURG R&B

## 2024-05-06 PROCEDURE — 94640 AIRWAY INHALATION TREATMENT: CPT

## 2024-05-06 PROCEDURE — 6360000004 HC RX CONTRAST MEDICATION: Performed by: PHYSICIAN ASSISTANT

## 2024-05-06 PROCEDURE — 93971 EXTREMITY STUDY: CPT

## 2024-05-06 PROCEDURE — 99285 EMERGENCY DEPT VISIT HI MDM: CPT

## 2024-05-06 PROCEDURE — 6370000000 HC RX 637 (ALT 250 FOR IP): Performed by: PHYSICIAN ASSISTANT

## 2024-05-06 PROCEDURE — 74177 CT ABD & PELVIS W/CONTRAST: CPT

## 2024-05-06 PROCEDURE — 36415 COLL VENOUS BLD VENIPUNCTURE: CPT

## 2024-05-06 PROCEDURE — 93005 ELECTROCARDIOGRAM TRACING: CPT | Performed by: PHYSICIAN ASSISTANT

## 2024-05-06 RX ORDER — INSULIN LISPRO 100 [IU]/ML
0-4 INJECTION, SOLUTION INTRAVENOUS; SUBCUTANEOUS NIGHTLY
Status: DISCONTINUED | OUTPATIENT
Start: 2024-05-07 | End: 2024-05-09 | Stop reason: HOSPADM

## 2024-05-06 RX ORDER — ACETAMINOPHEN 325 MG/1
650 TABLET ORAL EVERY 6 HOURS PRN
Status: DISCONTINUED | OUTPATIENT
Start: 2024-05-06 | End: 2024-05-09 | Stop reason: HOSPADM

## 2024-05-06 RX ORDER — SODIUM CHLORIDE 0.9 % (FLUSH) 0.9 %
5-40 SYRINGE (ML) INJECTION EVERY 12 HOURS SCHEDULED
Status: DISCONTINUED | OUTPATIENT
Start: 2024-05-07 | End: 2024-05-09 | Stop reason: HOSPADM

## 2024-05-06 RX ORDER — ACETAMINOPHEN 650 MG/1
650 SUPPOSITORY RECTAL EVERY 6 HOURS PRN
Status: DISCONTINUED | OUTPATIENT
Start: 2024-05-06 | End: 2024-05-09 | Stop reason: HOSPADM

## 2024-05-06 RX ORDER — INSULIN LISPRO 100 [IU]/ML
0-8 INJECTION, SOLUTION INTRAVENOUS; SUBCUTANEOUS
Status: DISCONTINUED | OUTPATIENT
Start: 2024-05-07 | End: 2024-05-09 | Stop reason: HOSPADM

## 2024-05-06 RX ORDER — IPRATROPIUM BROMIDE AND ALBUTEROL SULFATE 2.5; .5 MG/3ML; MG/3ML
1 SOLUTION RESPIRATORY (INHALATION) ONCE
Status: COMPLETED | OUTPATIENT
Start: 2024-05-06 | End: 2024-05-06

## 2024-05-06 RX ORDER — GLUCAGON 1 MG/ML
1 KIT INJECTION PRN
Status: DISCONTINUED | OUTPATIENT
Start: 2024-05-06 | End: 2024-05-09 | Stop reason: HOSPADM

## 2024-05-06 RX ORDER — POTASSIUM CHLORIDE 20 MEQ/1
40 TABLET, EXTENDED RELEASE ORAL PRN
Status: DISCONTINUED | OUTPATIENT
Start: 2024-05-06 | End: 2024-05-09 | Stop reason: HOSPADM

## 2024-05-06 RX ORDER — POLYETHYLENE GLYCOL 3350 17 G/17G
17 POWDER, FOR SOLUTION ORAL DAILY PRN
Status: DISCONTINUED | OUTPATIENT
Start: 2024-05-06 | End: 2024-05-09 | Stop reason: HOSPADM

## 2024-05-06 RX ORDER — POTASSIUM CHLORIDE 7.45 MG/ML
10 INJECTION INTRAVENOUS PRN
Status: DISCONTINUED | OUTPATIENT
Start: 2024-05-06 | End: 2024-05-09 | Stop reason: HOSPADM

## 2024-05-06 RX ORDER — SODIUM CHLORIDE 0.9 % (FLUSH) 0.9 %
5-40 SYRINGE (ML) INJECTION PRN
Status: DISCONTINUED | OUTPATIENT
Start: 2024-05-06 | End: 2024-05-09 | Stop reason: HOSPADM

## 2024-05-06 RX ORDER — ONDANSETRON 4 MG/1
4 TABLET, ORALLY DISINTEGRATING ORAL EVERY 8 HOURS PRN
Status: DISCONTINUED | OUTPATIENT
Start: 2024-05-06 | End: 2024-05-09 | Stop reason: HOSPADM

## 2024-05-06 RX ORDER — ONDANSETRON 2 MG/ML
4 INJECTION INTRAMUSCULAR; INTRAVENOUS EVERY 6 HOURS PRN
Status: DISCONTINUED | OUTPATIENT
Start: 2024-05-06 | End: 2024-05-09 | Stop reason: HOSPADM

## 2024-05-06 RX ORDER — SODIUM CHLORIDE 9 MG/ML
INJECTION, SOLUTION INTRAVENOUS PRN
Status: DISCONTINUED | OUTPATIENT
Start: 2024-05-06 | End: 2024-05-09 | Stop reason: HOSPADM

## 2024-05-06 RX ORDER — MAGNESIUM SULFATE IN WATER 40 MG/ML
2000 INJECTION, SOLUTION INTRAVENOUS PRN
Status: DISCONTINUED | OUTPATIENT
Start: 2024-05-06 | End: 2024-05-09 | Stop reason: HOSPADM

## 2024-05-06 RX ORDER — DEXTROSE MONOHYDRATE 100 MG/ML
INJECTION, SOLUTION INTRAVENOUS CONTINUOUS PRN
Status: DISCONTINUED | OUTPATIENT
Start: 2024-05-06 | End: 2024-05-09 | Stop reason: HOSPADM

## 2024-05-06 RX ADMIN — IPRATROPIUM BROMIDE AND ALBUTEROL SULFATE 1 DOSE: 2.5; .5 SOLUTION RESPIRATORY (INHALATION) at 20:57

## 2024-05-06 RX ADMIN — IOPAMIDOL 75 ML: 755 INJECTION, SOLUTION INTRAVENOUS at 17:21

## 2024-05-06 ASSESSMENT — PAIN - FUNCTIONAL ASSESSMENT: PAIN_FUNCTIONAL_ASSESSMENT: NONE - DENIES PAIN

## 2024-05-06 ASSESSMENT — ENCOUNTER SYMPTOMS
APNEA: 0
EYE DISCHARGE: 0
SHORTNESS OF BREATH: 1
VOMITING: 0
COLOR CHANGE: 1
VOICE CHANGE: 0
ANAL BLEEDING: 0
NAUSEA: 0
ABDOMINAL DISTENTION: 1
ABDOMINAL PAIN: 1

## 2024-05-06 ASSESSMENT — LIFESTYLE VARIABLES
HOW OFTEN DO YOU HAVE A DRINK CONTAINING ALCOHOL: NEVER
HOW MANY STANDARD DRINKS CONTAINING ALCOHOL DO YOU HAVE ON A TYPICAL DAY: PATIENT DOES NOT DRINK

## 2024-05-06 ASSESSMENT — PAIN SCALES - GENERAL: PAINLEVEL_OUTOF10: 0

## 2024-05-06 NOTE — TELEPHONE ENCOUNTER
Patient daughter (Jerica) called to say that her mom is really sick and states she taking her to the emergency, but is there anything you can do to help her mom so that they can keep her. States her mom has jaundice and she believes she needs a bllod transfusion

## 2024-05-06 NOTE — ED PROVIDER NOTES
[facility] ED  EMERGENCY DEPARTMENT ENCOUNTER      Pt Name: Zach Aguirre  MRN: [id number]  Birthdate 1945  Date of evaluation: 5/6/2024  Provider: Jose Mclean PA-C  7:54 PM EDT    CHIEF COMPLAINT       Chief Complaint   Patient presents with    Shortness of Breath     Sob and liver issues worried that she may need a blood transfusion.          HISTORY OF PRESENT ILLNESS   (Location/Symptom, Timing/Onset, Context/Setting, Quality, Duration, Modifying Factors, Severity)  Note limiting factors.   Zach Aguirre is a 79 y.o. female who presents to the emergency department patient's had several week history of shortness of breath liver issues abdominal distention.  She has cirrhosis she was seen by pulmonary who stated she likely needs a transfusion she used to have iron infusions when she  follow-up with hematology.  Patient is taking Pepto-Bismol currently states stool is brown in color    HPI    Nursing Notes were reviewed.    REVIEW OF SYSTEMS    (2-9 systems for level 4, 10 or more for level 5)     Review of Systems   Constitutional:  Negative for activity change, appetite change and unexpected weight change.   HENT:  Negative for ear discharge, nosebleeds and voice change.    Eyes:  Negative for discharge.   Respiratory:  Positive for shortness of breath. Negative for apnea.    Cardiovascular:  Negative for chest pain.   Gastrointestinal:  Positive for abdominal distention and abdominal pain. Negative for anal bleeding, nausea and vomiting.   Genitourinary:  Negative for hematuria.   Musculoskeletal:  Negative for joint swelling.   Skin:  Positive for color change. Negative for pallor.   Neurological:  Negative for seizures and facial asymmetry.   Hematological:  Does not bruise/bleed easily.   Psychiatric/Behavioral:  Negative for behavioral problems, self-injury and sleep disturbance.    All other systems reviewed and are negative.      Except as noted above the remainder of the review of

## 2024-05-07 ENCOUNTER — APPOINTMENT (OUTPATIENT)
Dept: ULTRASOUND IMAGING | Age: 79
DRG: 433 | End: 2024-05-07
Payer: MEDICARE

## 2024-05-07 ENCOUNTER — HOSPITAL ENCOUNTER (INPATIENT)
Dept: INTERVENTIONAL RADIOLOGY/VASCULAR | Age: 79
Discharge: HOME | DRG: 433 | End: 2024-05-09
Payer: MEDICARE

## 2024-05-07 VITALS — HEART RATE: 98 BPM | SYSTOLIC BLOOD PRESSURE: 130 MMHG | OXYGEN SATURATION: 94 % | DIASTOLIC BLOOD PRESSURE: 58 MMHG

## 2024-05-07 PROBLEM — R18.8 OTHER ASCITES: Status: ACTIVE | Noted: 2024-05-07

## 2024-05-07 PROBLEM — D64.9 SYMPTOMATIC ANEMIA: Status: ACTIVE | Noted: 2022-02-15

## 2024-05-07 PROBLEM — K75.81 LIVER CIRRHOSIS SECONDARY TO NASH (HCC): Status: ACTIVE | Noted: 2024-05-07

## 2024-05-07 PROBLEM — K74.60 LIVER CIRRHOSIS SECONDARY TO NASH (HCC): Status: ACTIVE | Noted: 2024-05-07

## 2024-05-07 LAB
ALBUMIN FLUID: 0.9 G/DL
ALBUMIN SERPL-MCNC: 2.9 G/DL (ref 3.5–4.6)
ALP SERPL-CCNC: 96 U/L (ref 40–130)
ALT SERPL-CCNC: 12 U/L (ref 0–33)
AMYLASE FLUID: 22 U/L
ANION GAP SERPL CALCULATED.3IONS-SCNC: 7 MEQ/L (ref 9–15)
APPEARANCE FLUID: CLEAR
AST SERPL-CCNC: 26 U/L (ref 0–35)
BASO FLUID: 0 %
BILIRUB SERPL-MCNC: 0.5 MG/DL (ref 0.2–0.7)
BUN SERPL-MCNC: 11 MG/DL (ref 8–23)
CALCIUM SERPL-MCNC: 8.2 MG/DL (ref 8.5–9.9)
CELL COUNT FLUID TYPE: NORMAL
CHLORIDE SERPL-SCNC: 107 MEQ/L (ref 95–107)
CLOT EVALUATION: NORMAL
CO2 SERPL-SCNC: 27 MEQ/L (ref 20–31)
COLOR FLUID: NORMAL
CREAT SERPL-MCNC: 1.16 MG/DL (ref 0.5–0.9)
EOSINOPHIL FLUID: 0 %
ERYTHROCYTE [DISTWIDTH] IN BLOOD BY AUTOMATED COUNT: 22.3 % (ref 11.5–14.5)
FLUID TYPE: NORMAL
GLOBULIN SER CALC-MCNC: 2.8 G/DL (ref 2.3–3.5)
GLUCOSE BLD-MCNC: 124 MG/DL (ref 70–99)
GLUCOSE BLD-MCNC: 132 MG/DL (ref 70–99)
GLUCOSE BLD-MCNC: 176 MG/DL (ref 70–99)
GLUCOSE BLD-MCNC: 182 MG/DL (ref 70–99)
GLUCOSE FLD-MCNC: 151 MG/DL
GLUCOSE SERPL-MCNC: 146 MG/DL (ref 70–99)
HCT VFR BLD AUTO: 25 % (ref 37–47)
HCT VFR BLD AUTO: 26.6 % (ref 37–47)
HCT VFR BLD AUTO: 27.9 % (ref 37–47)
HGB BLD-MCNC: 6.9 G/DL (ref 12–16)
HGB BLD-MCNC: 7.1 G/DL (ref 12–16)
HGB BLD-MCNC: 7.5 G/DL (ref 12–16)
INR PPP: 1.2
LACTATE DEHYDROGENASE, FLUID: 55 U/L
LYMPHOCYTES, BODY FLUID: 56 %
MCH RBC QN AUTO: 18.9 PG (ref 27–31.3)
MCHC RBC AUTO-ENTMCNC: 27.6 % (ref 33–37)
MCV RBC AUTO: 68.3 FL (ref 79.4–94.8)
MONOCYTE, FLUID: 18 %
NEUTROPHIL, FLUID: 26 %
NUCLEATED CELLS FLUID: 389 /CUMM
NUMBER OF CELLS COUNTED FLUID: 100
PERFORMED ON: ABNORMAL
PLATELET # BLD AUTO: 127 K/UL (ref 130–400)
POTASSIUM SERPL-SCNC: 3.8 MEQ/L (ref 3.4–4.9)
PROT FLD-MCNC: 1.4 G/DL
PROT SERPL-MCNC: 5.7 G/DL (ref 6.3–8)
PROTHROMBIN TIME: 15.5 SEC (ref 12.3–14.9)
RBC # BLD AUTO: 3.66 M/UL (ref 4.2–5.4)
RBC FLUID: 1000 /CUMM
SODIUM SERPL-SCNC: 141 MEQ/L (ref 135–144)
SPECIMEN TYPE: NORMAL
WBC # BLD AUTO: 4.3 K/UL (ref 4.8–10.8)

## 2024-05-07 PROCEDURE — 99223 1ST HOSP IP/OBS HIGH 75: CPT | Performed by: NURSE PRACTITIONER

## 2024-05-07 PROCEDURE — 87070 CULTURE OTHR SPECIMN AEROBIC: CPT

## 2024-05-07 PROCEDURE — 88342 IMHCHEM/IMCYTCHM 1ST ANTB: CPT

## 2024-05-07 PROCEDURE — 85014 HEMATOCRIT: CPT

## 2024-05-07 PROCEDURE — 87205 SMEAR GRAM STAIN: CPT

## 2024-05-07 PROCEDURE — 99222 1ST HOSP IP/OBS MODERATE 55: CPT | Performed by: NURSE PRACTITIONER

## 2024-05-07 PROCEDURE — 93975 VASCULAR STUDY: CPT

## 2024-05-07 PROCEDURE — 36415 COLL VENOUS BLD VENIPUNCTURE: CPT

## 2024-05-07 PROCEDURE — A4216 STERILE WATER/SALINE, 10 ML: HCPCS | Performed by: NURSE PRACTITIONER

## 2024-05-07 PROCEDURE — 2580000003 HC RX 258: Performed by: STUDENT IN AN ORGANIZED HEALTH CARE EDUCATION/TRAINING PROGRAM

## 2024-05-07 PROCEDURE — 6360000002 HC RX W HCPCS: Performed by: INTERNAL MEDICINE

## 2024-05-07 PROCEDURE — 2580000003 HC RX 258: Performed by: INTERNAL MEDICINE

## 2024-05-07 PROCEDURE — 84157 ASSAY OF PROTEIN OTHER: CPT

## 2024-05-07 PROCEDURE — 82945 GLUCOSE OTHER FLUID: CPT

## 2024-05-07 PROCEDURE — 6370000000 HC RX 637 (ALT 250 FOR IP): Performed by: STUDENT IN AN ORGANIZED HEALTH CARE EDUCATION/TRAINING PROGRAM

## 2024-05-07 PROCEDURE — 49083 ABD PARACENTESIS W/IMAGING: CPT | Performed by: RADIOLOGY

## 2024-05-07 PROCEDURE — 94640 AIRWAY INHALATION TREATMENT: CPT

## 2024-05-07 PROCEDURE — 2580000003 HC RX 258: Performed by: NURSE PRACTITIONER

## 2024-05-07 PROCEDURE — 2709999900 IR US GUIDED PARACENTESIS

## 2024-05-07 PROCEDURE — 89051 BODY FLUID CELL COUNT: CPT

## 2024-05-07 PROCEDURE — 80053 COMPREHEN METABOLIC PANEL: CPT

## 2024-05-07 PROCEDURE — 85018 HEMOGLOBIN: CPT

## 2024-05-07 PROCEDURE — 94761 N-INVAS EAR/PLS OXIMETRY MLT: CPT

## 2024-05-07 PROCEDURE — 88305 TISSUE EXAM BY PATHOLOGIST: CPT

## 2024-05-07 PROCEDURE — 82150 ASSAY OF AMYLASE: CPT

## 2024-05-07 PROCEDURE — 85610 PROTHROMBIN TIME: CPT

## 2024-05-07 PROCEDURE — 1210000000 HC MED SURG R&B

## 2024-05-07 PROCEDURE — 88112 CYTOPATH CELL ENHANCE TECH: CPT

## 2024-05-07 PROCEDURE — 2500000003 HC RX 250 WO HCPCS: Performed by: NURSE PRACTITIONER

## 2024-05-07 PROCEDURE — C9113 INJ PANTOPRAZOLE SODIUM, VIA: HCPCS | Performed by: NURSE PRACTITIONER

## 2024-05-07 PROCEDURE — 83615 LACTATE (LD) (LDH) ENZYME: CPT

## 2024-05-07 PROCEDURE — 82042 OTHER SOURCE ALBUMIN QUAN EA: CPT

## 2024-05-07 PROCEDURE — 0W9G3ZZ DRAINAGE OF PERITONEAL CAVITY, PERCUTANEOUS APPROACH: ICD-10-PCS | Performed by: RADIOLOGY

## 2024-05-07 PROCEDURE — 6360000002 HC RX W HCPCS: Performed by: NURSE PRACTITIONER

## 2024-05-07 PROCEDURE — 85027 COMPLETE CBC AUTOMATED: CPT

## 2024-05-07 PROCEDURE — 49083 ABD PARACENTESIS W/IMAGING: CPT

## 2024-05-07 RX ORDER — IPRATROPIUM BROMIDE AND ALBUTEROL SULFATE 2.5; .5 MG/3ML; MG/3ML
1 SOLUTION RESPIRATORY (INHALATION) EVERY 4 HOURS PRN
Status: DISCONTINUED | OUTPATIENT
Start: 2024-05-07 | End: 2024-05-09 | Stop reason: HOSPADM

## 2024-05-07 RX ORDER — LIDOCAINE HYDROCHLORIDE 20 MG/ML
INJECTION, SOLUTION INFILTRATION; PERINEURAL PRN
Status: COMPLETED | OUTPATIENT
Start: 2024-05-07 | End: 2024-05-07

## 2024-05-07 RX ORDER — POTASSIUM CHLORIDE 20 MEQ/1
20 TABLET, EXTENDED RELEASE ORAL DAILY
Status: DISCONTINUED | OUTPATIENT
Start: 2024-05-07 | End: 2024-05-09 | Stop reason: HOSPADM

## 2024-05-07 RX ORDER — BUDESONIDE AND FORMOTEROL FUMARATE DIHYDRATE 160; 4.5 UG/1; UG/1
2 AEROSOL RESPIRATORY (INHALATION)
Status: DISCONTINUED | OUTPATIENT
Start: 2024-05-07 | End: 2024-05-09 | Stop reason: HOSPADM

## 2024-05-07 RX ORDER — NADOLOL 20 MG/1
20 TABLET ORAL DAILY
Status: DISCONTINUED | OUTPATIENT
Start: 2024-05-07 | End: 2024-05-09 | Stop reason: HOSPADM

## 2024-05-07 RX ORDER — CITALOPRAM 20 MG/1
20 TABLET ORAL DAILY
Status: DISCONTINUED | OUTPATIENT
Start: 2024-05-07 | End: 2024-05-09 | Stop reason: HOSPADM

## 2024-05-07 RX ORDER — SODIUM FERRIC GLUCONATE COMPLEX IN SUCROSE 12.5 MG/ML
125 INJECTION INTRAVENOUS ONCE
Status: DISCONTINUED | OUTPATIENT
Start: 2024-05-07 | End: 2024-05-07

## 2024-05-07 RX ORDER — FAMOTIDINE 20 MG/1
20 TABLET, FILM COATED ORAL DAILY
Status: DISCONTINUED | OUTPATIENT
Start: 2024-05-07 | End: 2024-05-09 | Stop reason: HOSPADM

## 2024-05-07 RX ORDER — VITAMIN B COMPLEX
1000 TABLET ORAL DAILY
Status: DISCONTINUED | OUTPATIENT
Start: 2024-05-07 | End: 2024-05-09 | Stop reason: HOSPADM

## 2024-05-07 RX ORDER — LEVOTHYROXINE SODIUM 0.15 MG/1
150 TABLET ORAL DAILY
Status: DISCONTINUED | OUTPATIENT
Start: 2024-05-07 | End: 2024-05-09 | Stop reason: HOSPADM

## 2024-05-07 RX ADMIN — POTASSIUM CHLORIDE 20 MEQ: 1500 TABLET, EXTENDED RELEASE ORAL at 08:42

## 2024-05-07 RX ADMIN — Medication 10 ML: at 08:42

## 2024-05-07 RX ADMIN — LEVOTHYROXINE SODIUM 150 MCG: 0.15 TABLET ORAL at 05:42

## 2024-05-07 RX ADMIN — Medication 10 ML: at 21:51

## 2024-05-07 RX ADMIN — Medication 1000 UNITS: at 08:42

## 2024-05-07 RX ADMIN — LIDOCAINE HYDROCHLORIDE 10 ML: 20 INJECTION, SOLUTION INFILTRATION; PERINEURAL at 09:43

## 2024-05-07 RX ADMIN — NADOLOL 20 MG: 20 TABLET ORAL at 08:42

## 2024-05-07 RX ADMIN — BUDESONIDE AND FORMOTEROL FUMARATE DIHYDRATE 2 PUFF: 160; 4.5 AEROSOL RESPIRATORY (INHALATION) at 20:22

## 2024-05-07 RX ADMIN — SODIUM CHLORIDE, PRESERVATIVE FREE 40 MG: 5 INJECTION INTRAVENOUS at 21:52

## 2024-05-07 RX ADMIN — FAMOTIDINE 20 MG: 20 TABLET, FILM COATED ORAL at 08:42

## 2024-05-07 RX ADMIN — SODIUM CHLORIDE 125 MG: 9 INJECTION, SOLUTION INTRAVENOUS at 14:56

## 2024-05-07 RX ADMIN — CITALOPRAM HYDROBROMIDE 20 MG: 20 TABLET ORAL at 08:42

## 2024-05-07 NOTE — OR NURSING
NO SEDATION    0929 Pt arrived to Specials room 6 via inpt bed.  Hx, allergies, medications reviewed. Pt offered reassurance and VSS. Pt denies pain at this time. Consent obtained for ultrasound guided paracentesis.     0933 U/S image obtained.     0934 Area cleansed with large tinted chloraprep. After 3 minute dry time, draped with fenestrated drape and sterile towels by [employer].     0940 Timeout completed.     0943 Using U/S guidance, Sara Hernández CNP numbed site with 2% lidocaine, see eMar.     0945 Using U/S guidance, access obtained with [id number] centesis 5F catheter with return of fluid that appears hazy yellow. Sample obtained for lab and placed into sterile specimen containers. Catheter connected to tubing and Marsha machine with suction at 200 mmHG and draining well. Pt tolerating well. VSS.     1000 Specimens taken by [facility] to lab.     1010 Discharge instructions attached  to electronic chart. Instructions reviewed with patient. Education regarding possible repeat paracentesis also reviewed with patient.     1037 Drainage complete. Total 4660 ml removed. Centesis catheter removed and digital pressure held to site for 2 minutes. LLQ site soft, no drainage, large bandaid applied. VSS.     1040 Transport request placed for patient to return to inpt room. Report called to Grace at [phone].

## 2024-05-07 NOTE — ED NOTES
Writer walked with patient and monitored SpO2.     Patient SpO2 dropped to 84% on room air, patient shortness of breath increased with ambulation.     Patient profoundly short of breath with ambulation.

## 2024-05-07 NOTE — ACP (ADVANCE CARE PLANNING)
Advance Care Planning   Healthcare Decision Maker:    Primary Decision Maker: Jerica Aguirre - David - [phone]    Primary Decision Maker: ORLIN NAVI - David - [phone]    Click here to complete Healthcare Decision Makers including selection of the Healthcare Decision Maker Relationship (ie \"Primary\").  Today we documented Decision Maker(s) consistent with Legal Next of Kin hierarchy.

## 2024-05-07 NOTE — H&P
DEPARTMENT OF HOSPITAL MEDICINE    HISTORY AND PHYSICAL    PATIENT NAME:  Zach Aguirre    MRN:  [id number]  SERVICE DATE:  5/6/2024   SERVICE TIME:  11:46 PM    Primary Care Physician: Yesenia Viramontes MD     SUBJECTIVE  CHIEF COMPLAINT:    Chief Complaint   Patient presents with    Shortness of Breath     Sob and liver issues worried that she may need a blood transfusion.        HPI:  This is a 79 y.o. female with PMH of T2DM, COPD, cirrhosis, and hypothyroidism who presents for shortness of breath.  Patient reports that over the last few days she has had progressively worsening shortness of breath with exertion.  She is no longer to walk any amount of distance without feeling extremely short of breath and fatigued.  She also reports that her abdomen has become very distended and tight over the last few months.  She reports some abdominal pain in the setting of this distention.  She denies any history of paracenteses in the past.  She has known history of esophageal varices in the setting of her cirrhosis but denies any evidence of active bleeding such as hematemesis, hematochezia, or melena.  She denies fevers, chills, chest pain, N/V.    PAST MEDICAL HISTORY:    Past Medical History:   Diagnosis Date    Anxiety     DM (diabetes mellitus) (HCC) 4/14/2015    Hepatitis B infection     Hyperlipidemia     Hypothyroidism     Insomnia     Leg pain 4/14/2015    Post herpetic neuralgia     Unspecified sleep apnea     after seeing [facility] they state she is does not have sleep apnea.  Not using cpap     PAST SURGICAL HISTORY:    Past Surgical History:   Procedure Laterality Date    ANKLE FRACTURE SURGERY  09/2016    DR ANDRADE  LT    BIOPSY / LIGATION TEMPORAL ARTERY Right 4/26/2019    RIGHT TEMPORAL ARTERY BIOPSY performed by Emmanuel Irwin MD at [facility]    BRAIN SURGERY  2015    surgery for transgeminal neuraglia.  Insertion of sponge for chronic pain    COLONOSCOPY  3/31/14    DR POND    COLONOSCOPY N/A 2/2/2021

## 2024-05-07 NOTE — CONSULTS
B infection, Hyperlipidemia, Hypothyroidism, Insomnia, Leg pain, Post herpetic neuralgia, and Unspecified sleep apnea.    Past SurgicalHistory:   has a past surgical history that includes lymphadenectomy; Hysterectomy; Colonoscopy (3/31/14); other surgical history (Right); Ankle fracture surgery (09/2016); brain surgery (2015); pr colon ca scrn not hi rsk ind (N/A, 7/24/2017); BIOPSY / LIGATION TEMPORAL ARTERY (Right, 4/26/2019); Upper gastrointestinal endoscopy (N/A, 2/2/2021); Colonoscopy (N/A, 2/2/2021); Upper gastrointestinal endoscopy (N/A, 11/3/2022); and Upper gastrointestinal endoscopy (N/A, 1/26/2023).     Allergies:Cefdinir and Metformin and related    Home Medications:   Prior to Admission medications    Medication Sig Start Date End Date Taking? Authorizing Provider   potassium chloride (KLOR-CON M) 20 MEQ extended release tablet Take 1 tablet by mouth daily 5/3/24   Mei Kitchen MD   cholestyramine (QUESTRAN) 4 g packet MIX 1 PACKET WITH LIQUID AND  DRINK BY MOUTH TWICE DAILY  Patient not taking: Reported on 5/6/2024 8/28/23   Yesenia Viramontes MD   nadolol (CORGARD) 20 MG tablet TAKE 1 TABLET BY MOUTH DAILY 7/5/23   Yesenia Viramontes MD   citalopram (CELEXA) 20 MG tablet TAKE 1 TABLET BY MOUTH DAILY 6/20/23   Yesenia Viramontes MD   vitamin D3 (CHOLECALCIFEROL) 25 MCG (1000 UT) TABS tablet Take 1 tablet by mouth daily 4/12/23   Yesenia Viramontes MD   JANUVIA 100 MG tablet TAKE 1 TABLET DAILY 12/8/22   Yesenia Viramontes MD   famotidine (PEPCID) 20 MG tablet TAKE 1 TABLET TWICE A DAY (NEED APPOINTMENT FOR FURTHER REFILLS) 12/2/22   Yesenia Viramontes MD   levothyroxine (SYNTHROID) 150 MCG tablet TAKE 1 TABLET DAILY (NEED APPOINTMENT FOR FURTHER REFILLS) 12/2/22   Yesenia Viramontes MD   ipratropium-albuterol (DUONEB) 0.5-2.5 (3) MG/3ML SOLN nebulizer solution  10/8/21   Provider, MD Hilda   fluticasone-umeclidin-vilant (TRELEGY ELLIPTA) 100-62.5-25 MCG/INH AEPB  10/5/21   Provider, MD Hilda   Continuous Blood Gluc  (DEXCOM 
distension. The appendix is visualized and is unremarkable.  No evidence of acute appendicitis. Pelvis:  Bladder is unremarkable in appearance.  Prior hysterectomy noted. No adnexal mass seen. Peritoneum/Retroperitoneum: Moderate-large volume of ascites is seen, which has increased.  No free air noted.  No evidence of lymphadenopathy.  Aorta is normal in caliber. Bones/Soft Tissues:  No acute abnormality of the visualized osseous structures.  Diffuse anasarca is noted.     Cirrhosis with collateral vessels including esophageal varices as well as increased mild-moderate volume of ascites suggestive of worsening portal hypertension/decompensation.     CTA CHEST W WO CONTRAST PE Eval    Result Date: 5/6/2024  EXAMINATION: CTA OF THE CHEST WITH AND WITHOUT CONTRAST 5/6/2024 5:18 pm TECHNIQUE: CTA of the chest was performed before and after the administration of intravenous contrast.  Multiplanar reformatted images are provided for review.  MIP images are provided for review. Automated exposure control, iterative reconstruction, and/or weight based adjustment of the mA/kV was utilized to reduce the radiation dose to as low as reasonably achievable. COMPARISON: None. HISTORY: ORDERING SYSTEM PROVIDED HISTORY: PE TECHNOLOGIST PROVIDED HISTORY: Reason for exam:->PE Decision Support Exception - unselect if not a suspected or confirmed emergency medical condition->Emergency Medical Condition (MA) What reading provider will be dictating this exam?->CRC FINDINGS: Aorta: No evidence of thoracic aortic aneurysm or dissection.  No acute abnormality of the aorta. Mediastinum: No evidence of mediastinal lymphadenopathy.  The heart and pericardium demonstrate no acute abnormality. Lungs/Pleura: The lungs are without acute process.  No focal consolidation or pulmonary edema.  No evidence of pleural effusion or pneumothorax. Upper Abdomen: Cirrhotic appearance of the liver.  Upper abdominal ascites. Hiatal hernia. Soft Tissues/Bones:

## 2024-05-07 NOTE — DISCHARGE INSTRUCTIONS
Ultrasound Guided Paracentesis Discharge Instructions     Rest today. No heavy lifting, bending, stretching or pulling.  Some tenderness will be normal at the procedure site for a few days.    You may remove the bandaid in 48 hours.     If you experience any drainage or bleeding at the site, hold pressure for 30 minutes and lay on side opposite of the procedure site (move fluid away from the area of leakage). If drainage or bleeding does not stop and seems excessive, call your physician or proceed to the ER.      Watch for signs of infection such as fever, chills, drainage or redness at the site. If you experience any of these symptoms, call your primary doctor or go to the emergency room.       Please keep all follow-up appointments with your Hepatologist. Schedule follow-up appointment for repeat paracentesis if necessary.     If you have any concerns please contact the [facility] Radiology Department at [phone].

## 2024-05-07 NOTE — CARE COORDINATION
Case Management Assessment  Initial Evaluation    Date/Time of Evaluation: 5/7/2024 4:29 PM  Assessment Completed by: Niesha Chamberlain    If patient is discharged prior to next notation, then this note serves as note for discharge by case management.    Patient Name: Zach Aguirre                   YOB: 1945  Diagnosis: Peripheral edema [R60.0]  Other ascites [R18.8]  Symptomatic anemia [D64.9]  Acute hypoxic respiratory failure (HCC) [J96.01]                   Date / Time: 5/6/2024  3:50 PM    Patient Admission Status: Inpatient   Readmission Risk (Low < 19, Mod (19-27), High > 27): Readmission Risk Score: 16.1    Current PCP: Yesenia Viramontes MD  PCP verified by CM? Yes    Chart Reviewed: Yes      History Provided by: Patient  Patient Orientation: Alert and Oriented, Person, Place, Situation, Self    Patient Cognition: Alert    Hospitalization in the last 30 days (Readmission):  No    If yes, Readmission Assessment in CM Navigator will be completed.    Advance Directives:      Code Status: Full Code   Patient's Primary Decision Maker is: Named in Scanned ACP Document    Primary Decision Maker: Jerica Aguirre - Child - [phone]    Primary Decision Maker: AGUIRRENAVI - Child - [phone]    Discharge Planning:    Patient lives with: Alone Type of Home: House  Primary Care Giver: Self  Patient Support Systems include: Children   Current Financial resources:    Current community resources:    Current services prior to admission: None            Current DME:              Type of Home Care services:  None    ADLS  Prior functional level: Assistance with the following:, Shopping, Mobility, Housework  Current functional level: Assistance with the following:, Housework, Shopping, Mobility    PT AM-PAC:   /24  OT AM-PAC:   /24    Family can provide assistance at DC: Yes  Would you like Case Management to discuss the discharge plan with any other family members/significant others, and if so, who? Yes  Plans to

## 2024-05-08 ENCOUNTER — HOSPITAL ENCOUNTER (INPATIENT)
Dept: INTERVENTIONAL RADIOLOGY/VASCULAR | Age: 79
Discharge: HOME | DRG: 433 | End: 2024-05-10
Payer: MEDICARE

## 2024-05-08 PROBLEM — R18.8 CIRRHOSIS OF LIVER WITH ASCITES (HCC): Status: ACTIVE | Noted: 2024-05-07

## 2024-05-08 PROBLEM — D50.9 IDA (IRON DEFICIENCY ANEMIA): Status: ACTIVE | Noted: 2024-05-06

## 2024-05-08 LAB
ABO + RH BLD: NORMAL
ALBUMIN SERPL-MCNC: 2.8 G/DL (ref 3.5–4.6)
ALP SERPL-CCNC: 93 U/L (ref 40–130)
ALT SERPL-CCNC: 13 U/L (ref 0–33)
ANION GAP SERPL CALCULATED.3IONS-SCNC: 7 MEQ/L (ref 9–15)
ANISOCYTOSIS BLD QL SMEAR: ABNORMAL
AST SERPL-CCNC: 28 U/L (ref 0–35)
BASOPHILS # BLD: 0.1 K/UL (ref 0–0.2)
BASOPHILS NFR BLD: 2 %
BILIRUB DIRECT SERPL-MCNC: <0.2 MG/DL (ref 0–0.4)
BILIRUB INDIRECT SERPL-MCNC: ABNORMAL MG/DL (ref 0–0.6)
BILIRUB SERPL-MCNC: 0.4 MG/DL (ref 0.2–0.7)
BLD GP AB SCN SERPL QL: NORMAL
BLOOD BANK DISPENSE STATUS: NORMAL
BLOOD BANK PRODUCT CODE: NORMAL
BPU ID: NORMAL
BUN SERPL-MCNC: 10 MG/DL (ref 8–23)
CALCIUM SERPL-MCNC: 8 MG/DL (ref 8.5–9.9)
CHLORIDE SERPL-SCNC: 108 MEQ/L (ref 95–107)
CO2 SERPL-SCNC: 27 MEQ/L (ref 20–31)
CREAT SERPL-MCNC: 1.25 MG/DL (ref 0.5–0.9)
DACRYOCYTES BLD QL SMEAR: ABNORMAL
DESCRIPTION BLOOD BANK: NORMAL
EKG ATRIAL RATE: 67 BPM
EKG P AXIS: 53 DEGREES
EKG P-R INTERVAL: 178 MS
EKG Q-T INTERVAL: 458 MS
EKG QRS DURATION: 92 MS
EKG QTC CALCULATION (BAZETT): 483 MS
EKG R AXIS: -34 DEGREES
EKG T AXIS: 38 DEGREES
EKG VENTRICULAR RATE: 67 BPM
EOSINOPHIL # BLD: 0 K/UL (ref 0–0.7)
EOSINOPHIL NFR BLD: 1 %
ERYTHROCYTE [DISTWIDTH] IN BLOOD BY AUTOMATED COUNT: 22.7 % (ref 11.5–14.5)
FERRITIN: 41 NG/ML (ref 13–150)
FOLATE: 10.8 NG/ML (ref 4.8–24.2)
GLUCOSE BLD-MCNC: 151 MG/DL (ref 70–99)
GLUCOSE BLD-MCNC: 162 MG/DL (ref 70–99)
GLUCOSE BLD-MCNC: 162 MG/DL (ref 70–99)
GLUCOSE BLD-MCNC: 177 MG/DL (ref 70–99)
GLUCOSE SERPL-MCNC: 178 MG/DL (ref 70–99)
HCT VFR BLD AUTO: 25.2 % (ref 37–47)
HCT VFR BLD AUTO: 25.8 % (ref 37–47)
HCT VFR BLD AUTO: 26.1 % (ref 37–47)
HCT VFR BLD AUTO: 30.7 % (ref 37–47)
HGB BLD-MCNC: 6.9 G/DL (ref 12–16)
HGB BLD-MCNC: 6.9 G/DL (ref 12–16)
HGB BLD-MCNC: 7.1 G/DL (ref 12–16)
HGB BLD-MCNC: 8.6 G/DL (ref 12–16)
HYPOCHROMIA BLD QL SMEAR: ABNORMAL
IRON % SATURATION: 44 % (ref 20–55)
IRON: 119 UG/DL (ref 37–145)
LDH SERPL-CCNC: 222 U/L (ref 135–214)
LYMPHOCYTES # BLD: 0.6 K/UL (ref 1–4.8)
LYMPHOCYTES NFR BLD: 14 %
MAGNESIUM SERPL-MCNC: 2 MG/DL (ref 1.7–2.4)
MCH RBC QN AUTO: 19.1 PG (ref 27–31.3)
MCHC RBC AUTO-ENTMCNC: 27.4 % (ref 33–37)
MCV RBC AUTO: 69.8 FL (ref 79.4–94.8)
MICROCYTES BLD QL SMEAR: ABNORMAL
MONOCYTES # BLD: 0 K/UL (ref 0.2–0.8)
MONOCYTES NFR BLD: 10 %
NEUTROPHILS # BLD: 3.7 K/UL (ref 1.4–6.5)
NEUTS SEG NFR BLD: 84 %
OVALOCYTES BLD QL SMEAR: ABNORMAL
PERFORMED ON: ABNORMAL
PHOSPHATE SERPL-MCNC: 2.9 MG/DL (ref 2.3–4.8)
PLATELET # BLD AUTO: 123 K/UL (ref 130–400)
PLATELET BLD QL SMEAR: ABNORMAL
POIKILOCYTOSIS BLD QL SMEAR: ABNORMAL
POTASSIUM SERPL-SCNC: 4.6 MEQ/L (ref 3.4–4.9)
PROT SERPL-MCNC: 5.3 G/DL (ref 6.3–8)
RBC # BLD AUTO: 3.61 M/UL (ref 4.2–5.4)
SLIDE REVIEW: ABNORMAL
SMUDGE CELLS BLD QL SMEAR: 18.3
SODIUM SERPL-SCNC: 142 MEQ/L (ref 135–144)
TOTAL IRON BINDING CAPACITY: 271 UG/DL (ref 250–450)
UNSATURATED IRON BINDING CAPACITY: 152 UG/DL (ref 112–347)
VITAMIN B-12: 1194 PG/ML (ref 232–1245)
WBC # BLD AUTO: 4.4 K/UL (ref 4.8–10.8)

## 2024-05-08 PROCEDURE — 6370000000 HC RX 637 (ALT 250 FOR IP): Performed by: STUDENT IN AN ORGANIZED HEALTH CARE EDUCATION/TRAINING PROGRAM

## 2024-05-08 PROCEDURE — 86850 RBC ANTIBODY SCREEN: CPT

## 2024-05-08 PROCEDURE — 2580000003 HC RX 258: Performed by: NURSE PRACTITIONER

## 2024-05-08 PROCEDURE — 80048 BASIC METABOLIC PNL TOTAL CA: CPT

## 2024-05-08 PROCEDURE — 82607 VITAMIN B-12: CPT

## 2024-05-08 PROCEDURE — 30233N1 TRANSFUSION OF NONAUTOLOGOUS RED BLOOD CELLS INTO PERIPHERAL VEIN, PERCUTANEOUS APPROACH: ICD-10-PCS | Performed by: SPECIALIST

## 2024-05-08 PROCEDURE — 94760 N-INVAS EAR/PLS OXIMETRY 1: CPT

## 2024-05-08 PROCEDURE — 6360000002 HC RX W HCPCS: Performed by: NURSE PRACTITIONER

## 2024-05-08 PROCEDURE — 97166 OT EVAL MOD COMPLEX 45 MIN: CPT

## 2024-05-08 PROCEDURE — 36415 COLL VENOUS BLD VENIPUNCTURE: CPT

## 2024-05-08 PROCEDURE — C9113 INJ PANTOPRAZOLE SODIUM, VIA: HCPCS | Performed by: NURSE PRACTITIONER

## 2024-05-08 PROCEDURE — 83615 LACTATE (LD) (LDH) ENZYME: CPT

## 2024-05-08 PROCEDURE — 82728 ASSAY OF FERRITIN: CPT

## 2024-05-08 PROCEDURE — 86901 BLOOD TYPING SEROLOGIC RH(D): CPT

## 2024-05-08 PROCEDURE — 2580000003 HC RX 258: Performed by: STUDENT IN AN ORGANIZED HEALTH CARE EDUCATION/TRAINING PROGRAM

## 2024-05-08 PROCEDURE — 86923 COMPATIBILITY TEST ELECTRIC: CPT

## 2024-05-08 PROCEDURE — 97162 PT EVAL MOD COMPLEX 30 MIN: CPT

## 2024-05-08 PROCEDURE — 86900 BLOOD TYPING SEROLOGIC ABO: CPT

## 2024-05-08 PROCEDURE — 83735 ASSAY OF MAGNESIUM: CPT

## 2024-05-08 PROCEDURE — 94640 AIRWAY INHALATION TREATMENT: CPT

## 2024-05-08 PROCEDURE — 80076 HEPATIC FUNCTION PANEL: CPT

## 2024-05-08 PROCEDURE — P9016 RBC LEUKOCYTES REDUCED: HCPCS

## 2024-05-08 PROCEDURE — 36430 TRANSFUSION BLD/BLD COMPNT: CPT

## 2024-05-08 PROCEDURE — 1210000000 HC MED SURG R&B

## 2024-05-08 PROCEDURE — 83550 IRON BINDING TEST: CPT

## 2024-05-08 PROCEDURE — 82746 ASSAY OF FOLIC ACID SERUM: CPT

## 2024-05-08 PROCEDURE — 2709999900 IR US GUIDED PARACENTESIS

## 2024-05-08 PROCEDURE — 83540 ASSAY OF IRON: CPT

## 2024-05-08 PROCEDURE — 85025 COMPLETE CBC W/AUTO DIFF WBC: CPT

## 2024-05-08 PROCEDURE — 85018 HEMOGLOBIN: CPT

## 2024-05-08 PROCEDURE — 85014 HEMATOCRIT: CPT

## 2024-05-08 PROCEDURE — 6370000000 HC RX 637 (ALT 250 FOR IP): Performed by: INTERNAL MEDICINE

## 2024-05-08 PROCEDURE — 84100 ASSAY OF PHOSPHORUS: CPT

## 2024-05-08 PROCEDURE — 94761 N-INVAS EAR/PLS OXIMETRY MLT: CPT

## 2024-05-08 PROCEDURE — 0W9G3ZZ DRAINAGE OF PERITONEAL CAVITY, PERCUTANEOUS APPROACH: ICD-10-PCS | Performed by: RADIOLOGY

## 2024-05-08 PROCEDURE — 49083 ABD PARACENTESIS W/IMAGING: CPT

## 2024-05-08 PROCEDURE — 99232 SBSQ HOSP IP/OBS MODERATE 35: CPT | Performed by: NURSE PRACTITIONER

## 2024-05-08 RX ORDER — CARBOXYMETHYLCELLULOSE SODIUM 5 MG/ML
1 SOLUTION/ DROPS OPHTHALMIC 3 TIMES DAILY
Status: DISCONTINUED | OUTPATIENT
Start: 2024-05-08 | End: 2024-05-09 | Stop reason: HOSPADM

## 2024-05-08 RX ORDER — SODIUM CHLORIDE 0.9 % (FLUSH) 0.9 %
5-40 SYRINGE (ML) INJECTION EVERY 12 HOURS SCHEDULED
Status: CANCELLED | OUTPATIENT
Start: 2024-05-08

## 2024-05-08 RX ORDER — SODIUM CHLORIDE 0.9 % (FLUSH) 0.9 %
5-40 SYRINGE (ML) INJECTION PRN
Status: CANCELLED | OUTPATIENT
Start: 2024-05-08

## 2024-05-08 RX ORDER — SODIUM CHLORIDE 9 MG/ML
25 INJECTION, SOLUTION INTRAVENOUS PRN
Status: CANCELLED | OUTPATIENT
Start: 2024-05-08

## 2024-05-08 RX ORDER — SODIUM CHLORIDE 9 MG/ML
INJECTION, SOLUTION INTRAVENOUS PRN
Status: COMPLETED | OUTPATIENT
Start: 2024-05-08 | End: 2024-05-09

## 2024-05-08 RX ADMIN — CARBOXYMETHYLCELLULOSE SODIUM 1 DROP: 0.5 SOLUTION/ DROPS OPHTHALMIC at 20:43

## 2024-05-08 RX ADMIN — BUDESONIDE AND FORMOTEROL FUMARATE DIHYDRATE 2 PUFF: 160; 4.5 AEROSOL RESPIRATORY (INHALATION) at 19:43

## 2024-05-08 RX ADMIN — LEVOTHYROXINE SODIUM 150 MCG: 0.15 TABLET ORAL at 05:48

## 2024-05-08 RX ADMIN — NADOLOL 20 MG: 20 TABLET ORAL at 08:26

## 2024-05-08 RX ADMIN — Medication 5 ML: at 19:36

## 2024-05-08 RX ADMIN — Medication 1000 UNITS: at 08:26

## 2024-05-08 RX ADMIN — TIOTROPIUM BROMIDE INHALATION SPRAY 2 PUFF: 3.12 SPRAY, METERED RESPIRATORY (INHALATION) at 07:56

## 2024-05-08 RX ADMIN — CARBOXYMETHYLCELLULOSE SODIUM 1 DROP: 0.5 SOLUTION/ DROPS OPHTHALMIC at 15:54

## 2024-05-08 RX ADMIN — SODIUM CHLORIDE, PRESERVATIVE FREE 40 MG: 5 INJECTION INTRAVENOUS at 08:26

## 2024-05-08 RX ADMIN — POTASSIUM CHLORIDE 20 MEQ: 1500 TABLET, EXTENDED RELEASE ORAL at 08:26

## 2024-05-08 RX ADMIN — Medication 10 ML: at 08:26

## 2024-05-08 RX ADMIN — FAMOTIDINE 20 MG: 20 TABLET, FILM COATED ORAL at 08:26

## 2024-05-08 RX ADMIN — BUDESONIDE AND FORMOTEROL FUMARATE DIHYDRATE 2 PUFF: 160; 4.5 AEROSOL RESPIRATORY (INHALATION) at 07:55

## 2024-05-08 RX ADMIN — CITALOPRAM HYDROBROMIDE 20 MG: 20 TABLET ORAL at 08:26

## 2024-05-08 ASSESSMENT — PAIN SCALES - GENERAL
PAINLEVEL_OUTOF10: 0

## 2024-05-08 NOTE — PLAN OF CARE
Problem: Discharge Planning  Goal: Discharge to home or other facility with appropriate resources  5/8/2024 1023 by Rishabh Quarles RN  Outcome: Progressing  5/8/2024 0021 by Eliane Ridley RN  Outcome: Progressing     Problem: Discharge Planning  Goal: Discharge to home or other facility with appropriate resources  5/8/2024 1023 by Rishabh Quarles RN  Outcome: Progressing  5/8/2024 0021 by Eliane Ridley RN  Outcome: Progressing     Problem: Safety - Adult  Goal: Free from fall injury  5/8/2024 1023 by Rishabh Quarles RN  Outcome: Progressing  5/8/2024 0021 by Eliane Ridley RN  Outcome: Progressing     Problem: Chronic Conditions and Co-morbidities  Goal: Patient's chronic conditions and co-morbidity symptoms are monitored and maintained or improved  5/8/2024 1023 by Rishabh Quarles RN  Outcome: Progressing  5/8/2024 0021 by Eliane Ridley RN  Outcome: Progressing     Problem: ABCDS Injury Assessment  Goal: Absence of physical injury  5/8/2024 1023 by Rishabh Quarles RN  Outcome: Progressing  5/8/2024 0021 by Eliane Ridley RN  Outcome: Progressing

## 2024-05-08 NOTE — CARE COORDINATION
CM reviewed therapy notes.  Scores are leaning towards home health.  Pt is agreeable.  Freedom of choice was given.  Referral made to [facility], VM left. Will continue to follow.  Dtr called and made aware as well.

## 2024-05-08 NOTE — DISCHARGE INSTRUCTIONS
Ultrasound Guided Paracentesis Discharge Instructions     Rest today. No heavy lifting, bending, stretching or pulling.  Some tenderness will be normal at the procedure site for a few days.    You may remove the bandaid in 48 hours.     If you experience any drainage or bleeding at the site, hold pressure for 30 minutes and lay on side opposite of the procedure site (move fluid away from the area of leakage). If drainage or bleeding does not stop and seems excessive, call your physician or proceed to the ER.      Watch for signs of infection such as fever, chills, drainage or redness at the site. If you experience any of these symptoms, call your primary doctor or go to the emergency room.       Please keep all follow-up appointments with your Hepatologist. Schedule follow-up appointment for repeat paracentesis if necessary.     If you have any concerns please contact the [facility] Radiology Department at [phone].

## 2024-05-08 NOTE — CONSENT
Informed Consent for Blood Component Transfusion Note    I have discussed with the patient the rationale for blood component transfusion; its benefits in treating or preventing fatigue, organ damage, or death; and its risk which includes mild transfusion reactions, rare risk of blood borne infection, or more serious but rare reactions. I have discussed the alternatives to transfusion, including the risk and consequences of not receiving transfusion. The patient had an opportunity to ask questions and had agreed to proceed with transfusion of blood components.    Electronically signed by SELAM CASTRO MD on 5/8/24 at 10:57 AM EDT

## 2024-05-08 NOTE — OR NURSING
NO SEDATION    Pt to Special procedure via bed. Alert and oriented. Abd soft, distended.    RN obtained images prior to start of procedure using U/S. Pt  VSS.   Procedure explained, consent verified    2358 Sara Hernández CNP bedside to evaluate with US and determined that there is ascitic fluid in abd, there is not enough to warrant paracentesis at this time. Pt denies any severe pain, denies SOB, is not symptomatic. Pt awaiting transport back to inpt room. Report called to AIDA Keating.

## 2024-05-09 ENCOUNTER — ANESTHESIA EVENT (OUTPATIENT)
Dept: ENDOSCOPY | Age: 79
DRG: 433 | End: 2024-05-09
Payer: MEDICARE

## 2024-05-09 ENCOUNTER — ANESTHESIA (OUTPATIENT)
Dept: ENDOSCOPY | Age: 79
DRG: 433 | End: 2024-05-09
Payer: MEDICARE

## 2024-05-09 ENCOUNTER — PREP FOR PROCEDURE (OUTPATIENT)
Dept: GASTROENTEROLOGY | Age: 79
End: 2024-05-09

## 2024-05-09 VITALS
DIASTOLIC BLOOD PRESSURE: 70 MMHG | HEIGHT: 63 IN | HEART RATE: 53 BPM | WEIGHT: 203 LBS | RESPIRATION RATE: 18 BRPM | BODY MASS INDEX: 35.97 KG/M2 | SYSTOLIC BLOOD PRESSURE: 113 MMHG | OXYGEN SATURATION: 97 % | TEMPERATURE: 97.3 F

## 2024-05-09 LAB
ALBUMIN SERPL-MCNC: 2.8 G/DL (ref 3.5–4.6)
ALP SERPL-CCNC: 97 U/L (ref 40–130)
ALT SERPL-CCNC: 15 U/L (ref 0–33)
ANION GAP SERPL CALCULATED.3IONS-SCNC: 10 MEQ/L (ref 9–15)
AST SERPL-CCNC: 33 U/L (ref 0–35)
BASOPHILS # BLD: 0 K/UL (ref 0–0.2)
BASOPHILS NFR BLD: 0.8 %
BILIRUB DIRECT SERPL-MCNC: <0.2 MG/DL (ref 0–0.4)
BILIRUB INDIRECT SERPL-MCNC: ABNORMAL MG/DL (ref 0–0.6)
BILIRUB SERPL-MCNC: 0.5 MG/DL (ref 0.2–0.7)
BUN SERPL-MCNC: 10 MG/DL (ref 8–23)
CALCIUM SERPL-MCNC: 8.2 MG/DL (ref 8.5–9.9)
CHLORIDE SERPL-SCNC: 106 MEQ/L (ref 95–107)
CO2 SERPL-SCNC: 26 MEQ/L (ref 20–31)
CREAT SERPL-MCNC: 1.19 MG/DL (ref 0.5–0.9)
EOSINOPHIL # BLD: 0.1 K/UL (ref 0–0.7)
EOSINOPHIL NFR BLD: 1.8 %
ERYTHROCYTE [DISTWIDTH] IN BLOOD BY AUTOMATED COUNT: 23.1 % (ref 11.5–14.5)
GLUCOSE BLD-MCNC: 131 MG/DL (ref 70–99)
GLUCOSE BLD-MCNC: 155 MG/DL (ref 70–99)
GLUCOSE SERPL-MCNC: 140 MG/DL (ref 70–99)
HCT VFR BLD AUTO: 28.8 % (ref 37–47)
HCT VFR BLD AUTO: 29.5 % (ref 37–47)
HCT VFR BLD AUTO: 29.8 % (ref 37–47)
HGB BLD-MCNC: 8.1 G/DL (ref 12–16)
HGB BLD-MCNC: 8.2 G/DL (ref 12–16)
HGB BLD-MCNC: 8.4 G/DL (ref 12–16)
LYMPHOCYTES # BLD: 1.3 K/UL (ref 1–4.8)
LYMPHOCYTES NFR BLD: 24.8 %
MAGNESIUM SERPL-MCNC: 2 MG/DL (ref 1.7–2.4)
MCH RBC QN AUTO: 20 PG (ref 27–31.3)
MCHC RBC AUTO-ENTMCNC: 28.1 % (ref 33–37)
MCV RBC AUTO: 71.1 FL (ref 79.4–94.8)
MONOCYTES # BLD: 0.5 K/UL (ref 0.2–0.8)
MONOCYTES NFR BLD: 9.5 %
NEUTROPHILS # BLD: 3.1 K/UL (ref 1.4–6.5)
NEUTS SEG NFR BLD: 61.7 %
PERFORMED ON: ABNORMAL
PERFORMED ON: ABNORMAL
PHOSPHATE SERPL-MCNC: 2.6 MG/DL (ref 2.3–4.8)
PLATELET # BLD AUTO: 118 K/UL (ref 130–400)
POTASSIUM SERPL-SCNC: 4 MEQ/L (ref 3.4–4.9)
PROT SERPL-MCNC: 5.5 G/DL (ref 6.3–8)
RBC # BLD AUTO: 4.05 M/UL (ref 4.2–5.4)
SODIUM SERPL-SCNC: 142 MEQ/L (ref 135–144)
WBC # BLD AUTO: 5.1 K/UL (ref 4.8–10.8)

## 2024-05-09 PROCEDURE — 3700000000 HC ANESTHESIA ATTENDED CARE: Performed by: SPECIALIST

## 2024-05-09 PROCEDURE — 6370000000 HC RX 637 (ALT 250 FOR IP): Performed by: STUDENT IN AN ORGANIZED HEALTH CARE EDUCATION/TRAINING PROGRAM

## 2024-05-09 PROCEDURE — 3609017100 HC EGD: Performed by: SPECIALIST

## 2024-05-09 PROCEDURE — 94640 AIRWAY INHALATION TREATMENT: CPT

## 2024-05-09 PROCEDURE — 85018 HEMOGLOBIN: CPT

## 2024-05-09 PROCEDURE — C9113 INJ PANTOPRAZOLE SODIUM, VIA: HCPCS | Performed by: NURSE PRACTITIONER

## 2024-05-09 PROCEDURE — 84100 ASSAY OF PHOSPHORUS: CPT

## 2024-05-09 PROCEDURE — 06L38CZ OCCLUSION OF ESOPHAGEAL VEIN WITH EXTRALUMINAL DEVICE, VIA NATURAL OR ARTIFICIAL OPENING ENDOSCOPIC: ICD-10-PCS | Performed by: SPECIALIST

## 2024-05-09 PROCEDURE — 2580000003 HC RX 258: Performed by: SPECIALIST

## 2024-05-09 PROCEDURE — 43244 EGD VARICES LIGATION: CPT | Performed by: SPECIALIST

## 2024-05-09 PROCEDURE — 80076 HEPATIC FUNCTION PANEL: CPT

## 2024-05-09 PROCEDURE — 2580000003 HC RX 258

## 2024-05-09 PROCEDURE — 83735 ASSAY OF MAGNESIUM: CPT

## 2024-05-09 PROCEDURE — 6360000002 HC RX W HCPCS: Performed by: NURSE PRACTITIONER

## 2024-05-09 PROCEDURE — 85025 COMPLETE CBC W/AUTO DIFF WBC: CPT

## 2024-05-09 PROCEDURE — 6360000002 HC RX W HCPCS: Performed by: NURSE ANESTHETIST, CERTIFIED REGISTERED

## 2024-05-09 PROCEDURE — 3700000001 HC ADD 15 MINUTES (ANESTHESIA): Performed by: SPECIALIST

## 2024-05-09 PROCEDURE — 2709999900 HC NON-CHARGEABLE SUPPLY: Performed by: SPECIALIST

## 2024-05-09 PROCEDURE — 94761 N-INVAS EAR/PLS OXIMETRY MLT: CPT

## 2024-05-09 PROCEDURE — 2720000010 HC SURG SUPPLY STERILE: Performed by: SPECIALIST

## 2024-05-09 PROCEDURE — 7100000011 HC PHASE II RECOVERY - ADDTL 15 MIN: Performed by: SPECIALIST

## 2024-05-09 PROCEDURE — 85014 HEMATOCRIT: CPT

## 2024-05-09 PROCEDURE — 7100000010 HC PHASE II RECOVERY - FIRST 15 MIN: Performed by: SPECIALIST

## 2024-05-09 PROCEDURE — 36415 COLL VENOUS BLD VENIPUNCTURE: CPT

## 2024-05-09 PROCEDURE — 2580000003 HC RX 258: Performed by: INTERNAL MEDICINE

## 2024-05-09 PROCEDURE — 80048 BASIC METABOLIC PNL TOTAL CA: CPT

## 2024-05-09 PROCEDURE — 2580000003 HC RX 258: Performed by: STUDENT IN AN ORGANIZED HEALTH CARE EDUCATION/TRAINING PROGRAM

## 2024-05-09 PROCEDURE — 2580000003 HC RX 258: Performed by: NURSE PRACTITIONER

## 2024-05-09 RX ORDER — FUROSEMIDE 20 MG/1
20 TABLET ORAL DAILY
Qty: 30 TABLET | Refills: 0 | Status: SHIPPED | OUTPATIENT
Start: 2024-05-09

## 2024-05-09 RX ORDER — SODIUM CHLORIDE 0.9 % (FLUSH) 0.9 %
5-40 SYRINGE (ML) INJECTION EVERY 12 HOURS SCHEDULED
Status: CANCELLED | OUTPATIENT
Start: 2024-05-09

## 2024-05-09 RX ORDER — SPIRONOLACTONE 25 MG/1
25 TABLET ORAL DAILY
Qty: 30 TABLET | Refills: 0 | Status: SHIPPED | OUTPATIENT
Start: 2024-05-09

## 2024-05-09 RX ORDER — SODIUM CHLORIDE 9 MG/ML
INJECTION, SOLUTION INTRAVENOUS
Status: COMPLETED
Start: 2024-05-09 | End: 2024-05-09

## 2024-05-09 RX ORDER — SODIUM CHLORIDE 0.9 % (FLUSH) 0.9 %
5-40 SYRINGE (ML) INJECTION PRN
Status: CANCELLED | OUTPATIENT
Start: 2024-05-09

## 2024-05-09 RX ORDER — SODIUM CHLORIDE 9 MG/ML
INJECTION, SOLUTION INTRAVENOUS PRN
Status: CANCELLED | OUTPATIENT
Start: 2024-05-09

## 2024-05-09 RX ORDER — PROPOFOL 10 MG/ML
INJECTION, EMULSION INTRAVENOUS PRN
Status: DISCONTINUED | OUTPATIENT
Start: 2024-05-09 | End: 2024-05-09 | Stop reason: SDUPTHER

## 2024-05-09 RX ORDER — SODIUM CHLORIDE 9 MG/ML
INJECTION, SOLUTION INTRAVENOUS CONTINUOUS
Status: CANCELLED | OUTPATIENT
Start: 2024-05-09

## 2024-05-09 RX ADMIN — SODIUM CHLORIDE, PRESERVATIVE FREE 40 MG: 5 INJECTION INTRAVENOUS at 08:47

## 2024-05-09 RX ADMIN — PROPOFOL 50 MG: 10 INJECTION, EMULSION INTRAVENOUS at 10:37

## 2024-05-09 RX ADMIN — PROPOFOL 50 MG: 10 INJECTION, EMULSION INTRAVENOUS at 10:31

## 2024-05-09 RX ADMIN — POTASSIUM CHLORIDE 20 MEQ: 1500 TABLET, EXTENDED RELEASE ORAL at 08:48

## 2024-05-09 RX ADMIN — CITALOPRAM HYDROBROMIDE 20 MG: 20 TABLET ORAL at 08:50

## 2024-05-09 RX ADMIN — SODIUM CHLORIDE: 9 INJECTION, SOLUTION INTRAVENOUS at 09:13

## 2024-05-09 RX ADMIN — BUDESONIDE AND FORMOTEROL FUMARATE DIHYDRATE 2 PUFF: 160; 4.5 AEROSOL RESPIRATORY (INHALATION) at 07:11

## 2024-05-09 RX ADMIN — PROPOFOL 50 MG: 10 INJECTION, EMULSION INTRAVENOUS at 10:33

## 2024-05-09 RX ADMIN — ACETAMINOPHEN 650 MG: 325 TABLET ORAL at 08:49

## 2024-05-09 RX ADMIN — PROPOFOL 50 MG: 10 INJECTION, EMULSION INTRAVENOUS at 10:29

## 2024-05-09 RX ADMIN — Medication 1000 UNITS: at 08:48

## 2024-05-09 RX ADMIN — Medication 10 ML: at 08:51

## 2024-05-09 RX ADMIN — FAMOTIDINE 20 MG: 20 TABLET, FILM COATED ORAL at 08:48

## 2024-05-09 RX ADMIN — NADOLOL 20 MG: 20 TABLET ORAL at 08:48

## 2024-05-09 RX ADMIN — PROPOFOL 50 MG: 10 INJECTION, EMULSION INTRAVENOUS at 10:39

## 2024-05-09 RX ADMIN — SODIUM CHLORIDE: 9 INJECTION, SOLUTION INTRAVENOUS at 10:25

## 2024-05-09 RX ADMIN — TIOTROPIUM BROMIDE INHALATION SPRAY 2 PUFF: 3.12 SPRAY, METERED RESPIRATORY (INHALATION) at 07:11

## 2024-05-09 ASSESSMENT — PAIN - FUNCTIONAL ASSESSMENT
PAIN_FUNCTIONAL_ASSESSMENT: 0-10
PAIN_FUNCTIONAL_ASSESSMENT: NONE - DENIES PAIN

## 2024-05-09 ASSESSMENT — PAIN SCALES - GENERAL: PAINLEVEL_OUTOF10: 5

## 2024-05-09 ASSESSMENT — PAIN DESCRIPTION - LOCATION: LOCATION: HEAD

## 2024-05-09 NOTE — CARE COORDINATION
Pt is off the floor for EGD. CM spoke to [facility], they are unsure of acceptance, referral then made to [facility], acceptance.  Pt will eave today with .

## 2024-05-09 NOTE — PLAN OF CARE
Problem: Discharge Planning  Goal: Discharge to home or other facility with appropriate resources  5/8/2024 2122 by Brandy Araujo RN  Outcome: Progressing  5/8/2024 1023 by Rishabh Quarles RN  Outcome: Progressing     Problem: Safety - Adult  Goal: Free from fall injury  5/8/2024 2122 by Brandy Araujo RN  Outcome: Progressing  5/8/2024 1023 by Rishabh Quarles RN  Outcome: Progressing     Problem: Chronic Conditions and Co-morbidities  Goal: Patient's chronic conditions and co-morbidity symptoms are monitored and maintained or improved  5/8/2024 2122 by Brandy Araujo RN  Outcome: Progressing  5/8/2024 1023 by Rishabh Quarles RN  Outcome: Progressing     Problem: ABCDS Injury Assessment  Goal: Absence of physical injury  5/8/2024 2122 by Brandy Araujo RN  Outcome: Progressing  5/8/2024 1023 by Rishabh Quarles RN  Outcome: Progressing

## 2024-05-09 NOTE — DISCHARGE SUMMARY
Hospital Medicine Discharge Summary    Blair Aguirre  :  1945  MRN:  [id number]    Admit date:  2024  Discharge date:  2024    Admitting Physician:  Angelia Otero MD  Primary Care Physician:  Yesenia Viramontes MD      Discharge Diagnoses:    Principal Problem:    Acute hypoxic respiratory failure (HCC)  Active Problems:    Symptomatic anemia    Cirrhosis of liver with ascites (HCC)    Other ascites  Resolved Problems:    * No resolved hospital problems. *      Hospital Course:   Blair Aguirre is a 79 y.o. female that was admitted and treated at [facility] for the following medical issues:     Decompensated liver cirrhosis with ascites  - s/p paracentesis with drainage of 4.6 liters per IR  - fluid analysis not suggestive of SBP  - u/s negative for portal vein thrombosis  - patient was still feeling distended  - limited u/s showed only a small amount of ascites fluid per report  - continued Nadolol  - start Aldactone/Furosemide  - followed by GI     Anemia   - Hb down to 6.9, recent baseline around 9-10  - no overt bleeding  - improved s/p transfusion of 1 unit of PRBCs  - EGD showed grade II esophageal varices with cherry red spots  s/p banding and portal hypertensive gastropathy per GI      Elevated Cr  - follow as outpatient      Patient was seen by the following consultants while admitted to [facility]:   Consults:  IP CONSULT TO INTERVENTIONAL RADIOLOGY  IP CONSULT TO GI  IP CONSULT TO INTERVENTIONAL RADIOLOGY  IP CONSULT TO HOME CARE NEEDS    Significant Diagnostic Studies:    EGD    Result Date: 2024  [facility] Patient: BLAIR AGUIRRE MRN: [id number] : 1945 Account: [id number] Sex at Birth: Female Age: 79 Years Procedure: Upper GI endoscopy Date: 2024 Attending Physician: Mei Kitchen Indications:        - Decompensated cirrhosis with ascites and esophageal varices which were           banded in the past, now admitted with anemia and

## 2024-05-09 NOTE — ANESTHESIA POSTPROCEDURE EVALUATION
Department of Anesthesiology  Postprocedure Note    Patient: Zach Aguirre  MRN: [id number]  YOB: 1945  Date of evaluation: 5/9/2024    Procedure Summary       Date: 05/09/24 Room / Location: [facility] OR 01 / [facility]    Anesthesia Start: 1025 Anesthesia Stop:     Procedure: ESOPHAGOGASTRODUODENOSCOPY DIAGNOSTIC ONLY WITH ESOPHAGEAL BANDING Diagnosis:       HARIS (iron deficiency anemia)      (HARIS (iron deficiency anemia) [D50.9])    Surgeons: Mei Kitchen MD Responsible Provider: Sera Cornejo APRN - CRNA    Anesthesia Type: MAC ASA Status: 3            Anesthesia Type: No value filed.    Francisca Phase I: Francisca Score: 10    Francisca Phase II:      Anesthesia Post Evaluation    Patient location during evaluation: PACU  Patient participation: complete - patient cannot participate  Level of consciousness: awake  Pain score: 0  Airway patency: patent  Nausea & Vomiting: no vomiting and no nausea  Cardiovascular status: blood pressure returned to baseline  Respiratory status: acceptable  Hydration status: stable  Pain management: adequate        No notable events documented.

## 2024-05-09 NOTE — ANESTHESIA PRE PROCEDURE
Department of Anesthesiology  Preprocedure Note       Name:  Zach Aguirre   Age:  79 y.o.  :  1945                                          MRN:  [id number]         Date:  2024      Surgeon: Surgeon(s):  Mei Kitchen MD    Procedure: Procedure(s):  ESOPHAGOGASTRODUODENOSCOPY DIAGNOSTIC ONLY    Medications prior to admission:   Prior to Admission medications    Medication Sig Start Date End Date Taking? Authorizing Provider   potassium chloride (KLOR-CON M) 20 MEQ extended release tablet Take 1 tablet by mouth daily 5/3/24   Mei Kitchen MD   cholestyramine (QUESTRAN) 4 g packet MIX 1 PACKET WITH LIQUID AND  DRINK BY MOUTH TWICE DAILY  Patient not taking: Reported on 2024   Yesenia Viramontes MD   nadolol (CORGARD) 20 MG tablet TAKE 1 TABLET BY MOUTH DAILY 23   Yesenia Viramonets MD   citalopram (CELEXA) 20 MG tablet TAKE 1 TABLET BY MOUTH DAILY 23   Yesenia Viramontes MD   vitamin D3 (CHOLECALCIFEROL) 25 MCG (1000 UT) TABS tablet Take 1 tablet by mouth daily 23   Yesenia Viramontes MD   JANUVIA 100 MG tablet TAKE 1 TABLET DAILY 22   Yesenia Viramontes MD   famotidine (PEPCID) 20 MG tablet TAKE 1 TABLET TWICE A DAY (NEED APPOINTMENT FOR FURTHER REFILLS) 22   Yesenia Viramontes MD   levothyroxine (SYNTHROID) 150 MCG tablet TAKE 1 TABLET DAILY (NEED APPOINTMENT FOR FURTHER REFILLS) 22   Yesenia Viramontes MD   ipratropium-albuterol (DUONEB) 0.5-2.5 (3) MG/3ML SOLN nebulizer solution  10/8/21   Hilda Bach MD   fluticasone-umeclidin-vilant (TRELEGY ELLIPTA) 100-62.5-25 MCG/INH AEPB  10/5/21   Hilda Bach MD   Continuous Blood Gluc  (DEXCOM G6 ) MARIELA Use daily for blood sugar readings  Patient not taking: Reported on 2024   Yesenia Viramontes MD   Continuous Blood Gluc Sensor (DEXCOM G6 SENSOR) MISC Use daily for blood sugar readings  Patient not taking: Reported on 2024   Yesenia Viramontes MD   albuterol sulfate  (90 Base) MCG/ACT inhaler Inhale 2 puffs

## 2024-05-10 ENCOUNTER — TELEPHONE (OUTPATIENT)
Dept: PRIMARY CARE CLINIC | Age: 79
End: 2024-05-10

## 2024-05-10 NOTE — TELEPHONE ENCOUNTER
Nandini from [facility] health calling to get verbal confirmation that you will follow for home health. Her # is [phone]. Option #1

## 2024-05-10 NOTE — PROGRESS NOTES
[facility] Occupational Therapy      Date: 2024  Patient Name: Zach Aguirre        MRN: [id number]  Account: [id number]   : 1945  (79 y.o.)  Room: [place] [address]    Chart reviewed, attempted OT at 1017 for evaluation. Patient not seen 2° to:    Hold per nursing request due to: Pt with low hemoglobin    Spoke to AIDA Keating RN aware. Will attempt again when able.    Electronically signed by MARIANNE Grider on 2024 at 10:17 AM    
   05/07/24 0400   RT Protocol   History Pulmonary Disease 0   Respiratory pattern 0   Breath sounds 0   Cough 0   Indications for Bronchodilator Therapy Decreased or absent breath sounds   Bronchodilator Assessment Score 0       
  Physician Progress Note      PATIENT:               BLAIR ORDAZ  CSN #:                  [id number]  :                       1945  ADMIT DATE:       2024 3:50 PM  DISCH DATE:  RESPONDING  PROVIDER #:        Bladimir CASTRO MD          QUERY TEXT:    Patient admitted with decompensated cirrhosis of liver . Noted documentation   of acute respiratory failure in H&P on 24. In order to support the   diagnosis of acute respiratory failure, please include additional clinical   indicators in your documentation.  Or please document if the diagnosis of   acute respiratory failure has been ruled out after further study.    The medical record reflects the following:  Risk Factors: COPD without exacerbation, decompensated cirrhosis of liver  Clinical Indicators: SPO2 % on Room air, RR 16-18  Treatment: room air, Symbicort, Spiriva, Trelegy, Duoneb,    Acute Respiratory Failure Clinical Indicators per  MS-DRG Training Guide and   Quick Reference Guide:  pO2 < 60 mmHg or SpO2 (pulse oximetry) < 91% breathing room air  pCO2 > 50 and pH < 7.35  P/F ratio (pO2 / FIO2) < 300  pO2 decrease or pCO2 increase by 10 mmHg from baseline (if known)  Supplemental oxygen of 40% or more  Presence of respiratory distress, tachypnea, dyspnea, shortness of breath,   wheezing  Unable to speak in complete sentences  Use of accessory muscles to breathe  Extreme anxiety and feeling of impending doom  Tripod position  Confusion/altered mental status/obtunded    Chichi KELLY, RN, [facility]  [phone]  Options provided:  -- Acute Respiratory Failure as evidenced by, Please document evidence.  -- Acute Respiratory Failure ruled out after study  -- Other - I will add my own diagnosis  -- Disagree - Not applicable / Not valid  -- Disagree - Clinically unable to determine / Unknown  -- Refer to Clinical Documentation Reviewer    PROVIDER RESPONSE TEXT:    Acute Respiratory Failure has been ruled out after study.    Query created by: 
Admission completed and documented, see flowsheets. VSS on RA. A&Ox4. Lung sounds clear. From home alone. Pt gets SOB when ambulating. Home med rec completed with daughter Jerica via telephone. Angelia Otero made aware via [employer]. Ambulates with cane. Pt skin intact. Abdomen distended and rounded. Swelling to right lower leg. Pt would like to leave jeans on at this time d/t being cold. Pt able to make wants/needs known. Denies any needs at this time. Oriented pt to environment. Safety maintained. Call light within reach.     Electronically signed by SARA ORTEGA RN on 5/6/24 at 11:12 PM EDT    
CLINICAL PHARMACY NOTE: MEDS TO BEDS    Total # of Prescriptions Filled: 2   The following medications were delivered to the patient:  Furosemide 20 mg Tab  Spironolactone 25 mg Tab    Additional Documentation:    
Critical H&H results. Angelia Otero made aware via [email or web address].     Electronically signed by SARA ORTEGA RN on 5/8/24 at 6:48 AM EDT    
ESOPHAGEAL BANDING PERFORMED. PT TOLERATED WELL. 3 BANDS PLACES ON ESOPHAGEAL VARICES.  
Gastroenterology Progress Note    Zach Aguirre is a 79 y.o. female patient.  Hospitalization Day:2    Chief C/O: decompensated cirrhosis     SUBJECTIVE: Seen and examined, no acute events, tolerating diet, hemoglobin was 6.9 this a.m., recheck is 7.1, no overt bleeding, no abdominal pain, having bowel movements, no confusion.    ROS:  Gastrointestinal ROS: no abdominal pain, change in bowel habits, or black or bloody stools    Physical    VITALS:  /67   Pulse 73   Temp 97.5 °F (36.4 °C) (Axillary)   Resp 16   Ht 1.6 m (5' 3\")   Wt 94.3 kg (208 lb)   LMP 1982   SpO2 95%   BMI 36.85 kg/m²   TEMPERATURE:  Current - Temp: 97.5 °F (36.4 °C); Max - Temp  Av.7 °F (36.5 °C)  Min: 97.5 °F (36.4 °C)  Max: 97.8 °F (36.6 °C)    General:  Alert and oriented,  No apparent distress  Skin- without jaundice  Eyes: anicteric sclera  Cardiac: RRR, Nl s1s2, without murmurs  Lungs CTA Bilaterally, normal effort  Abdomen soft, ND, NT, no HSM, Bowel sounds normal  Ext: without edema  Neuro: no asterixis     Data    Data Review:    Recent Labs     24  1615 24  0535 24  0839 24  2304 24  0539 24  1015   WBC 5.0 4.3*  --   --  4.4*  --    HGB 7.3* 6.9*   < > 7.1* 6.9*  6.9* 7.1*   HCT 27.4* 25.0*   < > 26.6* 25.2*  25.8* 26.1*   MCV 70.1* 68.3*  --   --  69.8*  --     127*  --   --  123*  --     < > = values in this interval not displayed.     Recent Labs     24  1640 24  1649 24  0535 24  0539     --  141 142   K 4.1  --  3.8 4.6     --  107 108*   CO2 27  --  27 27   PHOS  --   --   --  2.9   BUN 11  --  11 10   CREATININE 1.15* 1.4* 1.16* 1.25*     Recent Labs     24  1640 24  0535 24  0539   AST 29 26 28   ALT 13 12 13   BILIDIR  --   --  <0.2   BILITOT 0.9* 0.5 0.4   ALKPHOS 104 96 93     No results for input(s): \"LIPASE\", \"AMYLASE\" in the last 72 hours.  Recent Labs     24  0535   PROTIME 15.5*   INR 1.2 
Hospitalist Progress Note      PCP: Yesenia Viramontes MD    Date of Admission: 5/6/2024    Chief Complaint:  no acute events, afebrile, stable HD, on RA, underwent paracentesis yesterday with 4.6 liters drained per IR, feels distended again today    Medications:  Reviewed    Infusion Medications    sodium chloride      dextrose       Scheduled Medications    citalopram  20 mg Oral Daily    famotidine  20 mg Oral Daily    levothyroxine  150 mcg Oral Daily    potassium chloride  20 mEq Oral Daily    Vitamin D  1,000 Units Oral Daily    nadolol  20 mg Oral Daily    budesonide-formoterol  2 puff Inhalation BID RT    And    tiotropium  2 puff Inhalation Daily RT    pantoprazole (PROTONIX) 40 mg in sodium chloride (PF) 0.9 % 10 mL injection  40 mg IntraVENous Daily    sodium chloride flush  5-40 mL IntraVENous 2 times per day    insulin lispro  0-8 Units SubCUTAneous TID WC    insulin lispro  0-4 Units SubCUTAneous Nightly     PRN Meds: ipratropium 0.5 mg-albuterol 2.5 mg, sodium chloride flush, sodium chloride, potassium chloride **OR** potassium alternative oral replacement **OR** potassium chloride, magnesium sulfate, ondansetron **OR** ondansetron, polyethylene glycol, acetaminophen **OR** acetaminophen, glucose, dextrose bolus **OR** dextrose bolus, glucagon (rDNA), dextrose      Intake/Output Summary (Last 24 hours) at 5/8/2024 1043  Last data filed at 5/8/2024 0446  Gross per 24 hour   Intake 348.59 ml   Output --   Net 348.59 ml         Exam:    /67   Pulse 73   Temp 97.5 °F (36.4 °C) (Axillary)   Resp 16   Ht 1.6 m (5' 3\")   Wt 94.3 kg (208 lb)   LMP 02/28/1982   SpO2 95%   BMI 36.85 kg/m²     General appearance: appears stated age and cooperative.  Respiratory:  clear to auscultation, bilaterally   Cardiovascular: Regular rate and rhythm, S1/S2.  Abdomen: distended.  Musculoskeletal: edema bilaterally.     Labs:   Recent Labs     05/06/24  1615 05/07/24  0535 05/07/24  0839 05/07/24  2304 05/08/24  0539 
Hospitalist Progress Note      PCP: Yesenia Viramontes MD    Date of Admission: 5/6/2024    Chief Complaint:  no acute events, afebrile, stable HD, underwent paracentesis today with 4.6 liters drained per IR    Medications:  Reviewed    Infusion Medications    sodium chloride      dextrose       Scheduled Medications    citalopram  20 mg Oral Daily    famotidine  20 mg Oral Daily    levothyroxine  150 mcg Oral Daily    potassium chloride  20 mEq Oral Daily    Vitamin D  1,000 Units Oral Daily    nadolol  20 mg Oral Daily    budesonide-formoterol  2 puff Inhalation BID RT    And    tiotropium  2 puff Inhalation Daily RT    sodium chloride flush  5-40 mL IntraVENous 2 times per day    insulin lispro  0-8 Units SubCUTAneous TID WC    insulin lispro  0-4 Units SubCUTAneous Nightly     PRN Meds: ipratropium 0.5 mg-albuterol 2.5 mg, sodium chloride flush, sodium chloride, potassium chloride **OR** potassium alternative oral replacement **OR** potassium chloride, magnesium sulfate, ondansetron **OR** ondansetron, polyethylene glycol, acetaminophen **OR** acetaminophen, glucose, dextrose bolus **OR** dextrose bolus, glucagon (rDNA), dextrose      Intake/Output Summary (Last 24 hours) at 5/7/2024 1322  Last data filed at 5/7/2024 0810  Gross per 24 hour   Intake 520 ml   Output 0 ml   Net 520 ml       Exam:    BP (!) 104/52   Pulse 73   Temp 98 °F (36.7 °C) (Oral)   Resp 16   Ht 1.6 m (5' 3\")   Wt 94.3 kg (208 lb)   LMP 02/28/1982   SpO2 95%   BMI 36.85 kg/m²     General appearance: appears stated age and cooperative.  Respiratory:  clear to auscultation, bilaterally   Cardiovascular: Regular rate and rhythm, S1/S2.  Abdomen: Soft, distended.  Musculoskeletal: edema bilaterally.     Labs:   Recent Labs     05/06/24  1615 05/07/24  0535 05/07/24  0839   WBC 5.0 4.3*  --    HGB 7.3* 6.9* 7.5*   HCT 27.4* 25.0* 27.9*    127*  --      Recent Labs     05/06/24  1640 05/06/24  1649 05/07/24  0535     --  141   K 4.1  
[facility] OCCUPATIONAL THERAPY EVALUATION - ACUTE     NAME: Zach Aguirre  : 1945 (79 y.o.)  MRN: [id number]  CODE STATUS: Full Code  Room: [place] [address]01    Date of Service: 2024    Patient Diagnosis(es): Peripheral edema [R60.0]  Other ascites [R18.8]  Symptomatic anemia [D64.9]  Acute hypoxic respiratory failure (HCC) [J96.01]   Patient Active Problem List    Diagnosis Date Noted    Venous ulcer of left lower extremity with varicose veins (HCC) 2022    Chronic renal disease, stage III (HCC) [542583] 2022    Cirrhosis of liver with ascites (HCC) 2024    Other ascites 2024    Acute hypoxic respiratory failure (HCC) 2024    HARIS (iron deficiency anemia) 2024    Secondary esophageal varices without bleeding (HCC) 02/15/2022    Chronic obstructive pulmonary disease, unspecified COPD type (HCC) 02/15/2022    Symptomatic anemia 02/15/2022    Chronic gastritis without bleeding     Polyp of sigmoid colon     Morbidly obese (HCC) 2020    Major depressive disorder, recurrent, in full remission (HCC) 05/10/2019    Frequent headaches 2019    Cherry angioma 2019    Hidradenitis suppurativa 2019    Multiple benign nevi 2019    Trigeminal neuralgia 2018    Diffuse photodamage of skin 2018    Dilated pore of Solomon 2018    Lentigines 2018    Poikiloderma 2018    Seborrheic keratosis 2018    DM (diabetes mellitus) (HCC) 2015    Leg pain 2015    Depression 2014    GERD (gastroesophageal reflux disease) 2014    Dyspnea 2012    Hepatitis B infection     Sleep apnea     Anxiety     Insomnia     Hyperlipidemia     Hypothyroidism         Past Medical History:   Diagnosis Date    Anxiety     DM (diabetes mellitus) (HCC) 2015    Hepatitis B infection     Hyperlipidemia     Hypothyroidism     Insomnia     Leg pain 2015    Post herpetic neuralgia     Unspecified sleep apnea     after seeing 
Patient assessment and vitals complete and per flowsheets. Patient to specials for paracentesis, tolerated well. No needs at this time. Alert and oriented, wheeze noted when at rest, c/o dyspnea at rest and exertion. Spo2  wdl on room air. No other needs at this time. Critical h/h reported to Therese NOGUERA, new order for h/h received, h/h redrawn and hgb 7.5. no other needs.   
Physical Therapy  Facility/Department: [facility] MED SURG W471/W471-01  Physical Therapy Discharge      NAME: Zach Aguirre    : 1945 (79 y.o.)  MRN: [id number]    Account: [id number]  Gender: female      Patient has been discharged from acute care hospital. DC patient from current PT program.      Electronically signed by Maryjane Flores PT on 5/10/24 at 11:47 AM EDT  
Physical Therapy Med Surg Initial Assessment  Facility/Department: [address] MED SURG UNIT  Room: [place] [address]     NAME: Zach Aguirre  : 1945 (79 y.o.)  MRN: [id number]  CODE STATUS: Full Code    Date of Service: 2024    Patient Diagnosis(es): Peripheral edema [R60.0]  Other ascites [R18.8]  Symptomatic anemia [D64.9]  Acute hypoxic respiratory failure (HCC) [J96.01]   Chief Complaint   Patient presents with    Shortness of Breath     Sob and liver issues worried that she may need a blood transfusion.      Patient Active Problem List    Diagnosis Date Noted    Venous ulcer of left lower extremity with varicose veins (HCC) 2022    Chronic renal disease, stage III (HCC) [828390] 2022    Cirrhosis of liver with ascites (HCC) 2024    Other ascites 2024    Acute hypoxic respiratory failure (HCC) 2024    HARIS (iron deficiency anemia) 2024    Secondary esophageal varices without bleeding (HCC) 02/15/2022    Chronic obstructive pulmonary disease, unspecified COPD type (HCC) 02/15/2022    Symptomatic anemia 02/15/2022    Chronic gastritis without bleeding     Polyp of sigmoid colon     Morbidly obese (HCC) 2020    Major depressive disorder, recurrent, in full remission (HCC) 05/10/2019    Frequent headaches 2019    Cherry angioma 2019    Hidradenitis suppurativa 2019    Multiple benign nevi 2019    Trigeminal neuralgia 2018    Diffuse photodamage of skin 2018    Dilated pore of Solomon 2018    Lentigines 2018    Poikiloderma 2018    Seborrheic keratosis 2018    DM (diabetes mellitus) (HCC) 2015    Leg pain 2015    Depression 2014    GERD (gastroesophageal reflux disease) 2014    Dyspnea 2012    Hepatitis B infection     Sleep apnea     Anxiety     Insomnia     Hyperlipidemia     Hypothyroidism       Past Medical History:   Diagnosis Date    Anxiety     DM (diabetes mellitus) (HCC) 
Physical Therapy Missed Treatment   Facility/Department: [facility] MED SURG MLOZ- GC OR/NONE    NAME: Zach Aguirre    : 1945 (79 y.o.)  MRN: [id number]    Account: [id number]  Gender: female    Chart reviewed, attempted PT at 1102. Patient unavailable 2° to:        [x] Pt.. off floor for test/procedure.           Will attempt PT treatment again at earliest convenience.      Electronically signed by Oma Shaw PTA on 24 at 11:02 AM EDT    
Physical Therapy Missed Treatment   Facility/Department: [facility] MED SURG W471/W471-01    NAME: Zach Aguirre    : 1945 (79 y.o.)  MRN: [id number]    Account: [id number]  Gender: female    Chart reviewed, attempted PT at 1445. Patient unavailable 2° to:        [x] Pt declined \"I'm not up to walking right now.  I'm tired from the surgery.  Could I have some coffee?\"   Pt to discharge later today.  Pt provided coffee.  [x] Nsg notified     Will attempt PT treatment again at earliest convenience.      Electronically signed by Oma Shaw, PTA on 24 at 2:50 PM EDT    
Pt has HBG 6.9 today per night shift was passed on to Angelia Otero.   Pt has co ABD distention and has requested eye drops for dry eyes.  Pt ABD  is firm and distended with active bowel sounds.  Nurse notified provider via [employer] awaiting response.  
Pt has blood transfusion at 125 ml/hr after 15 min monitoring.  Pt IV I'm left ac leaking, Nurse removed IV catheter intact dressing applied, new IV 20 g in right forearm, blood infusing in new site no reaction noted.     
Pt is aox4 pale cool and dry.   Pt states she is feeling weak today.  Pt updated on labs and daughter advised she is coming in.  Pt has no co pain or discomfort at this time.   
Spoke to Rishabh PARRA to follow up after pt received a paracentesis yesterday. Abdomen is distended. Hgb 6.9 today from 7.5 yesterday. Band-papo to abdomen clean,dry, and intact. No draining or concerns regarding site. Rishabh PARRA notified hospitalist and is awaiting response.   
shift assessment complete, see flowsheets. pt A&Ox4, meds given per mar, no other needs verbalized at this time, call light within reach.     Electronically signed by Brandy Araujo RN on 5/8/2024 at 9:22 PM    
    05/07/24  0535 05/07/24  0839 05/08/24  0539 05/08/24  1015 05/09/24  0148 05/09/24  0441 05/09/24  0819   WBC 4.3*  --  4.4*  --   --  5.1  --    HGB 6.9*   < > 6.9*  6.9*   < > 8.2* 8.1* 8.4*   HCT 25.0*   < > 25.2*  25.8*   < > 29.5* 28.8* 29.8*   *  --  123*  --   --  118*  --     < > = values in this interval not displayed.       Recent Labs     05/07/24  0535 05/08/24  0539 05/09/24 0441    142 142   K 3.8 4.6 4.0    108* 106   CO2 27 27 26   BUN 11 10 10   CREATININE 1.16* 1.25* 1.19*   CALCIUM 8.2* 8.0* 8.2*   PHOS  --  2.9 2.6       Recent Labs     05/07/24  0535 05/08/24  0539 05/09/24 0441   AST 26 28 33   ALT 12 13 15   BILIDIR  --  <0.2 <0.2   BILITOT 0.5 0.4 0.5   ALKPHOS 96 93 97       Recent Labs     05/07/24  0535   INR 1.2       No results for input(s): \"CKTOTAL\", \"TROPONINI\" in the last 72 hours.    Urinalysis:      Lab Results   Component Value Date/Time    NITRU NEGATIVE 06/21/2022 05:57 PM    WBCUA 0-2 12/02/2021 08:30 PM    WBCUA 5 09/08/2021 10:28 PM    BACTERIA Negative 12/02/2021 08:30 PM    RBCUA 3-5 12/02/2021 08:30 PM    RBCUA 2 09/08/2021 10:28 PM    BLOODU NEGATIVE 06/21/2022 05:57 PM    GLUCOSEU 50 (TRACE) 06/21/2022 05:57 PM    GLUCOSEU >=1000 12/02/2021 08:30 PM       Radiology:  IR US GUIDED PARACENTESIS   Final Result   1.A small amount of fluid is noted in the abdomen, currently too small for paracentesis as one was done yesterday.      COMPARISON: Ultrasound for paracentesis May 7, 2024      DIAGNOSIS: Ascites      COMMENTS: None      TECHNIQUE: Limited ultrasound of the abdomen      FINDINGS: Limited ultrasound of the abdomen demonstrates a small amount of ascites fluid.             Vascular duplex abdominal visceral arteries/veins/organs complete   Final Result   1. Patency with appropriate flow direction of the major hepatic vasculature.         IR US GUIDED PARACENTESIS   Final Result   1.Status post technically successful ultrasound-guided 
Relation Age of Onset    Stroke Mother     High Cholesterol Mother     Cancer Mother     Colon Cancer Neg Hx     Celiac Disease Neg Hx     Crohn's Disease Neg Hx       SocialHistory:    Social History     Socioeconomic History    Marital status:      Spouse name: Not on file    Number of children: 2    Years of education: Not on file    Highest education level: 12th grade   Occupational History    Occupation: retired    Tobacco Use    Smoking status: Former     Current packs/day: 0.00     Average packs/day: 1 pack/day for 48.0 years (48.0 ttl pk-yrs)     Types: Cigarettes     Start date: 7/24/1965     Quit date: 7/24/2013     Years since quitting: 10.7    Smokeless tobacco: Never   Vaping Use    Vaping Use: Former    Substances: Nicotine, Flavoring, low  dose    Substance and Sexual Activity    Alcohol use: Yes     Comment: very rarely    Drug use: No    Sexual activity: Not Currently     Partners: Male   Other Topics Concern    Not on file   Social History Narrative    Lives alone    Has stairs in home needs to go up/down.    2 children that help if needed      Social Determinants of Health     Financial Resource Strain: Low Risk  (4/12/2023)    Overall Financial Resource Strain (CARDIA)     Difficulty of Paying Living Expenses: Not hard at all   Food Insecurity: No Food Insecurity (5/6/2024)    Hunger Vital Sign     Worried About Running Out of Food in the Last Year: Never true     Ran Out of Food in the Last Year: Never true   Transportation Needs: No Transportation Needs (5/6/2024)    PRAPARE - Transportation     Lack of Transportation (Medical): No     Lack of Transportation (Non-Medical): No   Physical Activity: Inactive (11/1/2022)    Exercise Vital Sign     Days of Exercise per Week: 0 days     Minutes of Exercise per Session: 0 min   Stress: Not on file   Social Connections: Not on file   Intimate Partner Violence: Not on file   Housing Stability: Low Risk  (5/6/2024)    Housing Stability Vital Sign

## 2024-05-12 LAB — BACTERIA FLD AEROBE CULT: NORMAL

## 2024-05-13 LAB — BACTERIA FLD AEROBE CULT: NORMAL

## 2024-05-14 ENCOUNTER — OFFICE VISIT (OUTPATIENT)
Dept: PRIMARY CARE CLINIC | Age: 79
End: 2024-05-14
Payer: MEDICARE

## 2024-05-14 VITALS
RESPIRATION RATE: 18 BRPM | OXYGEN SATURATION: 98 % | WEIGHT: 211 LBS | SYSTOLIC BLOOD PRESSURE: 120 MMHG | BODY MASS INDEX: 37.38 KG/M2 | TEMPERATURE: 98.5 F | DIASTOLIC BLOOD PRESSURE: 70 MMHG | HEART RATE: 77 BPM

## 2024-05-14 VITALS
TEMPERATURE: 98.5 F | HEIGHT: 63 IN | DIASTOLIC BLOOD PRESSURE: 70 MMHG | WEIGHT: 211 LBS | HEART RATE: 77 BPM | SYSTOLIC BLOOD PRESSURE: 120 MMHG | RESPIRATION RATE: 18 BRPM | BODY MASS INDEX: 37.39 KG/M2 | OXYGEN SATURATION: 98 %

## 2024-05-14 DIAGNOSIS — E11.9 TYPE 2 DIABETES MELLITUS WITHOUT COMPLICATION, WITHOUT LONG-TERM CURRENT USE OF INSULIN (HCC): ICD-10-CM

## 2024-05-14 DIAGNOSIS — Z09 HOSPITAL DISCHARGE FOLLOW-UP: ICD-10-CM

## 2024-05-14 DIAGNOSIS — Z87.891 PERSONAL HISTORY OF TOBACCO USE: ICD-10-CM

## 2024-05-14 DIAGNOSIS — D64.9 SEVERE ANEMIA: ICD-10-CM

## 2024-05-14 DIAGNOSIS — J44.9 CHRONIC OBSTRUCTIVE PULMONARY DISEASE, UNSPECIFIED COPD TYPE (HCC): ICD-10-CM

## 2024-05-14 DIAGNOSIS — Z00.00 MEDICARE ANNUAL WELLNESS VISIT, SUBSEQUENT: Primary | ICD-10-CM

## 2024-05-14 DIAGNOSIS — K72.90 DECOMPENSATION OF CIRRHOSIS OF LIVER (HCC): Primary | ICD-10-CM

## 2024-05-14 DIAGNOSIS — K74.60 DECOMPENSATION OF CIRRHOSIS OF LIVER (HCC): Primary | ICD-10-CM

## 2024-05-14 PROCEDURE — 1111F DSCHRG MED/CURRENT MED MERGE: CPT | Performed by: INTERNAL MEDICINE

## 2024-05-14 PROCEDURE — 1036F TOBACCO NON-USER: CPT | Performed by: INTERNAL MEDICINE

## 2024-05-14 PROCEDURE — G8427 DOCREV CUR MEDS BY ELIG CLIN: HCPCS | Performed by: INTERNAL MEDICINE

## 2024-05-14 PROCEDURE — 83036 HEMOGLOBIN GLYCOSYLATED A1C: CPT | Performed by: INTERNAL MEDICINE

## 2024-05-14 PROCEDURE — 1090F PRES/ABSN URINE INCON ASSESS: CPT | Performed by: INTERNAL MEDICINE

## 2024-05-14 PROCEDURE — 3023F SPIROM DOC REV: CPT | Performed by: INTERNAL MEDICINE

## 2024-05-14 PROCEDURE — G0439 PPPS, SUBSEQ VISIT: HCPCS | Performed by: INTERNAL MEDICINE

## 2024-05-14 PROCEDURE — G8400 PT W/DXA NO RESULTS DOC: HCPCS | Performed by: INTERNAL MEDICINE

## 2024-05-14 PROCEDURE — G0296 VISIT TO DETERM LDCT ELIG: HCPCS | Performed by: INTERNAL MEDICINE

## 2024-05-14 PROCEDURE — 99214 OFFICE O/P EST MOD 30 MIN: CPT | Performed by: INTERNAL MEDICINE

## 2024-05-14 PROCEDURE — 1123F ACP DISCUSS/DSCN MKR DOCD: CPT | Performed by: INTERNAL MEDICINE

## 2024-05-14 PROCEDURE — G8417 CALC BMI ABV UP PARAM F/U: HCPCS | Performed by: INTERNAL MEDICINE

## 2024-05-14 RX ORDER — PREDNISONE 20 MG/1
20 TABLET ORAL DAILY
Qty: 7 TABLET | Refills: 0 | Status: SHIPPED | OUTPATIENT
Start: 2024-05-14 | End: 2024-05-14

## 2024-05-14 RX ORDER — FLUTICASONE FUROATE, UMECLIDINIUM BROMIDE AND VILANTEROL TRIFENATATE 100; 62.5; 25 UG/1; UG/1; UG/1
1 POWDER RESPIRATORY (INHALATION) DAILY
Qty: 60 EACH | Refills: 5 | Status: SHIPPED | OUTPATIENT
Start: 2024-05-14 | End: 2024-05-14 | Stop reason: SDUPTHER

## 2024-05-14 RX ORDER — FLUTICASONE FUROATE, UMECLIDINIUM BROMIDE AND VILANTEROL TRIFENATATE 100; 62.5; 25 UG/1; UG/1; UG/1
1 POWDER RESPIRATORY (INHALATION) DAILY
Qty: 60 EACH | Refills: 5 | Status: SHIPPED | OUTPATIENT
Start: 2024-05-14

## 2024-05-14 RX ORDER — METHYLPREDNISOLONE 4 MG/1
TABLET ORAL
Qty: 1 KIT | Refills: 0 | Status: SHIPPED | OUTPATIENT
Start: 2024-05-14 | End: 2024-05-20

## 2024-05-14 SDOH — ECONOMIC STABILITY: HOUSING INSECURITY
IN THE LAST 12 MONTHS, WAS THERE A TIME WHEN YOU DID NOT HAVE A STEADY PLACE TO SLEEP OR SLEPT IN A SHELTER (INCLUDING NOW)?: NO

## 2024-05-14 SDOH — ECONOMIC STABILITY: FOOD INSECURITY: WITHIN THE PAST 12 MONTHS, YOU WORRIED THAT YOUR FOOD WOULD RUN OUT BEFORE YOU GOT MONEY TO BUY MORE.: NEVER TRUE

## 2024-05-14 SDOH — ECONOMIC STABILITY: FOOD INSECURITY: WITHIN THE PAST 12 MONTHS, THE FOOD YOU BOUGHT JUST DIDN'T LAST AND YOU DIDN'T HAVE MONEY TO GET MORE.: NEVER TRUE

## 2024-05-14 SDOH — ECONOMIC STABILITY: INCOME INSECURITY: HOW HARD IS IT FOR YOU TO PAY FOR THE VERY BASICS LIKE FOOD, HOUSING, MEDICAL CARE, AND HEATING?: NOT HARD AT ALL

## 2024-05-14 ASSESSMENT — ENCOUNTER SYMPTOMS
DIARRHEA: 0
NAUSEA: 0
SHORTNESS OF BREATH: 1
ABDOMINAL PAIN: 0
COUGH: 1
SORE THROAT: 0
WHEEZING: 1
RHINORRHEA: 0
SINUS PRESSURE: 0
ABDOMINAL DISTENTION: 1
SINUS PAIN: 0

## 2024-05-14 ASSESSMENT — PATIENT HEALTH QUESTIONNAIRE - PHQ9
1. LITTLE INTEREST OR PLEASURE IN DOING THINGS: NOT AT ALL
SUM OF ALL RESPONSES TO PHQ QUESTIONS 1-9: 0
5. POOR APPETITE OR OVEREATING: NOT AT ALL
10. IF YOU CHECKED OFF ANY PROBLEMS, HOW DIFFICULT HAVE THESE PROBLEMS MADE IT FOR YOU TO DO YOUR WORK, TAKE CARE OF THINGS AT HOME, OR GET ALONG WITH OTHER PEOPLE: NOT DIFFICULT AT ALL
8. MOVING OR SPEAKING SO SLOWLY THAT OTHER PEOPLE COULD HAVE NOTICED. OR THE OPPOSITE, BEING SO FIGETY OR RESTLESS THAT YOU HAVE BEEN MOVING AROUND A LOT MORE THAN USUAL: NOT AT ALL
1. LITTLE INTEREST OR PLEASURE IN DOING THINGS: NOT AT ALL
SUM OF ALL RESPONSES TO PHQ9 QUESTIONS 1 & 2: 0
SUM OF ALL RESPONSES TO PHQ QUESTIONS 1-9: 0
4. FEELING TIRED OR HAVING LITTLE ENERGY: NOT AT ALL
SUM OF ALL RESPONSES TO PHQ QUESTIONS 1-9: 0
SUM OF ALL RESPONSES TO PHQ QUESTIONS 1-9: 0
2. FEELING DOWN, DEPRESSED OR HOPELESS: NOT AT ALL
9. THOUGHTS THAT YOU WOULD BE BETTER OFF DEAD, OR OF HURTING YOURSELF: NOT AT ALL
7. TROUBLE CONCENTRATING ON THINGS, SUCH AS READING THE NEWSPAPER OR WATCHING TELEVISION: NOT AT ALL
5. POOR APPETITE OR OVEREATING: NOT AT ALL
9. THOUGHTS THAT YOU WOULD BE BETTER OFF DEAD, OR OF HURTING YOURSELF: NOT AT ALL
4. FEELING TIRED OR HAVING LITTLE ENERGY: NOT AT ALL
SUM OF ALL RESPONSES TO PHQ QUESTIONS 1-9: 0
3. TROUBLE FALLING OR STAYING ASLEEP: NOT AT ALL
3. TROUBLE FALLING OR STAYING ASLEEP: NOT AT ALL
SUM OF ALL RESPONSES TO PHQ QUESTIONS 1-9: 0
SUM OF ALL RESPONSES TO PHQ QUESTIONS 1-9: 0
SUM OF ALL RESPONSES TO PHQ9 QUESTIONS 1 & 2: 0
SUM OF ALL RESPONSES TO PHQ QUESTIONS 1-9: 0
6. FEELING BAD ABOUT YOURSELF - OR THAT YOU ARE A FAILURE OR HAVE LET YOURSELF OR YOUR FAMILY DOWN: NOT AT ALL
7. TROUBLE CONCENTRATING ON THINGS, SUCH AS READING THE NEWSPAPER OR WATCHING TELEVISION: NOT AT ALL
2. FEELING DOWN, DEPRESSED OR HOPELESS: NOT AT ALL
6. FEELING BAD ABOUT YOURSELF - OR THAT YOU ARE A FAILURE OR HAVE LET YOURSELF OR YOUR FAMILY DOWN: NOT AT ALL
8. MOVING OR SPEAKING SO SLOWLY THAT OTHER PEOPLE COULD HAVE NOTICED. OR THE OPPOSITE, BEING SO FIGETY OR RESTLESS THAT YOU HAVE BEEN MOVING AROUND A LOT MORE THAN USUAL: NOT AT ALL

## 2024-05-14 NOTE — PROGRESS NOTES
Medicare Annual Wellness Visit    Zach Aguirre is here for Medicare AWV    Assessment & Plan   Medicare annual wellness visit, subsequent  Personal history of tobacco use  -     VT VISIT TO DISCUSS LUNG CA SCREEN W LDCT  -     CT Lung Screen (Initial/Annual/Baseline); Future    Recommendations for Preventive Services Due: see orders and patient instructions/AVS.  Recommended screening schedule for the next 5-10 years is provided to the patient in written form: see Patient Instructions/AVS.     No follow-ups on file.     Subjective       Patient's complete Health Risk Assessment and screening values have been reviewed and are found in Flowsheets. The following problems were reviewed today and where indicated follow up appointments were made and/or referrals ordered.    Positive Risk Factor Screenings with Interventions:            Self-assessment of health:  In general, how would you say your health is?: (!) Poor    Interventions:  Seem EM     Activity, Diet, and Weight:  On average, how many days per week do you engage in moderate to strenuous exercise (like a brisk walk)?: 0 days  On average, how many minutes do you engage in exercise at this level?: 0 min    Do you eat balanced/healthy meals regularly?: Yes    Body mass index is 37.38 kg/m². (!) Abnormal      Inactivity Interventions:  Incapacitated by decompensated liver cirrhosis   Obesity Interventions:  Incapacitated by decompensated liver cirrhosis          Hearing Screen:  Do you or your family notice any trouble with your hearing that hasn't been managed with hearing aids?: (!) Yes    Interventions:  Declines hearing aids      Safety:  Do you have non-slip mats or non-slip surfaces or shower bars or grab bars in your shower or bathtub?: (!) No  Interventions:  Her children advised to install grab bar      Lung Cancer Screening:  LDCT Screening: Discussed with patient the benefits and harms of screening, follow-up diagnostic testing, over-diagnosis, false  2020 17:16

## 2024-05-14 NOTE — PATIENT INSTRUCTIONS

## 2024-05-14 NOTE — PROGRESS NOTES
(CARDIA)     Difficulty of Paying Living Expenses: Not hard at all   Food Insecurity: No Food Insecurity (5/14/2024)    Hunger Vital Sign     Worried About Running Out of Food in the Last Year: Never true     Ran Out of Food in the Last Year: Never true   Transportation Needs: No Transportation Needs (5/14/2024)    PRAPARE - Transportation     Lack of Transportation (Medical): No     Lack of Transportation (Non-Medical): No   Physical Activity: Inactive (5/14/2024)    Exercise Vital Sign     Days of Exercise per Week: 0 days     Minutes of Exercise per Session: 0 min   Stress: Not on file   Social Connections: Not on file   Intimate Partner Violence: Not on file   Housing Stability: Low Risk  (5/14/2024)    Housing Stability Vital Sign     Unable to Pay for Housing in the Last Year: No     Number of Places Lived in the Last Year: 1     Unstable Housing in the Last Year: No     Family History   Problem Relation Age of Onset    Stroke Mother     High Cholesterol Mother     Cancer Mother     Colon Cancer Neg Hx     Celiac Disease Neg Hx     Crohn's Disease Neg Hx      Allergies:  Cefdinir and Metformin and related  Patient Active Problem List   Diagnosis    Hepatitis B infection    Sleep apnea    Anxiety    Insomnia    Trigeminal neuralgia    Hyperlipidemia    Hypothyroidism    Dyspnea    DM (diabetes mellitus) (HCC)    Leg pain    Depression    Frequent headaches    Major depressive disorder, recurrent, in full remission (HCC)    Cherry angioma    Diffuse photodamage of skin    Dilated pore of Solomon    GERD (gastroesophageal reflux disease)    Hidradenitis suppurativa    Lentigines    Multiple benign nevi    Poikiloderma    Seborrheic keratosis    Morbidly obese (HCC)    Chronic gastritis without bleeding    Polyp of sigmoid colon    Secondary esophageal varices without bleeding (HCC)    Chronic obstructive pulmonary disease, unspecified COPD type (HCC)    Symptomatic anemia    Chronic renal disease, stage III (HCC)

## 2024-05-15 ENCOUNTER — TELEPHONE (OUTPATIENT)
Dept: INTERVENTIONAL RADIOLOGY/VASCULAR | Age: 79
End: 2024-05-15

## 2024-05-15 ENCOUNTER — TELEPHONE (OUTPATIENT)
Dept: FAMILY MEDICINE CLINIC | Age: 79
End: 2024-05-15

## 2024-05-15 DIAGNOSIS — R18.8 CIRRHOSIS OF LIVER WITH ASCITES, UNSPECIFIED HEPATIC CIRRHOSIS TYPE (HCC): Primary | ICD-10-CM

## 2024-05-15 DIAGNOSIS — G47.30 SLEEP APNEA, UNSPECIFIED TYPE: Primary | ICD-10-CM

## 2024-05-15 DIAGNOSIS — K74.60 CIRRHOSIS OF LIVER WITH ASCITES, UNSPECIFIED HEPATIC CIRRHOSIS TYPE (HCC): Primary | ICD-10-CM

## 2024-05-15 NOTE — TELEPHONE ENCOUNTER
KARMEN was given this information via phone conversation - voiced understanding  2.  Spoke to SCAR in SPECIALS to schedule procedure    >  YOUR PROCEDURE IS SCHEDULED ON: 5/16/24 @ 9AM   >  You will need to arrive at 8:30AM (from home ) and check in at the Diagnostic Imaging Check In desk.

## 2024-05-15 NOTE — TELEPHONE ENCOUNTER
Patient is being referred to the sleep lab, but they will need a dx with shahram SOLIZ. Please place new referral.

## 2024-05-16 ENCOUNTER — HOSPITAL ENCOUNTER (OUTPATIENT)
Dept: INTERVENTIONAL RADIOLOGY/VASCULAR | Age: 79
Discharge: HOME | End: 2024-05-18
Payer: MEDICARE

## 2024-05-16 VITALS — HEART RATE: 67 BPM | DIASTOLIC BLOOD PRESSURE: 60 MMHG | OXYGEN SATURATION: 97 % | SYSTOLIC BLOOD PRESSURE: 124 MMHG

## 2024-05-16 DIAGNOSIS — K74.60 CIRRHOSIS OF LIVER WITH ASCITES, UNSPECIFIED HEPATIC CIRRHOSIS TYPE (HCC): ICD-10-CM

## 2024-05-16 DIAGNOSIS — D64.9 SEVERE ANEMIA: ICD-10-CM

## 2024-05-16 DIAGNOSIS — R18.8 CIRRHOSIS OF LIVER WITH ASCITES, UNSPECIFIED HEPATIC CIRRHOSIS TYPE (HCC): ICD-10-CM

## 2024-05-16 LAB
ANISOCYTOSIS BLD QL SMEAR: ABNORMAL
BASOPHILS # BLD: 0 K/UL (ref 0–0.2)
BASOPHILS NFR BLD: 0.6 %
EOSINOPHIL # BLD: 0 K/UL (ref 0–0.7)
EOSINOPHIL NFR BLD: 0.2 %
ERYTHROCYTE [DISTWIDTH] IN BLOOD BY AUTOMATED COUNT: 26.9 % (ref 11.5–14.5)
HCT VFR BLD AUTO: 32.3 % (ref 37–47)
HGB BLD-MCNC: 9 G/DL (ref 12–16)
HYPOCHROMIA BLD QL SMEAR: ABNORMAL
LYMPHOCYTES # BLD: 0.4 K/UL (ref 1–4.8)
LYMPHOCYTES NFR BLD: 5 %
MCH RBC QN AUTO: 21 PG (ref 27–31.3)
MCHC RBC AUTO-ENTMCNC: 27.9 % (ref 33–37)
MCV RBC AUTO: 75.3 FL (ref 79.4–94.8)
MICROCYTES BLD QL SMEAR: ABNORMAL
MONOCYTES # BLD: 0.1 K/UL (ref 0.2–0.8)
MONOCYTES NFR BLD: 1.9 %
NEUTROPHILS # BLD: 4.7 K/UL (ref 1.4–6.5)
NEUTS SEG NFR BLD: 91 %
OVALOCYTES BLD QL SMEAR: ABNORMAL
PLATELET # BLD AUTO: 156 K/UL (ref 130–400)
PLATELET BLD QL SMEAR: ADEQUATE
POIKILOCYTOSIS BLD QL SMEAR: ABNORMAL
POLYCHROMASIA BLD QL SMEAR: ABNORMAL
RBC # BLD AUTO: 4.29 M/UL (ref 4.2–5.4)
SLIDE REVIEW: ABNORMAL
SMUDGE CELLS BLD QL SMEAR: 17.1
VARIANT LYMPHS NFR BLD: 3 %
WBC # BLD AUTO: 5.2 K/UL (ref 4.8–10.8)

## 2024-05-16 PROCEDURE — 2500000003 HC RX 250 WO HCPCS: Performed by: NURSE PRACTITIONER

## 2024-05-16 PROCEDURE — 49083 ABD PARACENTESIS W/IMAGING: CPT

## 2024-05-16 PROCEDURE — 2709999900 IR US GUIDED PARACENTESIS

## 2024-05-16 RX ORDER — LIDOCAINE HYDROCHLORIDE 20 MG/ML
INJECTION, SOLUTION INFILTRATION; PERINEURAL PRN
Status: COMPLETED | OUTPATIENT
Start: 2024-05-16 | End: 2024-05-16

## 2024-05-16 RX ADMIN — LIDOCAINE HYDROCHLORIDE 9 ML: 20 INJECTION, SOLUTION INFILTRATION; PERINEURAL at 09:34

## 2024-05-16 NOTE — DISCHARGE INSTRUCTIONS
Ultrasound Guided Paracentesis    Activity:  Rest, avoid strenuous activity such as lifting heavy objects, and bending, stretching or pulling.     Diet:  Resume usual diet    Medication:  * Resume home medication unless otherwise instructed by your physician.  * (If Applicable) If taking any blood thinners, speak with your physician before restarting them.  * May take mild pain reliever, as needed, such as Acetaminophen or Ibuprofen.    INSTRUCTIONS OR COMMENTS RELATIVE TO SPECIFIC EXAM PERFORMED:    - Some tenderness at site of paracentesis will be normal for a few days.  - May remove band aid after 48 hours.   - Monitor the site for the next 24 - 48 hours.  - If you experience any drainage or bleeding at the site, hold pressure for 30 minutes and lay on side opposite of the procedure site (move fluid away from the   area of leakage).  If drainage or bleeding does not stop and seems excessive, call your physician or go to the ER for evaluation.   - If any signs of infection (redness, swelling, drainage/pus) at the site, go to ER for evaluation.   - Please keep all follow-up appointments with your Hepatologist. Schedule follow-up appointment for repeat paracentesis if necessary.       IF YOU DEVELOP ANY OF THE FOLLOWING SYMPTOMS, CONTACT PHYSICIAN LISTED BELOW:  Physician performing procedure: DR. Nelson - [phone] or [phone] and ask to be put in contact with the RADIOLOGY RN. After hours contact ER.  * Extreme pain that increases after leaving the hospital  * Active bleeding (refer to above instructions)  * Chills, fever above 100 degrees Farenheit and fatigue.  * Weakness, dizziness, lightheadedness or fainting.    If you are unable to contact any of the above physician and you feel you have a major problem, please go to the Emergency Room for treatment.

## 2024-05-16 NOTE — OR NURSING
NO SEDATION    0920  Procedure explained, consent reviewed. Pt assisted onto procedure table. Abdomen assessed with ultrasound. Presence of fluid confirmed.      0930 Timeout completed for Ultrasound Guided Paracentesis. Using U/S, Sara Hernández CNP marked, prepped LLQ with Chloraprep.    0933 After 3 minute dry time, draped with sterile drape and towels.    0934 Site numbed with lidocaine.  Dxo7tlik Centesis 5F catheter placed using Ultrasound guidance.  Fluid appears clear yellow. Catheter tubing connected to BiolineRx machine to remove fluid.     0958 Pt tolerated well. Total 3120 ml removed. Catheter removed, pressure held and bandaid applied. Verbal and tactile reassurance given throughout.     1005 Discharge instructions reviewed with patient. Pt has no questions or concerns at this time. Pt taken via WC to discharge exit. Pts daughter Jerica to drive her home.

## 2024-05-18 DIAGNOSIS — K74.60 HEPATIC CIRRHOSIS, UNSPECIFIED HEPATIC CIRRHOSIS TYPE, UNSPECIFIED WHETHER ASCITES PRESENT (HCC): ICD-10-CM

## 2024-05-20 RX ORDER — CITALOPRAM 20 MG/1
TABLET ORAL
Qty: 90 TABLET | Refills: 3 | Status: SHIPPED | OUTPATIENT
Start: 2024-05-20

## 2024-05-20 RX ORDER — NADOLOL 20 MG/1
20 TABLET ORAL DAILY
Qty: 90 TABLET | Refills: 3 | Status: SHIPPED | OUTPATIENT
Start: 2024-05-20

## 2024-05-29 ENCOUNTER — OFFICE VISIT (OUTPATIENT)
Dept: GASTROENTEROLOGY | Age: 79
End: 2024-05-29
Payer: MEDICARE

## 2024-05-29 VITALS — HEART RATE: 64 BPM | WEIGHT: 185 LBS | BODY MASS INDEX: 31.58 KG/M2 | OXYGEN SATURATION: 99 % | HEIGHT: 64 IN

## 2024-05-29 DIAGNOSIS — R18.8 ASCITES CONTROLLED WITH MEDICATION: ICD-10-CM

## 2024-05-29 DIAGNOSIS — K74.60 HEPATIC CIRRHOSIS, UNSPECIFIED HEPATIC CIRRHOSIS TYPE, UNSPECIFIED WHETHER ASCITES PRESENT (HCC): ICD-10-CM

## 2024-05-29 DIAGNOSIS — K74.60 CIRRHOSIS OF LIVER WITH ASCITES, UNSPECIFIED HEPATIC CIRRHOSIS TYPE (HCC): Primary | ICD-10-CM

## 2024-05-29 DIAGNOSIS — R18.8 CIRRHOSIS OF LIVER WITH ASCITES, UNSPECIFIED HEPATIC CIRRHOSIS TYPE (HCC): Primary | ICD-10-CM

## 2024-05-29 PROCEDURE — G8417 CALC BMI ABV UP PARAM F/U: HCPCS | Performed by: SPECIALIST

## 2024-05-29 PROCEDURE — 1123F ACP DISCUSS/DSCN MKR DOCD: CPT | Performed by: SPECIALIST

## 2024-05-29 PROCEDURE — 1111F DSCHRG MED/CURRENT MED MERGE: CPT | Performed by: SPECIALIST

## 2024-05-29 PROCEDURE — G8427 DOCREV CUR MEDS BY ELIG CLIN: HCPCS | Performed by: SPECIALIST

## 2024-05-29 PROCEDURE — 1036F TOBACCO NON-USER: CPT | Performed by: SPECIALIST

## 2024-05-29 PROCEDURE — 99213 OFFICE O/P EST LOW 20 MIN: CPT | Performed by: SPECIALIST

## 2024-05-29 PROCEDURE — G8400 PT W/DXA NO RESULTS DOC: HCPCS | Performed by: SPECIALIST

## 2024-05-29 PROCEDURE — 1090F PRES/ABSN URINE INCON ASSESS: CPT | Performed by: SPECIALIST

## 2024-05-29 RX ORDER — FUROSEMIDE 20 MG/1
20 TABLET ORAL DAILY
Qty: 30 TABLET | Refills: 0 | Status: SHIPPED | OUTPATIENT
Start: 2024-05-29

## 2024-05-29 RX ORDER — SPIRONOLACTONE 25 MG/1
25 TABLET ORAL DAILY
Qty: 30 TABLET | Refills: 0 | Status: SHIPPED | OUTPATIENT
Start: 2024-05-29

## 2024-05-29 RX ORDER — NADOLOL 20 MG/1
20 TABLET ORAL DAILY
Qty: 90 TABLET | Refills: 3 | Status: SHIPPED | OUTPATIENT
Start: 2024-05-29

## 2024-05-29 ASSESSMENT — ENCOUNTER SYMPTOMS
RECTAL PAIN: 0
ANAL BLEEDING: 0
RESPIRATORY NEGATIVE: 1
VOMITING: 0
CONSTIPATION: 0
ABDOMINAL DISTENTION: 0
GASTROINTESTINAL NEGATIVE: 1
ABDOMINAL PAIN: 0
DIARRHEA: 0
NAUSEA: 0
BLOOD IN STOOL: 0
EYES NEGATIVE: 1

## 2024-05-29 NOTE — PROGRESS NOTES
-  Z-line regular, 35 cm from the incisors.        -  Erythematous and congested mucosa in the gastric body and gastric antrum.        - Gastric mucosal finding consistent with portal hypertensive gastropathy.  No           gastric varices were seen.        -  Normal examined duodenum.        -  No specimens collected. Recommendation:        -  Continue present medications. Procedure Code(s):        - 58341, Esophagogastroduodenoscopy, flexible, transoral; diagnostic,           including collection of specimen(s) by brushing or washing, when performed           (separate procedure) Diagnosis Code(s):        - I85.00, Esophageal varices without bleeding        - K31.89, Other diseases of stomach and duodenum       CPT(R) - 2023 copyright [employer]. All Rights Reserved.       The CPT codes, CCI edits and ICD codes generated are intended as suggestions       and were generated based on input data.  These codes are preliminary and upon        review may be revised to meet current compliance and payer requirements.       The provider is responsible for the final determination of appropriate codes,       and modifiers. Mei Kitchen MD This document has been electronically signed. Note Initiated:5/9/2024 Note Completed:5/9/2024 10:46 AM    IR US GUIDED PARACENTESIS    1.A small amount of fluid is noted in the abdomen, currently too small for paracentesis as one was done yesterday. COMPARISON: Ultrasound for paracentesis May 7, 2024 DIAGNOSIS: Ascites COMMENTS: None TECHNIQUE: Limited ultrasound of the abdomen FINDINGS: Limited ultrasound of the abdomen demonstrates a small amount of ascites fluid.     Vascular duplex abdominal visceral arteries/veins/organs complete    Result Date: 5/7/2024  EXAMINATION: DOPPLER EVALUATION OF THE abdomen 5/7/2024 7:50 pm COMPARISON: None. HISTORY: ORDERING SYSTEM PROVIDED HISTORY: evaluate portal veins for thrombus, HEPATIC CIRRHOSIS WITH ASCITES FINDINGS: The

## 2024-05-31 ENCOUNTER — OFFICE VISIT (OUTPATIENT)
Dept: GASTROENTEROLOGY | Facility: CLINIC | Age: 79
End: 2024-05-31
Payer: COMMERCIAL

## 2024-05-31 VITALS
WEIGHT: 186 LBS | TEMPERATURE: 97.8 F | DIASTOLIC BLOOD PRESSURE: 74 MMHG | SYSTOLIC BLOOD PRESSURE: 105 MMHG | HEART RATE: 74 BPM

## 2024-05-31 DIAGNOSIS — K74.60 LIVER DISEASE, CHRONIC, WITH CIRRHOSIS (MULTI): ICD-10-CM

## 2024-05-31 DIAGNOSIS — K72.10 CHRONIC LIVER FAILURE WITHOUT HEPATIC COMA (MULTI): Primary | ICD-10-CM

## 2024-05-31 DIAGNOSIS — K76.9 LIVER DISEASE, CHRONIC, WITH CIRRHOSIS (MULTI): ICD-10-CM

## 2024-05-31 PROCEDURE — 1126F AMNT PAIN NOTED NONE PRSNT: CPT | Performed by: NURSE PRACTITIONER

## 2024-05-31 PROCEDURE — 99205 OFFICE O/P NEW HI 60 MIN: CPT | Performed by: NURSE PRACTITIONER

## 2024-05-31 PROCEDURE — 1160F RVW MEDS BY RX/DR IN RCRD: CPT | Performed by: NURSE PRACTITIONER

## 2024-05-31 PROCEDURE — 1159F MED LIST DOCD IN RCRD: CPT | Performed by: NURSE PRACTITIONER

## 2024-05-31 PROCEDURE — 99215 OFFICE O/P EST HI 40 MIN: CPT | Performed by: NURSE PRACTITIONER

## 2024-05-31 RX ORDER — VIT C/E/ZN/COPPR/LUTEIN/ZEAXAN 250MG-90MG
CAPSULE ORAL
COMMUNITY
Start: 2022-07-18

## 2024-05-31 RX ORDER — FUROSEMIDE 20 MG/1
1 TABLET ORAL DAILY
COMMUNITY
Start: 2024-05-29

## 2024-05-31 RX ORDER — ATORVASTATIN CALCIUM 20 MG/1
TABLET, FILM COATED ORAL
COMMUNITY

## 2024-05-31 RX ORDER — CELECOXIB 200 MG/1
CAPSULE ORAL
COMMUNITY
Start: 2023-04-12 | End: 2024-05-31 | Stop reason: ALTCHOICE

## 2024-05-31 RX ORDER — FLUTICASONE FUROATE, UMECLIDINIUM BROMIDE AND VILANTEROL TRIFENATATE 100; 62.5; 25 UG/1; UG/1; UG/1
1 POWDER RESPIRATORY (INHALATION) DAILY
COMMUNITY
Start: 2024-05-14

## 2024-05-31 RX ORDER — LEVOTHYROXINE SODIUM 150 UG/1
TABLET ORAL
COMMUNITY
Start: 2022-12-02

## 2024-05-31 RX ORDER — FAMOTIDINE 20 MG/1
TABLET, FILM COATED ORAL
COMMUNITY
Start: 2015-11-19

## 2024-05-31 RX ORDER — SPIRONOLACTONE 25 MG/1
1 TABLET ORAL DAILY
COMMUNITY
Start: 2024-05-29

## 2024-05-31 RX ORDER — SITAGLIPTIN 100 MG/1
1 TABLET, FILM COATED ORAL DAILY
COMMUNITY
Start: 2015-11-19

## 2024-05-31 RX ORDER — CITALOPRAM 20 MG/1
1 TABLET, FILM COATED ORAL DAILY
COMMUNITY
Start: 2015-11-19

## 2024-05-31 RX ORDER — NADOLOL 20 MG/1
1 TABLET ORAL DAILY
COMMUNITY
Start: 2022-12-19

## 2024-05-31 ASSESSMENT — ENCOUNTER SYMPTOMS: DEPRESSION: 1

## 2024-05-31 ASSESSMENT — PAIN SCALES - GENERAL: PAINLEVEL: 0-NO PAIN

## 2024-05-31 NOTE — PROGRESS NOTES
Patient ID: Faye Macdonald is a 79 y.o. female who presents for cirrhosis .    HPI  79 year old with history of HLD, hypothyroidism, DM and MASH related decompensated cirrhosis.  She is here today with daughter Pamela and granddaughter to discuss transplant candidacy.    She was diagnosed with cirrhosis secondary to fatty liver ~ 5 years ago.  She had been doing well until earlier this year when she contracted COVID and started to show signs of decompensation with edema, ascites and weakness.   She recently was admitted for increased SOB, hypoxia related to progressive ascites.   Pt also has history of esophageal varices-now on nadolol.   She underwent paracentesis while hospitalized without signs of SBP.   She was discharged on diuretics and her last paracentesis was ~ 2 weeks ago.  Currently she is tolerating low dose diuretics with good response.   SOB has improved.      Pt does admit to poor appetite with abdominal distension.  She lives alone and prepares her own meals.   Currently no overt signs of HE, however daughter is concerned she is showing some mild signs of confusion.       Denies jaundice, icterus, itching, edema, bleeding.   Denies fevers, chills, abdominal pain.       Review of Systems   Constitutional:  Positive for appetite change. Negative for chills, fever and unexpected weight change.   HENT:  Negative for mouth sores, nosebleeds, trouble swallowing and voice change.    Eyes:  Negative for visual disturbance.   Respiratory:  Negative for cough, shortness of breath and wheezing.    Cardiovascular:  Negative for chest pain, palpitations and leg swelling.   Gastrointestinal:  Negative for abdominal distention, abdominal pain, blood in stool, constipation, diarrhea, nausea and vomiting.   Genitourinary:  Negative for decreased urine volume, difficulty urinating, dysuria, frequency, hematuria and urgency.   Musculoskeletal:  Negative for gait problem and joint swelling.   Skin:  Negative for color  change, pallor and rash.   Neurological:  Positive for weakness. Negative for dizziness, tremors, light-headedness, numbness and headaches.   Hematological:  Does not bruise/bleed easily.   Psychiatric/Behavioral:  Negative for agitation, behavioral problems, confusion and sleep disturbance.        Objective   Physical Exam  Constitutional:       General: She is awake.      Appearance: Normal appearance. She is well-developed.   HENT:      Head: Normocephalic and atraumatic.      Right Ear: Hearing normal.      Left Ear: Hearing normal.      Nose: Nose normal.      Mouth/Throat:      Lips: Pink.      Mouth: Mucous membranes are moist.   Eyes:      General: Lids are normal.      Extraocular Movements: Extraocular movements intact.      Conjunctiva/sclera: Conjunctivae normal.      Pupils: Pupils are equal, round, and reactive to light.   Cardiovascular:      Rate and Rhythm: Normal rate and regular rhythm.      Pulses: Normal pulses.      Heart sounds: Normal heart sounds.   Pulmonary:      Effort: Pulmonary effort is normal.      Breath sounds: Normal breath sounds.   Abdominal:      General: Bowel sounds are normal. There is distension.      Palpations: Abdomen is soft. There is hepatomegaly.   Musculoskeletal:      Cervical back: Normal range of motion and neck supple.   Feet:      Right foot:      Skin integrity: Skin integrity normal.      Left foot:      Skin integrity: Skin integrity normal.   Skin:     General: Skin is warm.   Neurological:      General: No focal deficit present.      Mental Status: She is alert and oriented to person, place, and time.      Cranial Nerves: Cranial nerves 2-12 are intact.      Sensory: Sensation is intact.      Motor: Motor function is intact.      Coordination: Coordination is intact.      Gait: Gait is intact.   Psychiatric:         Attention and Perception: Attention and perception normal.         Mood and Affect: Mood normal.         Speech: Speech normal.          Behavior: Behavior is cooperative.         Thought Content: Thought content normal.         Cognition and Memory: Cognition normal.         Judgment: Judgment normal.         Current Outpatient Medications   Medication Sig Dispense Refill    cholecalciferol (Vitamin D-3) 25 MCG (1000 UT) capsule 1 capsule DAILY (route: oral)      citalopram (CeleXA) 20 mg tablet Take 1 tablet (20 mg) by mouth once daily.      famotidine (Pepcid) 20 mg tablet TAKE 1 TABLET TWICE A DAY (NEED APPOINTMENT FOR FURTHER REFILLS)      fluticasone-umeclidin-vilanter (Trelegy Ellipta) 100-62.5-25 mcg blister with device Inhale 1 puff once daily.      furosemide (Lasix) 20 mg tablet Take 1 tablet (20 mg) by mouth once daily.      Januvia 100 mg tablet Take 1 tablet (100 mg) by mouth once daily.      levothyroxine (Synthroid, Levoxyl) 150 mcg tablet TAKE 1 TABLET DAILY (NEED APPOINTMENT FOR FURTHER REFILLS)      nadolol (Corgard) 20 mg tablet Take 1 tablet (20 mg) by mouth once daily.      spironolactone (Aldactone) 25 mg tablet Take 1 tablet (25 mg) by mouth once daily.      atorvastatin (Lipitor) 20 mg tablet Take by mouth.       No current facility-administered medications for this visit.     Labs 5/9/2024:  AST 33, ALT 15, Alk Phos 97, Alb 28, Na 142, Scr 1.19  WBC 5.1, Hgb 8.1, Plt 118  MELD 14    CT abdomen 5/6/24:  Cirrhosis with collateral vessels including esophageal varices as well as increased mild-moderate volume of ascites suggestive of worsening portal hypertension/decompensation.      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   MELD 14.   Recent decompensation following viral illness with edema, ascites and worsening portal hypertension.  Currently stable on diuretics, which is helping with overall volume overload.  Some signs of developing HE.    Given her age, low MELD  do not feel she is a appropriate transplant candidate with supportive care being the best option.    Discussed natural history of cirrhosis including risk factors, signs of decompensation and management plan including proper nutrition including low sodium/high protein diet.    Education material provided for low sodium diet and signs of decompensation including progressive jaundice, cognitive impairment, excessive somnolence, lower extremity edema or increase in abdominal girth.   Monitor for bleeding including melena and hematemesis.    Follow-up plan includes labs and imaging every 6 months to monitor for decompensation and screen for HCC.    Will start Xifaxan for HE prophy, place standing order for paracentesis as needed and refer to home care for RN/aide and PT/OT.    FU in 1 month.                Relevant Medications    rifAXIMin (Xifaxan) 550 mg tablet    Other Relevant Orders    Alpha-Fetoprotein            ANGEL Herrera-RAFIA 05/31/24 1:55 PM

## 2024-06-01 ENCOUNTER — HOME HEALTH ADMISSION (OUTPATIENT)
Dept: HOME HEALTH SERVICES | Facility: HOME HEALTH | Age: 79
End: 2024-06-01
Payer: MEDICARE

## 2024-06-01 ENCOUNTER — DOCUMENTATION (OUTPATIENT)
Dept: HOME HEALTH SERVICES | Facility: HOME HEALTH | Age: 79
End: 2024-06-01
Payer: COMMERCIAL

## 2024-06-01 PROBLEM — K74.60 LIVER DISEASE, CHRONIC, WITH CIRRHOSIS (MULTI): Status: ACTIVE | Noted: 2024-05-31

## 2024-06-01 PROBLEM — K76.9 LIVER DISEASE, CHRONIC, WITH CIRRHOSIS (MULTI): Status: ACTIVE | Noted: 2024-05-31

## 2024-06-01 ASSESSMENT — ENCOUNTER SYMPTOMS
COUGH: 0
DIZZINESS: 0
SLEEP DISTURBANCE: 0
DYSURIA: 0
BRUISES/BLEEDS EASILY: 0
ABDOMINAL DISTENTION: 0
ABDOMINAL PAIN: 0
VOICE CHANGE: 0
JOINT SWELLING: 0
CHILLS: 0
AGITATION: 0
COLOR CHANGE: 0
CONFUSION: 0
SHORTNESS OF BREATH: 0
TROUBLE SWALLOWING: 0
NUMBNESS: 0
APPETITE CHANGE: 1
UNEXPECTED WEIGHT CHANGE: 0
HEADACHES: 0
FREQUENCY: 0
DIARRHEA: 0
WHEEZING: 0
DIFFICULTY URINATING: 0
NAUSEA: 0
WEAKNESS: 1
FEVER: 0
CONSTIPATION: 0
BLOOD IN STOOL: 0
TREMORS: 0
HEMATURIA: 0
VOMITING: 0
PALPITATIONS: 0
LIGHT-HEADEDNESS: 0

## 2024-06-01 NOTE — HH CARE COORDINATION
Home Care received a Referral for Nursing, Physical Therapy, Occupational Therapy, Home Health Aide, and Medical Social Work. We have processed the referral for a Start of Care on 6/4.     If you have any questions or concerns, please feel free to contact us at [phone]. Follow the prompts, enter your five digit zip code, and you will be directed to your care team on WEST 1.

## 2024-06-01 NOTE — ASSESSMENT & PLAN NOTE
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   MELD 14.   Recent decompensation following viral illness with edema, ascites and worsening portal hypertension.  Currently stable on diuretics, which is helping with overall volume overload.  Some signs of developing HE.    Given her age, low MELD do not feel she is a appropriate transplant candidate with supportive care being the best option.    Discussed natural history of cirrhosis including risk factors, signs of decompensation and management plan including proper nutrition including low sodium/high protein diet.    Education material provided for low sodium diet and signs of decompensation including progressive jaundice, cognitive impairment, excessive somnolence, lower extremity edema or increase in abdominal girth.   Monitor for bleeding including melena and hematemesis.    Follow-up plan includes labs and imaging every 6 months to monitor for decompensation and screen for HCC.    Will start Xifaxan for HE prophy, place standing order for paracentesis as needed and refer to home care for RN/aide and PT/OT.    FU in 1 month.

## 2024-06-03 ENCOUNTER — HOME CARE VISIT (OUTPATIENT)
Dept: HOME HEALTH SERVICES | Facility: HOME HEALTH | Age: 79
End: 2024-06-03

## 2024-06-03 ENCOUNTER — HOSPITAL ENCOUNTER (OUTPATIENT)
Dept: CT IMAGING | Age: 79
Discharge: HOME | End: 2024-06-05
Payer: MEDICARE

## 2024-06-03 ENCOUNTER — SPECIALTY PHARMACY (OUTPATIENT)
Dept: PHARMACY | Facility: CLINIC | Age: 79
End: 2024-06-03

## 2024-06-03 DIAGNOSIS — Z87.891 PERSONAL HISTORY OF TOBACCO USE: ICD-10-CM

## 2024-06-03 PROCEDURE — 71271 CT THORAX LUNG CANCER SCR C-: CPT

## 2024-06-04 ENCOUNTER — HOME CARE VISIT (OUTPATIENT)
Dept: HOME HEALTH SERVICES | Facility: HOME HEALTH | Age: 79
End: 2024-06-04

## 2024-06-05 ENCOUNTER — HOME CARE VISIT (OUTPATIENT)
Dept: HOME HEALTH SERVICES | Facility: HOME HEALTH | Age: 79
End: 2024-06-05
Payer: MEDICARE

## 2024-06-05 ENCOUNTER — APPOINTMENT (OUTPATIENT)
Dept: PRIMARY CARE | Facility: CLINIC | Age: 79
End: 2024-06-05
Payer: COMMERCIAL

## 2024-06-05 VITALS
HEIGHT: 63 IN | BODY MASS INDEX: 35.61 KG/M2 | SYSTOLIC BLOOD PRESSURE: 86 MMHG | RESPIRATION RATE: 18 BRPM | HEART RATE: 65 BPM | DIASTOLIC BLOOD PRESSURE: 59 MMHG | OXYGEN SATURATION: 94 % | WEIGHT: 201 LBS

## 2024-06-05 PROCEDURE — G0299 HHS/HOSPICE OF RN EA 15 MIN: HCPCS

## 2024-06-05 PROCEDURE — G0152 HHCP-SERV OF OT,EA 15 MIN: HCPCS

## 2024-06-05 PROCEDURE — 0023 HH SOC

## 2024-06-05 SDOH — ECONOMIC STABILITY: HOUSING INSECURITY: HOME SAFETY: LIVES ALONE, 2 STORY HOME WITH MAIN FLOOR BATHROOM WITH WALK IN SHOWER. SLEEPS UPPER LEVEL

## 2024-06-05 ASSESSMENT — ACTIVITIES OF DAILY LIVING (ADL)
TOILETING: 1
OASIS_M1830: 03
ENTERING_EXITING_HOME: STAND BY ASSIST
LAUNDRY ASSESSED: 1
AMBULATION ASSISTANCE: STAND BY ASSIST
DRESSING_UB_CURRENT_FUNCTION: INDEPENDENT
LAUNDRY: NEEDS ASSISTANCE
DRESSING_LB_CURRENT_FUNCTION: INDEPENDENT
AMBULATION ASSISTANCE: 1
BATHING ASSESSED: 1
BATHING_CURRENT_FUNCTION: SUPERVISION
TOILETING: INDEPENDENT

## 2024-06-05 ASSESSMENT — ENCOUNTER SYMPTOMS
OCCASIONAL FEELINGS OF UNSTEADINESS: 0
PERSON REPORTING PAIN: PATIENT
CHANGE IN APPETITE: UNCHANGED
PERSON REPORTING PAIN: PATIENT
LOSS OF SENSATION IN FEET: 0
HIGHEST PAIN SEVERITY IN PAST 24 HOURS: 0/10
DEPRESSION: 0
DENIES PAIN: 1
SUBJECTIVE PAIN PROGRESSION: UNCHANGED
PAIN SEVERITY GOAL: 0/10
APPETITE LEVEL: GOOD
DENIES PAIN: 1
LOWEST PAIN SEVERITY IN PAST 24 HOURS: 0/10

## 2024-06-05 ASSESSMENT — PAIN SCALES - PAIN ASSESSMENT IN ADVANCED DEMENTIA (PAINAD)
CONSOLABILITY: 0 - NO NEED TO CONSOLE.
BREATHING: 0
BODYLANGUAGE: 0
CONSOLABILITY: 0
BODYLANGUAGE: 0 - RELAXED.
TOTALSCORE: 0
NEGVOCALIZATION: 0 - NONE.
FACIALEXPRESSION: 0 - SMILING OR INEXPRESSIVE.
NEGVOCALIZATION: 0
FACIALEXPRESSION: 0

## 2024-06-06 ENCOUNTER — HOME CARE VISIT (OUTPATIENT)
Dept: HOME HEALTH SERVICES | Facility: HOME HEALTH | Age: 79
End: 2024-06-06
Payer: MEDICARE

## 2024-06-06 DIAGNOSIS — R91.1 LUNG NODULE: Primary | ICD-10-CM

## 2024-06-06 PROCEDURE — G0321 HH/HSPC MSW PHONE VISIT: HCPCS

## 2024-06-07 ENCOUNTER — HOME CARE VISIT (OUTPATIENT)
Dept: HOME HEALTH SERVICES | Facility: HOME HEALTH | Age: 79
End: 2024-06-07
Payer: MEDICARE

## 2024-06-07 PROCEDURE — G0151 HHCP-SERV OF PT,EA 15 MIN: HCPCS

## 2024-06-07 SDOH — HEALTH STABILITY: PHYSICAL HEALTH
EXERCISE COMMENTS: INSTRUCTED IN AND GIVEN WRITTEN PROGRAM. STAND AT SINK MARCH, HIP ABD, HIP EXT, HEEL RAISES. SEATED REPETITIVE SIT TO STAND

## 2024-06-07 ASSESSMENT — ENCOUNTER SYMPTOMS
DENIES PAIN: 1
PERSON REPORTING PAIN: PATIENT

## 2024-06-07 ASSESSMENT — ACTIVITIES OF DAILY LIVING (ADL): AMBULATION ASSISTANCE ON FLAT SURFACES: 1

## 2024-06-08 NOTE — HOME HEALTH
pt lives alone in 2 story home, 2 stairs to enter front entrance. bedrm and full bath on 2nd flr. has half bath on 1st flr. pt id self. has son, dtr and granddtr who live close by and assist prn. pt has ambul without device indoors and cane outdoors pre recent  hosp admit.  pt goals for p.t. are to get legs stronger so she can walk better.

## 2024-06-11 ENCOUNTER — OFFICE VISIT (OUTPATIENT)
Dept: PRIMARY CARE CLINIC | Age: 79
End: 2024-06-11

## 2024-06-11 ENCOUNTER — HOME CARE VISIT (OUTPATIENT)
Dept: HOME HEALTH SERVICES | Facility: HOME HEALTH | Age: 79
End: 2024-06-11
Payer: MEDICARE

## 2024-06-11 VITALS
BODY MASS INDEX: 33.13 KG/M2 | SYSTOLIC BLOOD PRESSURE: 112 MMHG | HEART RATE: 64 BPM | WEIGHT: 187 LBS | HEIGHT: 63 IN | RESPIRATION RATE: 18 BRPM | DIASTOLIC BLOOD PRESSURE: 60 MMHG | OXYGEN SATURATION: 100 %

## 2024-06-11 DIAGNOSIS — K74.60 DECOMPENSATION OF CIRRHOSIS OF LIVER (HCC): Primary | ICD-10-CM

## 2024-06-11 DIAGNOSIS — R41.82 ALTERED MENTAL STATUS, UNSPECIFIED ALTERED MENTAL STATUS TYPE: ICD-10-CM

## 2024-06-11 DIAGNOSIS — K72.90 DECOMPENSATION OF CIRRHOSIS OF LIVER (HCC): Primary | ICD-10-CM

## 2024-06-11 DIAGNOSIS — R30.0 DYSURIA: ICD-10-CM

## 2024-06-11 DIAGNOSIS — R18.8 OTHER ASCITES: ICD-10-CM

## 2024-06-11 LAB
BILIRUB UR QL STRIP: ABNORMAL
CLARITY UR: ABNORMAL
COLOR UR: ABNORMAL
GLUCOSE UR STRIP-MCNC: NEGATIVE MG/DL
HGB UR QL STRIP: NEGATIVE
KETONES UR STRIP-MCNC: ABNORMAL MG/DL
LEUKOCYTE ESTERASE UR QL STRIP: ABNORMAL
NITRITE UR QL STRIP: POSITIVE
PH UR STRIP: 5.5 [PH] (ref 5–9)
PROT UR STRIP-MCNC: ABNORMAL MG/DL
SP GR UR STRIP: 1.02 (ref 1–1.03)
UROBILINOGEN UR STRIP-ACNC: 1 E.U./DL

## 2024-06-11 NOTE — PROGRESS NOTES
Subjective:      Patient ID: Zach Aguirre is a 79 y.o. female    Follow up, accompanied by daughter Jerica and Shena.   [facility]  Pt presents for follow up regarding CHAVEZ-related liver cirrhosis with decompensation. S/p paracentesis, tolerating spironolactone/furosemide. Ascites improving. No SOB. Seen by GI and liver transplant team. Deemed not a candidate for transplant, based on low MELD 14. Rather placed on rifaximin for recently noticed confusion by daughter. One 2 occasions, she has driven herself to wrong locations with no known explanation. Yet to commence Xifaxan prescribed for HE prophylaxis.   Attests to burning and frequent urination. Does not drink enough water.   Pt has declined numerous offers from daughter to move pt in with her. Also declines moving to an assisted living facility. Daughter feels overwhelmed managing her own health, raising her healhty daughter and caring for her mother. Feels her brother is not helpful though he lives closer to their mother. Has been evaluated by [facility] PT/OT. Not yet seen by nurse or aide.  Past Medical History:   Diagnosis Date    Anxiety     DM (diabetes mellitus) (HCC) 4/14/2015    Hepatitis B infection     Hyperlipidemia     Hypothyroidism     Insomnia     Leg pain 4/14/2015    Post herpetic neuralgia     Unspecified sleep apnea     after seeing [facility] they state she is does not have sleep apnea.  Not using cpap     Past Surgical History:   Procedure Laterality Date    ANKLE FRACTURE SURGERY  09/2016    DR ANDRADE  LT    BIOPSY / LIGATION TEMPORAL ARTERY Right 04/26/2019    RIGHT TEMPORAL ARTERY BIOPSY performed by Emmanuel Irwin MD at [facility] OR    BRAIN SURGERY  2015    surgery for transgeminal neuraglia.  Insertion of sponge for chronic pain    COLONOSCOPY  03/31/2014    DR POND    COLONOSCOPY N/A 02/02/2021    COLONOSCOPY DIAGNOSTIC performed by Mei Pond MD at [facility]    HYSTERECTOMY (CERVIX STATUS UNKNOWN)      PARTIAL

## 2024-06-11 NOTE — PATIENT INSTRUCTIONS

## 2024-06-12 DIAGNOSIS — N39.0 URINARY TRACT INFECTION WITH HEMATURIA, SITE UNSPECIFIED: Primary | ICD-10-CM

## 2024-06-12 DIAGNOSIS — R31.9 URINARY TRACT INFECTION WITH HEMATURIA, SITE UNSPECIFIED: Primary | ICD-10-CM

## 2024-06-12 LAB
BACTERIA URNS QL MICRO: ABNORMAL /HPF
CRYSTALS URNS MICRO: ABNORMAL /HPF
EPI CELLS #/AREA URNS AUTO: ABNORMAL /HPF (ref 0–5)
RBC #/AREA URNS HPF: ABNORMAL /HPF (ref 0–2)
WBC #/AREA URNS AUTO: ABNORMAL /HPF (ref 0–5)
YEAST URNS QL MICRO: PRESENT /HPF

## 2024-06-12 RX ORDER — CIPROFLOXACIN 500 MG/1
500 TABLET, FILM COATED ORAL 2 TIMES DAILY
Qty: 10 TABLET | Refills: 0 | Status: SHIPPED | OUTPATIENT
Start: 2024-06-12 | End: 2024-06-17

## 2024-06-12 ASSESSMENT — ENCOUNTER SYMPTOMS
ABDOMINAL PAIN: 0
COUGH: 0
NAUSEA: 0
SHORTNESS OF BREATH: 0
DIARRHEA: 0
WHEEZING: 0
VOMITING: 0

## 2024-06-13 ENCOUNTER — HOME CARE VISIT (OUTPATIENT)
Dept: HOME HEALTH SERVICES | Facility: HOME HEALTH | Age: 79
End: 2024-06-13
Payer: MEDICARE

## 2024-06-13 ENCOUNTER — TELEPHONE (OUTPATIENT)
Dept: INTERVENTIONAL RADIOLOGY/VASCULAR | Age: 79
End: 2024-06-13

## 2024-06-13 ENCOUNTER — PRE-PROCEDURE TELEPHONE (OUTPATIENT)
Dept: INTERVENTIONAL RADIOLOGY/VASCULAR | Age: 79
End: 2024-06-13

## 2024-06-13 DIAGNOSIS — R18.8 CIRRHOSIS OF LIVER WITH ASCITES, UNSPECIFIED HEPATIC CIRRHOSIS TYPE (HCC): Primary | ICD-10-CM

## 2024-06-13 DIAGNOSIS — K74.60 CIRRHOSIS OF LIVER WITH ASCITES, UNSPECIFIED HEPATIC CIRRHOSIS TYPE (HCC): Primary | ICD-10-CM

## 2024-06-13 PROCEDURE — G0152 HHCP-SERV OF OT,EA 15 MIN: HCPCS

## 2024-06-13 ASSESSMENT — ENCOUNTER SYMPTOMS
PAIN: 1
SUBJECTIVE PAIN PROGRESSION: UNCHANGED
PERSON REPORTING PAIN: PATIENT
PAIN LOCATION - PAIN SEVERITY: 2/10
HIGHEST PAIN SEVERITY IN PAST 24 HOURS: 7/10
PAIN LOCATION: ABDOMEN

## 2024-06-13 ASSESSMENT — ACTIVITIES OF DAILY LIVING (ADL)
HOUSEKEEPING ASSESSED: 1
PREPARING MEALS: NEEDS ASSISTANCE
LIGHT HOUSEKEEPING: NEEDS ASSISTANCE

## 2024-06-13 NOTE — FLOWSHEET NOTE
Return call placed to patient re: scheduling paracentesis appointment, notified by Dr. Nelson's office staff that patient called earlier in the week. No answer at home or cell phone. Call then placed to Jerica, patient's daughter.     Appointment made for 6/14/24 at 2:30pm with arrival at 2:00pm and checking in at the diagnostic / imaging window.     Daughter Jerica will transport patient, understanding of above indicated. Electronically signed by Stefani Gonzales RN on 6/13/2024 at 11:32 AM

## 2024-06-13 NOTE — TELEPHONE ENCOUNTER
Patient's daughter, Jerica, was given this information via phone conversation - voiced understanding  2.  Spoke to Nany in Specials to schedule procedure    >  YOUR PROCEDURE IS SCHEDULED ON: 6/14/2024 @ 2:30 pm   >  You will need to arrive at 2:00 pm from home and check in at the Diagnostic Imaging Check In desk.

## 2024-06-14 ENCOUNTER — HOME CARE VISIT (OUTPATIENT)
Dept: HOME HEALTH SERVICES | Facility: HOME HEALTH | Age: 79
End: 2024-06-14

## 2024-06-14 ENCOUNTER — HOME CARE VISIT (OUTPATIENT)
Dept: HOME HEALTH SERVICES | Facility: HOME HEALTH | Age: 79
End: 2024-06-14
Payer: MEDICARE

## 2024-06-14 ENCOUNTER — HOSPITAL ENCOUNTER (OUTPATIENT)
Dept: INTERVENTIONAL RADIOLOGY/VASCULAR | Age: 79
Discharge: HOME | End: 2024-06-14
Payer: MEDICARE

## 2024-06-14 VITALS
OXYGEN SATURATION: 93 % | DIASTOLIC BLOOD PRESSURE: 49 MMHG | RESPIRATION RATE: 16 BRPM | SYSTOLIC BLOOD PRESSURE: 97 MMHG | HEART RATE: 66 BPM

## 2024-06-14 DIAGNOSIS — R18.8 CIRRHOSIS OF LIVER WITH ASCITES, UNSPECIFIED HEPATIC CIRRHOSIS TYPE (HCC): ICD-10-CM

## 2024-06-14 DIAGNOSIS — K74.60 CIRRHOSIS OF LIVER WITH ASCITES, UNSPECIFIED HEPATIC CIRRHOSIS TYPE (HCC): ICD-10-CM

## 2024-06-14 PROCEDURE — 2709999900 IR US GUIDED PARACENTESIS

## 2024-06-14 PROCEDURE — 49083 ABD PARACENTESIS W/IMAGING: CPT

## 2024-06-14 PROCEDURE — 2500000003 HC RX 250 WO HCPCS: Performed by: NURSE PRACTITIONER

## 2024-06-14 PROCEDURE — 49083 ABD PARACENTESIS W/IMAGING: CPT | Performed by: RADIOLOGY

## 2024-06-14 RX ORDER — LIDOCAINE HYDROCHLORIDE 20 MG/ML
INJECTION, SOLUTION INFILTRATION; PERINEURAL PRN
Status: COMPLETED | OUTPATIENT
Start: 2024-06-14 | End: 2024-06-14

## 2024-06-14 RX ADMIN — LIDOCAINE HYDROCHLORIDE 15 ML: 20 INJECTION, SOLUTION INFILTRATION; PERINEURAL at 14:51

## 2024-06-14 NOTE — DISCHARGE INSTRUCTIONS
Ultrasound Guided Paracentesis    Activity:  Rest, avoid strenuous activity such as lifting heavy objects, and bending, stretching or pulling.     Diet:  Resume usual diet    Medication:  * Resume home medication unless otherwise instructed by your physician.  * (If Applicable) If taking any blood thinners, speak with your physician before restarting them.  * May take mild pain reliever, as needed, such as Acetaminophen or Ibuprofen.    INSTRUCTIONS OR COMMENTS RELATIVE TO SPECIFIC EXAM PERFORMED:    - Some tenderness at site of paracentesis will be normal for a few days.  - May remove band aid after 48 hours.   - Monitor the site for the next 24 - 48 hours.  - If you experience any drainage or bleeding at the site, hold pressure for 30 minutes and lay on side opposite of the procedure site (move fluid away from the   area of leakage).  If drainage or bleeding does not stop and seems excessive, call your physician or go to the ER for evaluation.   - If any signs of infection (redness, swelling, drainage/pus) at the site, go to ER for evaluation.   - Please keep all follow-up appointments with your Hepatologist. Schedule follow-up appointment for repeat paracentesis if necessary.       IF YOU DEVELOP ANY OF THE FOLLOWING SYMPTOMS, CONTACT PHYSICIAN LISTED BELOW:  Physician performing procedure: DR. Nelson - [phone] or [phone] and ask to be put in contact with the RADIOLOGY RN. After hours contact ER.  * Extreme pain that increases after leaving the hospital  * Active bleeding (refer to above instructions)  * Chills, fever above 100 degrees Farenheit and fatigue.  * Weakness, dizziness, lightheadedness or fainting.    If you are unable to contact any of the above physician and you feel you have a major problem, please go to the Emergency Room for treatment.    TOTAL REMOVED WITH PARACENTESIS TODAY:

## 2024-06-14 NOTE — OR NURSING
NO SEDATION    1432  Images obtained  prior to start of procedure using U/S. Pt  VSS.  Procedure explained, consent signed.     1441  Timeout completed for Ultrasound Guided Paracentesis. Using U/S, Sara Hernández CNP marked, prepped left lower abdomen with Chloraprep.    1444 After 3 minute dry time, draped with sterile drape and towels.    1451 Site numbed with lidocaine.  Ygv7upgn Centesis 5F catheter placed using Ultrasound guidance.  Fluid appears clear yellow in color. Catheter tubing connected to Songtradr machine to remove fluid.    1500 Fluid continues to drain. Patient tolerating well. VSS.    1519 Pt tolerated well. Total 2980 ml removed. Catheter removed, pressure held and bandaid applied. Verbal and tactile reassurance given throughout.  Discharge instructions printed and reviewed with the patient. Patient verbalizes understanding of discharge instructions. Copy of discharge instructions given to the patient. Patient dressed. Gait slow . Patient taken to front  of hospital via wheelchair. Daughter  to drive them home.

## 2024-06-15 ENCOUNTER — HOME CARE VISIT (OUTPATIENT)
Dept: HOME HEALTH SERVICES | Facility: HOME HEALTH | Age: 79
End: 2024-06-15
Payer: MEDICARE

## 2024-06-17 ENCOUNTER — TELEPHONE (OUTPATIENT)
Dept: INTERVENTIONAL RADIOLOGY/VASCULAR | Age: 79
End: 2024-06-17

## 2024-06-17 NOTE — TELEPHONE ENCOUNTER
Post procedure phone call. This patient had a paracentesis done on 06/14/2024 by Sara Hernández CNP. I left a voicemail for the patient to call us back if she had any issues,complications,questions, or concerns over the weekend due to the paracentesis. Radiology phone number provided.

## 2024-06-18 ENCOUNTER — HOSPITAL ENCOUNTER (EMERGENCY)
Age: 79
Discharge: HOME | End: 2024-06-18
Attending: STUDENT IN AN ORGANIZED HEALTH CARE EDUCATION/TRAINING PROGRAM
Payer: MEDICARE

## 2024-06-18 ENCOUNTER — APPOINTMENT (OUTPATIENT)
Dept: GENERAL RADIOLOGY | Age: 79
End: 2024-06-18
Payer: MEDICARE

## 2024-06-18 VITALS
HEIGHT: 63 IN | RESPIRATION RATE: 16 BRPM | TEMPERATURE: 97.5 F | DIASTOLIC BLOOD PRESSURE: 64 MMHG | OXYGEN SATURATION: 97 % | HEART RATE: 69 BPM | BODY MASS INDEX: 33.13 KG/M2 | WEIGHT: 187 LBS | SYSTOLIC BLOOD PRESSURE: 106 MMHG

## 2024-06-18 DIAGNOSIS — E86.0 DEHYDRATION: ICD-10-CM

## 2024-06-18 DIAGNOSIS — R42 DIZZINESS: Primary | ICD-10-CM

## 2024-06-18 DIAGNOSIS — K74.60 CIRRHOSIS OF LIVER WITHOUT ASCITES, UNSPECIFIED HEPATIC CIRRHOSIS TYPE (HCC): ICD-10-CM

## 2024-06-18 LAB
ALBUMIN SERPL-MCNC: 3 G/DL (ref 3.5–4.6)
ALP SERPL-CCNC: 159 U/L (ref 40–130)
ALT SERPL-CCNC: 24 U/L (ref 0–33)
AMMONIA PLAS-SCNC: 21 UMOL/L (ref 11–51)
ANION GAP SERPL CALCULATED.3IONS-SCNC: 8 MEQ/L (ref 9–15)
ANISOCYTOSIS BLD QL SMEAR: ABNORMAL
AST SERPL-CCNC: 44 U/L (ref 0–35)
BASOPHILS # BLD: 0 K/UL (ref 0–0.2)
BASOPHILS NFR BLD: 0.7 %
BILIRUB SERPL-MCNC: 0.6 MG/DL (ref 0.2–0.7)
BILIRUB UR QL STRIP: NEGATIVE
BUN SERPL-MCNC: 12 MG/DL (ref 8–23)
CALCIUM SERPL-MCNC: 8.5 MG/DL (ref 8.5–9.9)
CHLORIDE SERPL-SCNC: 103 MEQ/L (ref 95–107)
CLARITY UR: ABNORMAL
CO2 SERPL-SCNC: 29 MEQ/L (ref 20–31)
COLOR UR: YELLOW
CREAT SERPL-MCNC: 1.3 MG/DL (ref 0.5–0.9)
EOSINOPHIL # BLD: 0.1 K/UL (ref 0–0.7)
EOSINOPHIL NFR BLD: 2.4 %
ERYTHROCYTE [DISTWIDTH] IN BLOOD BY AUTOMATED COUNT: 24.5 % (ref 11.5–14.5)
GLOBULIN SER CALC-MCNC: 3 G/DL (ref 2.3–3.5)
GLUCOSE SERPL-MCNC: 146 MG/DL (ref 70–99)
GLUCOSE UR STRIP-MCNC: NEGATIVE MG/DL
HCT VFR BLD AUTO: 35.1 % (ref 37–47)
HGB BLD-MCNC: 9.8 G/DL (ref 12–16)
HGB UR QL STRIP: NEGATIVE
HYPOCHROMIA BLD QL SMEAR: ABNORMAL
KETONES UR STRIP-MCNC: ABNORMAL MG/DL
LEUKOCYTE ESTERASE UR QL STRIP: NEGATIVE
LIPASE SERPL-CCNC: 45 U/L (ref 12–95)
LYMPHOCYTES # BLD: 1.1 K/UL (ref 1–4.8)
LYMPHOCYTES NFR BLD: 20.1 %
MAGNESIUM SERPL-MCNC: 2.1 MG/DL (ref 1.7–2.4)
MCH RBC QN AUTO: 21.8 PG (ref 27–31.3)
MCHC RBC AUTO-ENTMCNC: 27.9 % (ref 33–37)
MCV RBC AUTO: 78.2 FL (ref 79.4–94.8)
MONOCYTES # BLD: 0.6 K/UL (ref 0.2–0.8)
MONOCYTES NFR BLD: 10.3 %
NEUTROPHILS # BLD: 3.6 K/UL (ref 1.4–6.5)
NEUTS SEG NFR BLD: 65.8 %
NITRITE UR QL STRIP: NEGATIVE
OVALOCYTES BLD QL SMEAR: ABNORMAL
PH UR STRIP: 5.5 [PH] (ref 5–9)
PLATELET # BLD AUTO: 172 K/UL (ref 130–400)
PLATELET BLD QL SMEAR: ADEQUATE
POIKILOCYTOSIS BLD QL SMEAR: ABNORMAL
POLYCHROMASIA BLD QL SMEAR: ABNORMAL
POTASSIUM SERPL-SCNC: 4.2 MEQ/L (ref 3.4–4.9)
PROT SERPL-MCNC: 6 G/DL (ref 6.3–8)
PROT UR STRIP-MCNC: ABNORMAL MG/DL
RBC # BLD AUTO: 4.49 M/UL (ref 4.2–5.4)
SODIUM SERPL-SCNC: 140 MEQ/L (ref 135–144)
SP GR UR STRIP: 1.02 (ref 1–1.03)
TARGETS BLD QL SMEAR: ABNORMAL
URINE REFLEX TO CULTURE: ABNORMAL
UROBILINOGEN UR STRIP-ACNC: 1 E.U./DL
WBC # BLD AUTO: 5.5 K/UL (ref 4.8–10.8)

## 2024-06-18 PROCEDURE — 93005 ELECTROCARDIOGRAM TRACING: CPT | Performed by: STUDENT IN AN ORGANIZED HEALTH CARE EDUCATION/TRAINING PROGRAM

## 2024-06-18 PROCEDURE — 6360000002 HC RX W HCPCS: Performed by: STUDENT IN AN ORGANIZED HEALTH CARE EDUCATION/TRAINING PROGRAM

## 2024-06-18 PROCEDURE — 85025 COMPLETE CBC W/AUTO DIFF WBC: CPT

## 2024-06-18 PROCEDURE — 96361 HYDRATE IV INFUSION ADD-ON: CPT

## 2024-06-18 PROCEDURE — 99285 EMERGENCY DEPT VISIT HI MDM: CPT

## 2024-06-18 PROCEDURE — 81003 URINALYSIS AUTO W/O SCOPE: CPT

## 2024-06-18 PROCEDURE — 96365 THER/PROPH/DIAG IV INF INIT: CPT

## 2024-06-18 PROCEDURE — 74018 RADEX ABDOMEN 1 VIEW: CPT

## 2024-06-18 PROCEDURE — 2580000003 HC RX 258: Performed by: STUDENT IN AN ORGANIZED HEALTH CARE EDUCATION/TRAINING PROGRAM

## 2024-06-18 PROCEDURE — 71045 X-RAY EXAM CHEST 1 VIEW: CPT

## 2024-06-18 PROCEDURE — 82140 ASSAY OF AMMONIA: CPT

## 2024-06-18 PROCEDURE — 83690 ASSAY OF LIPASE: CPT

## 2024-06-18 PROCEDURE — 80053 COMPREHEN METABOLIC PANEL: CPT

## 2024-06-18 PROCEDURE — 83735 ASSAY OF MAGNESIUM: CPT

## 2024-06-18 PROCEDURE — 36415 COLL VENOUS BLD VENIPUNCTURE: CPT

## 2024-06-18 RX ORDER — MAGNESIUM SULFATE IN WATER 40 MG/ML
2000 INJECTION, SOLUTION INTRAVENOUS ONCE
Status: COMPLETED | OUTPATIENT
Start: 2024-06-18 | End: 2024-06-18

## 2024-06-18 RX ORDER — 0.9 % SODIUM CHLORIDE 0.9 %
500 INTRAVENOUS SOLUTION INTRAVENOUS ONCE
Status: COMPLETED | OUTPATIENT
Start: 2024-06-18 | End: 2024-06-18

## 2024-06-18 RX ADMIN — SODIUM CHLORIDE 500 ML: 9 INJECTION, SOLUTION INTRAVENOUS at 16:59

## 2024-06-18 RX ADMIN — MAGNESIUM SULFATE HEPTAHYDRATE 2000 MG: 40 INJECTION, SOLUTION INTRAVENOUS at 16:59

## 2024-06-18 ASSESSMENT — PAIN DESCRIPTION - FREQUENCY: FREQUENCY: CONTINUOUS

## 2024-06-18 ASSESSMENT — PAIN DESCRIPTION - DESCRIPTORS: DESCRIPTORS: DULL

## 2024-06-18 ASSESSMENT — PAIN SCALES - GENERAL: PAINLEVEL_OUTOF10: 8

## 2024-06-18 ASSESSMENT — PAIN - FUNCTIONAL ASSESSMENT
PAIN_FUNCTIONAL_ASSESSMENT: NONE - DENIES PAIN
PAIN_FUNCTIONAL_ASSESSMENT: 0-10

## 2024-06-18 ASSESSMENT — PAIN DESCRIPTION - LOCATION: LOCATION: ABDOMEN

## 2024-06-18 ASSESSMENT — PAIN DESCRIPTION - ONSET: ONSET: ON-GOING

## 2024-06-18 ASSESSMENT — PAIN DESCRIPTION - PAIN TYPE: TYPE: ACUTE PAIN

## 2024-06-18 NOTE — ED PROVIDER NOTES
Basic Information   Time Seen: 2:10 PM   Primary Care Provider: Yesenia Viramontes MD     Chief Complaint   Patient presents with    Nausea    Dizziness    Abdominal Pain    Eye Pain      HPI   Zach Aguirre is a 79 yrs female who presents with LUQ abdominal pain and nausea x 4 days. Glo has a hx of decompensated liver disease and has a paracentesis on Friday. The next day, she started having LUQ abdominal pain that has not resolved. Patient was also diagnosed with a UTI 1 week ago but only took 3 doses of cipro because she got her medicines confused with one another.        Physical Exam  BP   97/60   Temp   97.5   Pulse  67   Resp   16   SpO2   95%      General: Awake and Alert, no acute distress   CV: RRR, S1, S2   Resp: LCTAB, even and non labored   Other: No abdominal tenderness to palpation. No CVA tenderness.      Impression and Plan     Labs Reviewed   CBC WITH AUTO DIFFERENTIAL   COMPREHENSIVE METABOLIC PANEL   MAGNESIUM   URINALYSIS WITH REFLEX TO CULTURE   LIPASE        No orders to display      Final Impression   I have performed a medical screening exam on Zach Aguirre. Based on this patient's chief complaint/symptoms of   Chief Complaint   Patient presents with    Nausea    Dizziness    Abdominal Pain    Eye Pain    and my focused exam, their care will be started and transitioned to provider when room is available       Mary Webster PA-C  06/18/24 [id number]    
Course as of 06/18/24 2003 Tue Jun 18, 2024   1612 Nitrite, Urine: Negative [NA]   1624 Leukocyte Esterase, Urine: Negative [NA]   1733 EKG 12 Lead  EKG showing sinus bradycardia, rate of 58 bpm.  Left axis deviation.  Low voltage QRS.  Prolonged QTc at 528.  No gross acute ST-T wave abnormalities. [NA]      ED Course User Index  [NA] Du Mcgee MD       79 y.o. female  with a PMH clinically significant for HBV, HTN, HLD, DM II, CKD, Obesity, OSMIN, Cirrhosis, Hypothyroidism, Anemia, Anxiety, and MDD, S/p multiple paracenteses presenting to the ED via PV c/o intermittent left-sided abdominal discomfort, nausea without vomiting, constipation, lightheadedness, dizziness and generalized weakness ongoing since last night.  Upon initial evaluation, Pt fatigued-appearing, but otherwise Afebrile, HDS and in NAD. PE as noted above.  Labs, EKG, and Imaging visualized and interpreted by myself and as noted above.  Given findings, clinical presentation most likely consistent w/ nonspecific lightheadedness, dizziness and fatigue in the setting of multiple chronic illnesses and recent large-volume paracentesis per the family.  Unclear regarding whether or not patient is taking all medications as indicated either given confusion with these.  Although patient complaining of some abdominal discomfort, lower suspicion for SBP at this time as pain was only left-sided.  Possibly secondary to stool in the setting of reported constipation.  No gross evidence of cellulitis or clear abscess overlying abdominal wall either.  Right eye pain does appear to be consistent with dry eye which has been worked up in the past given reports per the daughter, including intraocular pressures.  Low suspicion for acute angle-closure glaucoma at this time.  Also not endorsing any recent trauma, lower suspicion for traumatic ICH.  Administered small volume fluid resuscitation and IV magnesium in the ED.  Patient feeling significantly improved

## 2024-06-18 NOTE — ED TRIAGE NOTES
Pt arrived with c/o abd pain, nausea, weakness, dizziness, and right eye pain.  Pt has liver disease and the abd was drained last Friday, the left side of her abd where she was drained is hard and painful.  Pt states the dizziness that started this morning.  Pt denies any injury to her right eye, her eye doctor tested her eye but nothing was found to be wrong.

## 2024-06-19 ENCOUNTER — HOME CARE VISIT (OUTPATIENT)
Dept: HOME HEALTH SERVICES | Facility: HOME HEALTH | Age: 79
End: 2024-06-19
Payer: MEDICARE

## 2024-06-19 ENCOUNTER — TELEPHONE (OUTPATIENT)
Dept: PRIMARY CARE CLINIC | Age: 79
End: 2024-06-19

## 2024-06-19 DIAGNOSIS — K74.60 DECOMPENSATION OF CIRRHOSIS OF LIVER (HCC): Primary | ICD-10-CM

## 2024-06-19 DIAGNOSIS — K72.90 DECOMPENSATION OF CIRRHOSIS OF LIVER (HCC): Primary | ICD-10-CM

## 2024-06-19 LAB
EKG ATRIAL RATE: 58 BPM
EKG P AXIS: 57 DEGREES
EKG P-R INTERVAL: 180 MS
EKG Q-T INTERVAL: 538 MS
EKG QRS DURATION: 92 MS
EKG QTC CALCULATION (BAZETT): 528 MS
EKG R AXIS: -31 DEGREES
EKG T AXIS: -10 DEGREES
EKG VENTRICULAR RATE: 58 BPM

## 2024-06-19 RX ORDER — LACTULOSE 20 G/30ML
20 SOLUTION ORAL 3 TIMES DAILY
Qty: 946 ML | Refills: 10 | Status: SHIPPED | OUTPATIENT
Start: 2024-06-19

## 2024-06-19 RX ORDER — FUROSEMIDE 20 MG/1
10 TABLET ORAL DAILY
Qty: 30 TABLET | Refills: 4 | Status: SHIPPED | OUTPATIENT
Start: 2024-06-19

## 2024-06-19 NOTE — TELEPHONE ENCOUNTER
I sent lactulose to the pharmacy  For low BP, I halved lasix dose to 10mg daily. Continue other meds as is

## 2024-06-19 NOTE — TELEPHONE ENCOUNTER
Pt daughter called and said that xifaxan was denied and she wanted to know if she should be taking lactulose and there is a medication that is making her Bp low

## 2024-06-19 NOTE — PROGRESS NOTES
Pt daughter called and said that insurance denied XiFAXAN and she wanted to know if she should be taking lactulose and also there is a medication that she is on that has her BP low

## 2024-06-20 ENCOUNTER — OFFICE VISIT (OUTPATIENT)
Dept: PRIMARY CARE CLINIC | Age: 79
End: 2024-06-20
Payer: MEDICARE

## 2024-06-20 VITALS
DIASTOLIC BLOOD PRESSURE: 60 MMHG | TEMPERATURE: 97.6 F | HEART RATE: 73 BPM | BODY MASS INDEX: 33.13 KG/M2 | OXYGEN SATURATION: 96 % | WEIGHT: 187 LBS | RESPIRATION RATE: 18 BRPM | SYSTOLIC BLOOD PRESSURE: 102 MMHG

## 2024-06-20 DIAGNOSIS — K74.60 DECOMPENSATION OF CIRRHOSIS OF LIVER (HCC): Primary | ICD-10-CM

## 2024-06-20 DIAGNOSIS — R41.82 ALTERED MENTAL STATUS, UNSPECIFIED ALTERED MENTAL STATUS TYPE: ICD-10-CM

## 2024-06-20 DIAGNOSIS — K72.90 DECOMPENSATION OF CIRRHOSIS OF LIVER (HCC): Primary | ICD-10-CM

## 2024-06-20 DIAGNOSIS — Z71.89 ACP (ADVANCE CARE PLANNING): ICD-10-CM

## 2024-06-20 PROCEDURE — G8427 DOCREV CUR MEDS BY ELIG CLIN: HCPCS | Performed by: INTERNAL MEDICINE

## 2024-06-20 PROCEDURE — G8400 PT W/DXA NO RESULTS DOC: HCPCS | Performed by: INTERNAL MEDICINE

## 2024-06-20 PROCEDURE — 99214 OFFICE O/P EST MOD 30 MIN: CPT | Performed by: INTERNAL MEDICINE

## 2024-06-20 PROCEDURE — 1090F PRES/ABSN URINE INCON ASSESS: CPT | Performed by: INTERNAL MEDICINE

## 2024-06-20 PROCEDURE — 1123F ACP DISCUSS/DSCN MKR DOCD: CPT | Performed by: INTERNAL MEDICINE

## 2024-06-20 PROCEDURE — G8417 CALC BMI ABV UP PARAM F/U: HCPCS | Performed by: INTERNAL MEDICINE

## 2024-06-20 PROCEDURE — 1036F TOBACCO NON-USER: CPT | Performed by: INTERNAL MEDICINE

## 2024-06-20 SDOH — ECONOMIC STABILITY: INCOME INSECURITY: HOW HARD IS IT FOR YOU TO PAY FOR THE VERY BASICS LIKE FOOD, HOUSING, MEDICAL CARE, AND HEATING?: NOT HARD AT ALL

## 2024-06-20 SDOH — ECONOMIC STABILITY: FOOD INSECURITY: WITHIN THE PAST 12 MONTHS, THE FOOD YOU BOUGHT JUST DIDN'T LAST AND YOU DIDN'T HAVE MONEY TO GET MORE.: NEVER TRUE

## 2024-06-20 SDOH — ECONOMIC STABILITY: FOOD INSECURITY: WITHIN THE PAST 12 MONTHS, YOU WORRIED THAT YOUR FOOD WOULD RUN OUT BEFORE YOU GOT MONEY TO BUY MORE.: NEVER TRUE

## 2024-06-20 ASSESSMENT — PATIENT HEALTH QUESTIONNAIRE - PHQ9
9. THOUGHTS THAT YOU WOULD BE BETTER OFF DEAD, OR OF HURTING YOURSELF: NOT AT ALL
3. TROUBLE FALLING OR STAYING ASLEEP: NOT AT ALL
7. TROUBLE CONCENTRATING ON THINGS, SUCH AS READING THE NEWSPAPER OR WATCHING TELEVISION: NOT AT ALL
2. FEELING DOWN, DEPRESSED OR HOPELESS: NOT AT ALL
8. MOVING OR SPEAKING SO SLOWLY THAT OTHER PEOPLE COULD HAVE NOTICED. OR THE OPPOSITE, BEING SO FIGETY OR RESTLESS THAT YOU HAVE BEEN MOVING AROUND A LOT MORE THAN USUAL: NOT AT ALL
10. IF YOU CHECKED OFF ANY PROBLEMS, HOW DIFFICULT HAVE THESE PROBLEMS MADE IT FOR YOU TO DO YOUR WORK, TAKE CARE OF THINGS AT HOME, OR GET ALONG WITH OTHER PEOPLE: NOT DIFFICULT AT ALL
6. FEELING BAD ABOUT YOURSELF - OR THAT YOU ARE A FAILURE OR HAVE LET YOURSELF OR YOUR FAMILY DOWN: NOT AT ALL
SUM OF ALL RESPONSES TO PHQ9 QUESTIONS 1 & 2: 0
SUM OF ALL RESPONSES TO PHQ QUESTIONS 1-9: 0
4. FEELING TIRED OR HAVING LITTLE ENERGY: NOT AT ALL
5. POOR APPETITE OR OVEREATING: NOT AT ALL
1. LITTLE INTEREST OR PLEASURE IN DOING THINGS: NOT AT ALL
SUM OF ALL RESPONSES TO PHQ QUESTIONS 1-9: 0

## 2024-06-20 ASSESSMENT — ENCOUNTER SYMPTOMS
NAUSEA: 0
COUGH: 0
DIARRHEA: 0
SHORTNESS OF BREATH: 0
ABDOMINAL PAIN: 0
VOMITING: 0
WHEEZING: 0

## 2024-06-20 NOTE — PATIENT INSTRUCTIONS

## 2024-06-20 NOTE — PROGRESS NOTES
Subjective:      Patient ID: Zach Aguirre is a 79 y.o. female      Follow up post ER visit. Accompanied by daughter Jerica JAMISON  Pt presents for follow up regarding ER visit for abd pain and dizziness. This happened a few days after paracentesis. No fever or chills. Diagnosed with dehydration and placed on IVF in the ER. Daughter working to increase her fluid and food intake. Dizziness is improved. Lasix dose halved. No diarrhea, nausea or vomiting.     Yet to commence lactulose. Yet to be seen by [facility] nurse and aide. Still decline relocating to daughter's abode or an care home.       5/16/2024 5/29/2024 6/11/2024 6/14/2024 6/18/2024 6/20/2024   Vitals         SYSTOLIC 124  --  112  97 !  106  102    SYSTOLIC 135 !    98 !  97     SYSTOLIC 145 !    104 !      SYSTOLIC    102 !      SYSTOLIC    108 !      DIASTOLIC 60  --  60  49 !  64  60    DIASTOLIC 59 !    50 !  60     DIASTOLIC 67 !    50 !      DIASTOLIC    54 !      DIASTOLIC    56 !        Past Medical History:   Diagnosis Date    Anxiety     DM (diabetes mellitus) (HCC) 4/14/2015    Hepatitis B infection     Hyperlipidemia     Hypothyroidism     Insomnia     Leg pain 4/14/2015    Post herpetic neuralgia     Unspecified sleep apnea     after seeing [facility] they state she is does not have sleep apnea.  Not using cpap     Past Surgical History:   Procedure Laterality Date    ANKLE FRACTURE SURGERY  09/2016    DR ANDRADE  LT    BIOPSY / LIGATION TEMPORAL ARTERY Right 04/26/2019    RIGHT TEMPORAL ARTERY BIOPSY performed by Emmanuel Irwin MD at [facility] OR    BRAIN SURGERY  2015    surgery for transgeminal neuraglia.  Insertion of sponge for chronic pain    COLONOSCOPY  03/31/2014    DR POND    COLONOSCOPY N/A 02/02/2021    COLONOSCOPY DIAGNOSTIC performed by Mei Pond MD at [facility]    HYSTERECTOMY (CERVIX STATUS UNKNOWN)      PARTIAL    LYMPHADENECTOMY      OTHER SURGICAL HISTORY Right     Trigeminal Neuralgia    PARACENTESIS Left 05/07/2024

## 2024-06-20 NOTE — PROGRESS NOTES
Advance Care Planning   The patient has the following advanced directives on file:  Advance Directives       Power of  Living Will ACP-Advance Directive ACP-Power of     Not on File Filed on 07/26/17 Filed Not on File        The patient has appointed the following active healthcare agents:    Primary Decision Maker: AguirreJerica dominguze - Child - [phone]    Primary Decision Maker: ORLINNAVI - Child - [phone]  The Patient has the following current code status:    Code Status: Prior    Visit Documentation:  I discussed Advance Care Planning with Nadyamarina ANETTE Aguirre today which included the importance of making their choices for care and treatment in the case of a health event that adversely affects their decision-making abilities. She has not completed the Advance Care Directives. She has an active health care agent at this time.  Zach Aguirre was encouraged to provide my office with a copy of the living will.  6/20/2024

## 2024-06-21 ENCOUNTER — HOME CARE VISIT (OUTPATIENT)
Dept: HOME HEALTH SERVICES | Facility: HOME HEALTH | Age: 79
End: 2024-06-21
Payer: MEDICARE

## 2024-06-21 PROCEDURE — G0152 HHCP-SERV OF OT,EA 15 MIN: HCPCS

## 2024-06-21 ASSESSMENT — ACTIVITIES OF DAILY LIVING (ADL)
PREPARING MEALS: NEEDS ASSISTANCE
TOILETING: 1
BATHING ASSESSED: 1
TOILETING: INDEPENDENT
BATHING_CURRENT_FUNCTION: INDEPENDENT
DRESSING_UB_CURRENT_FUNCTION: INDEPENDENT
DRESSING_LB_CURRENT_FUNCTION: INDEPENDENT

## 2024-06-21 ASSESSMENT — ENCOUNTER SYMPTOMS
PERSON REPORTING PAIN: PATIENT
DENIES PAIN: 1

## 2024-06-25 RX ORDER — LEVOTHYROXINE SODIUM 0.15 MG/1
TABLET ORAL
Qty: 90 TABLET | Refills: 3 | Status: SHIPPED | OUTPATIENT
Start: 2024-06-25 | End: 2024-06-27 | Stop reason: SDUPTHER

## 2024-06-26 ENCOUNTER — TELEPHONE (OUTPATIENT)
Dept: PRIMARY CARE CLINIC | Age: 79
End: 2024-06-26

## 2024-06-26 ENCOUNTER — HOME CARE VISIT (OUTPATIENT)
Dept: HOME HEALTH SERVICES | Facility: HOME HEALTH | Age: 79
End: 2024-06-26
Payer: MEDICARE

## 2024-06-26 VITALS
SYSTOLIC BLOOD PRESSURE: 100 MMHG | OXYGEN SATURATION: 98 % | HEART RATE: 80 BPM | DIASTOLIC BLOOD PRESSURE: 60 MMHG | RESPIRATION RATE: 18 BRPM | TEMPERATURE: 98 F

## 2024-06-26 PROCEDURE — G0300 HHS/HOSPICE OF LPN EA 15 MIN: HCPCS

## 2024-06-26 PROCEDURE — G0152 HHCP-SERV OF OT,EA 15 MIN: HCPCS

## 2024-06-26 ASSESSMENT — ACTIVITIES OF DAILY LIVING (ADL)
BATHING_CURRENT_FUNCTION: INDEPENDENT
DRESSING_LB_CURRENT_FUNCTION: INDEPENDENT
BATHING ASSESSED: 1
PREPARING MEALS: INDEPENDENT
TOILETING: INDEPENDENT
TOILETING: 1

## 2024-06-26 NOTE — TELEPHONE ENCOUNTER
INS AUTH - XIFAXAN - DENIAL    REASON FOR DENIAL - MEDICARE ALLOWS US TO COVER A DRUG THAT IS ONLY WHEN IT IS A PART D DRUG.  A PART D DRUG IS ONE THAT IS USED FOR A MEDICALLY ACCEPTED INDICATION, WHICH MEANS THE USE IS APPROVED BY THE FDA OR SUPPORTED BY [employer], "[employer] DRUGDEX INFORMATION SYSTEM.  XIFAXAN IS NOT FDA APPROVED FOR YOUR MEDICAL CONDITIONS: HEPATIC FAILURE UNSPECIFIED WITHOUT COMA, CIRRHOSIS OF LIVER.      SEE DENIAL LETTER ATTACHED

## 2024-06-27 ENCOUNTER — TELEPHONE (OUTPATIENT)
Dept: PRIMARY CARE CLINIC | Age: 79
End: 2024-06-27

## 2024-06-27 DIAGNOSIS — E11.9 TYPE 2 DIABETES MELLITUS WITHOUT COMPLICATION, WITHOUT LONG-TERM CURRENT USE OF INSULIN (HCC): Primary | ICD-10-CM

## 2024-06-27 DIAGNOSIS — E03.9 HYPOTHYROIDISM, UNSPECIFIED TYPE: ICD-10-CM

## 2024-06-27 RX ORDER — LEVOTHYROXINE SODIUM 0.15 MG/1
TABLET ORAL
Qty: 30 TABLET | Refills: 3 | Status: SHIPPED | OUTPATIENT
Start: 2024-06-27

## 2024-06-27 NOTE — TELEPHONE ENCOUNTER
Per Mala, iron sucrose is on national shortage and they can't do those right now. It can be changed to Ferrlecit or Injectafer? Ferrlecit is 125 per 8 doses with premedicated Solumedrol 40 mg pre and the Injectafer is 750 mg weekly X 2 doses. Her # is [phone].

## 2024-06-29 ASSESSMENT — PAIN SCALES - PAIN ASSESSMENT IN ADVANCED DEMENTIA (PAINAD)
BREATHING: 0
TOTALSCORE: 0
FACIALEXPRESSION: 0 - SMILING OR INEXPRESSIVE.
NEGVOCALIZATION: 0 - NONE.
BODYLANGUAGE: 0 - RELAXED.
CONSOLABILITY: 0 - NO NEED TO CONSOLE.
FACIALEXPRESSION: 0
CONSOLABILITY: 0
BODYLANGUAGE: 0
NEGVOCALIZATION: 0

## 2024-06-29 ASSESSMENT — ENCOUNTER SYMPTOMS
APPETITE LEVEL: GOOD
DENIES PAIN: 1
OCCASIONAL FEELINGS OF UNSTEADINESS: 0
CHANGE IN APPETITE: UNCHANGED
PERSON REPORTING PAIN: PATIENT

## 2024-07-01 ENCOUNTER — OFFICE VISIT (OUTPATIENT)
Dept: PULMONOLOGY | Age: 79
End: 2024-07-01
Payer: MEDICARE

## 2024-07-01 VITALS
HEART RATE: 66 BPM | TEMPERATURE: 97.6 F | WEIGHT: 187 LBS | SYSTOLIC BLOOD PRESSURE: 106 MMHG | DIASTOLIC BLOOD PRESSURE: 68 MMHG | OXYGEN SATURATION: 98 % | HEIGHT: 63 IN | BODY MASS INDEX: 33.13 KG/M2

## 2024-07-01 DIAGNOSIS — R06.02 SHORTNESS OF BREATH: ICD-10-CM

## 2024-07-01 DIAGNOSIS — J44.9 CHRONIC OBSTRUCTIVE PULMONARY DISEASE, UNSPECIFIED COPD TYPE (HCC): ICD-10-CM

## 2024-07-01 DIAGNOSIS — R91.1 LUNG NODULE: Primary | ICD-10-CM

## 2024-07-01 PROCEDURE — 3023F SPIROM DOC REV: CPT | Performed by: INTERNAL MEDICINE

## 2024-07-01 PROCEDURE — G8428 CUR MEDS NOT DOCUMENT: HCPCS | Performed by: INTERNAL MEDICINE

## 2024-07-01 PROCEDURE — 1090F PRES/ABSN URINE INCON ASSESS: CPT | Performed by: INTERNAL MEDICINE

## 2024-07-01 PROCEDURE — G8400 PT W/DXA NO RESULTS DOC: HCPCS | Performed by: INTERNAL MEDICINE

## 2024-07-01 PROCEDURE — 99204 OFFICE O/P NEW MOD 45 MIN: CPT | Performed by: INTERNAL MEDICINE

## 2024-07-01 PROCEDURE — 1036F TOBACCO NON-USER: CPT | Performed by: INTERNAL MEDICINE

## 2024-07-01 PROCEDURE — 1123F ACP DISCUSS/DSCN MKR DOCD: CPT | Performed by: INTERNAL MEDICINE

## 2024-07-01 PROCEDURE — G8417 CALC BMI ABV UP PARAM F/U: HCPCS | Performed by: INTERNAL MEDICINE

## 2024-07-01 RX ORDER — FLUTICASONE FUROATE, UMECLIDINIUM BROMIDE AND VILANTEROL TRIFENATATE 100; 62.5; 25 UG/1; UG/1; UG/1
1 POWDER RESPIRATORY (INHALATION) DAILY
Qty: 60 EACH | Refills: 5 | Status: SHIPPED | OUTPATIENT
Start: 2024-07-01

## 2024-07-01 NOTE — PROGRESS NOTES
Tympanic   SpO2: 98%   Weight: 84.8 kg (187 lb)   Height: 1.6 m (5' 2.99\")          General appearance: Well appearing. No acute distress. AAOX3  Head: Normocephalic, without obvious abnormality, atraumatic   Eyes:Pupils bilateral equal and reactive, EOM intact. Normal sclera and conjunctiva   Nose: Mucosa pink   Neck: Supple, No JVD. Nothyromegaly. Neck is thick  Lungs: Clear bilaterally. No wheezing. No crackles. No use of accessory muscles.   Heart: RRR, S1, S2 normal, no murmur, click, rub or gallop   Abdomen: soft, non-tender, nondistended. Bowel sounds normal. No hernia. No organomegaly.   Extremities: extremities normal, atraumatic, no cyanosis, no edema  Skin: Skin color, texture, turgor normal. No rashes or lesions   Neurological: No focal deficits,cranial nerves grossly intact. No weakness. Sensation normal   Psych: Normal Mood  Musculoskeletal: No joint abnormalities.                    Impression:   Diagnosis Orders   1. Lung nodule  CT CHEST WO CONTRAST      2. Shortness of breath  Full PFT Study With Bronchodilator      3. Chronic obstructive pulmonary disease, unspecified COPD type (HCC)  fluticasone-umeclidin-vilant (TRELEGY ELLIPTA) 100-62.5-25 MCG/ACT AEPB inhaler            Orders Placed This Encounter   Procedures    CT CHEST WO CONTRAST     Standing Status:   Future     Standing Expiration Date:   7/2/2025    Full PFT Study With Bronchodilator     If an ABG is needed along with this PFT procedure, please place the appropriate lab order     Standing Status:   Future     Standing Expiration Date:   10/1/2024      Orders Placed This Encounter   Medications    fluticasone-umeclidin-vilant (TRELEGY ELLIPTA) 100-62.5-25 MCG/ACT AEPB inhaler     Sig: Inhale 1 puff into the lungs daily     Dispense:  60 each     Refill:  5             Recommendations:     - I went over results of CT chest with patient and her daughter in details and answered all their questions.  - Nodules are small.  Recommend

## 2024-07-06 ENCOUNTER — HOME CARE VISIT (OUTPATIENT)
Dept: HOME HEALTH SERVICES | Facility: HOME HEALTH | Age: 79
End: 2024-07-06
Payer: MEDICARE

## 2024-07-11 ENCOUNTER — OFFICE VISIT (OUTPATIENT)
Dept: GASTROENTEROLOGY | Facility: CLINIC | Age: 79
End: 2024-07-11
Payer: MEDICARE

## 2024-07-11 VITALS
DIASTOLIC BLOOD PRESSURE: 74 MMHG | BODY MASS INDEX: 32.25 KG/M2 | WEIGHT: 182 LBS | HEART RATE: 76 BPM | TEMPERATURE: 98 F | HEIGHT: 63 IN | SYSTOLIC BLOOD PRESSURE: 121 MMHG

## 2024-07-11 DIAGNOSIS — K74.60 LIVER DISEASE, CHRONIC, WITH CIRRHOSIS (MULTI): Primary | ICD-10-CM

## 2024-07-11 DIAGNOSIS — K72.10 CHRONIC LIVER FAILURE WITHOUT HEPATIC COMA (MULTI): ICD-10-CM

## 2024-07-11 DIAGNOSIS — K76.9 LIVER DISEASE, CHRONIC, WITH CIRRHOSIS (MULTI): Primary | ICD-10-CM

## 2024-07-11 PROCEDURE — 99214 OFFICE O/P EST MOD 30 MIN: CPT | Performed by: NURSE PRACTITIONER

## 2024-07-11 PROCEDURE — 1160F RVW MEDS BY RX/DR IN RCRD: CPT | Performed by: NURSE PRACTITIONER

## 2024-07-11 PROCEDURE — 1159F MED LIST DOCD IN RCRD: CPT | Performed by: NURSE PRACTITIONER

## 2024-07-11 ASSESSMENT — ENCOUNTER SYMPTOMS
TREMORS: 0
JOINT SWELLING: 0
CONFUSION: 0
NUMBNESS: 0
TROUBLE SWALLOWING: 0
HEADACHES: 0
BRUISES/BLEEDS EASILY: 0
DIARRHEA: 0
COLOR CHANGE: 0
COUGH: 0
CHILLS: 0
VOICE CHANGE: 0
VOMITING: 0
DIZZINESS: 0
HEMATURIA: 0
LIGHT-HEADEDNESS: 0
DYSURIA: 0
FEVER: 0
SLEEP DISTURBANCE: 0
WEAKNESS: 1
WHEEZING: 0
ABDOMINAL PAIN: 0
UNEXPECTED WEIGHT CHANGE: 0
PALPITATIONS: 0
FREQUENCY: 0
ABDOMINAL DISTENTION: 0
BLOOD IN STOOL: 0
AGITATION: 0
CONSTIPATION: 0
DIFFICULTY URINATING: 0
APPETITE CHANGE: 1
NAUSEA: 0
SHORTNESS OF BREATH: 0

## 2024-07-11 NOTE — PROGRESS NOTES
Patient ID: Faye Macdonald is a 79 y.o. female who presents for cirrhosis.    7/11/2024:  Here for FUV for cirrhosis.   Since her last visit she has required paracentesis x1.   She completed PT/OT at home and was told she doesn't need it anymore.  The biggest issues currently include mobility and the need for assistance at home for meals, cleaning and light housework.  She has some intermittent confusion, but does not have features of HE.  Labs are stable.  Xifaxan was not approved and will work on auth for this as the lactulose is causing more gas.  She continues on diuretics with good fluid management.    Denies jaundice, icterus, itching, abdominal distension, edema, bleeding or confusion.    Denies fevers, chills, abdominal pain.       ---------------------------------------------------------------------------    HPI  79 year old with history of HLD, hypothyroidism, DM and MASH related decompensated cirrhosis.  She is here today with daughter Pamela and granddaughter to discuss transplant candidacy.    She was diagnosed with cirrhosis secondary to fatty liver ~ 5 years ago.  She had been doing well until earlier this year when she contracted COVID and started to show signs of decompensation with edema, ascites and weakness.   She recently was admitted for increased SOB, hypoxia related to progressive ascites.   Pt also has history of esophageal varices-now on nadolol.   She underwent paracentesis while hospitalized without signs of SBP.   She was discharged on diuretics and her last paracentesis was ~ 2 weeks ago.  Currently she is tolerating low dose diuretics with good response.   SOB has improved.      Pt does admit to poor appetite with abdominal distension.  She lives alone and prepares her own meals.   Currently no overt signs of HE, however daughter is concerned she is showing some mild signs of confusion.       Denies jaundice, icterus, itching, edema, bleeding.   Denies fevers, chills, abdominal pain.        Review of Systems   Constitutional:  Positive for appetite change. Negative for chills, fever and unexpected weight change.   HENT:  Negative for mouth sores, nosebleeds, trouble swallowing and voice change.    Eyes:  Negative for visual disturbance.   Respiratory:  Negative for cough, shortness of breath and wheezing.    Cardiovascular:  Negative for chest pain, palpitations and leg swelling.   Gastrointestinal:  Negative for abdominal distention, abdominal pain, blood in stool, constipation, diarrhea, nausea and vomiting.   Genitourinary:  Negative for decreased urine volume, difficulty urinating, dysuria, frequency, hematuria and urgency.   Musculoskeletal:  Negative for gait problem and joint swelling.   Skin:  Negative for color change, pallor and rash.   Neurological:  Positive for weakness. Negative for dizziness, tremors, light-headedness, numbness and headaches.   Hematological:  Does not bruise/bleed easily.   Psychiatric/Behavioral:  Negative for agitation, behavioral problems, confusion and sleep disturbance.        Objective   Physical Exam  Constitutional:       General: She is awake.      Appearance: Normal appearance. She is well-developed.   HENT:      Head: Normocephalic and atraumatic.      Right Ear: Hearing normal.      Left Ear: Hearing normal.      Nose: Nose normal.      Mouth/Throat:      Lips: Pink.      Mouth: Mucous membranes are moist.   Eyes:      General: Lids are normal.      Extraocular Movements: Extraocular movements intact.      Conjunctiva/sclera: Conjunctivae normal.      Pupils: Pupils are equal, round, and reactive to light.   Cardiovascular:      Rate and Rhythm: Normal rate and regular rhythm.      Pulses: Normal pulses.      Heart sounds: Normal heart sounds.   Pulmonary:      Effort: Pulmonary effort is normal.      Breath sounds: Normal breath sounds.   Abdominal:      General: Bowel sounds are normal. There is distension.      Palpations: Abdomen is soft. There is  hepatomegaly.   Musculoskeletal:      Cervical back: Normal range of motion and neck supple.   Feet:      Right foot:      Skin integrity: Skin integrity normal.      Left foot:      Skin integrity: Skin integrity normal.   Skin:     General: Skin is warm.   Neurological:      General: No focal deficit present.      Mental Status: She is alert and oriented to person, place, and time.      Cranial Nerves: Cranial nerves 2-12 are intact.      Sensory: Sensation is intact.      Motor: Motor function is intact.      Coordination: Coordination is intact.      Gait: Gait is intact.   Psychiatric:         Attention and Perception: Attention and perception normal.         Mood and Affect: Mood normal.         Speech: Speech normal.         Behavior: Behavior is cooperative.         Thought Content: Thought content normal.         Cognition and Memory: Cognition normal.         Judgment: Judgment normal.         Current Outpatient Medications   Medication Sig Dispense Refill    albuterol 90 mcg/actuation aerosol powdr breath activated inhaler Inhale 4 times a day as needed.      aspirin 81 mg chewable tablet Chew 1 tablet (81 mg) once daily.      atorvastatin (Lipitor) 20 mg tablet Take by mouth.      cholecalciferol (Vitamin D-3) 25 MCG (1000 UT) capsule 1 capsule DAILY (route: oral)      citalopram (CeleXA) 20 mg tablet Take 1 tablet (20 mg) by mouth once daily.      famotidine (Pepcid) 20 mg tablet TAKE 1 TABLET TWICE A DAY (NEED APPOINTMENT FOR FURTHER REFILLS)      fluticasone-umeclidin-vilanter (Trelegy Ellipta) 100-62.5-25 mcg blister with device Inhale 1 puff once daily.      furosemide (Lasix) 20 mg tablet Take 1 tablet (20 mg) by mouth once daily.      Januvia 100 mg tablet Take 1 tablet (100 mg) by mouth once daily.      lactulose 10 gram/15 mL (15 mL) solution Use 30 mL in the mouth or throat 3 times a day. Indications: impaired brain function due to liver disease      levothyroxine (Synthroid, Levoxyl) 150 mcg  tablet TAKE 1 TABLET DAILY (NEED APPOINTMENT FOR FURTHER REFILLS)      nadolol (Corgard) 20 mg tablet Take 1 tablet (20 mg) by mouth once daily.      rifAXIMin (Xifaxan) 550 mg tablet Take 1 tablet (550 mg) by mouth 2 times a day. 60 tablet 3    spironolactone (Aldactone) 25 mg tablet Take 1 tablet (25 mg) by mouth once daily.       No current facility-administered medications for this visit.     Labs 5/9/2024:  AST 33, ALT 15, Alk Phos 97, Alb 28, Na 142, Scr 1.19  WBC 5.1, Hgb 8.1, Plt 118  MELD 14    CT abdomen 5/6/24:  Cirrhosis with collateral vessels including esophageal varices as well as increased mild-moderate volume of ascites suggestive of worsening portal hypertension/decompensation.      Assessment/Plan   Problem List Items Addressed This Visit             ICD-10-CM    Liver disease, chronic, with cirrhosis (Multi) - Primary K74.60, K76.9     MASH related cirrhosis with improvement in ascites and HE symptoms.  Continues on lactulose however Xifaxan was not approved and will look into it.  Long discussion with patient regarding in home care to help with meals, cleaning and medication adherence.  Family will need to discuss hiring care or consider transition into assisted living.  Will attempt to get Xifaxan approved.  Continue all medications for now and paracentesis as needed.  FU in 3 months with labs prior         Relevant Orders    Follow Up In Hepatology            LINDA Herrera 07/12/24 8:05 AM

## 2024-07-12 ENCOUNTER — HOME CARE VISIT (OUTPATIENT)
Dept: HOME HEALTH SERVICES | Facility: HOME HEALTH | Age: 79
End: 2024-07-12
Payer: MEDICARE

## 2024-07-12 NOTE — ASSESSMENT & PLAN NOTE
MASH related cirrhosis with improvement in ascites and HE symptoms.  Continues on lactulose however Xifaxan was not approved and will look into it.  Long discussion with patient regarding in home care to help with meals, cleaning and medication adherence.  Family will need to discuss hiring care or consider transition into assisted living.  Will attempt to get Xifaxan approved.  Continue all medications for now and paracentesis as needed.  FU in 3 months with labs prior

## 2024-07-15 NOTE — TELEPHONE ENCOUNTER
Spoke to Mala at the infusion center and they got the iron back in stock. Notified Zach and she will call them to get on their schedule.

## 2024-07-16 ENCOUNTER — OFFICE VISIT (OUTPATIENT)
Dept: PRIMARY CARE CLINIC | Age: 79
End: 2024-07-16
Payer: MEDICARE

## 2024-07-16 VITALS
OXYGEN SATURATION: 97 % | DIASTOLIC BLOOD PRESSURE: 60 MMHG | BODY MASS INDEX: 32.43 KG/M2 | HEART RATE: 73 BPM | TEMPERATURE: 97.8 F | RESPIRATION RATE: 18 BRPM | WEIGHT: 183 LBS | SYSTOLIC BLOOD PRESSURE: 110 MMHG

## 2024-07-16 DIAGNOSIS — K72.90 DECOMPENSATION OF CIRRHOSIS OF LIVER (HCC): Primary | ICD-10-CM

## 2024-07-16 DIAGNOSIS — D50.9 IRON DEFICIENCY ANEMIA, UNSPECIFIED IRON DEFICIENCY ANEMIA TYPE: ICD-10-CM

## 2024-07-16 DIAGNOSIS — Z71.89 ACP (ADVANCE CARE PLANNING): ICD-10-CM

## 2024-07-16 DIAGNOSIS — J44.9 CHRONIC OBSTRUCTIVE PULMONARY DISEASE, UNSPECIFIED COPD TYPE (HCC): ICD-10-CM

## 2024-07-16 DIAGNOSIS — K74.60 DECOMPENSATION OF CIRRHOSIS OF LIVER (HCC): Primary | ICD-10-CM

## 2024-07-16 LAB
ANISOCYTOSIS BLD QL SMEAR: ABNORMAL
BASOPHILS # BLD: 0 K/UL (ref 0–0.2)
BASOPHILS NFR BLD: 0.8 %
DACRYOCYTES BLD QL SMEAR: ABNORMAL
EOSINOPHIL # BLD: 0.2 K/UL (ref 0–0.7)
EOSINOPHIL NFR BLD: 2.9 %
ERYTHROCYTE [DISTWIDTH] IN BLOOD BY AUTOMATED COUNT: 20.9 % (ref 11.5–14.5)
HCT VFR BLD AUTO: 33.1 % (ref 37–47)
HGB BLD-MCNC: 9.3 G/DL (ref 12–16)
HYPOCHROMIA BLD QL SMEAR: ABNORMAL
LYMPHOCYTES # BLD: 1.2 K/UL (ref 1–4.8)
LYMPHOCYTES NFR BLD: 22.7 %
MCH RBC QN AUTO: 21.4 PG (ref 27–31.3)
MCHC RBC AUTO-ENTMCNC: 28.1 % (ref 33–37)
MCV RBC AUTO: 76.3 FL (ref 79.4–94.8)
MONOCYTES # BLD: 0.7 K/UL (ref 0.2–0.8)
MONOCYTES NFR BLD: 13.3 %
NEUTROPHILS # BLD: 3.1 K/UL (ref 1.4–6.5)
NEUTS SEG NFR BLD: 59.9 %
OVALOCYTES BLD QL SMEAR: ABNORMAL
PLATELET # BLD AUTO: 176 K/UL (ref 130–400)
POIKILOCYTOSIS BLD QL SMEAR: ABNORMAL
RBC # BLD AUTO: 4.34 M/UL (ref 4.2–5.4)
SLIDE REVIEW: ABNORMAL
WBC # BLD AUTO: 5.1 K/UL (ref 4.8–10.8)

## 2024-07-16 PROCEDURE — G8427 DOCREV CUR MEDS BY ELIG CLIN: HCPCS | Performed by: INTERNAL MEDICINE

## 2024-07-16 PROCEDURE — G8417 CALC BMI ABV UP PARAM F/U: HCPCS | Performed by: INTERNAL MEDICINE

## 2024-07-16 PROCEDURE — 1123F ACP DISCUSS/DSCN MKR DOCD: CPT | Performed by: INTERNAL MEDICINE

## 2024-07-16 PROCEDURE — 99214 OFFICE O/P EST MOD 30 MIN: CPT | Performed by: INTERNAL MEDICINE

## 2024-07-16 PROCEDURE — 1036F TOBACCO NON-USER: CPT | Performed by: INTERNAL MEDICINE

## 2024-07-16 PROCEDURE — G8400 PT W/DXA NO RESULTS DOC: HCPCS | Performed by: INTERNAL MEDICINE

## 2024-07-16 PROCEDURE — 3023F SPIROM DOC REV: CPT | Performed by: INTERNAL MEDICINE

## 2024-07-16 PROCEDURE — 1090F PRES/ABSN URINE INCON ASSESS: CPT | Performed by: INTERNAL MEDICINE

## 2024-07-16 RX ORDER — CIPROFLOXACIN HYDROCHLORIDE 3.5 MG/ML
SOLUTION/ DROPS TOPICAL
COMMUNITY
Start: 2024-07-15

## 2024-07-16 RX ORDER — PREDNISOLONE ACETATE 10 MG/ML
SUSPENSION/ DROPS OPHTHALMIC
COMMUNITY
Start: 2024-07-15

## 2024-07-16 RX ORDER — FERROUS SULFATE 325(65) MG
325 TABLET ORAL
Qty: 90 TABLET | Refills: 1 | Status: CANCELLED | OUTPATIENT
Start: 2024-07-16

## 2024-07-16 RX ORDER — NEOMYCIN SULFATE, POLYMYXIN B SULFATE AND DEXAMETHASONE 3.5; 10000; 1 MG/ML; [USP'U]/ML; MG/ML
SUSPENSION/ DROPS OPHTHALMIC
COMMUNITY
Start: 2024-06-27

## 2024-07-16 RX ORDER — LIFITEGRAST 50 MG/ML
1 SOLUTION/ DROPS OPHTHALMIC 2 TIMES DAILY
COMMUNITY
Start: 2024-07-15

## 2024-07-16 ASSESSMENT — ENCOUNTER SYMPTOMS
DIARRHEA: 0
SHORTNESS OF BREATH: 0
NAUSEA: 0
COUGH: 0
ABDOMINAL PAIN: 0
VOMITING: 0
WHEEZING: 0

## 2024-07-16 NOTE — PATIENT INSTRUCTIONS

## 2024-07-16 NOTE — PROGRESS NOTES
Subjective:      Patient ID: Zach Aguirre is a 79 y.o. female    Follow up   HPI  Pt presents for follow up regarding liver cirrhosis and ascites. Ascites well controlled on Lasix and spironolactone.    7/1/2024 7/16/2024   Vitals     Weight - Scale 187 lb  183 lb    Height 5' 2.992\"       Lactulose caused flatulence, hence GI got Xifaxan approved.      Unsure statu of IV iron order. Will repeat CBC for Hb level.   Past Medical History:   Diagnosis Date    Anxiety     DM (diabetes mellitus) (HCC) 4/14/2015    Hepatitis B infection     Hyperlipidemia     Hypothyroidism     Insomnia     Leg pain 4/14/2015    Post herpetic neuralgia     Unspecified sleep apnea     after seeing [facility] they state she is does not have sleep apnea.  Not using cpap     Past Surgical History:   Procedure Laterality Date    ANKLE FRACTURE SURGERY  09/2016    DR ANDRADE  LT    BIOPSY / LIGATION TEMPORAL ARTERY Right 04/26/2019    RIGHT TEMPORAL ARTERY BIOPSY performed by Emmanuel Irwin MD at [facility]    BRAIN SURGERY  2015    surgery for transgeminal neuraglia.  Insertion of sponge for chronic pain    COLONOSCOPY  03/31/2014    DR POND    COLONOSCOPY N/A 02/02/2021    COLONOSCOPY DIAGNOSTIC performed by Mei Pond MD at [facility]    HYSTERECTOMY (CERVIX STATUS UNKNOWN)      PARTIAL    LYMPHADENECTOMY      OTHER SURGICAL HISTORY Right     Trigeminal Neuralgia    PARACENTESIS Left 05/07/2024    4660 ml removed, performed by Sara Hernández CNP - diagnostics sent    PARACENTESIS Left 05/16/2024    3120 ml removed by Sara Hernández CNP    PARACENTESIS Left 06/14/2024    2980 ml removed by Sara Hernández CNP    SD COLON CA SCRN NOT HI RSK IND N/A 07/24/2017    COLONOSCOPY performed by Mei Pond MD at [facility]    UPPER GASTROINTESTINAL ENDOSCOPY N/A 02/02/2021    EGD ESOPHAGOGASTRODUODENOSCOPY performed by Mei Pond MD at [facility]    UPPER GASTROINTESTINAL ENDOSCOPY N/A 11/03/2022    EGD

## 2024-07-16 NOTE — PROGRESS NOTES
Advance Care Planning   The patient has the following advanced directives on file:  Advance Directives       Power of  Living Will ACP-Advance Directive ACP-Power of     Not on File Filed on 07/26/17 Filed Not on File            The patient has appointed the following active healthcare agents:    Primary Decision Maker: AguirreJerica - Child - [phone]    Primary Decision Maker: ORLINNAVI - Child - [phone]    The Patient has the following current code status:    Code Status: Prior    Visit Documentation:  I discussed Advance Care Planning with Zach Aguirre today which included the importance of making their choices for care and treatment in the case of a health event that adversely affects their decision-making abilities. She has not completed the Advance Care Directives. She has an active health care agent at this time with her . I advised patient we would continue this discussion at future visits.

## 2024-07-18 ASSESSMENT — PAIN SCALES - PAIN ASSESSMENT IN ADVANCED DEMENTIA (PAINAD)
BODYLANGUAGE: 0
BODYLANGUAGE: 0 - RELAXED.
FACIALEXPRESSION: 0
NEGVOCALIZATION: 0
BREATHING: 0
FACIALEXPRESSION: 0 - SMILING OR INEXPRESSIVE.
CONSOLABILITY: 0 - NO NEED TO CONSOLE.
NEGVOCALIZATION: 0 - NONE.
CONSOLABILITY: 0
TOTALSCORE: 0

## 2024-07-18 ASSESSMENT — ACTIVITIES OF DAILY LIVING (ADL)
OASIS_M1830: 01
AMBULATION ASSISTANCE: 1
HOME_HEALTH_OASIS: 01

## 2024-07-18 ASSESSMENT — ENCOUNTER SYMPTOMS
PERSON REPORTING PAIN: PATIENT
SUBJECTIVE PAIN PROGRESSION: UNCHANGED
DENIES PAIN: 1

## 2024-08-06 ENCOUNTER — TELEPHONE (OUTPATIENT)
Dept: INTERVENTIONAL RADIOLOGY/VASCULAR | Age: 79
End: 2024-08-06

## 2024-08-06 NOTE — TELEPHONE ENCOUNTER
Patient's daughter, Jerica, was given this information via phone conversation - voiced understanding  2.  Spoke to Nany in Specials to schedule procedure    >  Paracentesis is scheduled on 8/7/2024 @ 2:30 pm   >  You will need to arrive at 2:00 pm from home and check in at the Diagnostic Imaging Check In desk.

## 2024-08-07 ENCOUNTER — HOSPITAL ENCOUNTER (OUTPATIENT)
Dept: INTERVENTIONAL RADIOLOGY/VASCULAR | Age: 79
Discharge: HOME | End: 2024-08-09
Payer: MEDICARE

## 2024-08-07 VITALS
HEART RATE: 68 BPM | SYSTOLIC BLOOD PRESSURE: 109 MMHG | OXYGEN SATURATION: 96 % | RESPIRATION RATE: 14 BRPM | DIASTOLIC BLOOD PRESSURE: 53 MMHG

## 2024-08-07 DIAGNOSIS — R18.8 CIRRHOSIS OF LIVER WITH ASCITES, UNSPECIFIED HEPATIC CIRRHOSIS TYPE (HCC): ICD-10-CM

## 2024-08-07 DIAGNOSIS — K74.60 CIRRHOSIS OF LIVER WITH ASCITES, UNSPECIFIED HEPATIC CIRRHOSIS TYPE (HCC): ICD-10-CM

## 2024-08-07 PROCEDURE — 2709999900 IR US GUIDED PARACENTESIS

## 2024-08-07 PROCEDURE — 2500000003 HC RX 250 WO HCPCS: Performed by: RADIOLOGY

## 2024-08-07 PROCEDURE — 49083 ABD PARACENTESIS W/IMAGING: CPT

## 2024-08-07 RX ORDER — LIDOCAINE HYDROCHLORIDE 20 MG/ML
INJECTION, SOLUTION INFILTRATION; PERINEURAL PRN
Status: COMPLETED | OUTPATIENT
Start: 2024-08-07 | End: 2024-08-07

## 2024-08-07 RX ADMIN — LIDOCAINE HYDROCHLORIDE 4 ML: 20 INJECTION, SOLUTION INFILTRATION; PERINEURAL at 15:13

## 2024-08-07 NOTE — DISCHARGE INSTRUCTIONS
Ultrasound Guided Paracentesis Discharge Instructions     Rest today. No heavy lifting, bending, stretching or pulling.  Some tenderness will be normal at the procedure site for a few days.    You may remove the bandaid in 24-48 hours.     If you experience any drainage or bleeding at the site, hold pressure for 30 minutes and lay on side opposite of the procedure site (move fluid away from the area of leakage). If drainage or bleeding does not stop and seems excessive, call your physician or proceed to the ER.      Watch for signs of infection such as fever, chills, drainage or redness at the site. If you experience any of these symptoms, call your primary doctor or go to the emergency room.       Please keep all follow-up appointments with your Hepatologist. Schedule follow-up appointment for repeat paracentesis if necessary - Dr. Nelson's office [phone].     If you have any concerns please contact the [facility] Radiology Department at [phone].

## 2024-08-07 NOTE — OR NURSING
PARACENTESIS - NO SEDATION    1459 - Patient brought to specials room #6 via WC. A&Ox4, calm and cooperative. Procedure explained and consent signed. Assisted to cart, positioned lying propped to left side using pillow wedge.  Initial images obtained prior to start of procedure using U/S. Pt  VSS, on RA. Allergies and home medications reviewed.     1508 - Dr. Gardiner arrived and evaluating left side of abdomen with U/S. Site marked.     1509 - Timeout completed for Ultrasound Guided Paracentesis. Left side of abdomen prepped with Chloraprep.    After 3 minute dry time, draped with sterile drape and towels.    1513 - Site numbed with 2% lidocaine by Dr. Gardiner, see eMAR.  [id number] Centesis 5F catheter placed using Ultrasound guidance.  Fluid appears clear yellow. Catheter tubing connected to [employer] machine to remove fluid.    1532 - Pt tolerated well. Total 2920 ml removed. Catheter removed, pressure held and bandaid applied. Verbal and tactile reassurance given throughout. D/C instructions reviewed with patient and understanding indicated.     1540 - Assisted to  and taken to lobby waiting room, met by daughter who will drive her home. Electronically signed by Stefani Gonzales RN on 8/7/2024 at 3:44 PM

## 2024-08-08 ENCOUNTER — TELEPHONE (OUTPATIENT)
Dept: INTERVENTIONAL RADIOLOGY/VASCULAR | Age: 79
End: 2024-08-08

## 2024-08-08 NOTE — TELEPHONE ENCOUNTER
Post procedure phone call made to patient after paracentesis yesterday. Pt reports her abdomen was sore yesterday, but feeling better today. Dsg CD&I. Pt denies any questions or concerns.

## 2024-08-19 ENCOUNTER — TELEPHONE (OUTPATIENT)
Dept: PULMONOLOGY | Age: 79
End: 2024-08-19

## 2024-08-19 DIAGNOSIS — K72.90 DECOMPENSATION OF CIRRHOSIS OF LIVER (HCC): Primary | ICD-10-CM

## 2024-08-19 DIAGNOSIS — K74.60 DECOMPENSATION OF CIRRHOSIS OF LIVER (HCC): Primary | ICD-10-CM

## 2024-08-19 NOTE — TELEPHONE ENCOUNTER
PATIENT'S DAUGHTER CALLING TO SCHEDULE A PARA. SHE IS ASKING FOR A MORNING APT. PLEASE CALL DAUGHTER, KARMEN @ [phone]

## 2024-08-20 ENCOUNTER — TELEPHONE (OUTPATIENT)
Dept: PRIMARY CARE CLINIC | Age: 79
End: 2024-08-20

## 2024-08-20 DIAGNOSIS — R18.8 CIRRHOSIS OF LIVER WITH ASCITES, UNSPECIFIED HEPATIC CIRRHOSIS TYPE (HCC): Primary | ICD-10-CM

## 2024-08-20 DIAGNOSIS — K74.60 CIRRHOSIS OF LIVER WITH ASCITES, UNSPECIFIED HEPATIC CIRRHOSIS TYPE (HCC): Primary | ICD-10-CM

## 2024-08-20 NOTE — TELEPHONE ENCOUNTER
INS AUTH  -  XIFAXAN - DENIAL    REASON FOR DENIAL - UNABLE TO PROCESS REQUEST BECAUSE THERE IS A DENIED REQUEST ON FILE FOR XIFAXAN FOR THI MEMBER    SEE ATTACHED DENIAL LETTER

## 2024-08-21 ENCOUNTER — TELEPHONE (OUTPATIENT)
Dept: INTERVENTIONAL RADIOLOGY/VASCULAR | Age: 79
End: 2024-08-21

## 2024-08-21 ENCOUNTER — TELEPHONE (OUTPATIENT)
Dept: PRIMARY CARE CLINIC | Age: 79
End: 2024-08-21

## 2024-08-21 DIAGNOSIS — K72.90 DECOMPENSATION OF CIRRHOSIS OF LIVER (HCC): ICD-10-CM

## 2024-08-21 DIAGNOSIS — K76.82 HEPATIC ENCEPHALOPATHY (HCC): Primary | ICD-10-CM

## 2024-08-21 DIAGNOSIS — K74.60 DECOMPENSATION OF CIRRHOSIS OF LIVER (HCC): ICD-10-CM

## 2024-08-21 NOTE — TELEPHONE ENCOUNTER
I will try another diagnosis and resend the prescription.   But she should ask the original prescriber who got it approved to help

## 2024-08-21 NOTE — TELEPHONE ENCOUNTER
Her Xifaxin was denied and her daughter Jerica called and is very upset asking what they are going to be able to do now?

## 2024-08-21 NOTE — TELEPHONE ENCOUNTER
I made a call to the appeals line to see if there is any other papers that is needed, Yesenia Coburn at [facility] stated that because the Dx code was listed as Decompensation of cirrhosis of liver this is not a supported dx by the FDA and therefore it is denied (x3)

## 2024-08-22 ENCOUNTER — HOSPITAL ENCOUNTER (OUTPATIENT)
Dept: INTERVENTIONAL RADIOLOGY/VASCULAR | Age: 79
Discharge: HOME | End: 2024-08-24
Payer: MEDICARE

## 2024-08-22 VITALS
RESPIRATION RATE: 14 BRPM | OXYGEN SATURATION: 97 % | DIASTOLIC BLOOD PRESSURE: 65 MMHG | SYSTOLIC BLOOD PRESSURE: 137 MMHG | HEART RATE: 81 BPM

## 2024-08-22 DIAGNOSIS — R18.8 CIRRHOSIS OF LIVER WITH ASCITES, UNSPECIFIED HEPATIC CIRRHOSIS TYPE (HCC): ICD-10-CM

## 2024-08-22 DIAGNOSIS — K74.60 CIRRHOSIS OF LIVER WITH ASCITES, UNSPECIFIED HEPATIC CIRRHOSIS TYPE (HCC): ICD-10-CM

## 2024-08-22 PROCEDURE — 2500000003 HC RX 250 WO HCPCS: Performed by: NURSE PRACTITIONER

## 2024-08-22 PROCEDURE — 49083 ABD PARACENTESIS W/IMAGING: CPT

## 2024-08-22 PROCEDURE — 2709999900 IR US GUIDED PARACENTESIS

## 2024-08-22 RX ORDER — LIDOCAINE HYDROCHLORIDE 20 MG/ML
INJECTION, SOLUTION INFILTRATION; PERINEURAL PRN
Status: COMPLETED | OUTPATIENT
Start: 2024-08-22 | End: 2024-08-22

## 2024-08-22 RX ADMIN — LIDOCAINE HYDROCHLORIDE 10 ML: 20 INJECTION, SOLUTION INFILTRATION; PERINEURAL at 09:19

## 2024-08-22 NOTE — OR NURSING
PARACENTESIS - NO SEDATION    0901 - Patient arrived to Specials room, via wheelchair. Pt A&Ox4, calm and cooperative. Allergies and home medications reviewed - confirms she doesn't take any blood thinners. Procedure explained and consent signed. Settled onto cart, positioned lying propped to left side using pillow wedge. Initial U/S images obtained. Sara Hernández CNP paged to come evaluate and phuc procedural site. VSS, on RA.     0913 - Sara Hernández CNP arrived and evaluating left side of abdomen with U/S guidance. Site marked. Timeout completed.     Area cleansed with large tinted chloraprep. After 3 minute dry time, covered with fenestrated drape and sterile towels by AIDA RICHARD.     0919 - Using U/S guidance, Sara Hernández CNP numbed site with 2% lidocaine, see eMar.     Using U/S guidance, access obtained with [id number] centesis 5F catheter with return of fluid that appears clear yellow. Catheter connected to tubing and Marsha machine with suction at 200 mmHG and draining well.     0951 - Drainage complete. Total 3880ml removed. Centesis catheter removed and manual pressure held to site. No bleeding / drainage noted. Band aid applied. Attempted to draw ordered PT/INR sample as ordered by Sara Hernández. Unable to get blood sample. Patient refused additional attempt. Reports she hasn't \"really eaten or drank any fluids the last 3 days.\" Has felt extremely tired and with abdomen feeling full, hasn't wanted to take in food / liquids.     1015 - D/C instructions reviewed with patient and understanding indicated. Taken to [facility] entrance, picked up by daughter who will drive her home. Instructed to take lab order with her and have specimen drawn after she hydrates the next 24-48 hours.Electronically signed by Stefani Gonzales RN on 8/22/2024 at 10:25 AM

## 2024-08-22 NOTE — DISCHARGE INSTRUCTIONS
Ultrasound Guided Paracentesis Discharge Instructions     Rest today. No heavy lifting, bending, stretching or pulling.  Some tenderness will be normal at the procedure site for a few days.    You may remove the bandaid in 24-48 hours.     If you experience any drainage or bleeding at the site, hold pressure for 30 minutes and lay on side opposite of the procedure site (move fluid away from the area of leakage). If drainage or bleeding does not stop and seems excessive, call your physician or proceed to the ER.      Watch for signs of infection such as fever, chills, drainage or redness at the site. If you experience any of these symptoms, call your primary doctor or go to the emergency room.       Please keep all follow-up appointments with your Hepatologist. Schedule follow-up appointment for repeat paracentesis if necessary - Dr. Nelson's office at [phone].     If you have any concerns please contact the [facility] Radiology Department at [phone].

## 2024-08-23 ENCOUNTER — POST-OP TELEPHONE (OUTPATIENT)
Dept: INTERVENTIONAL RADIOLOGY/VASCULAR | Age: 79
End: 2024-08-23

## 2024-08-23 NOTE — FLOWSHEET NOTE
IR follow up phone call re: procedure completed on 8/22/24. Spoke with patient who denies any issues at procedural site, no concerns. Encouraged to hydrate well and have lab word drawn as ordered by Sara Hernández CNP. Aware to call [facility] Radiology / Dr. Nelson's office to schedule next paracentesis appointment when needed. Electronically signed by Stefani Gonzales RN on 8/23/2024 at 10:05 AM

## 2024-08-26 ENCOUNTER — TELEPHONE (OUTPATIENT)
Dept: PRIMARY CARE CLINIC | Age: 79
End: 2024-08-26

## 2024-08-26 NOTE — TELEPHONE ENCOUNTER
Patients darcyer states that she believes she [facility] this [facility]. She wonders if you could get that information of the diagnosis code what she used to get this authorized maybe they would approve it.  I will look for this information on her further.  I did call and place an appeals with the diagnosis code on Friday and as requested from them I did send the information to them as well.

## 2024-08-26 NOTE — TELEPHONE ENCOUNTER
Call placed to Appeals Dept #[phone] to see if patient's appeal has been decided on for her medication of Xifaxan Case #[id number]  Was told they have reviewed my notes and listed to my online case and decided to flip the appeal in our favor to an approval (Auth #[id number])  Call placed to patient's daughter Jerica to inform her of this and was told that we should be receiving via fax the approval letter as well

## 2024-09-03 DIAGNOSIS — R18.8 OTHER ASCITES: Primary | ICD-10-CM

## 2024-09-04 ENCOUNTER — TELEPHONE (OUTPATIENT)
Dept: INTERVENTIONAL RADIOLOGY/VASCULAR | Age: 79
End: 2024-09-04

## 2024-09-04 DIAGNOSIS — R18.8 CIRRHOSIS OF LIVER WITH ASCITES, UNSPECIFIED HEPATIC CIRRHOSIS TYPE (HCC): ICD-10-CM

## 2024-09-04 DIAGNOSIS — K74.60 CIRRHOSIS OF LIVER WITH ASCITES, UNSPECIFIED HEPATIC CIRRHOSIS TYPE (HCC): ICD-10-CM

## 2024-09-04 DIAGNOSIS — R18.8 ASCITES CONTROLLED WITH MEDICATION: ICD-10-CM

## 2024-09-04 LAB
ALBUMIN SERPL-MCNC: 3 G/DL (ref 3.5–4.6)
ALP SERPL-CCNC: 193 U/L (ref 40–130)
ALT SERPL-CCNC: 19 U/L (ref 0–33)
ANION GAP SERPL CALCULATED.3IONS-SCNC: 8 MEQ/L (ref 9–15)
ANISOCYTOSIS BLD QL SMEAR: ABNORMAL
AST SERPL-CCNC: 40 U/L (ref 0–35)
BASOPHILS # BLD: 0 K/UL (ref 0–0.2)
BASOPHILS NFR BLD: 1 %
BILIRUB SERPL-MCNC: 0.6 MG/DL (ref 0.2–0.7)
BUN SERPL-MCNC: 11 MG/DL (ref 8–23)
CALCIUM SERPL-MCNC: 8.7 MG/DL (ref 8.5–9.9)
CHLORIDE SERPL-SCNC: 105 MEQ/L (ref 95–107)
CO2 SERPL-SCNC: 29 MEQ/L (ref 20–31)
CREAT SERPL-MCNC: 0.95 MG/DL (ref 0.5–0.9)
EOSINOPHIL # BLD: 0.1 K/UL (ref 0–0.7)
EOSINOPHIL NFR BLD: 2.6 %
ERYTHROCYTE [DISTWIDTH] IN BLOOD BY AUTOMATED COUNT: 20.6 % (ref 11.5–14.5)
GLOBULIN SER CALC-MCNC: 3 G/DL (ref 2.3–3.5)
GLUCOSE SERPL-MCNC: 142 MG/DL (ref 70–99)
HCT VFR BLD AUTO: 38.9 % (ref 37–47)
HGB BLD-MCNC: 11.4 G/DL (ref 12–16)
HYPOCHROMIA BLD QL SMEAR: ABNORMAL
INR PPP: 1.1
LYMPHOCYTES # BLD: 1 K/UL (ref 1–4.8)
LYMPHOCYTES NFR BLD: 23.4 %
MCH RBC QN AUTO: 23.8 PG (ref 27–31.3)
MCHC RBC AUTO-ENTMCNC: 29.3 % (ref 33–37)
MCV RBC AUTO: 81.4 FL (ref 79.4–94.8)
MONOCYTES # BLD: 0.4 K/UL (ref 0.2–0.8)
MONOCYTES NFR BLD: 10.3 %
NEUTROPHILS # BLD: 2.6 K/UL (ref 1.4–6.5)
NEUTS SEG NFR BLD: 62.2 %
OVALOCYTES BLD QL SMEAR: ABNORMAL
PLATELET # BLD AUTO: 138 K/UL (ref 130–400)
PLATELET BLD QL SMEAR: ADEQUATE
POIKILOCYTOSIS BLD QL SMEAR: ABNORMAL
POTASSIUM SERPL-SCNC: 5.1 MEQ/L (ref 3.4–4.9)
PROT SERPL-MCNC: 6 G/DL (ref 6.3–8)
PROTHROMBIN TIME: 14.4 SEC (ref 12.3–14.9)
RBC # BLD AUTO: 4.78 M/UL (ref 4.2–5.4)
SLIDE REVIEW: ABNORMAL
SODIUM SERPL-SCNC: 142 MEQ/L (ref 135–144)
WBC # BLD AUTO: 4.2 K/UL (ref 4.8–10.8)

## 2024-09-04 NOTE — TELEPHONE ENCOUNTER
Patient's daughter, Jerica, was given this information via phone conversation - voiced understanding  2.  Spoke to Roma in Specials to schedule procedure    >  Paracentesis is scheduled on 9/5/2024 @ 9:00 am   >  You will need to arrive at 8:30 am from home and check in at the Diagnostic Imaging Check In desk.   >  Do not eat or drink after midnight.    >  Make arrangements for transportation, as you should not drive immediately after.  >  Patient to have PT/INR drawn anytime between now and 1 day prior to procedure

## 2024-09-05 ENCOUNTER — HOSPITAL ENCOUNTER (OUTPATIENT)
Dept: INTERVENTIONAL RADIOLOGY/VASCULAR | Age: 79
Discharge: HOME | End: 2024-09-07
Payer: MEDICARE

## 2024-09-05 VITALS
RESPIRATION RATE: 12 BRPM | HEART RATE: 69 BPM | OXYGEN SATURATION: 97 % | DIASTOLIC BLOOD PRESSURE: 64 MMHG | SYSTOLIC BLOOD PRESSURE: 136 MMHG

## 2024-09-05 DIAGNOSIS — R18.8 OTHER ASCITES: ICD-10-CM

## 2024-09-05 PROCEDURE — 2500000003 HC RX 250 WO HCPCS: Performed by: NURSE PRACTITIONER

## 2024-09-05 PROCEDURE — 49083 ABD PARACENTESIS W/IMAGING: CPT

## 2024-09-05 PROCEDURE — 2709999900 IR US GUIDED PARACENTESIS

## 2024-09-05 RX ORDER — LIDOCAINE HYDROCHLORIDE 20 MG/ML
INJECTION, SOLUTION INFILTRATION; PERINEURAL PRN
Status: COMPLETED | OUTPATIENT
Start: 2024-09-05 | End: 2024-09-05

## 2024-09-05 RX ADMIN — LIDOCAINE HYDROCHLORIDE 15 ML: 20 INJECTION, SOLUTION INFILTRATION; PERINEURAL at 09:26

## 2024-09-05 NOTE — DISCHARGE INSTRUCTIONS
Ultrasound Guided Paracentesis Discharge Instructions:    Rest today. No heavy lifting, bending, stretching or pulling.  Some tenderness will be normal at the procedure site for a few days.    You may remove the bandaid in 24-48 hours.     If you experience any drainage or bleeding at the site, hold pressure for 30 minutes and lay on side opposite of the procedure site (move fluid away from the area of leakage). If drainage or bleeding does not stop and seems excessive, call your physician or proceed to the ER.      Watch for signs of infection such as fever, chills, drainage or redness at the site. If you experience any of these symptoms, call your primary doctor or go to the emergency room.       Please keep all follow-up appointments with your Hepatologist. Schedule follow-up appointment for repeat paracentesis if necessary - Dr. Nelson's office at [phone].     If you have any concerns please contact the [facility] Radiology Department at [phone].

## 2024-09-16 DIAGNOSIS — R18.8 OTHER ASCITES: Primary | ICD-10-CM

## 2024-09-17 ENCOUNTER — TELEPHONE (OUTPATIENT)
Dept: INTERVENTIONAL RADIOLOGY/VASCULAR | Age: 79
End: 2024-09-17

## 2024-09-19 ENCOUNTER — HOSPITAL ENCOUNTER (OUTPATIENT)
Dept: INTERVENTIONAL RADIOLOGY/VASCULAR | Age: 79
Discharge: HOME | End: 2024-09-21
Payer: MEDICARE

## 2024-09-19 VITALS
RESPIRATION RATE: 12 BRPM | DIASTOLIC BLOOD PRESSURE: 58 MMHG | OXYGEN SATURATION: 95 % | SYSTOLIC BLOOD PRESSURE: 132 MMHG | HEART RATE: 71 BPM

## 2024-09-19 DIAGNOSIS — R18.8 OTHER ASCITES: ICD-10-CM

## 2024-09-19 PROCEDURE — 49083 ABD PARACENTESIS W/IMAGING: CPT

## 2024-09-19 PROCEDURE — 2709999900 IR US GUIDED PARACENTESIS

## 2024-09-19 PROCEDURE — 2500000003 HC RX 250 WO HCPCS: Performed by: NURSE PRACTITIONER

## 2024-09-19 RX ORDER — LIDOCAINE HYDROCHLORIDE 20 MG/ML
INJECTION, SOLUTION INFILTRATION; PERINEURAL PRN
Status: COMPLETED | OUTPATIENT
Start: 2024-09-19 | End: 2024-09-19

## 2024-09-19 RX ADMIN — LIDOCAINE HYDROCHLORIDE 15 ML: 20 INJECTION, SOLUTION INFILTRATION; PERINEURAL at 11:46

## 2024-09-21 DIAGNOSIS — L29.9 PRURITUS: ICD-10-CM

## 2024-09-24 ENCOUNTER — TELEPHONE (OUTPATIENT)
Dept: INTERVENTIONAL RADIOLOGY/VASCULAR | Age: 79
End: 2024-09-24

## 2024-09-24 ENCOUNTER — TELEPHONE (OUTPATIENT)
Dept: PULMONOLOGY | Age: 79
End: 2024-09-24

## 2024-09-24 DIAGNOSIS — R18.8 OTHER ASCITES: Primary | ICD-10-CM

## 2024-09-24 RX ORDER — HYDROXYZINE HYDROCHLORIDE 25 MG/1
25 TABLET, FILM COATED ORAL EVERY 8 HOURS PRN
Qty: 30 TABLET | Refills: 0 | Status: SHIPPED | OUTPATIENT
Start: 2024-09-24 | End: 2024-10-04

## 2024-09-25 ENCOUNTER — HOSPITAL ENCOUNTER (OUTPATIENT)
Dept: INTERVENTIONAL RADIOLOGY/VASCULAR | Age: 79
Discharge: HOME | End: 2024-09-27
Payer: MEDICARE

## 2024-09-25 VITALS
HEART RATE: 74 BPM | OXYGEN SATURATION: 96 % | DIASTOLIC BLOOD PRESSURE: 53 MMHG | SYSTOLIC BLOOD PRESSURE: 112 MMHG | TEMPERATURE: 98.2 F

## 2024-09-25 DIAGNOSIS — R18.8 OTHER ASCITES: ICD-10-CM

## 2024-09-25 PROCEDURE — 49083 ABD PARACENTESIS W/IMAGING: CPT | Performed by: RADIOLOGY

## 2024-09-25 PROCEDURE — 2500000003 HC RX 250 WO HCPCS: Performed by: NURSE PRACTITIONER

## 2024-09-25 PROCEDURE — C1729 CATH, DRAINAGE: HCPCS

## 2024-09-25 PROCEDURE — 49083 ABD PARACENTESIS W/IMAGING: CPT

## 2024-09-25 RX ORDER — LIDOCAINE HYDROCHLORIDE 20 MG/ML
INJECTION, SOLUTION INFILTRATION; PERINEURAL PRN
Status: COMPLETED | OUTPATIENT
Start: 2024-09-25 | End: 2024-09-25

## 2024-09-25 RX ADMIN — LIDOCAINE HYDROCHLORIDE 10 ML: 20 INJECTION, SOLUTION INFILTRATION; PERINEURAL at 13:00

## 2024-09-26 ENCOUNTER — APPOINTMENT (OUTPATIENT)
Dept: GASTROENTEROLOGY | Facility: CLINIC | Age: 79
End: 2024-09-26
Payer: COMMERCIAL

## 2024-09-26 ENCOUNTER — TELEPHONE (OUTPATIENT)
Dept: GASTROENTEROLOGY | Facility: HOSPITAL | Age: 79
End: 2024-09-26
Payer: COMMERCIAL

## 2024-10-02 ENCOUNTER — TELEPHONE (OUTPATIENT)
Dept: INTERVENTIONAL RADIOLOGY/VASCULAR | Age: 79
End: 2024-10-02

## 2024-10-02 NOTE — TELEPHONE ENCOUNTER
Patient's daughter, Jerica, was given this information via phone conversation - voiced understanding  2.  Spoke to Nany in Specials to schedule procedure    >  Paracentesis is scheduled on 10/4/2024 @ 9:00 am   >  You will need to arrive at 8:30 am from home and check in at the Diagnostic Imaging Check In desk.

## 2024-10-03 DIAGNOSIS — R18.8 OTHER ASCITES: Primary | ICD-10-CM

## 2024-10-04 ENCOUNTER — HOSPITAL ENCOUNTER (OUTPATIENT)
Dept: INTERVENTIONAL RADIOLOGY/VASCULAR | Age: 79
Discharge: HOME | End: 2024-10-06
Payer: MEDICARE

## 2024-10-04 VITALS
SYSTOLIC BLOOD PRESSURE: 128 MMHG | RESPIRATION RATE: 16 BRPM | HEART RATE: 73 BPM | DIASTOLIC BLOOD PRESSURE: 63 MMHG | OXYGEN SATURATION: 97 %

## 2024-10-04 DIAGNOSIS — R18.8 OTHER ASCITES: ICD-10-CM

## 2024-10-04 PROCEDURE — 2709999900 IR US GUIDED PARACENTESIS

## 2024-10-04 PROCEDURE — 2500000003 HC RX 250 WO HCPCS: Performed by: RADIOLOGY

## 2024-10-04 PROCEDURE — 49083 ABD PARACENTESIS W/IMAGING: CPT

## 2024-10-04 RX ORDER — LIDOCAINE HYDROCHLORIDE 20 MG/ML
INJECTION, SOLUTION INFILTRATION; PERINEURAL PRN
Status: COMPLETED | OUTPATIENT
Start: 2024-10-04 | End: 2024-10-04

## 2024-10-04 RX ADMIN — LIDOCAINE HYDROCHLORIDE 6 ML: 20 INJECTION, SOLUTION INFILTRATION; PERINEURAL at 09:35

## 2024-10-04 NOTE — OR NURSING
NO SEDATION    0915 Images  obtained prior to start of procedure using U/S. Pt  VSS.  Procedure explained, consent signed.      0931 Timeout completed for Ultrasound Guided Paracentesis. Using U/S, Dr. Gardiner marked, prepped left lower abdomen with Chloraprep.    0934 After 3 minute dry time, draped with sterile drape and towels.    0935  Site numbed with lidocaine.  [id number] Centesis 5F catheter placed using Ultrasound guidance.  Fluid appears  randy in color. Catheter tubing connected to [employer] machine to remove fluid.    0947 Fluid continues to drain. Patient tolerating well. VSS.    0957 Fluid continues to drain but slowing at this time.    1007 Fluid slowing at this time. Patient tolerating at this time.    1015 Patient tolerated well. Total 3440  ml removed. Catheter removed, pressure held and bandaid applied. Verbal and tactile reassurance given throughout.  Discharge instructions printed and reviewed with the patient. Patient verbalizes understanding of discharge instructions. Copy of discharge instructions given to the patient.  Patient dressed. Gait slow but steady. Patient taken to front of hospital. Daughter to drive them home.

## 2024-10-04 NOTE — DISCHARGE INSTRUCTIONS
Ultrasound Guided Paracentesis    Activity:  Rest, avoid strenuous activity such as lifting heavy objects, and bending, stretching or pulling.     Diet:  Resume usual diet    Medication:  * Resume home medication unless otherwise instructed by your physician.  * (If Applicable) If taking any blood thinners, speak with your physician before restarting them.  * May take mild pain reliever, as needed, such as Acetaminophen or Ibuprofen.    INSTRUCTIONS OR COMMENTS RELATIVE TO SPECIFIC EXAM PERFORMED:    - Some tenderness at site of paracentesis will be normal for a few days.  - May remove band aid after 48 hours.   - Monitor the site for the next 24 - 48 hours.  - If you experience any drainage or bleeding at the site, hold pressure for 30 minutes and lay on side opposite of the procedure site (move fluid away from the   area of leakage).  If drainage or bleeding does not stop and seems excessive, call your physician or go to the ER for evaluation.   - If any signs of infection (redness, swelling, drainage/pus) at the site, go to ER for evaluation.   - Please keep all follow-up appointments with your Hepatologist. Schedule follow-up appointment for repeat paracentesis if necessary.       IF YOU DEVELOP ANY OF THE FOLLOWING SYMPTOMS, CONTACT PHYSICIAN LISTED BELOW:  Physician performing procedure: DR. Gardiner-  [phone] and ask to be put in contact with the RADIOLOGY RN. After hours contact ER.  * Extreme pain that increases after leaving the hospital  * Active bleeding (refer to above instructions)  * Chills, fever above 100 degrees Farenheit and fatigue.  * Weakness, dizziness, lightheadedness or fainting.    If you are unable to contact any of the above physician and you feel you have a major problem, please go to the Emergency Room for treatment.    TOTAL REMOVED WITH PARACENTESIS TODAY:

## 2024-10-07 ENCOUNTER — TELEPHONE (OUTPATIENT)
Dept: INTERVENTIONAL RADIOLOGY/VASCULAR | Age: 79
End: 2024-10-07

## 2024-10-07 NOTE — TELEPHONE ENCOUNTER
Post paracentesis follow up call made to patient. Patient states she has had \"sharp\" pains at site over the weekend, but has not been up and moving yet today. Pt denies redness/warmth/drainage at site, denies fever. States she will call us back after she is awake and moving if she is still having pain or any other issues.

## 2024-10-11 DIAGNOSIS — R18.8 OTHER ASCITES: Primary | ICD-10-CM

## 2024-10-14 ENCOUNTER — TELEPHONE (OUTPATIENT)
Dept: INTERVENTIONAL RADIOLOGY/VASCULAR | Age: 79
End: 2024-10-14

## 2024-10-14 NOTE — TELEPHONE ENCOUNTER
Patient's daughter, Jerica, was given this information via phone conversation - voiced understanding  2.  Spoke to Stefani/Roma in Specials to schedule procedure    >  Paracentesis is scheduled on 10/16/2024 @ 10:30 am   >  You will need to arrive at 10:00 am from home and check in at the Diagnostic Imaging Check In desk.

## 2024-10-16 ENCOUNTER — HOSPITAL ENCOUNTER (OUTPATIENT)
Dept: INTERVENTIONAL RADIOLOGY/VASCULAR | Age: 79
Discharge: HOME | End: 2024-10-18
Payer: MEDICARE

## 2024-10-16 VITALS — DIASTOLIC BLOOD PRESSURE: 58 MMHG | SYSTOLIC BLOOD PRESSURE: 118 MMHG | HEART RATE: 68 BPM | OXYGEN SATURATION: 98 %

## 2024-10-16 DIAGNOSIS — R18.8 OTHER ASCITES: ICD-10-CM

## 2024-10-16 PROCEDURE — 2500000003 HC RX 250 WO HCPCS: Performed by: NURSE PRACTITIONER

## 2024-10-16 PROCEDURE — 49083 ABD PARACENTESIS W/IMAGING: CPT

## 2024-10-16 PROCEDURE — 2709999900 IR US GUIDED PARACENTESIS

## 2024-10-16 RX ORDER — LIDOCAINE HYDROCHLORIDE 20 MG/ML
INJECTION, SOLUTION INFILTRATION; PERINEURAL PRN
Status: COMPLETED | OUTPATIENT
Start: 2024-10-16 | End: 2024-10-16

## 2024-10-16 RX ADMIN — LIDOCAINE HYDROCHLORIDE 15 ML: 20 INJECTION, SOLUTION INFILTRATION; PERINEURAL at 11:17

## 2024-10-16 NOTE — DISCHARGE INSTRUCTIONS
Ultrasound Guided Paracentesis    Activity:  Rest, avoid strenuous activity such as lifting heavy objects, and bending, stretching or pulling.     Diet:  Resume usual diet    Medication:  * Resume home medication unless otherwise instructed by your physician.  * (If Applicable) If taking any blood thinners, speak with your physician before restarting them.  * May take mild pain reliever, as needed, such as Acetaminophen or Ibuprofen.    INSTRUCTIONS OR COMMENTS RELATIVE TO SPECIFIC EXAM PERFORMED:    - Some tenderness at site of paracentesis will be normal for a few days.  - May remove band aid after 48 hours.   - Monitor the site for the next 24 - 48 hours.  - If you experience any drainage or bleeding at the site, hold pressure for 30 minutes and lay on side opposite of the procedure site (move fluid away from the   area of leakage).  If drainage or bleeding does not stop and seems excessive, call your physician or go to the ER for evaluation.   - If any signs of infection (redness, swelling, drainage/pus) at the site, go to ER for evaluation.   - Please keep all follow-up appointments with your Hepatologist. Schedule follow-up appointment for repeat paracentesis if necessary.       IF YOU DEVELOP ANY OF THE FOLLOWING SYMPTOMS, CONTACT PHYSICIAN LISTED BELOW:  Physician performing procedure: DR. Nelson - [phone] or [phone] and ask to be put in contact with the RADIOLOGY RN. After hours contact ER.  * Extreme pain that increases after leaving the hospital  * Active bleeding (refer to above instructions)  * Chills, fever above 100 degrees Farenheit and fatigue.  * Weakness, dizziness, lightheadedness or fainting.    If you are unable to contact any of the above physician and you feel you have a major problem, please go to the Emergency Room for treatment.    TOTAL REMOVED WITH PARACENTESIS TODAY: 4580 mls

## 2024-10-16 NOTE — PROGRESS NOTES
Reviewed discharge instructions with pt, and pt voices understanding.     1208 Pt taken via wheel chair with all belongings for discharge home with daughter, Jerica (#[phone]).

## 2024-10-16 NOTE — OR NURSING
NO SEDATION      Pt arrived to Specials room 6 via wheel chair. Pt's abdomen distended. Hx, allergies, medications reviewed. Pt offered reassurance and VSS. Pt denies pain at this time, but states having pain at times across the lower part of the abdomen.  Consent obtained for ultrasound guided paracentesis.     U/S image obtained, Sara CONKLIN approved LLQ site.     Area cleansed with large tinted chloraprep. After 3 minute dry time, draped with fenestrated drape and sterile towels by Sara CONKLIN.     1110 Timeout completed.     1117 Using U/S guidance, Sara CONKLIN numbed site with 2% lidocaine, see eMar.     Using U/S guidance, access obtained with [id number] centesis 5F catheter with return of fluid that appears clear, light yellow. Catheter connected to tubing and Marsha machine with suction at 200 mmHG and draining well.     1124 Pt draining and tolerating well. VSS.     1143 Pt draining and tolerating well. VSS.     Drainage complete. Total 4580 ml removed. Centesis catheter removed and digital pressure held to site for 3 minutes.     LLQ site soft, no drainage, large bandaid applied. VSS.

## 2024-10-17 ENCOUNTER — POST-OP TELEPHONE (OUTPATIENT)
Dept: INTERVENTIONAL RADIOLOGY/VASCULAR | Age: 79
End: 2024-10-17

## 2024-10-17 NOTE — FLOWSHEET NOTE
RN call to patient to follow up post-op, as pt had  paracentesis on 10/16/24.    Pt reports 0/10 pain to site.     Pt confirms site and dressing is clean, dry, and intact.     Pt has no questions or concerns at this time.     Pt instructed to call back the radiology dept and request to speak with a nurse if any questions or concerns should develop at [phone].

## 2024-10-21 ENCOUNTER — OFFICE VISIT (OUTPATIENT)
Dept: GASTROENTEROLOGY | Facility: CLINIC | Age: 79
End: 2024-10-21
Payer: COMMERCIAL

## 2024-10-21 VITALS
TEMPERATURE: 97.3 F | HEIGHT: 63 IN | DIASTOLIC BLOOD PRESSURE: 63 MMHG | WEIGHT: 184 LBS | SYSTOLIC BLOOD PRESSURE: 137 MMHG | BODY MASS INDEX: 32.6 KG/M2 | HEART RATE: 73 BPM

## 2024-10-21 DIAGNOSIS — K74.60 LIVER DISEASE, CHRONIC, WITH CIRRHOSIS (MULTI): Primary | ICD-10-CM

## 2024-10-21 DIAGNOSIS — K76.9 LIVER DISEASE, CHRONIC, WITH CIRRHOSIS (MULTI): Primary | ICD-10-CM

## 2024-10-21 PROCEDURE — 99214 OFFICE O/P EST MOD 30 MIN: CPT | Performed by: NURSE PRACTITIONER

## 2024-10-21 PROCEDURE — 1159F MED LIST DOCD IN RCRD: CPT | Performed by: NURSE PRACTITIONER

## 2024-10-21 PROCEDURE — 1036F TOBACCO NON-USER: CPT | Performed by: NURSE PRACTITIONER

## 2024-10-21 PROCEDURE — 1160F RVW MEDS BY RX/DR IN RCRD: CPT | Performed by: NURSE PRACTITIONER

## 2024-10-21 ASSESSMENT — ENCOUNTER SYMPTOMS
JOINT SWELLING: 0
DIFFICULTY URINATING: 0
CONSTIPATION: 0
SHORTNESS OF BREATH: 0
PALPITATIONS: 0
AGITATION: 0
DYSURIA: 0
TREMORS: 0
ABDOMINAL DISTENTION: 0
VOICE CHANGE: 0
ABDOMINAL PAIN: 0
APPETITE CHANGE: 1
WHEEZING: 0
COLOR CHANGE: 0
CONFUSION: 0
BRUISES/BLEEDS EASILY: 0
DIZZINESS: 0
HEADACHES: 0
FREQUENCY: 0
NAUSEA: 0
FEVER: 0
VOMITING: 0
NUMBNESS: 0
LIGHT-HEADEDNESS: 0
CHILLS: 0
DIARRHEA: 0
UNEXPECTED WEIGHT CHANGE: 0
WEAKNESS: 1
TROUBLE SWALLOWING: 0
BLOOD IN STOOL: 0
SLEEP DISTURBANCE: 0
COUGH: 0
HEMATURIA: 0

## 2024-10-21 NOTE — PROGRESS NOTES
Patient ID: Faye Macdonald is a 79 y.o. female who presents for cirrhosis.    10/21/24:  3 month FUV.  She has required more frequent paracentesis over the last 2 months.  She remains on low dose diuretics but has not been eating as well.  She is not getting near enough protein and not eating enough calories.  Pt does feel very tired and often sleeps through meals.  Remains on low dose diuretics.  Pt is taking Xifaxan and lactulose for HE.  Continues to live alone with family close.  She has not been getting albumin infusions with paracentesis, which could be one reason she is developing fluid quicker.    Labs are stable, though albumin level is low.  No other issues.    -----------------------------------------------------------------------------    7/11/2024:  Here for FUV for cirrhosis.   Since her last visit she has required paracentesis x1.   She completed PT/OT at home and was told she doesn't need it anymore.  The biggest issues currently include mobility and the need for assistance at home for meals, cleaning and light housework.  She has some intermittent confusion, but does not have features of HE.  Labs are stable.  Xifaxan was not approved and will work on auth for this as the lactulose is causing more gas.  She continues on diuretics with good fluid management.    Denies jaundice, icterus, itching, abdominal distension, edema, bleeding or confusion.    Denies fevers, chills, abdominal pain.       ---------------------------------------------------------------------------    HPI  79 year old with history of HLD, hypothyroidism, DM and MASH related decompensated cirrhosis.  She is here today with daughter Pamela and granddaughter to discuss transplant candidacy.    She was diagnosed with cirrhosis secondary to fatty liver ~ 5 years ago.  She had been doing well until earlier this year when she contracted COVID and started to show signs of decompensation with edema, ascites and weakness.   She recently was  admitted for increased SOB, hypoxia related to progressive ascites.   Pt also has history of esophageal varices-now on nadolol.   She underwent paracentesis while hospitalized without signs of SBP.   She was discharged on diuretics and her last paracentesis was ~ 2 weeks ago.  Currently she is tolerating low dose diuretics with good response.   SOB has improved.      Pt does admit to poor appetite with abdominal distension.  She lives alone and prepares her own meals.   Currently no overt signs of HE, however daughter is concerned she is showing some mild signs of confusion.       Denies jaundice, icterus, itching, edema, bleeding.   Denies fevers, chills, abdominal pain.       Review of Systems   Constitutional:  Positive for appetite change. Negative for chills, fever and unexpected weight change.   HENT:  Negative for mouth sores, nosebleeds, trouble swallowing and voice change.    Eyes:  Negative for visual disturbance.   Respiratory:  Negative for cough, shortness of breath and wheezing.    Cardiovascular:  Negative for chest pain, palpitations and leg swelling.   Gastrointestinal:  Negative for abdominal distention, abdominal pain, blood in stool, constipation, diarrhea, nausea and vomiting.   Genitourinary:  Negative for decreased urine volume, difficulty urinating, dysuria, frequency, hematuria and urgency.   Musculoskeletal:  Negative for gait problem and joint swelling.   Skin:  Negative for color change, pallor and rash.   Neurological:  Positive for weakness. Negative for dizziness, tremors, light-headedness, numbness and headaches.   Hematological:  Does not bruise/bleed easily.   Psychiatric/Behavioral:  Negative for agitation, behavioral problems, confusion and sleep disturbance.        Objective   Physical Exam  Constitutional:       General: She is awake.      Appearance: Normal appearance. She is well-developed.   HENT:      Head: Normocephalic and atraumatic.      Right Ear: Hearing normal.       Left Ear: Hearing normal.      Nose: Nose normal.      Mouth/Throat:      Lips: Pink.      Mouth: Mucous membranes are moist.   Eyes:      General: Lids are normal.      Extraocular Movements: Extraocular movements intact.      Conjunctiva/sclera: Conjunctivae normal.      Pupils: Pupils are equal, round, and reactive to light.   Cardiovascular:      Rate and Rhythm: Normal rate and regular rhythm.      Pulses: Normal pulses.      Heart sounds: Normal heart sounds.   Pulmonary:      Effort: Pulmonary effort is normal.      Breath sounds: Normal breath sounds.   Abdominal:      General: Bowel sounds are normal. There is distension.      Palpations: Abdomen is soft. There is hepatomegaly.   Musculoskeletal:      Cervical back: Normal range of motion and neck supple.   Feet:      Right foot:      Skin integrity: Skin integrity normal.      Left foot:      Skin integrity: Skin integrity normal.   Skin:     General: Skin is warm.   Neurological:      General: No focal deficit present.      Mental Status: She is alert and oriented to person, place, and time.      Cranial Nerves: Cranial nerves 2-12 are intact.      Sensory: Sensation is intact.      Motor: Motor function is intact.      Coordination: Coordination is intact.      Gait: Gait is intact.   Psychiatric:         Attention and Perception: Attention and perception normal.         Mood and Affect: Mood normal.         Speech: Speech normal.         Behavior: Behavior is cooperative.         Thought Content: Thought content normal.         Cognition and Memory: Cognition normal.         Judgment: Judgment normal.         Current Outpatient Medications   Medication Sig Dispense Refill    albuterol 90 mcg/actuation aerosol powdr breath activated inhaler Inhale 4 times a day as needed.      aspirin 81 mg chewable tablet Chew 1 tablet (81 mg) once daily.      atorvastatin (Lipitor) 20 mg tablet Take by mouth.      cholecalciferol (Vitamin D-3) 25 MCG (1000 UT) capsule  1 capsule DAILY (route: oral)      citalopram (CeleXA) 20 mg tablet Take 1 tablet (20 mg) by mouth once daily.      famotidine (Pepcid) 20 mg tablet TAKE 1 TABLET TWICE A DAY (NEED APPOINTMENT FOR FURTHER REFILLS)      fluticasone-umeclidin-vilanter (Trelegy Ellipta) 100-62.5-25 mcg blister with device Inhale 1 puff once daily.      furosemide (Lasix) 20 mg tablet Take 1 tablet (20 mg) by mouth once daily.      Januvia 100 mg tablet Take 1 tablet (100 mg) by mouth once daily.      lactulose 10 gram/15 mL (15 mL) solution Use 30 mL in the mouth or throat 3 times a day. Indications: impaired brain function due to liver disease      levothyroxine (Synthroid, Levoxyl) 150 mcg tablet TAKE 1 TABLET DAILY (NEED APPOINTMENT FOR FURTHER REFILLS)      nadolol (Corgard) 20 mg tablet Take 1 tablet (20 mg) by mouth once daily.      rifAXIMin (Xifaxan) 550 mg tablet Take 1 tablet (550 mg) by mouth 2 times a day. 60 tablet 3    spironolactone (Aldactone) 25 mg tablet Take 1 tablet (25 mg) by mouth once daily.       No current facility-administered medications for this visit.     Labs 5/9/2024:  AST 33, ALT 15, Alk Phos 97, Alb 28, Na 142, Scr 1.19  WBC 5.1, Hgb 8.1, Plt 118  MELD 14    CT abdomen 5/6/24:  Cirrhosis with collateral vessels including esophageal varices as well as increased mild-moderate volume of ascites suggestive of worsening portal hypertension/decompensation.      Assessment/Plan   Problem List Items Addressed This Visit             ICD-10-CM    Liver disease, chronic, with cirrhosis (Multi) - Primary K74.60, K76.9     [facility] related cirrhosis with decompensation by way of ascites.   Now getting paracentesis every 2 weeks WITHOUT albumin infusions.  Remains on low dose diuretics, though her LE edema is minimal.    She is now taking Xifaxan and using lactulose as needed.  HE symptoms stable.  Reviewed importance for increased protein-including ensure 1-2 per day.  This is likely one reason she is getting more  ascites build up.   Continue low sodium.  Standing order for paracentesis and ALBUMIN infusions faxed to [facility].  FU in 4-6 weeks               Relevant Orders    IR abdomen paracentesis    AFB Culture/Smear    Body Fluid Cell Count With Differential              ANGEL Herrera-CNP 10/21/24 1:12 PM

## 2024-10-21 NOTE — ASSESSMENT & PLAN NOTE
MASH related cirrhosis with decompensation by way of ascites.   Now getting paracentesis every 2 weeks WITHOUT albumin infusions.  Remains on low dose diuretics, though her LE edema is minimal.    She is now taking Xifaxan and using lactulose as needed.  HE symptoms stable.  Reviewed importance for increased protein-including ensure 1-2 per day.  This is likely one reason she is getting more ascites build up.   Continue low sodium.  Standing order for paracentesis and ALBUMIN infusions faxed to [facility].  FU in 4-6 weeks

## 2024-10-23 DIAGNOSIS — R18.8 OTHER ASCITES: Primary | ICD-10-CM

## 2024-10-23 DIAGNOSIS — K74.60 CIRRHOSIS OF LIVER WITH ASCITES, UNSPECIFIED HEPATIC CIRRHOSIS TYPE (HCC): ICD-10-CM

## 2024-10-23 DIAGNOSIS — R18.8 CIRRHOSIS OF LIVER WITH ASCITES, UNSPECIFIED HEPATIC CIRRHOSIS TYPE (HCC): ICD-10-CM

## 2024-10-24 ENCOUNTER — PRE-PROCEDURE TELEPHONE (OUTPATIENT)
Dept: INTERVENTIONAL RADIOLOGY/VASCULAR | Age: 79
End: 2024-10-24

## 2024-10-24 NOTE — FLOWSHEET NOTE
Call placed to patient re: upcoming IR procedure appointment on 10/25/24 at 1300 / arrival at 1230. Patient confirmed she will be here. No questions / concerns noted. Electronically signed by Stefani Gonzales RN on 10/24/2024 at 11:09 AM

## 2024-10-25 ENCOUNTER — HOSPITAL ENCOUNTER (OUTPATIENT)
Dept: INTERVENTIONAL RADIOLOGY/VASCULAR | Age: 79
Discharge: HOME | End: 2024-10-27
Payer: MEDICARE

## 2024-10-25 DIAGNOSIS — R18.8 CIRRHOSIS OF LIVER WITH ASCITES, UNSPECIFIED HEPATIC CIRRHOSIS TYPE (HCC): ICD-10-CM

## 2024-10-25 DIAGNOSIS — K74.60 CIRRHOSIS OF LIVER WITH ASCITES, UNSPECIFIED HEPATIC CIRRHOSIS TYPE (HCC): ICD-10-CM

## 2024-10-25 DIAGNOSIS — R18.8 OTHER ASCITES: ICD-10-CM

## 2024-10-25 PROCEDURE — 2709999900 IR US GUIDED PARACENTESIS

## 2024-10-25 PROCEDURE — 49083 ABD PARACENTESIS W/IMAGING: CPT

## 2024-10-25 PROCEDURE — 76705 ECHO EXAM OF ABDOMEN: CPT | Performed by: RADIOLOGY

## 2024-10-25 NOTE — FLOWSHEET NOTE
1310 Pts daughter Jerica present and updated on plan of care.  pt brought to CT holding room via w/c. Assisted to cart into  left side lying position.     1320 [facility] IR tech scanned abdomen with US, image obtained.    Sara Hernández CNP, to come evaluate pt for paracentesis.  Pt resting with warm blanket, call light in reach.     1345 Sara Hernández CNP evaluated abdomen with US.   Not enough fluid at this time to safely remove to to bowel activity. Images taken.   Pt denies any abdominal pain or nausea at this time..   Support given to pt.  New appt given for 11/1 @ 1300 for next paracentesis.   Assisted pt into w/c, gait steady.  Take to daughters car, updated.  D/c to home with daughter.

## 2024-11-01 ENCOUNTER — HOSPITAL ENCOUNTER (OUTPATIENT)
Dept: INTERVENTIONAL RADIOLOGY/VASCULAR | Age: 79
Discharge: HOME | End: 2024-11-03
Payer: MEDICARE

## 2024-11-01 VITALS
SYSTOLIC BLOOD PRESSURE: 141 MMHG | HEART RATE: 63 BPM | OXYGEN SATURATION: 97 % | RESPIRATION RATE: 14 BRPM | DIASTOLIC BLOOD PRESSURE: 65 MMHG

## 2024-11-01 DIAGNOSIS — K74.60 CIRRHOSIS OF LIVER WITH ASCITES, UNSPECIFIED HEPATIC CIRRHOSIS TYPE (HCC): ICD-10-CM

## 2024-11-01 DIAGNOSIS — R18.8 CIRRHOSIS OF LIVER WITH ASCITES, UNSPECIFIED HEPATIC CIRRHOSIS TYPE (HCC): ICD-10-CM

## 2024-11-01 DIAGNOSIS — R18.8 OTHER ASCITES: ICD-10-CM

## 2024-11-01 LAB
APPEARANCE FLUID: CLEAR
CELL COUNT FLUID TYPE: NORMAL
CLOT EVALUATION: NORMAL
COLOR FLUID: YELLOW
FLUID PATH CONSULT: YES
LYMPHOCYTES, BODY FLUID: 61 %
MONOCYTE, FLUID: 26 %
NEUTROPHIL, FLUID: 13 %
NUCLEATED CELLS FLUID: 231 /CUMM
NUMBER OF CELLS COUNTED FLUID: 100
RBC FLUID: NORMAL /CUMM

## 2024-11-01 PROCEDURE — 49083 ABD PARACENTESIS W/IMAGING: CPT | Performed by: RADIOLOGY

## 2024-11-01 PROCEDURE — 87070 CULTURE OTHR SPECIMN AEROBIC: CPT

## 2024-11-01 PROCEDURE — 89051 BODY FLUID CELL COUNT: CPT

## 2024-11-01 PROCEDURE — 49083 ABD PARACENTESIS W/IMAGING: CPT

## 2024-11-01 PROCEDURE — 2500000003 HC RX 250 WO HCPCS: Performed by: NURSE PRACTITIONER

## 2024-11-01 PROCEDURE — C1729 CATH, DRAINAGE: HCPCS

## 2024-11-01 PROCEDURE — 87116 MYCOBACTERIA CULTURE: CPT

## 2024-11-01 RX ORDER — LIDOCAINE HYDROCHLORIDE 20 MG/ML
INJECTION, SOLUTION INFILTRATION; PERINEURAL PRN
Status: COMPLETED | OUTPATIENT
Start: 2024-11-01 | End: 2024-11-01

## 2024-11-01 RX ADMIN — LIDOCAINE HYDROCHLORIDE 15 ML: 20 INJECTION, SOLUTION INFILTRATION; PERINEURAL at 13:32

## 2024-11-01 NOTE — OR NURSING
PARACENTESIS     1305 - Pt arrived to [facility] room 6 via WC. A&Ox4, calm and cooperative. Hx, allergies, medications reviewed.  Consent verified from previous encounter for ultrasound guided paracentesis.     Pt assisted onto cart and positioned lying propped to left side using a pillow wedge. Abdomen assessed with ultrasound to confirm fluid. VSS, on RA. Denies pain.      1323 - Sara Hernández CNP arrived and marking procedural site. Left side of abdomen cleansed with large tinted chloraprep. After 3 minute dry time, draped with fenestrated drape and sterile towels by VORA, CNP.     1326 - Timeout completed for procedure.     1332 - Using U/S guidance, Sara Hernández CNP numbed site with 2% lidocaine, see eMar.     1338 - Using U/S guidance, access obtained with [id number] centesis 5F catheter with return of fluid that appears clear yellow. Sample of fluid collected and placed into specimen container for ordered diagnostics. Catheter connected to tubing and Marsha machine and draining without issue. Pt tolerating well . VSS.     1403 - Drainage complete. Total 4630 ml removed. Centesis catheter removed and digital pressure held to site for 1 minute.     LLQ site soft, no drainage, large bandaid applied. VSS.     1408 - Discharge instructions reviewed. Next paracentesis appointment made per patient request. Pt taken to hospital entrance via  and daughter to drive them home. Electronically signed by Stefani Gonzales RN on 11/1/2024 at 2:12 PM

## 2024-11-01 NOTE — DISCHARGE INSTRUCTIONS
Ultrasound Guided Paracentesis Discharge Instructions     Rest today. No heavy lifting, bending, stretching or pulling.  Some tenderness will be normal at the procedure site for a few days.    You may remove the bandaid in 24-48 hours.     If you experience any drainage or bleeding at the site, hold pressure for 30 minutes and lay on side opposite of the procedure site (move fluid away from the area of leakage). If drainage or bleeding does not stop and seems excessive, call your physician or proceed to the ER.      Watch for signs of infection such as fever, chills, drainage or redness at the site. If you experience any of these symptoms, call your primary doctor or go to the emergency room.       Please keep all follow-up appointments with your Hepatologist. Schedule follow-up appointment for repeat paracentesis if necessary - Dr. Nelson's office at [phone].     If you have any concerns please contact the [facility] Radiology Department at [phone].

## 2024-11-03 LAB
AFB STAIN: NORMAL
BACTERIA FLD AEROBE CULT: NORMAL
PRELIMINARY: NORMAL

## 2024-11-04 LAB — PATH CONSULT FLUID: NORMAL

## 2024-11-06 LAB — BACTERIA FLD AEROBE CULT: NORMAL

## 2024-11-06 RX ORDER — ACETAMINOPHEN 500 MG
500 TABLET ORAL ONCE
Start: 2024-11-06 | End: 2024-11-06

## 2024-11-08 ENCOUNTER — PRE-PROCEDURE TELEPHONE (OUTPATIENT)
Dept: INTERVENTIONAL RADIOLOGY/VASCULAR | Age: 79
End: 2024-11-08

## 2024-11-08 DIAGNOSIS — R18.8 OTHER ASCITES: Primary | ICD-10-CM

## 2024-11-08 NOTE — FLOWSHEET NOTE
Pre-procedure call placed to patient re: upcoming IR appointment on 11/11/24. Spoke with patient who is aware of procedural time of 1300 / arrival time of 1230. Reports she will be there. No questions / concerns noted. Electronically signed by Stefani Gonzales RN on 11/8/2024 at 1:06 PM

## 2024-11-11 ENCOUNTER — HOSPITAL ENCOUNTER (OUTPATIENT)
Dept: INTERVENTIONAL RADIOLOGY/VASCULAR | Age: 79
Discharge: HOME | End: 2024-11-13
Payer: MEDICARE

## 2024-11-11 VITALS
OXYGEN SATURATION: 97 % | HEART RATE: 62 BPM | DIASTOLIC BLOOD PRESSURE: 89 MMHG | RESPIRATION RATE: 14 BRPM | SYSTOLIC BLOOD PRESSURE: 149 MMHG

## 2024-11-11 DIAGNOSIS — R18.8 OTHER ASCITES: ICD-10-CM

## 2024-11-11 PROCEDURE — 96365 THER/PROPH/DIAG IV INF INIT: CPT

## 2024-11-11 PROCEDURE — 2709999900 IR US GUIDED PARACENTESIS

## 2024-11-11 PROCEDURE — 2500000003 HC RX 250 WO HCPCS: Performed by: NURSE PRACTITIONER

## 2024-11-11 PROCEDURE — 6360000002 HC RX W HCPCS: Performed by: NURSE PRACTITIONER

## 2024-11-11 PROCEDURE — 49083 ABD PARACENTESIS W/IMAGING: CPT

## 2024-11-11 PROCEDURE — P9047 ALBUMIN (HUMAN), 25%, 50ML: HCPCS | Performed by: NURSE PRACTITIONER

## 2024-11-11 PROCEDURE — 49083 ABD PARACENTESIS W/IMAGING: CPT | Performed by: RADIOLOGY

## 2024-11-11 RX ORDER — ALBUMIN (HUMAN) 12.5 G/50ML
SOLUTION INTRAVENOUS CONTINUOUS PRN
Status: COMPLETED | OUTPATIENT
Start: 2024-11-11 | End: 2024-11-11

## 2024-11-11 RX ORDER — LIDOCAINE HYDROCHLORIDE 20 MG/ML
INJECTION, SOLUTION INFILTRATION; PERINEURAL PRN
Status: COMPLETED | OUTPATIENT
Start: 2024-11-11 | End: 2024-11-11

## 2024-11-11 RX ADMIN — ALBUMIN (HUMAN) 25 G: 12.5 SOLUTION INTRAVENOUS at 14:41

## 2024-11-11 RX ADMIN — LIDOCAINE HYDROCHLORIDE 10 ML: 20 INJECTION, SOLUTION INFILTRATION; PERINEURAL at 13:55

## 2024-11-11 NOTE — OR NURSING
1334 Pt arrived to Specials room 6 via WC. Hx, allergies, medications reviewed. Pt denies pain at this time. Consent verified for ultrasound guided paracentesis. Confirmed pt not taking any blood thinners or anticoagulation.     1335 Pt assisted onto cart and turned onto left side, foam wedge placed to assist with positioning. Abdomen assessed with ultrasound to confirm fluid. VSS.     1340 Requested HA CNP to evaluate fluid.     1343 HA in to evaluate for fluid and confirms we may proceed with procedure.     1345 Area cleansed with large tinted chloraprep. After 3 minute dry time, draped with fenestrated drape and sterile towels by HILARY PARRA.     1353 Timeout completed. Reviewed last PTINR and platelet levels.     1354 Using U/S guidance, Sara Hernández CNP numbed site with 2% lidocaine, see eMar.     1356 Using U/S guidance, access obtained with [id number] centesis 5F catheter with return of fluid that appears clear yellow. Catheter connected to tubing and Marsha machine with suction at 200 mmHG and draining well. Pt tolerating well . VSS.     1409 MJ RN in an made next apt with patient. Pt aware of time and date.

## 2024-11-11 NOTE — DISCHARGE INSTRUCTIONS
Ultrasound Guided Paracentesis Discharge Instructions     Rest today. No heavy lifting, bending, stretching or pulling.  Some tenderness will be normal at the procedure site for a few days.    You may remove the bandaid in 48 hours.     If you experience any drainage or bleeding at the site, hold pressure for 30 minutes and lay on side opposite of the procedure site (move fluid away from the area of leakage). If drainage or bleeding does not stop and seems excessive, call your physician or proceed to the ER.      Watch for signs of infection such as fever, chills, drainage or redness at the site. If you experience any of these symptoms, call your primary doctor or go to the emergency room.       Please keep all follow-up appointments with your Hepatologist. Schedule follow-up appointment for repeat paracentesis if necessary.     If you have any concerns please contact the [facility] Radiology Department at [phone].

## 2024-11-11 NOTE — OR NURSING
1430 - Assumed care of patient during paracentesis procedure. Patient resting on cart, alert and appropriate. Hand off of care received from HILARY RN.     1438 - IV #20 inserted LAC by GUY RN. Patient tolerated well. Albumin administered per order, see eMAR.     1440 - Pt tolerated well. Total 5150 ml removed. Catheter removed, pressure held and bandaid applied. Verbal and tactile reassurance given throughout.     1455 - Albumin completed. IV removed. VS remain stable. Patient assisted back into . D/C instructions reviewed and understanding indicated. Taken to hospital entrance where met by daughter who will drive her home. Electronically signed by Stefani Gonzales RN on 11/11/2024 at 2:58 PM

## 2024-11-12 ENCOUNTER — POST-OP TELEPHONE (OUTPATIENT)
Dept: INTERVENTIONAL RADIOLOGY/VASCULAR | Age: 79
End: 2024-11-12

## 2024-11-12 NOTE — FLOWSHEET NOTE
Post procedural call placed and spoke with patient. Denies any issues at site, reports feeling well. Aware of next paracentesis appointment which is scheduled. Encouraged to call us back if any concerns arise. Understanding indicated. Electronically signed by Stefani Gonzales RN on 11/12/2024 at 11:14 AM

## 2024-11-20 ENCOUNTER — PRE-PROCEDURE TELEPHONE (OUTPATIENT)
Dept: INTERVENTIONAL RADIOLOGY/VASCULAR | Age: 79
End: 2024-11-20

## 2024-11-20 NOTE — FLOWSHEET NOTE
Apt. reminder called to patient. Pt aware she is to arrive at 1030 AM on 11/21/24 for her 1100 AM paracentesis apt. No questions or concerns present.  Electronically signed by Sharmaine Cartagena RN on 11/20/2024 at 9:49 AM

## 2024-11-21 ENCOUNTER — HOSPITAL ENCOUNTER (OUTPATIENT)
Dept: INTERVENTIONAL RADIOLOGY/VASCULAR | Age: 79
Discharge: HOME | End: 2024-11-23
Payer: MEDICARE

## 2024-11-21 VITALS — HEART RATE: 73 BPM | OXYGEN SATURATION: 95 % | DIASTOLIC BLOOD PRESSURE: 60 MMHG | SYSTOLIC BLOOD PRESSURE: 123 MMHG

## 2024-11-21 DIAGNOSIS — R18.8 OTHER ASCITES: ICD-10-CM

## 2024-11-21 PROCEDURE — 49083 ABD PARACENTESIS W/IMAGING: CPT

## 2024-11-21 PROCEDURE — C1729 CATH, DRAINAGE: HCPCS

## 2024-11-21 NOTE — OR NURSING
No sedation     1050 Pt arrived to Specials room 6 via WC. Hx, allergies, medications reviewed. Pt denies pain at this time. Consent obtained for ultrasound guided paracentesis.     1055 Pt assisted onto cart and turned onto left side, foam wedge placed to assist with positioning. Abdomen assessed with ultrasound to confirm fluid. VSS.     1105 Sara Hernández CNP in to evaluate patients abdomen with ultrasound at this time.  Pt not symptomatic with ascites at this time. Per Sara Hernández CNP, we can reschedule for next week when pt accumulates more fluid.     1110 Call to pts daughter to notify and reschedule for next week. Apt time confirmed with patient and daughter. Pt to arrive at 1000 on Wed Nov 27th for paracentesis.     1113 Pt taken to discharge exit via WC. Pts daughter to drive her home.

## 2024-12-02 ENCOUNTER — HOSPITAL ENCOUNTER (OUTPATIENT)
Dept: INTERVENTIONAL RADIOLOGY/VASCULAR | Age: 79
Discharge: HOME | End: 2024-12-04
Payer: MEDICARE

## 2024-12-02 VITALS
DIASTOLIC BLOOD PRESSURE: 67 MMHG | SYSTOLIC BLOOD PRESSURE: 155 MMHG | HEART RATE: 80 BPM | RESPIRATION RATE: 16 BRPM | TEMPERATURE: 97.3 F | OXYGEN SATURATION: 96 %

## 2024-12-02 DIAGNOSIS — R18.8 OTHER ASCITES: ICD-10-CM

## 2024-12-02 LAB
ALBUMIN FLUID: 0.8 G/DL
AMYLASE FLUID: 20 U/L
APPEARANCE FLUID: CLEAR
CELL COUNT FLUID TYPE: NORMAL
CLOT EVALUATION: NORMAL
COLOR FLUID: NORMAL
FLUID PATH CONSULT: YES
FLUID TYPE: NORMAL
GLUCOSE FLD-MCNC: 116 MG/DL
LACTATE DEHYDROGENASE, FLUID: 35 U/L
LYMPHOCYTES, BODY FLUID: 86 %
NEUTROPHIL, FLUID: 14 %
NUCLEATED CELLS FLUID: 93 /CUMM
NUMBER OF CELLS COUNTED FLUID: 100
PROT FLD-MCNC: 1 G/DL
RBC FLUID: <2000 /CUMM
SPECIMEN TYPE: NORMAL

## 2024-12-02 PROCEDURE — 88112 CYTOPATH CELL ENHANCE TECH: CPT

## 2024-12-02 PROCEDURE — 6360000002 HC RX W HCPCS: Performed by: NURSE PRACTITIONER

## 2024-12-02 PROCEDURE — 96365 THER/PROPH/DIAG IV INF INIT: CPT

## 2024-12-02 PROCEDURE — 87205 SMEAR GRAM STAIN: CPT

## 2024-12-02 PROCEDURE — 82945 GLUCOSE OTHER FLUID: CPT

## 2024-12-02 PROCEDURE — 2709999900 IR US GUIDED PARACENTESIS

## 2024-12-02 PROCEDURE — 87070 CULTURE OTHR SPECIMN AEROBIC: CPT

## 2024-12-02 PROCEDURE — P9047 ALBUMIN (HUMAN), 25%, 50ML: HCPCS | Performed by: NURSE PRACTITIONER

## 2024-12-02 PROCEDURE — 83615 LACTATE (LD) (LDH) ENZYME: CPT

## 2024-12-02 PROCEDURE — 49083 ABD PARACENTESIS W/IMAGING: CPT

## 2024-12-02 PROCEDURE — 89051 BODY FLUID CELL COUNT: CPT

## 2024-12-02 PROCEDURE — 82150 ASSAY OF AMYLASE: CPT

## 2024-12-02 PROCEDURE — 88305 TISSUE EXAM BY PATHOLOGIST: CPT

## 2024-12-02 PROCEDURE — 82042 OTHER SOURCE ALBUMIN QUAN EA: CPT

## 2024-12-02 PROCEDURE — 84157 ASSAY OF PROTEIN OTHER: CPT

## 2024-12-02 RX ORDER — LIDOCAINE HYDROCHLORIDE 20 MG/ML
INJECTION, SOLUTION INFILTRATION; PERINEURAL PRN
Status: COMPLETED | OUTPATIENT
Start: 2024-12-02 | End: 2024-12-02

## 2024-12-02 RX ORDER — ALBUMIN (HUMAN) 12.5 G/50ML
SOLUTION INTRAVENOUS CONTINUOUS PRN
Status: COMPLETED | OUTPATIENT
Start: 2024-12-02 | End: 2024-12-02

## 2024-12-02 RX ADMIN — LIDOCAINE HYDROCHLORIDE 10 ML: 20 INJECTION, SOLUTION INFILTRATION; PERINEURAL at 09:24

## 2024-12-02 RX ADMIN — ALBUMIN (HUMAN) 25 G: 12.5 SOLUTION INTRAVENOUS at 10:03

## 2024-12-02 NOTE — OR NURSING
NO SEDATION      Pt arrived to Specials room 6 via ambulation, gait steady. Pt A&Ox4, respirations even and unlabored, abdomen distended and slightly firm. Hx, allergies, medications reviewed.  Pt c/o not feeling well for over week (nauseous, diarrhea, fatigue), denies fever. Pt offered reassurance and VSS, afebrile. Pt denies pain at this time but does have intermittently, 8/10 when has the aching pain. Consent confirmed for ultrasound guided paracentesis from 11/21/24 with HA-CNP.     U/S image obtained, HA-CNP approved LLQ site.     Area cleansed with large tinted chloraprep. After 3 minute dry time, draped with fenestrated drape and sterile towels by AJ-RN.     Timeout completed.     Using U/S guidance, HA-CNP numbed site with 2% lidocaine, see eMar.     Using U/S guidance, access obtained with [id number] centesis 5F catheter with return of fluid that appears clear yellow. ~50ml aspirated for diagnostics since pt c/o not feeling well (nauseous, diarrhea, fatigue). Catheter connected to tubing and Marsha machine with suction at 200 mmHG and draining well. Pt tolerating well. VSS.     Over 5L removed at this time. IV #22G started in left FA, GBR, flushed well. Albumin infusion started and pt tolerated well, see eMar. VSS.    Drainage complete. Total 6340ml removed. Centesis catheter removed and digital pressure held to site for 2 minutes.     LLQ site soft, no drainage, large bandaid applied. VSS.     Pt provided discharge instructions and verbalized understanding. Next appt confirmed.     Albumin complete. IV removed with cath intact, no complications, dressing applied.     Specimens taken to lab.     Pt discharged with belongings to [facility]'s vehicle via ambulation, gait steady.Electronically signed by Chica Power RN on 12/2/2024 at 10:24 AM

## 2024-12-02 NOTE — DISCHARGE INSTRUCTIONS
Ultrasound Guided Paracentesis    Activity:  Rest, avoid strenuous activity such as lifting heavy objects, and bending, stretching or pulling.     Diet:  Resume usual diet    Medication:  * Resume home medication unless otherwise instructed by your physician.  * (If Applicable) If taking any blood thinners, speak with your physician before restarting them.  * May take mild pain reliever, as needed, such as Acetaminophen or Ibuprofen.    INSTRUCTIONS OR COMMENTS RELATIVE TO SPECIFIC EXAM PERFORMED:    - Some tenderness at site of paracentesis will be normal for a few days.  - May remove band aid after 48 hours.   - Monitor the site for the next 24 - 48 hours.  - If you experience any drainage or bleeding at the site, hold pressure for 30 minutes and lay on side opposite of the procedure site (move fluid away from the   area of leakage).  If drainage or bleeding does not stop and seems excessive, call your physician or go to the ER for evaluation.   - If any signs of infection (redness, swelling, drainage/pus) at the site, go to ER for evaluation.   - Please keep all follow-up appointments with your Hepatologist. Schedule follow-up appointment for repeat paracentesis if necessary.       IF YOU DEVELOP ANY OF THE FOLLOWING SYMPTOMS, CONTACT PHYSICIAN LISTED BELOW:  Physician performing procedure: DR. Nelson - [phone] or [phone] and ask to be put in contact with the RADIOLOGY RN. After hours contact ER.  * Extreme pain that increases after leaving the hospital  * Active bleeding (refer to above instructions)  * Chills, fever above 100 degrees Farenheit and fatigue.  * Weakness, dizziness, lightheadedness or fainting.    If you are unable to contact any of the above physician and you feel you have a major problem, please go to the Emergency Room for treatment.

## 2024-12-03 LAB — PATH CONSULT FLUID: NORMAL

## 2024-12-08 LAB — BACTERIA FLD AEROBE CULT: NORMAL

## 2024-12-11 ENCOUNTER — PRE-PROCEDURE TELEPHONE (OUTPATIENT)
Dept: INTERVENTIONAL RADIOLOGY/VASCULAR | Age: 79
End: 2024-12-11

## 2024-12-11 NOTE — FLOWSHEET NOTE
Pre-procedural reminder call placed to patient - she didn't answer, unable to leave voicemail. Call then placed to daughter Jerica and information given re: IR paracentesis appointment 12/12/24 at 1330. Understanding indicated, no issues / concerns. Electronically signed by Stefani Gonzales RN on 12/11/2024 at 2:26 PM

## 2024-12-12 ENCOUNTER — HOSPITAL ENCOUNTER (OUTPATIENT)
Dept: INTERVENTIONAL RADIOLOGY/VASCULAR | Age: 79
Discharge: HOME | End: 2024-12-14
Payer: MEDICARE

## 2024-12-12 VITALS — DIASTOLIC BLOOD PRESSURE: 61 MMHG | OXYGEN SATURATION: 98 % | SYSTOLIC BLOOD PRESSURE: 131 MMHG | HEART RATE: 77 BPM

## 2024-12-12 DIAGNOSIS — R18.8 OTHER ASCITES: ICD-10-CM

## 2024-12-12 PROCEDURE — 49083 ABD PARACENTESIS W/IMAGING: CPT

## 2024-12-12 PROCEDURE — 96365 THER/PROPH/DIAG IV INF INIT: CPT

## 2024-12-12 PROCEDURE — 6360000002 HC RX W HCPCS: Performed by: NURSE PRACTITIONER

## 2024-12-12 PROCEDURE — 2709999900 IR US GUIDED PARACENTESIS

## 2024-12-12 PROCEDURE — P9047 ALBUMIN (HUMAN), 25%, 50ML: HCPCS | Performed by: NURSE PRACTITIONER

## 2024-12-12 RX ORDER — LIDOCAINE HYDROCHLORIDE 20 MG/ML
INJECTION, SOLUTION INFILTRATION; PERINEURAL PRN
Status: COMPLETED | OUTPATIENT
Start: 2024-12-12 | End: 2024-12-12

## 2024-12-12 RX ORDER — ALBUMIN (HUMAN) 12.5 G/50ML
SOLUTION INTRAVENOUS CONTINUOUS PRN
Status: COMPLETED | OUTPATIENT
Start: 2024-12-12 | End: 2024-12-12

## 2024-12-12 RX ADMIN — LIDOCAINE HYDROCHLORIDE 20 ML: 20 INJECTION, SOLUTION INFILTRATION; PERINEURAL at 14:05

## 2024-12-12 RX ADMIN — ALBUMIN (HUMAN) 25 G: 12.5 SOLUTION INTRAVENOUS at 14:40

## 2024-12-12 NOTE — OR NURSING
1340 Pt arrived to Specials room 6 via ambulation. Hx, allergies, medications reviewed. Pt denies pain at this time. Consent verified for ultrasound guided paracentesis.     1341 Pt assisted onto cart and turned onto left side, foam wedge placed to assist with positioning. Abdomen assessed with ultrasound to confirm fluid. VSS.     1343 Area cleansed with large tinted chloraprep. After 3 minute dry time, draped with fenestrated drape and sterile towels by LETTY RN.     1401 Timeout completed.     1405 Using U/S guidance, Sara Hernández CNP numbed site with 2% lidocaine, see eMar.     1407 Using U/S guidance, access obtained with [id number] centesis 5F catheter with return of fluid that appears clear yellow. Catheter connected to tubing and Marsha machine with suction at 200 mmHG and draining well. Pt tolerating  well. VSS.     1440 Iv established to L FA, albumin infusing at this time.     1500 Drainage complete. Total 5490 ml removed. Centesis catheter removed and digital pressure held to site for 1 minute.  LLQ site soft, no drainage, large bandaid applied. VSS.     1524 Discharge instructions reviewed. Pt taken to discharge exit. Pts daughter to drive her home. Pt taken to discharge exit via WC.

## 2024-12-12 NOTE — DISCHARGE INSTRUCTIONS
Ultrasound Guided Paracentesis Discharge Instructions     Rest today. No heavy lifting, bending, stretching or pulling.  Some tenderness will be normal at the procedure site for a few days.    You may remove the bandaid in 48 hours.     If you experience any drainage or bleeding at the site, hold pressure for 30 minutes and lay on side opposite of the procedure site (move fluid away from the area of leakage). If drainage or bleeding does not stop and seems excessive, call your physician or proceed to the ER.      Watch for signs of infection such as fever, chills, drainage or redness at the site. If you experience any of these symptoms, call your primary doctor or go to the emergency room.       Please keep all follow-up appointments with your Hepatologist. Schedule follow-up appointment for repeat paracentesis if necessary.     If you have any concerns please contact the [facility] Radiology Department at [phone].

## 2024-12-16 DIAGNOSIS — R18.8 OTHER ASCITES: Primary | ICD-10-CM

## 2024-12-19 ENCOUNTER — TELEPHONE (OUTPATIENT)
Dept: INTERVENTIONAL RADIOLOGY/VASCULAR | Age: 79
End: 2024-12-19

## 2024-12-19 NOTE — TELEPHONE ENCOUNTER
Patient's daughter, Jerica, was given this information via phone conversation - voiced understanding  2.  Spoke to Nany in Specials to schedule procedure    >  Paracentesis is scheduled on 12/23/2024 @ 11:00 am   >  You will need to arrive at 10:30 am from home and check in at the Diagnostic Imaging Check In desk.   >  Patient to have PT/INR and CBC drawn anytime between now and 1 day prior to procedure  >  If patient cannot get labs drawn prior to procedure - she will come in at 10:00 am day of procedure to get STAT labs drawn by Radiology RN

## 2024-12-20 DIAGNOSIS — R18.8 OTHER ASCITES: ICD-10-CM

## 2024-12-20 LAB
ERYTHROCYTE [DISTWIDTH] IN BLOOD BY AUTOMATED COUNT: 17 % (ref 11.5–14.5)
HCT VFR BLD AUTO: 43.6 % (ref 37–47)
HGB BLD-MCNC: 13.4 G/DL (ref 12–16)
INR PPP: 1.1
MCH RBC QN AUTO: 26.7 PG (ref 27–31.3)
MCHC RBC AUTO-ENTMCNC: 30.7 % (ref 33–37)
MCV RBC AUTO: 87 FL (ref 79.4–94.8)
PLATELET # BLD AUTO: 109 K/UL (ref 130–400)
PROTHROMBIN TIME: 14 SEC (ref 12.3–14.9)
RBC # BLD AUTO: 5.01 M/UL (ref 4.2–5.4)
WBC # BLD AUTO: 3.5 K/UL (ref 4.8–10.8)

## 2024-12-26 ENCOUNTER — TELEPHONE (OUTPATIENT)
Dept: INTERVENTIONAL RADIOLOGY/VASCULAR | Age: 79
End: 2024-12-26

## 2024-12-26 NOTE — TELEPHONE ENCOUNTER
Pre-procedure reminder call made to patient re: paracentesis on 12/27/24. No answer. Call placed to pt's daughter, Jerica, who confirmed 1230 arrival on 12/27/24.   Prednisone Pregnancy And Lactation Text: This medication is Pregnancy Category C and it isn't know if it is safe during pregnancy. This medication is excreted in breast milk.

## 2024-12-27 ENCOUNTER — HOSPITAL ENCOUNTER (OUTPATIENT)
Dept: INTERVENTIONAL RADIOLOGY/VASCULAR | Age: 79
Discharge: HOME | End: 2024-12-29
Payer: MEDICARE

## 2024-12-27 VITALS
OXYGEN SATURATION: 97 % | HEART RATE: 85 BPM | RESPIRATION RATE: 16 BRPM | DIASTOLIC BLOOD PRESSURE: 70 MMHG | SYSTOLIC BLOOD PRESSURE: 163 MMHG | TEMPERATURE: 98.1 F

## 2024-12-27 DIAGNOSIS — R18.8 OTHER ASCITES: ICD-10-CM

## 2024-12-27 PROCEDURE — 96365 THER/PROPH/DIAG IV INF INIT: CPT

## 2024-12-27 PROCEDURE — 6360000002 HC RX W HCPCS: Performed by: RADIOLOGY

## 2024-12-27 PROCEDURE — P9047 ALBUMIN (HUMAN), 25%, 50ML: HCPCS | Performed by: RADIOLOGY

## 2024-12-27 PROCEDURE — 2709999900 IR US GUIDED PARACENTESIS

## 2024-12-27 PROCEDURE — 49083 ABD PARACENTESIS W/IMAGING: CPT

## 2024-12-27 RX ORDER — LIDOCAINE HYDROCHLORIDE 20 MG/ML
INJECTION, SOLUTION INFILTRATION; PERINEURAL PRN
Status: COMPLETED | OUTPATIENT
Start: 2024-12-27 | End: 2024-12-27

## 2024-12-27 RX ORDER — ALBUMIN (HUMAN) 12.5 G/50ML
SOLUTION INTRAVENOUS CONTINUOUS PRN
Status: COMPLETED | OUTPATIENT
Start: 2024-12-27 | End: 2024-12-27

## 2024-12-27 RX ADMIN — LIDOCAINE HYDROCHLORIDE 10 ML: 20 INJECTION, SOLUTION INFILTRATION; PERINEURAL at 13:09

## 2024-12-27 RX ADMIN — ALBUMIN (HUMAN) 25 G: 12.5 SOLUTION INTRAVENOUS at 13:40

## 2024-12-27 NOTE — DISCHARGE INSTRUCTIONS
Ultrasound Guided Paracentesis Discharge Instructions     Rest today. No heavy lifting, bending, stretching or pulling.  Some tenderness will be normal at the procedure site for a few days.    You may remove the bandaid in 48 hours.     If you experience any drainage or bleeding at the site, hold pressure for 30 minutes and lay on side opposite of the procedure site (move fluid away from the area of leakage). If drainage or bleeding does not stop and seems excessive, call your physician or proceed to the ER.      Watch for signs of infection such as fever, chills, drainage or redness at the site. If you experience any of these symptoms, call your primary doctor or go to the emergency room.       Please keep all follow-up appointments with your Hepatologist. Schedule follow-up appointment for repeat paracentesis if necessary.     If you have any concerns please contact the [facility] Radiology Department at [phone].

## 2024-12-27 NOTE — OR NURSING
NO SEDATION  Pt to Specials room 6 via wheelchair. Alert and oriented.   U/S tech obtained images prior to start of procedure using U/S. Pt  VSS.   Pt's left lower abdomen cleansed with chloraprep. After 3 minute dry time, draped with sterile drape and towels.    1308 Procedure explained, consent signed.  Timeout completed for Ultrasound Guided Paracentesis. Using U/S, Dr. Gardiner marked pt's left lower abdomen.    1309 Site numbed with lidocaine.  [id number] Centesis 5F catheter placed using Ultrasound guidance.  Fluid appears pale, clear yellow. Catheter tubing connected to [employer] machine to remove fluid.    1340 IV started in right forearm and albumin 25g given IV for paracentesis >5L.    1347 Pt tolerated well. Total 5860 ml removed. Catheter removed, pressure held and bandaid applied. Verbal and tactile reassurance given throughout. Call placed to pt's daughter who is waiting outside. Pt taken to door via w/c.

## 2024-12-28 LAB
AFB STAIN: NORMAL
FINAL REPORT: NORMAL
PRELIMINARY: NORMAL

## 2024-12-30 ENCOUNTER — TELEPHONE (OUTPATIENT)
Dept: INTERVENTIONAL RADIOLOGY/VASCULAR | Age: 79
End: 2024-12-30

## 2024-12-30 NOTE — TELEPHONE ENCOUNTER
Post procedure phone call placed. This patient had a procedure done by Dr. Gardiner on 12/27/2024. I tried to reach the patient but no answer on her phone. I then called her daughter, Jerica and spoke to her. She stated that since the paracentesis her mother has complained of a somewhat bloated stomach and \"Things shifting in there.\" Emotional support given. Instructed Jerica to see if her mother had a fever and was in pain. Also informed her that the body does try to readjust  once the fluid has been removed and that may be what she is feeling. Also encouraged them to call the physician if she has a fever,bloating continues, and if she has pain. Jerica denied that her mother was in pain, but would try to reach her by phone and check on her.

## 2025-01-03 ENCOUNTER — PRE-PROCEDURE TELEPHONE (OUTPATIENT)
Dept: INTERVENTIONAL RADIOLOGY/VASCULAR | Age: 80
End: 2025-01-03

## 2025-01-03 NOTE — FLOWSHEET NOTE
Pre-procedure call placed, spoke with patient and reminder given for IR appointment  on Monday 1/6/25 at 1100, arrival time of 1030. Understanding indicated. Electronically signed by Stefani Gonzales RN on 1/3/2025 at 1:38 PM

## 2025-01-06 ENCOUNTER — HOSPITAL ENCOUNTER (OUTPATIENT)
Dept: INTERVENTIONAL RADIOLOGY/VASCULAR | Age: 80
Discharge: HOME | End: 2025-01-08
Payer: MEDICARE

## 2025-01-06 VITALS
OXYGEN SATURATION: 96 % | HEART RATE: 57 BPM | DIASTOLIC BLOOD PRESSURE: 59 MMHG | SYSTOLIC BLOOD PRESSURE: 105 MMHG | RESPIRATION RATE: 14 BRPM

## 2025-01-06 PROCEDURE — 49083 ABD PARACENTESIS W/IMAGING: CPT | Performed by: RADIOLOGY

## 2025-01-06 PROCEDURE — 49083 ABD PARACENTESIS W/IMAGING: CPT

## 2025-01-06 PROCEDURE — 2709999900 IR US GUIDED PARACENTESIS

## 2025-01-06 PROCEDURE — 6360000002 HC RX W HCPCS: Performed by: RADIOLOGY

## 2025-01-06 RX ORDER — LIDOCAINE HYDROCHLORIDE 20 MG/ML
INJECTION, SOLUTION INFILTRATION; PERINEURAL PRN
Status: COMPLETED | OUTPATIENT
Start: 2025-01-06 | End: 2025-01-06

## 2025-01-06 RX ADMIN — LIDOCAINE HYDROCHLORIDE 7 ML: 20 INJECTION, SOLUTION INFILTRATION; PERINEURAL at 11:56

## 2025-01-06 NOTE — OR NURSING
NO SEDATION    1145 RN-GUY obtained images prior to start of procedure using U/S. Pt  VSS.  Procedure explained, consent signed.      1152 Timeout completed for Ultrasound Guided Paracentesis. Using U/S, Dr. Nelson marked, prepped left lower abdomen with Chloraprep.    1155 After 3 minute dry time, draped with sterile drape and towels.    1156 Site numbed with lidocaine.  [id number] Centesis 5F catheter placed using Ultrasound guidance.  Fluid appears light randy in color. Catheter tubing connected to [employer] machine to remove fluid.    1201 Fluid continues to drain. Patient tolerating well.     1215 Fluid continues to drain. Patient tolerating well.    1228 Pt tolerated well. Total 4670 ml removed. Catheter removed, pressure held and bandaid applied. Verbal and tactile reassurance given throughout.     1229  Discharge instructions printed and reviewed with the patient. Patient verbalizes understanding of discharge instructions. Copy of discharge instructions given to the patient.  Patient dressed. Gait slow. Patient amb[facility] to wheelchair[facility] DaughterJerica to drive them home. Daughter to come and get the patient from the x ray lob.

## 2025-01-06 NOTE — DISCHARGE INSTRUCTIONS
Ultrasound Guided Paracentesis    Activity:  Rest, avoid strenuous activity such as lifting heavy objects, and bending, stretching or pulling.     Diet:  Resume usual diet    Medication:  * Resume home medication unless otherwise instructed by your physician.  * (If Applicable) If taking any blood thinners, speak with your physician before restarting them.  * May take mild pain reliever, as needed, such as Acetaminophen or Ibuprofen.    INSTRUCTIONS OR COMMENTS RELATIVE TO SPECIFIC EXAM PERFORMED:    - Some tenderness at site of paracentesis will be normal for a few days.  - May remove band aid after 48 hours.   - Monitor the site for the next 24 - 48 hours.  - If you experience any drainage or bleeding at the site, hold pressure for 30 minutes and lay on side opposite of the procedure site (move fluid away from the   area of leakage).  If drainage or bleeding does not stop and seems excessive, call your physician or go to the ER for evaluation.   - If any signs of infection (redness, swelling, drainage/pus) at the site, go to ER for evaluation.   - Please keep all follow-up appointments with your Hepatologist. Schedule follow-up appointment for repeat paracentesis if necessary.       IF YOU DEVELOP ANY OF THE FOLLOWING SYMPTOMS, CONTACT PHYSICIAN LISTED BELOW:  Physician performing procedure: DR. Nelson - [phone] or [phone] and ask to be put in contact with the RADIOLOGY RN. After hours contact ER.  * Extreme pain that increases after leaving the hospital  * Active bleeding (refer to above instructions)  * Chills, fever above 100 degrees Farenheit and fatigue.  * Weakness, dizziness, lightheadedness or fainting.    If you are unable to contact any of the above physician and you feel you have a major problem, please go to the Emergency Room for treatment.    TOTAL REMOVED WITH PARACENTESIS TODAY:    Surgeon Performing Repair (Optional): Dr. Jayna Barron

## 2025-01-14 ENCOUNTER — OFFICE VISIT (OUTPATIENT)
Dept: PRIMARY CARE CLINIC | Age: 80
End: 2025-01-14

## 2025-01-14 VITALS
RESPIRATION RATE: 18 BRPM | HEIGHT: 63 IN | BODY MASS INDEX: 32.6 KG/M2 | OXYGEN SATURATION: 95 % | HEART RATE: 64 BPM | WEIGHT: 184 LBS | TEMPERATURE: 98.3 F | SYSTOLIC BLOOD PRESSURE: 118 MMHG | DIASTOLIC BLOOD PRESSURE: 72 MMHG

## 2025-01-14 DIAGNOSIS — Z71.89 ACP (ADVANCE CARE PLANNING): ICD-10-CM

## 2025-01-14 DIAGNOSIS — I83.029 VENOUS ULCER OF LEFT LOWER EXTREMITY WITH VARICOSE VEINS (HCC): ICD-10-CM

## 2025-01-14 DIAGNOSIS — L73.2 HIDRADENITIS SUPPURATIVA: ICD-10-CM

## 2025-01-14 DIAGNOSIS — J96.01 ACUTE RESPIRATORY FAILURE WITH HYPOXIA: ICD-10-CM

## 2025-01-14 DIAGNOSIS — K74.60 DECOMPENSATION OF CIRRHOSIS OF LIVER (HCC): ICD-10-CM

## 2025-01-14 DIAGNOSIS — K72.90 DECOMPENSATION OF CIRRHOSIS OF LIVER (HCC): ICD-10-CM

## 2025-01-14 DIAGNOSIS — L97.929 VENOUS ULCER OF LEFT LOWER EXTREMITY WITH VARICOSE VEINS (HCC): ICD-10-CM

## 2025-01-14 DIAGNOSIS — J44.9 CHRONIC OBSTRUCTIVE PULMONARY DISEASE, UNSPECIFIED COPD TYPE (HCC): ICD-10-CM

## 2025-01-14 DIAGNOSIS — N18.31 STAGE 3A CHRONIC KIDNEY DISEASE (HCC): ICD-10-CM

## 2025-01-14 DIAGNOSIS — L98.491 SKIN ULCER OF FOREARM, LIMITED TO BREAKDOWN OF SKIN (HCC): ICD-10-CM
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
## 2025-01-14 DIAGNOSIS — Z00.00 MEDICARE ANNUAL WELLNESS VISIT, SUBSEQUENT: Primary | ICD-10-CM

## 2025-01-14 DIAGNOSIS — E11.9 TYPE 2 DIABETES MELLITUS WITHOUT COMPLICATION, WITHOUT LONG-TERM CURRENT USE OF INSULIN (HCC): ICD-10-CM

## 2025-01-14 RX ORDER — SULFAMETHOXAZOLE AND TRIMETHOPRIM 800; 160 MG/1; MG/1
1 TABLET ORAL 2 TIMES DAILY
Qty: 20 TABLET | Refills: 0 | Status: SHIPPED | OUTPATIENT
Start: 2025-01-14 | End: 2025-01-24

## 2025-01-14 SDOH — ECONOMIC STABILITY: FOOD INSECURITY: WITHIN THE PAST 12 MONTHS, THE FOOD YOU BOUGHT JUST DIDN'T LAST AND YOU DIDN'T HAVE MONEY TO GET MORE.: NEVER TRUE

## 2025-01-14 SDOH — ECONOMIC STABILITY: FOOD INSECURITY: WITHIN THE PAST 12 MONTHS, YOU WORRIED THAT YOUR FOOD WOULD RUN OUT BEFORE YOU GOT MONEY TO BUY MORE.: NEVER TRUE

## 2025-01-14 ASSESSMENT — PATIENT HEALTH QUESTIONNAIRE - PHQ9
SUM OF ALL RESPONSES TO PHQ QUESTIONS 1-9: 0
10. IF YOU CHECKED OFF ANY PROBLEMS, HOW DIFFICULT HAVE THESE PROBLEMS MADE IT FOR YOU TO DO YOUR WORK, TAKE CARE OF THINGS AT HOME, OR GET ALONG WITH OTHER PEOPLE: NOT DIFFICULT AT ALL
9. THOUGHTS THAT YOU WOULD BE BETTER OFF DEAD, OR OF HURTING YOURSELF: NOT AT ALL
2. FEELING DOWN, DEPRESSED OR HOPELESS: NOT AT ALL
4. FEELING TIRED OR HAVING LITTLE ENERGY: NOT AT ALL
6. FEELING BAD ABOUT YOURSELF - OR THAT YOU ARE A FAILURE OR HAVE LET YOURSELF OR YOUR FAMILY DOWN: NOT AT ALL
5. POOR APPETITE OR OVEREATING: NOT AT ALL
3. TROUBLE FALLING OR STAYING ASLEEP: NOT AT ALL
SUM OF ALL RESPONSES TO PHQ QUESTIONS 1-9: 0
1. LITTLE INTEREST OR PLEASURE IN DOING THINGS: NOT AT ALL
SUM OF ALL RESPONSES TO PHQ QUESTIONS 1-9: 0
7. TROUBLE CONCENTRATING ON THINGS, SUCH AS READING THE NEWSPAPER OR WATCHING TELEVISION: NOT AT ALL
9. THOUGHTS THAT YOU WOULD BE BETTER OFF DEAD, OR OF HURTING YOURSELF: NOT AT ALL
1. LITTLE INTEREST OR PLEASURE IN DOING THINGS: NOT AT ALL
SUM OF ALL RESPONSES TO PHQ QUESTIONS 1-9: 0
SUM OF ALL RESPONSES TO PHQ9 QUESTIONS 1 & 2: 0
SUM OF ALL RESPONSES TO PHQ QUESTIONS 1-9: 0
6. FEELING BAD ABOUT YOURSELF - OR THAT YOU ARE A FAILURE OR HAVE LET YOURSELF OR YOUR FAMILY DOWN: NOT AT ALL
2. FEELING DOWN, DEPRESSED OR HOPELESS: NOT AT ALL
SUM OF ALL RESPONSES TO PHQ QUESTIONS 1-9: 0
10. IF YOU CHECKED OFF ANY PROBLEMS, HOW DIFFICULT HAVE THESE PROBLEMS MADE IT FOR YOU TO DO YOUR WORK, TAKE CARE OF THINGS AT HOME, OR GET ALONG WITH OTHER PEOPLE: NOT DIFFICULT AT ALL
SUM OF ALL RESPONSES TO PHQ QUESTIONS 1-9: 0
8. MOVING OR SPEAKING SO SLOWLY THAT OTHER PEOPLE COULD HAVE NOTICED. OR THE OPPOSITE, BEING SO FIGETY OR RESTLESS THAT YOU HAVE BEEN MOVING AROUND A LOT MORE THAN USUAL: NOT AT ALL
8. MOVING OR SPEAKING SO SLOWLY THAT OTHER PEOPLE COULD HAVE NOTICED. OR THE OPPOSITE, BEING SO FIGETY OR RESTLESS THAT YOU HAVE BEEN MOVING AROUND A LOT MORE THAN USUAL: NOT AT ALL
4. FEELING TIRED OR HAVING LITTLE ENERGY: NOT AT ALL
5. POOR APPETITE OR OVEREATING: NOT AT ALL
SUM OF ALL RESPONSES TO PHQ9 QUESTIONS 1 & 2: 0
7. TROUBLE CONCENTRATING ON THINGS, SUCH AS READING THE NEWSPAPER OR WATCHING TELEVISION: NOT AT ALL
SUM OF ALL RESPONSES TO PHQ QUESTIONS 1-9: 0
3. TROUBLE FALLING OR STAYING ASLEEP: NOT AT ALL

## 2025-01-14 ASSESSMENT — LIFESTYLE VARIABLES
HOW MANY STANDARD DRINKS CONTAINING ALCOHOL DO YOU HAVE ON A TYPICAL DAY: PATIENT DOES NOT DRINK
HOW OFTEN DO YOU HAVE A DRINK CONTAINING ALCOHOL: NEVER

## 2025-01-14 NOTE — PATIENT INSTRUCTIONS
ordered immunizations, labs, imaging, and/or referrals for you.  A list of these orders (if applicable) as well as your Preventive Care list are included within your After Visit Summary for your review.      Advance Care Planning     Advance Care Planning opens a door to talk about and write down your wishes before a sudden accident or illness.  Make your goals, values, and preferences known.     This puts you in the ’s seat and helps others know what matters most to you so they won’t have to guess.      Where can you learn more?    Go to [email or web address]-resources/[email or web address]-care-planning   to learn how to:    Name someone you trust to make healthcare decisions for you, only if you can’t. (Healthcare Power of )    Document your wishes for care if you were seriously ill and not expected to recover or are approaching end of life. (Advance Directive or Living Will)    The same page can be found using the QR code below.

## 2025-01-14 NOTE — PROGRESS NOTES
Medicare Annual Wellness Visit    Zach Aguirre is here for Medicare AWV and 6 Month Follow-Up    Assessment & Plan   Medicare annual wellness visit, subsequent  Decompensation of cirrhosis of liver (HCC)  Comments:  controlled on paracentesis  Venous ulcer of left lower extremity with varicose veins (HCC)  Comments:  resolved  Acute respiratory failure with hypoxia  Comments:  resolved  Chronic obstructive pulmonary disease, unspecified COPD type (HCC)  Comments:  controlled  Type 2 diabetes mellitus without complication, without long-term current use of insulin (HCC)  -     Hemoglobin A1C; Future  -     Lipid, Fasting; Future  Stage 3a chronic kidney disease (HCC)  ACP (advance care planning)  -     [place] Referral to ACP Clinical Specialist  Fall, initial encounter  -     XR ELBOW RIGHT (MIN 3 VIEWS); Future  -     XR SHOULDER RIGHT (MIN 2 VIEWS); Future  -     XR RADIUS ULNA RIGHT (2 VIEWS); Future  -     XR FACIAL BONES (MIN 3 VIEWS ); Future  Skin ulcer of forearm, limited to breakdown of skin (HCC)  Comments:  dressing applied  will treat for superimposed infection  Orders:  -     sulfamethoxazole-trimethoprim (BACTRIM DS) 800-160 MG per tablet; Take 1 tablet by mouth 2 times daily for 10 days, Disp-20 tablet, R-0Normal  Hidradenitis suppurativa  -     sulfamethoxazole-trimethoprim (BACTRIM DS) 800-160 MG per tablet; Take 1 tablet by mouth 2 times daily for 10 days, Disp-20 tablet, R-0Normal     Return in about 6 months (around 7/14/2025) for follow up with PCP.     Subjective       Pt complains of 1 month of painful bumps in the groin and pubic region. Hx of H suppurativa.     Right forearm pain s/p fall 2 weeks ago. Assoc bleeding and bruising. Dressing applied daily. Pain is controlled.     Decompensated liver cirrhosis. Ascites well controlled on Lasix and spironolactone. Undergoes paracentesis every 10 days. Xifaxan well tolerated.      Venous ulcer of lower extremity- resolved   Acute resp failure

## 2025-01-15 ENCOUNTER — CLINICAL DOCUMENTATION (OUTPATIENT)
Dept: SPIRITUAL SERVICES | Age: 80
End: 2025-01-15

## 2025-01-15 NOTE — ACP (ADVANCE CARE PLANNING)
Advance Care Planning   Ambulatory ACP Specialist Patient Outreach    Date:  1/15/2025    ACP Specialist:  MANUEL Palma    Outreach call to patient in follow-up to ACP Specialist referral from:Yesenia Viramontes MD    [x] PCP  [] Provider   [] Ambulatory Care Management [] Other     For:                  [x] Advance Directive Assistance              [x] Complete Portable DNR order              [] Complete POST/POLST/MOST              [x] Code Status Discussion             [x] Discuss Goals of Care             [x] Early ACP Decision-Making              [] Other (Specify)    Date Referral Received: 01/14/2025    Next Step:   [] ACP scheduled conversation  [x] Outreach again in one week               [] Email / Mail ACP Info Sheets  [] Email / Mail Advance Directive   [] Closing referral.  Routing closure to referring provider/staff and to ACP Specialist .    [] Closure letter mailed to patient with invitation to contact ACP Specialist if / when ready.   [] Other (Specify here):       [x] At this time, Healthcare Decision Maker Is:         [] Primary agent named in scanned advance directive.    [x] Legal Next of Kin.     [] Unable to determine legal decision maker at this time.    Outreaches:       [x] 1st -  Date:   01/15/2025               Intervention:  [] Spoke with Patient   [x] Left Voice mail [] Email / Mail    [] Pixatet  [] Other (Specify) :     Outcomes: ACP specialist made an initial outreach to this pt on her listed home number reflecting [phone]. Pt did not answer this call. Pt did not have a VM option attached to this call, but rather a message stating \"please enter your remote access code\", therefore no VM able to be left. If this pt does not return this call back within 1 week, a 2nd outreach will be made.            [] 2nd -  Date:                 Intervention:  [] Spoke with Patient  [] Left Voice mail [] Email / Mail    [] Phonologicshart  [] Other (Specify) :              Outcomes:

## 2025-01-16 ENCOUNTER — TELEPHONE (OUTPATIENT)
Dept: INTERVENTIONAL RADIOLOGY/VASCULAR | Age: 80
End: 2025-01-16

## 2025-01-16 NOTE — TELEPHONE ENCOUNTER
Left message for Jerica, patient's daughter to advise   2.  Spoke to Roma in Specials to schedule procedure    >  Paracentesis is scheduled on 1/23/2025 @ 1:30 pm   >  You will need to arrive at 12:45 pm to have CT done first from home and check in at the Diagnostic Imaging Check In desk.

## 2025-01-23 ENCOUNTER — HOSPITAL ENCOUNTER (OUTPATIENT)
Dept: INTERVENTIONAL RADIOLOGY/VASCULAR | Age: 80
Discharge: HOME | End: 2025-01-25
Attending: INTERNAL MEDICINE
Payer: MEDICARE

## 2025-01-23 ENCOUNTER — HOSPITAL ENCOUNTER (OUTPATIENT)
Dept: CT IMAGING | Age: 80
Discharge: HOME | End: 2025-01-25
Attending: INTERNAL MEDICINE
Payer: MEDICARE

## 2025-01-23 VITALS
DIASTOLIC BLOOD PRESSURE: 53 MMHG | RESPIRATION RATE: 14 BRPM | SYSTOLIC BLOOD PRESSURE: 101 MMHG | HEART RATE: 69 BPM | OXYGEN SATURATION: 98 %

## 2025-01-23 DIAGNOSIS — R18.8 OTHER ASCITES: ICD-10-CM

## 2025-01-23 DIAGNOSIS — R91.1 LUNG NODULE: ICD-10-CM

## 2025-01-23 DIAGNOSIS — E11.9 TYPE 2 DIABETES MELLITUS WITHOUT COMPLICATION, WITHOUT LONG-TERM CURRENT USE OF INSULIN (HCC): ICD-10-CM

## 2025-01-23 LAB
CHOLEST SERPL-MCNC: 151 MG/DL (ref 0–199)
HDLC SERPL-MCNC: 58 MG/DL (ref 40–59)
LDL CHOLESTEROL: 77 MG/DL (ref 0–129)
TRIGLYCERIDE, FASTING: 79 MG/DL (ref 0–150)

## 2025-01-23 PROCEDURE — 71250 CT THORAX DX C-: CPT

## 2025-01-23 PROCEDURE — 49083 ABD PARACENTESIS W/IMAGING: CPT

## 2025-01-23 PROCEDURE — 6360000002 HC RX W HCPCS: Performed by: NURSE PRACTITIONER

## 2025-01-23 PROCEDURE — 2709999900 IR US GUIDED PARACENTESIS

## 2025-01-23 RX ORDER — LIDOCAINE HYDROCHLORIDE 20 MG/ML
INJECTION, SOLUTION INFILTRATION; PERINEURAL PRN
Status: COMPLETED | OUTPATIENT
Start: 2025-01-23 | End: 2025-01-23

## 2025-01-23 RX ADMIN — LIDOCAINE HYDROCHLORIDE 10 ML: 20 INJECTION, SOLUTION INFILTRATION; PERINEURAL at 13:33

## 2025-01-23 NOTE — DISCHARGE INSTRUCTIONS
Ultrasound Guided Paracentesis Discharge Instructions     Rest today. No heavy lifting, bending, stretching or pulling.  Some tenderness will be normal at the procedure site for a few days.    You may remove the bandaid in 48 hours.     If you experience any drainage or bleeding at the site, hold pressure for 30 minutes and lay on side opposite of the procedure site (move fluid away from the area of leakage). If drainage or bleeding does not stop and seems excessive, call your physician or proceed to the ER.      Watch for signs of infection such as fever, chills, drainage or redness at the site. If you experience any of these symptoms, call your primary doctor or go to the emergency room.       Please keep all follow-up appointments with your Hepatologist. Schedule follow-up appointment for repeat paracentesis if necessary.     If you have any concerns please contact the [facility] Radiology Department at [phone].

## 2025-01-23 NOTE — OR NURSING
NO SEDATION      Pt arrived to Specials room 6 via wc. Pt A&Ox4, respirations even and unlabored, abdomen distended but soft. Hx, allergies, medications reviewed. Pt transferred self onto cart in supine position, tilted to left side. Pt offered reassurance and VSS. Pt denies pain at this time. Consent confirmed for ultrasound guided paracentesis from 1/6/25.     U/S image obtained, HA-CNP approved LLQ site.     Area cleansed with large tinted chloraprep by AJ-RN. After 3 minute dry time, draped with fenestrated drape and sterile towels by HA-CNP.     Timeout completed.     Using U/S guidance, HA-CNP numbed site with 2% lidocaine, see eMar.     Using U/S guidance, access obtained with [id number] centesis 5F catheter with return of fluid that appears clear pale yellow. Catheter connected to tubing and Marsha machine with suction at 200 mmHG and draining well. Pt tolerating well. VSS.     Drainage complete. Total 4670 ml removed. Centesis catheter removed and digital pressure held to site for 2 minutes.     LLQ site soft, no drainage, large bandaid applied. VSS.     Pt provided discharge instructions and verbalized understanding. Pt dtr, emil, called for pt .     Pt discharged via w/c with belongings to lobby with dtr.Electronically signed by Chica Power RN on 1/23/2025 at 2:20 PM

## 2025-01-24 ENCOUNTER — TELEPHONE (OUTPATIENT)
Dept: INTERVENTIONAL RADIOLOGY/VASCULAR | Age: 80
End: 2025-01-24

## 2025-01-24 LAB
ESTIMATED AVERAGE GLUCOSE: 111 MG/DL
HBA1C MFR BLD: 5.5 % (ref 4–6)

## 2025-01-24 NOTE — TELEPHONE ENCOUNTER
Post paracentesis follow up call made to patient. Pt reports, \"I'm doing very good,\" denies any needs at this time.

## 2025-01-27 DIAGNOSIS — R18.8 OTHER ASCITES: Primary | ICD-10-CM

## 2025-01-28 ENCOUNTER — TELEPHONE (OUTPATIENT)
Dept: INTERVENTIONAL RADIOLOGY/VASCULAR | Age: 80
End: 2025-01-28

## 2025-01-28 NOTE — TELEPHONE ENCOUNTER
Patient's daughter, Jerica, was given this information via phone conversation - voiced understanding  2.  Spoke to Nany in Specials to schedule procedure    >  Paracentesis is scheduled on 2/6/2025 @ 11:00 am   >  You will need to arrive at 10:30 am from home and check in at the Diagnostic Imaging Check In desk.

## 2025-02-05 ENCOUNTER — PRE-PROCEDURE TELEPHONE (OUTPATIENT)
Dept: INTERVENTIONAL RADIOLOGY/VASCULAR | Age: 80
End: 2025-02-05

## 2025-02-05 NOTE — FLOWSHEET NOTE
Pre-procedure call placed re: upcoming IR procedure. Spoke with patient and reviewed the following information:     >  Paracentesis is scheduled on 2/6/2025 @ 11:00 am   >  You will need to arrive at 10:30 am from home and check in at the Diagnostic Imaging Check In desk.     Understanding indicated. No questions / concerns noted. Electronically signed by Stefani Gonzales RN on 2/5/2025 at 1:48 PM

## 2025-02-06 ENCOUNTER — PRE-PROCEDURE TELEPHONE (OUTPATIENT)
Dept: INTERVENTIONAL RADIOLOGY/VASCULAR | Age: 80
End: 2025-02-06

## 2025-02-06 NOTE — FLOWSHEET NOTE
Patient's daughter Jerica called and requesting to reschedule para appointment to 2/7/25 due to icy road conditions today.     Appointment changed to 2/7/25 at 1100. Arrival at 1030 and checking in at diagnostic imaging window. Electronically signed by Stefani Gonzales RN on 2/6/2025 at 8:42 AM

## 2025-02-07 ENCOUNTER — HOSPITAL ENCOUNTER (OUTPATIENT)
Dept: INTERVENTIONAL RADIOLOGY/VASCULAR | Age: 80
Discharge: HOME | End: 2025-02-09
Payer: MEDICARE

## 2025-02-07 VITALS
RESPIRATION RATE: 12 BRPM | SYSTOLIC BLOOD PRESSURE: 109 MMHG | HEART RATE: 69 BPM | DIASTOLIC BLOOD PRESSURE: 55 MMHG | OXYGEN SATURATION: 99 %

## 2025-02-07 DIAGNOSIS — R18.8 OTHER ASCITES: ICD-10-CM

## 2025-02-07 PROCEDURE — P9047 ALBUMIN (HUMAN), 25%, 50ML: HCPCS | Performed by: NURSE PRACTITIONER

## 2025-02-07 PROCEDURE — 49083 ABD PARACENTESIS W/IMAGING: CPT

## 2025-02-07 PROCEDURE — 6360000002 HC RX W HCPCS: Performed by: NURSE PRACTITIONER

## 2025-02-07 PROCEDURE — 96365 THER/PROPH/DIAG IV INF INIT: CPT

## 2025-02-07 PROCEDURE — 2709999900 IR US GUIDED PARACENTESIS

## 2025-02-07 PROCEDURE — 49083 ABD PARACENTESIS W/IMAGING: CPT | Performed by: RADIOLOGY

## 2025-02-07 RX ORDER — ALBUMIN (HUMAN) 12.5 G/50ML
SOLUTION INTRAVENOUS CONTINUOUS PRN
Status: COMPLETED | OUTPATIENT
Start: 2025-02-07 | End: 2025-02-07

## 2025-02-07 RX ORDER — LIDOCAINE HYDROCHLORIDE 20 MG/ML
INJECTION, SOLUTION INFILTRATION; PERINEURAL PRN
Status: COMPLETED | OUTPATIENT
Start: 2025-02-07 | End: 2025-02-07

## 2025-02-07 RX ADMIN — ALBUMIN (HUMAN) 25 G: 12.5 SOLUTION INTRAVENOUS at 11:43

## 2025-02-07 RX ADMIN — LIDOCAINE HYDROCHLORIDE 10 ML: 20 INJECTION, SOLUTION INFILTRATION; PERINEURAL at 11:10

## 2025-02-07 NOTE — OR NURSING
PARACENTESIS - NO SEDATION    1100 - Patient brought into specials via WC. A&Ox4, calm and cooperative. Procedure explained and consent signed - no changes. Assisted onto cart and positioned lying propped to left side using a pillow wedge. Initial images obtained prior to start of procedure using U/S. Pt  VSS, on RA.     Left side of abdomen cleansed with Chloraprep. After 3 minute dry time, covered with sterile drape and towels.     1105 - Timeout completed for Ultrasound Guided Paracentesis.     1110 - Site numbed with 2% lidocaine by Sara Hernández CNP. See eMAR.  [id number] Centesis 5F catheter placed using Ultrasound guidance.  Fluid appears clear yellow. Catheter tubing connected to flck.me machine to remove fluid.    1140 - Patient with >5L drain. IV #22 inserted LFA - tolerated well. Albumin administered per order.     1158 - Pt tolerated well. Total 6850 ml removed. Catheter removed, pressure held and bandaid applied. Verbal and tactile reassurance given throughout.     1209 - Next para appointment made per patient request. D/C instructions reviewed verbally with patient and understanding indicated. Declined printed AVS. Assisted into WC, taken to hospital entrance and picked up by daughter who will drive her home. Electronically signed by Stefani Gonzales RN on 2/7/2025 at 12:11 PM

## 2025-02-07 NOTE — DISCHARGE INSTRUCTIONS
Ultrasound Guided Paracentesis Discharge Instructions     Rest today. No heavy lifting, bending, stretching or pulling.  Some tenderness will be normal at the procedure site for a few days.    You may remove the bandaid in 48 hours.     If you experience any drainage or bleeding at the site, hold pressure for 30 minutes and lay on side opposite of the procedure site (move fluid away from the area of leakage). If drainage or bleeding does not stop and seems excessive, call your physician or proceed to the ER.      Watch for signs of infection such as fever, chills, drainage or redness at the site. If you experience any of these symptoms, call your primary doctor or go to the emergency room.       Please keep all follow-up appointments with your Hepatologist. Schedule follow-up appointment for repeat paracentesis if necessary.     If you have any concerns please contact the [facility] Radiology Department at [phone].

## 2025-02-10 ENCOUNTER — POST-OP TELEPHONE (OUTPATIENT)
Dept: INTERVENTIONAL RADIOLOGY/VASCULAR | Age: 80
End: 2025-02-10

## 2025-02-10 NOTE — FLOWSHEET NOTE
RN call to patient to follow up post-op, as pt had paracentesis on 2/7/25.     Pt reports 0/10 pain to site. Pt states she did have some cramping that has resolved.     Pt confirms site and dressing is clean, dry, and intact.     Pt has no questions or concerns at this time.     Pt instructed to call back the radiology dept and request to speak with a nurse if any questions or concerns should develop at [phone].

## 2025-02-20 ENCOUNTER — TELEPHONE (OUTPATIENT)
Dept: PULMONOLOGY | Age: 80
End: 2025-02-20

## 2025-02-20 ENCOUNTER — TELEPHONE (OUTPATIENT)
Dept: INTERVENTIONAL RADIOLOGY/VASCULAR | Age: 80
End: 2025-02-20

## 2025-02-20 NOTE — TELEPHONE ENCOUNTER
Confirmed pt appt with pt's dtr, Jerica. Pt's son will be bringing pt to paracentesis tomorrow, 2/21/25.Electronically signed by Chica Power RN on 2/20/2025 at 2:41 PM

## 2025-02-20 NOTE — TELEPHONE ENCOUNTER
Overdue for follow-up.  CT chest is done as well and we can discuss results. major please schedule at her convenience. thanks

## 2025-02-21 ENCOUNTER — HOSPITAL ENCOUNTER (OUTPATIENT)
Dept: INTERVENTIONAL RADIOLOGY/VASCULAR | Age: 80
Discharge: HOME | End: 2025-02-23
Payer: MEDICARE

## 2025-02-21 VITALS
RESPIRATION RATE: 14 BRPM | OXYGEN SATURATION: 98 % | HEART RATE: 64 BPM | SYSTOLIC BLOOD PRESSURE: 117 MMHG | DIASTOLIC BLOOD PRESSURE: 58 MMHG

## 2025-02-21 DIAGNOSIS — R18.8 OTHER ASCITES: ICD-10-CM

## 2025-02-21 PROCEDURE — P9047 ALBUMIN (HUMAN), 25%, 50ML: HCPCS | Performed by: NURSE PRACTITIONER

## 2025-02-21 PROCEDURE — 49083 ABD PARACENTESIS W/IMAGING: CPT | Performed by: RADIOLOGY

## 2025-02-21 PROCEDURE — 96365 THER/PROPH/DIAG IV INF INIT: CPT

## 2025-02-21 PROCEDURE — 49083 ABD PARACENTESIS W/IMAGING: CPT

## 2025-02-21 PROCEDURE — 6360000002 HC RX W HCPCS: Performed by: NURSE PRACTITIONER

## 2025-02-21 PROCEDURE — C1729 CATH, DRAINAGE: HCPCS

## 2025-02-21 RX ORDER — ALBUMIN (HUMAN) 12.5 G/50ML
SOLUTION INTRAVENOUS CONTINUOUS PRN
Status: COMPLETED | OUTPATIENT
Start: 2025-02-21 | End: 2025-02-21

## 2025-02-21 RX ORDER — LIDOCAINE HYDROCHLORIDE 20 MG/ML
INJECTION, SOLUTION INFILTRATION; PERINEURAL PRN
Status: COMPLETED | OUTPATIENT
Start: 2025-02-21 | End: 2025-02-21

## 2025-02-21 RX ADMIN — LIDOCAINE HYDROCHLORIDE 6 ML: 20 INJECTION, SOLUTION INFILTRATION; PERINEURAL at 11:26

## 2025-02-21 RX ADMIN — ALBUMIN (HUMAN) 25 G: 12.5 SOLUTION INTRAVENOUS at 12:12

## 2025-02-21 NOTE — OR NURSING
NO SEDATION    Pt to special procedures room 6 via wheelchair. Alert and oriented, no complaints.    RN obtained images prior to start of procedure using U/S. Pt  VSS.      Procedure explained, consent verified from 2/7/25.      Left lower abdomen prepped  with Chloraprep.    After 3 minute dry time, draped with sterile drape and towels.    1123 Timeout completed for Ultrasound Guided Paracentesis.     1126  Site numbed with lidocaine.  [id number] Centesis 5F catheter placed using Ultrasound guidance.  Fluid appears pale yellow. Catheter tubing connected to KupiKupon machine to remove fluid.    1212 New IV placed, left AC and albumin 25g given IV for drain >5L    @ Pt tolerated well. Total @ ml removed. Catheter removed, pressure held and bandaid applied. Verbal and tactile reassurance given throughout.

## 2025-02-21 NOTE — DISCHARGE INSTRUCTIONS
Ultrasound Guided Paracentesis Discharge Instructions     Rest today. No heavy lifting, bending, stretching or pulling.  Some tenderness will be normal at the procedure site for a few days.    You may remove the bandaid in 48 hours.     If you experience any drainage or bleeding at the site, hold pressure for 30 minutes and lay on side opposite of the procedure site (move fluid away from the area of leakage). If drainage or bleeding does not stop and seems excessive, call your physician or proceed to the ER.      Watch for signs of infection such as fever, chills, drainage or redness at the site. If you experience any of these symptoms, call your primary doctor or go to the emergency room.       Please keep all follow-up appointments with your Hepatologist. Schedule follow-up appointment for repeat paracentesis if necessary.     If you have any concerns please contact the [facility] Radiology Department at [phone].

## 2025-02-24 ENCOUNTER — TELEPHONE (OUTPATIENT)
Dept: INTERVENTIONAL RADIOLOGY/VASCULAR | Age: 80
End: 2025-02-24

## 2025-02-24 NOTE — TELEPHONE ENCOUNTER
Follow up call post paracentesis with IR: pt denies any issues or concerns at this time.Electronically signed by Chica Power RN on 2/24/2025 at 12:59 PM       [Well Nourished] : well nourished [Well Developed] : well developed [Clear to Auscultation] : lungs were clear to auscultation bilaterally [Regular Rhythm] : with a regular rhythm [Normal S1, S2] : normal S1 and S2 [Soft] : abdomen soft [Normal Gait] : normal gait [Normal Affect] : the affect was normal [Normal Insight/Judgement] : insight and judgment were intact

## 2025-02-25 ENCOUNTER — CLINICAL DOCUMENTATION (OUTPATIENT)
Dept: SPIRITUAL SERVICES | Age: 80
End: 2025-02-25

## 2025-02-25 NOTE — ACP (ADVANCE CARE PLANNING)
Advance Care Planning    Advance Care Planning Clinical Specialist  Conversation Note    Date of ACP Conversation: 2/25/2025    ACP Clinical Specialist: MANUEL Palma    Referral Date: 01/14/2025    Received by: PCP    Conversation Conducted with: Patient with decision making capacity      Current Designated Decision Maker/s:    Primary Decision Maker: Jerica Aguirre - Child - [phone]    Primary Decision Maker: NAVI AGUIRRE - Child - [phone]      Care Preferences Communicated    Code Status:  If the patient's heart were to stop beating, in their present state of health, the patient's CPR Preference: Yes, attempt CPR    If their health worsens and it becomes clear that the chance of recovery is unlikely, the patient's CPR Preference: Pt states that she would like to leave this decision up to her HCDM's.       Ventilator:  If the patient, in their present state of health, suddenly became very ill and unable to breathe on their own, the patient desires the use of a ventilator (intubation).    If their health worsens and it becomes clear that the chance of recovery is unlikely, the patient does not desire the use of a ventilator (intubation).    [x] Yes  [] No   Educated Patient / Decision Maker regarding differences between advance directives and portable DNR orders.    Conversation Summary: ACP conversation has been completed. Pt was able to make her wants/needs known, along with nominating her HCDM's. Pt has selected her son Navi as her primary agent and her daughter Jerica as her secondary agent. Pt's AD documents were completed via [employer] during this conversation. These documents have since been rcvd, completed, and uploaded into her medical record. This pt is requesting that a hard copy of these documents be mailed to her confirmed home address, as she is unable to print independently. This worker has e-mailed REBECCA Allred re: this mailing request. Referral can be closed.       Outcomes:  ACP

## 2025-02-27 DIAGNOSIS — R18.8 OTHER ASCITES: Primary | ICD-10-CM

## 2025-03-01 ENCOUNTER — APPOINTMENT (OUTPATIENT)
Dept: RADIOLOGY | Facility: HOSPITAL | Age: 80
End: 2025-03-01
Payer: COMMERCIAL

## 2025-03-01 ENCOUNTER — HOSPITAL ENCOUNTER (INPATIENT)
Facility: HOSPITAL | Age: 80
LOS: 1 days | Discharge: HOME | End: 2025-03-02
Attending: EMERGENCY MEDICINE | Admitting: STUDENT IN AN ORGANIZED HEALTH CARE EDUCATION/TRAINING PROGRAM
Payer: COMMERCIAL

## 2025-03-01 ENCOUNTER — CLINICAL SUPPORT (OUTPATIENT)
Dept: EMERGENCY MEDICINE | Facility: HOSPITAL | Age: 80
End: 2025-03-01
Payer: COMMERCIAL

## 2025-03-01 ENCOUNTER — HOSPITAL ENCOUNTER (EMERGENCY)
Facility: HOSPITAL | Age: 80
Discharge: OTHER INSTITUTION | End: 2025-03-01
Attending: EMERGENCY MEDICINE
Payer: COMMERCIAL

## 2025-03-01 VITALS
DIASTOLIC BLOOD PRESSURE: 65 MMHG | TEMPERATURE: 97 F | BODY MASS INDEX: 32.6 KG/M2 | RESPIRATION RATE: 16 BRPM | OXYGEN SATURATION: 99 % | HEIGHT: 63 IN | WEIGHT: 184 LBS | HEART RATE: 85 BPM | SYSTOLIC BLOOD PRESSURE: 121 MMHG

## 2025-03-01 DIAGNOSIS — M25.531 RIGHT WRIST PAIN: ICD-10-CM

## 2025-03-01 DIAGNOSIS — S06.5XAA SUBDURAL HEMATOMA (MULTI): Primary | ICD-10-CM

## 2025-03-01 DIAGNOSIS — S06.6X0A SUBARACHNOID HEMATOMA, WITHOUT LOSS OF CONSCIOUSNESS, INITIAL ENCOUNTER (MULTI): ICD-10-CM
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
## 2025-03-01 DIAGNOSIS — S06.6X0A SUBARACHNOID HEMATOMA, WITHOUT LOSS OF CONSCIOUSNESS, INITIAL ENCOUNTER (MULTI): Primary | ICD-10-CM

## 2025-03-01 DIAGNOSIS — K59.00 CONSTIPATION, UNSPECIFIED CONSTIPATION TYPE: ICD-10-CM

## 2025-03-01 DIAGNOSIS — S00.83XA FACIAL BRUISING, INITIAL ENCOUNTER: ICD-10-CM

## 2025-03-01 DIAGNOSIS — S06.5XAA SUBDURAL HEMATOMA (MULTI): ICD-10-CM

## 2025-03-01 LAB
ABO GROUP (TYPE) IN BLOOD: NORMAL
ABO GROUP (TYPE) IN BLOOD: NORMAL
ALBUMIN SERPL BCP-MCNC: 3 G/DL (ref 3.4–5)
ALP SERPL-CCNC: 142 U/L (ref 33–136)
ALT SERPL W P-5'-P-CCNC: 18 U/L (ref 7–45)
ANION GAP SERPL CALC-SCNC: 8 MMOL/L (ref 10–20)
ANTIBODY SCREEN: NORMAL
ANTIBODY SCREEN: NORMAL
APTT PPP: 26 SECONDS (ref 26–36)
AST SERPL W P-5'-P-CCNC: 33 U/L (ref 9–39)
ATRIAL RATE: 82 BPM
BASOPHILS # BLD AUTO: 0.02 X10*3/UL (ref 0–0.1)
BASOPHILS NFR BLD AUTO: 0.5 %
BILIRUB SERPL-MCNC: 1.1 MG/DL (ref 0–1.2)
BUN SERPL-MCNC: 10 MG/DL (ref 6–23)
CALCIUM SERPL-MCNC: 8.3 MG/DL (ref 8.6–10.3)
CHLORIDE SERPL-SCNC: 107 MMOL/L (ref 98–107)
CO2 SERPL-SCNC: 29 MMOL/L (ref 21–32)
CREAT SERPL-MCNC: 0.97 MG/DL (ref 0.5–1.05)
EGFRCR SERPLBLD CKD-EPI 2021: 60 ML/MIN/1.73M*2
EOSINOPHIL # BLD AUTO: 0.07 X10*3/UL (ref 0–0.4)
EOSINOPHIL NFR BLD AUTO: 1.8 %
ERYTHROCYTE [DISTWIDTH] IN BLOOD BY AUTOMATED COUNT: 15.2 % (ref 11.5–14.5)
ETHANOL SERPL-MCNC: <10 MG/DL
GLUCOSE SERPL-MCNC: 180 MG/DL (ref 74–99)
HCT VFR BLD AUTO: 36 % (ref 36–46)
HGB BLD-MCNC: 11 G/DL (ref 12–16)
IMM GRANULOCYTES # BLD AUTO: 0.02 X10*3/UL (ref 0–0.5)
IMM GRANULOCYTES NFR BLD AUTO: 0.5 % (ref 0–0.9)
INR PPP: 1.1 (ref 0.9–1.1)
INR PPP: 1.1 (ref 0.9–1.1)
LACTATE SERPL-SCNC: 1.9 MMOL/L (ref 0.4–2)
LYMPHOCYTES # BLD AUTO: 0.81 X10*3/UL (ref 0.8–3)
LYMPHOCYTES NFR BLD AUTO: 20.7 %
MCH RBC QN AUTO: 26.6 PG (ref 26–34)
MCHC RBC AUTO-ENTMCNC: 30.6 G/DL (ref 32–36)
MCV RBC AUTO: 87 FL (ref 80–100)
MONOCYTES # BLD AUTO: 0.4 X10*3/UL (ref 0.05–0.8)
MONOCYTES NFR BLD AUTO: 10.2 %
NEUTROPHILS # BLD AUTO: 2.6 X10*3/UL (ref 1.6–5.5)
NEUTROPHILS NFR BLD AUTO: 66.3 %
NRBC BLD-RTO: 0 /100 WBCS (ref 0–0)
P AXIS: 78 DEGREES
P OFFSET: 180 MS
P ONSET: 127 MS
PLATELET # BLD AUTO: 116 X10*3/UL (ref 150–450)
POTASSIUM SERPL-SCNC: 4.1 MMOL/L (ref 3.5–5.3)
PR INTERVAL: 172 MS
PROT SERPL-MCNC: 5.6 G/DL (ref 6.4–8.2)
PROTHROMBIN TIME: 12 SECONDS (ref 9.8–12.4)
PROTHROMBIN TIME: 12 SECONDS (ref 9.8–12.4)
Q ONSET: 213 MS
QRS COUNT: 13 BEATS
QRS DURATION: 88 MS
QT INTERVAL: 434 MS
QTC CALCULATION(BAZETT): 507 MS
QTC FREDERICIA: 481 MS
R AXIS: -40 DEGREES
RBC # BLD AUTO: 4.14 X10*6/UL (ref 4–5.2)
RH FACTOR (ANTIGEN D): NORMAL
RH FACTOR (ANTIGEN D): NORMAL
SODIUM SERPL-SCNC: 140 MMOL/L (ref 136–145)
T AXIS: 66 DEGREES
T OFFSET: 430 MS
VENTRICULAR RATE: 82 BPM
WBC # BLD AUTO: 3.9 X10*3/UL (ref 4.4–11.3)

## 2025-03-01 PROCEDURE — 93005 ELECTROCARDIOGRAM TRACING: CPT

## 2025-03-01 PROCEDURE — 72125 CT NECK SPINE W/O DYE: CPT

## 2025-03-01 PROCEDURE — 72131 CT LUMBAR SPINE W/O DYE: CPT | Mod: RCN

## 2025-03-01 PROCEDURE — 36415 COLL VENOUS BLD VENIPUNCTURE: CPT

## 2025-03-01 PROCEDURE — 80053 COMPREHEN METABOLIC PANEL: CPT | Performed by: EMERGENCY MEDICINE

## 2025-03-01 PROCEDURE — G0378 HOSPITAL OBSERVATION PER HR: HCPCS

## 2025-03-01 PROCEDURE — 86901 BLOOD TYPING SEROLOGIC RH(D): CPT | Performed by: EMERGENCY MEDICINE

## 2025-03-01 PROCEDURE — 72128 CT CHEST SPINE W/O DYE: CPT | Mod: RCN

## 2025-03-01 PROCEDURE — 2500000001 HC RX 250 WO HCPCS SELF ADMINISTERED DRUGS (ALT 637 FOR MEDICARE OP)

## 2025-03-01 PROCEDURE — 90471 IMMUNIZATION ADMIN: CPT | Performed by: EMERGENCY MEDICINE

## 2025-03-01 PROCEDURE — 70486 CT MAXILLOFACIAL W/O DYE: CPT | Performed by: RADIOLOGY

## 2025-03-01 PROCEDURE — G0390 TRAUMA RESPONS W/HOSP CRITI: HCPCS

## 2025-03-01 PROCEDURE — 99291 CRITICAL CARE FIRST HOUR: CPT | Mod: 25 | Performed by: EMERGENCY MEDICINE

## 2025-03-01 PROCEDURE — 99223 1ST HOSP IP/OBS HIGH 75: CPT | Performed by: STUDENT IN AN ORGANIZED HEALTH CARE EDUCATION/TRAINING PROGRAM

## 2025-03-01 PROCEDURE — 70450 CT HEAD/BRAIN W/O DYE: CPT

## 2025-03-01 PROCEDURE — 85025 COMPLETE CBC W/AUTO DIFF WBC: CPT | Performed by: EMERGENCY MEDICINE

## 2025-03-01 PROCEDURE — 73110 X-RAY EXAM OF WRIST: CPT | Mod: LT

## 2025-03-01 PROCEDURE — 73110 X-RAY EXAM OF WRIST: CPT | Mod: RT

## 2025-03-01 PROCEDURE — 83605 ASSAY OF LACTIC ACID: CPT | Performed by: EMERGENCY MEDICINE

## 2025-03-01 PROCEDURE — 90715 TDAP VACCINE 7 YRS/> IM: CPT | Performed by: EMERGENCY MEDICINE

## 2025-03-01 PROCEDURE — 76377 3D RENDER W/INTRP POSTPROCES: CPT

## 2025-03-01 PROCEDURE — 74176 CT ABD & PELVIS W/O CONTRAST: CPT

## 2025-03-01 PROCEDURE — 72125 CT NECK SPINE W/O DYE: CPT | Performed by: RADIOLOGY

## 2025-03-01 PROCEDURE — 36415 COLL VENOUS BLD VENIPUNCTURE: CPT | Performed by: EMERGENCY MEDICINE

## 2025-03-01 PROCEDURE — 86901 BLOOD TYPING SEROLOGIC RH(D): CPT

## 2025-03-01 PROCEDURE — 73130 X-RAY EXAM OF HAND: CPT | Mod: LEFT SIDE | Performed by: STUDENT IN AN ORGANIZED HEALTH CARE EDUCATION/TRAINING PROGRAM

## 2025-03-01 PROCEDURE — 71045 X-RAY EXAM CHEST 1 VIEW: CPT

## 2025-03-01 PROCEDURE — 73110 X-RAY EXAM OF WRIST: CPT | Mod: LEFT SIDE | Performed by: STUDENT IN AN ORGANIZED HEALTH CARE EDUCATION/TRAINING PROGRAM

## 2025-03-01 PROCEDURE — 70450 CT HEAD/BRAIN W/O DYE: CPT | Performed by: RADIOLOGY

## 2025-03-01 PROCEDURE — 71045 X-RAY EXAM CHEST 1 VIEW: CPT | Performed by: STUDENT IN AN ORGANIZED HEALTH CARE EDUCATION/TRAINING PROGRAM

## 2025-03-01 PROCEDURE — 85610 PROTHROMBIN TIME: CPT | Performed by: EMERGENCY MEDICINE

## 2025-03-01 PROCEDURE — 73130 X-RAY EXAM OF HAND: CPT | Mod: LT

## 2025-03-01 PROCEDURE — 70486 CT MAXILLOFACIAL W/O DYE: CPT

## 2025-03-01 PROCEDURE — 2500000004 HC RX 250 GENERAL PHARMACY W/ HCPCS (ALT 636 FOR OP/ED): Performed by: EMERGENCY MEDICINE

## 2025-03-01 PROCEDURE — 1210000001 HC SEMI-PRIVATE ROOM DAILY

## 2025-03-01 PROCEDURE — 82077 ASSAY SPEC XCP UR&BREATH IA: CPT | Performed by: EMERGENCY MEDICINE

## 2025-03-01 PROCEDURE — 73110 X-RAY EXAM OF WRIST: CPT | Mod: RIGHT SIDE | Performed by: STUDENT IN AN ORGANIZED HEALTH CARE EDUCATION/TRAINING PROGRAM

## 2025-03-01 PROCEDURE — 99285 EMERGENCY DEPT VISIT HI MDM: CPT | Mod: 25 | Performed by: EMERGENCY MEDICINE

## 2025-03-01 PROCEDURE — 85610 PROTHROMBIN TIME: CPT

## 2025-03-01 PROCEDURE — 74176 CT ABD & PELVIS W/O CONTRAST: CPT | Performed by: RADIOLOGY

## 2025-03-01 PROCEDURE — 76377 3D RENDER W/INTRP POSTPROCES: CPT | Performed by: RADIOLOGY

## 2025-03-01 PROCEDURE — 71250 CT THORAX DX C-: CPT | Performed by: RADIOLOGY

## 2025-03-01 RX ORDER — ONDANSETRON 4 MG/1
4 TABLET, ORALLY DISINTEGRATING ORAL EVERY 8 HOURS PRN
Status: DISCONTINUED | OUTPATIENT
Start: 2025-03-01 | End: 2025-03-01

## 2025-03-01 RX ORDER — OXYCODONE HYDROCHLORIDE 5 MG/1
5 TABLET ORAL EVERY 6 HOURS PRN
Status: DISCONTINUED | OUTPATIENT
Start: 2025-03-01 | End: 2025-03-02 | Stop reason: HOSPADM

## 2025-03-01 RX ORDER — OXYCODONE HYDROCHLORIDE 5 MG/1
10 TABLET ORAL EVERY 4 HOURS PRN
Status: DISCONTINUED | OUTPATIENT
Start: 2025-03-01 | End: 2025-03-02 | Stop reason: HOSPADM

## 2025-03-01 RX ORDER — BISACODYL 5 MG
10 TABLET, DELAYED RELEASE (ENTERIC COATED) ORAL DAILY PRN
Status: DISCONTINUED | OUTPATIENT
Start: 2025-03-01 | End: 2025-03-02 | Stop reason: HOSPADM

## 2025-03-01 RX ORDER — POLYETHYLENE GLYCOL 3350 17 G/17G
17 POWDER, FOR SOLUTION ORAL DAILY
Status: DISCONTINUED | OUTPATIENT
Start: 2025-03-02 | End: 2025-03-02 | Stop reason: HOSPADM

## 2025-03-01 RX ORDER — ONDANSETRON HYDROCHLORIDE 2 MG/ML
4 INJECTION, SOLUTION INTRAVENOUS EVERY 8 HOURS PRN
Status: DISCONTINUED | OUTPATIENT
Start: 2025-03-01 | End: 2025-03-01

## 2025-03-01 RX ORDER — ACETAMINOPHEN 500 MG
5 TABLET ORAL NIGHTLY
Status: DISCONTINUED | OUTPATIENT
Start: 2025-03-01 | End: 2025-03-02 | Stop reason: HOSPADM

## 2025-03-01 RX ORDER — FENTANYL CITRATE 50 UG/ML
25 INJECTION, SOLUTION INTRAMUSCULAR; INTRAVENOUS ONCE
Status: DISCONTINUED | OUTPATIENT
Start: 2025-03-01 | End: 2025-03-01 | Stop reason: HOSPADM

## 2025-03-01 RX ORDER — METHOCARBAMOL 500 MG/1
500 TABLET, FILM COATED ORAL EVERY 6 HOURS
Status: DISCONTINUED | OUTPATIENT
Start: 2025-03-01 | End: 2025-03-02

## 2025-03-01 RX ORDER — LIDOCAINE 560 MG/1
1 PATCH PERCUTANEOUS; TOPICAL; TRANSDERMAL DAILY
Status: DISCONTINUED | OUTPATIENT
Start: 2025-03-02 | End: 2025-03-02 | Stop reason: HOSPADM

## 2025-03-01 RX ORDER — BISACODYL 10 MG/1
10 SUPPOSITORY RECTAL DAILY PRN
Status: DISCONTINUED | OUTPATIENT
Start: 2025-03-01 | End: 2025-03-02 | Stop reason: HOSPADM

## 2025-03-01 RX ORDER — OXYCODONE HYDROCHLORIDE 5 MG/1
2.5 TABLET ORAL EVERY 4 HOURS PRN
Status: DISCONTINUED | OUTPATIENT
Start: 2025-03-01 | End: 2025-03-02 | Stop reason: HOSPADM

## 2025-03-01 RX ADMIN — TETANUS TOXOID, REDUCED DIPHTHERIA TOXOID AND ACELLULAR PERTUSSIS VACCINE, ADSORBED 0.5 ML: 5; 2.5; 8; 8; 2.5 SUSPENSION INTRAMUSCULAR at 14:31

## 2025-03-01 RX ADMIN — METHOCARBAMOL TABLETS 500 MG: 500 TABLET, COATED ORAL at 23:00

## 2025-03-01 RX ADMIN — MELATONIN TAB 5 MG 5 MG: 5 TAB at 22:59

## 2025-03-01 ASSESSMENT — ENCOUNTER SYMPTOMS
SPEECH DIFFICULTY: 0
SINUS PAIN: 0
LIGHT-HEADEDNESS: 0
BLOOD IN STOOL: 0
WEAKNESS: 0
EYE DISCHARGE: 0
DIZZINESS: 0
BACK PAIN: 0
VOMITING: 0
AGITATION: 0
ABDOMINAL PAIN: 0
FEVER: 0
DYSURIA: 0
SEIZURES: 0
NAUSEA: 0
APNEA: 0
ABDOMINAL DISTENTION: 0
NECK PAIN: 0
PALPITATIONS: 0
CONSTIPATION: 0
DIFFICULTY URINATING: 0
DIARRHEA: 0
NECK STIFFNESS: 0
CHEST TIGHTNESS: 0
ACTIVITY CHANGE: 0
COLOR CHANGE: 0
FATIGUE: 0
COUGH: 0
RHINORRHEA: 0
SHORTNESS OF BREATH: 0

## 2025-03-01 ASSESSMENT — COLUMBIA-SUICIDE SEVERITY RATING SCALE - C-SSRS
2. HAVE YOU ACTUALLY HAD ANY THOUGHTS OF KILLING YOURSELF?: NO
6. HAVE YOU EVER DONE ANYTHING, STARTED TO DO ANYTHING, OR PREPARED TO DO ANYTHING TO END YOUR LIFE?: NO
1. IN THE PAST MONTH, HAVE YOU WISHED YOU WERE DEAD OR WISHED YOU COULD GO TO SLEEP AND NOT WAKE UP?: NO

## 2025-03-01 ASSESSMENT — PAIN - FUNCTIONAL ASSESSMENT
PAIN_FUNCTIONAL_ASSESSMENT: 0-10
PAIN_FUNCTIONAL_ASSESSMENT: 0-10

## 2025-03-01 ASSESSMENT — PAIN SCALES - GENERAL
PAINLEVEL_OUTOF10: 0 - NO PAIN

## 2025-03-01 ASSESSMENT — LIFESTYLE VARIABLES
TOTAL SCORE: 0
EVER FELT BAD OR GUILTY ABOUT YOUR DRINKING: NO
EVER HAD A DRINK FIRST THING IN THE MORNING TO STEADY YOUR NERVES TO GET RID OF A HANGOVER: NO
HAVE PEOPLE ANNOYED YOU BY CRITICIZING YOUR DRINKING: NO
HAVE YOU EVER FELT YOU SHOULD CUT DOWN ON YOUR DRINKING: NO

## 2025-03-01 ASSESSMENT — PAIN DESCRIPTION - PROGRESSION: CLINICAL_PROGRESSION: NOT CHANGED

## 2025-03-01 NOTE — ED PROVIDER NOTES
HPI   Chief Complaint   Patient presents with    Fall     Pt states she was hit by the door on the way out causing her to fall. No LOC, not on thinners.       HPI: 79-year-old female states that she was walking into a building to watch her granddaughter play volleyball when it was very windy and the door closed on her and she fell.  She states that she fell forward.  She did not pass out.  She is not take any blood thinners.  She states that she face planted.  She did sustain a laceration to the lip.  She denies any dizziness or syncope.  She denies any chest pain.  She denies any shortness of breath.  In looking at old records it appears that she does have history of ascites liver disease    Family HX: Denies any significant/pertinent family history.  Social Hx: Denies ETOH or drug use.  Review of systems:  Gen.: No weight loss, fatigue, anorexia, insomnia, fever.   Eyes: No vision loss, double vision, drainage, eye pain.   ENT: No pharyngitis, dry mouth.   Cardiac: No chest pain, palpitations, syncope, near syncope.   Pulmonary: No shortness of breath, cough, hemoptysis.   Heme/lymph: No swollen glands, fever, bleeding.   GI: No abdominal pain, change in bowel habits, melena, hematemesis, hematochezia, nausea, vomiting, diarrhea.   : No discharge, dysuria, frequency, urgency, hematuria.   Musculoskeletal: No limb pain, joint pain, joint swelling.   Skin: No rashes.   Psych: No depression, anxiety, suicidality, homicidality.   Review of systems is otherwise negative unless stated above or in history of present illness.    Physical Exam:    Appearance: Alert, oriented , cooperative,  in no acute distress. Well nourished & well hydrated.    Skin: Intact,  dry skin, no lesions, rash, petechiae or purpura.     Eyes: PERRLA, EOMs intact,  Conjunctiva pink with no redness or exudates.  No hyphema, no proptosis, extraocular muscles are intact eyelids without lesions. No scleral icterus.  Ecchymosis over the right  eyebrow    ENT: Hearing grossly intact. External auditory canals patent, tympanic membranes intact with visible landmarks. Nares patent, mucus membranes moist. Dentition without lesions. Pharynx clear, uvula midline.  Avulsion to the upper lip.  Midface is stable.  No septal hematomas    Neck: Supple, without meningismus. Thyroid not palpable. Trachea at midline. No lymphadenopathy.    Pulmonary: Clear bilaterally with good chest wall excursion. No rales, rhonchi or wheezing. No accessory muscle use or stridor.    Cardiac: Normal S1, S2 without murmur, rub, gallop or extrasystole. No JVD, Carotids without bruits.    Abdomen: Soft, nontender, active bowel sounds.  No palpable organomegaly.  No rebound or guarding.  No CVA tenderness.  Distention likely ascites    Genitourinary: Exam deferred.    Musculoskeletal: Full range of motion. no pain, edema, or deformity. Pulses full and equal. No cyanosis, clubbing, or edema.    Neurological:  Cranial nerves II through XII are grossly intact, finger-nose touch is normal, normal sensation, no weakness, no focal findings identified.    Psychiatric: Appropriate mood and affect.     Medical Decision-Making:  Testing: Patient was sent to the CT scanner as I was concerned that she may have had injury.  Patient CT of her head shows acute intracranial hemorrhage 4 mm subdural hematoma along the falx scattered area of subarachnoid hemorrhage involving both frontal and parietal lobes.  These findings I did emergently reach out to trauma at [facility].  Patient had a CT of the chest abdomen and pelvis done as well as a CT of the face and C-spine.  Patient has signs of ascites and cirrhosis however there is no current signs of other intra-abdominal intrathoracic or spinal trauma.  I did recommend that we sew the laceration to the lip and the patient declined.  At this time patient is excepted for a trauma consult to [facility].  I spoke to Dr. Frank as well as Dr. Santos.  Patient's  family is at the bedside  Treatment:   Reevaluation:   Plan: Transferred Patient and family/friend/caregiver are in agreement with this plan.   Impression:   1.  Intracranial hemorrhage  2.    Labs Reviewed  CBC WITH AUTO DIFFERENTIAL - Abnormal     WBC                           3.9 (*)                nRBC                          0.0                    RBC                           4.14                   Hemoglobin                    11.0 (*)               Hematocrit                    36.0                   MCV                           87                     MCH                           26.6                   MCHC                          30.6 (*)               RDW                           15.2 (*)               Platelets                     116 (*)                Neutrophils %                 66.3                   Immature Granulocytes %, Automated   0.5                    Lymphocytes %                 20.7                   Monocytes %                   10.2                   Eosinophils %                 1.8                    Basophils %                   0.5                    Neutrophils Absolute          2.60                   Immature Granulocytes Absolute, Au*   0.02                   Lymphocytes Absolute          0.81                   Monocytes Absolute            0.40                   Eosinophils Absolute          0.07                   Basophils Absolute            0.02                COMPREHENSIVE METABOLIC PANEL - Abnormal     Glucose                       180 (*)                Sodium                        140                    Potassium                     4.1                    Chloride                      107                    Bicarbonate                   29                     Anion Gap                     8 (*)                  Urea Nitrogen                 10                     Creatinine                    0.97                   eGFR                          60 (*)                  Calcium                       8.3 (*)                Albumin                       3.0 (*)                Alkaline Phosphatase          142 (*)                Total Protein                 5.6 (*)                AST                           33                     Bilirubin, Total              1.1                    ALT                           18                  ALCOHOL - Normal     Alcohol                       <10                 LACTATE - Normal     Lactate                       1.9                      Narrative: Venipuncture immediately after or during the administration of Metamizole may lead to falsely low results. Testing should be performed immediately prior to Metamizole dosing.  PROTIME-INR - Normal     Protime                       12.0                   INR                           1.1                 TYPE AND SCREEN     CT chest abdomen pelvis wo IV contrast   Final Result    CHEST:    1.  No acute traumatic injury of the thorax identified on noncontrast    exam.          ABDOMEN-PELVIS:    1.  Cirrhosis and portal hypertension.    2. Moderate volume ascites with severe anasarca.    3. No definite acute traumatic injury identified on noncontrast exam.                Signed by: Guille Irving 3/1/2025 2:33 PM    Dictation workstation:   [id number]     CT head W O contrast trauma protocol   Final Result    Positive study for acute intracranial hemorrhage.    4 mm in thickness subdural hematoma along the falx.    Scattered areas of subarachnoid hemorrhage involving both frontal and    parietal lobes. No significant mass effect or midline shift otherwise    identified.                      Signed by: Guille Irving 3/1/2025 2:17 PM    Dictation workstation:   [id number]     CT maxillofacial bones wo IV contrast   Final Result    No acute facial bone fracture visualized.          MACRO:    None          Signed by: Guille Irving 3/1/2025 2:20 PM    Dictation workstation:   [id number]     CT cervical spine  wo IV contrast   Final Result    No evidence for an acute fracture or subluxation of the cervical    spine.          MACRO:    None          Signed by: Guille Irving 3/1/2025 2:22 PM    Dictation workstation:   [id number]     CT 3D reconstruction   Final Result    No acute facial bone fracture visualized.          MACRO:    None          Signed by: Guille Irving 3/1/2025 2:20 PM    Dictation workstation:   [id number]                    Patient History   Past Medical History:   Diagnosis Date    Muscle weakness (generalized) 11/19/2015    Muscle weakness    Personal history of other diseases of the digestive system     History of dental problems    Personal history of other diseases of the nervous system and sense organs 11/19/2015    History of carpal tunnel syndrome    Personal history of other diseases of the nervous system and sense organs     History of cataract    Personal history of other endocrine, nutritional and metabolic disease     History of thyroid disease    Personal history of other mental and behavioral disorders     History of depression    Unspecified visual loss 11/19/2015    Vision problems     Past Surgical History:   Procedure Laterality Date    HYSTERECTOMY  11/19/2015    Hysterectomy     No family history on file.  Social History     Tobacco Use    Smoking status: Former     Types: Cigarettes    Smokeless tobacco: Never   Vaping Use    Vaping status: Former   Substance Use Topics    Alcohol use: Never    Drug use: Never       Physical Exam   ED Triage Vitals   Temp Pulse Resp BP   -- -- -- --      SpO2 Temp src Heart Rate Source Patient Position   -- -- -- --      BP Location FiO2 (%)     -- --       Physical Exam      ED Course & MDM   Diagnoses as of 03/01/25 1451   Subarachnoid hematoma, without loss of consciousness, initial encounter (Multi)   Subdural hematoma (Multi)                 No data recorded                                 Medical Decision Making      Procedure  Procedures      China Walsh MD  03/01/25 9340

## 2025-03-01 NOTE — ED PROCEDURE NOTE
Procedure  Critical Care    Performed by: China Walsh MD  Authorized by: China Walsh MD    Critical care provider statement:     Critical care time (minutes):  35    Critical care time was exclusive of:  Separately billable procedures and treating other patients    Critical care was necessary to treat or prevent imminent or life-threatening deterioration of the following conditions:  Trauma    Critical care was time spent personally by me on the following activities:  Ordering and performing treatments and interventions, ordering and review of laboratory studies, ordering and review of radiographic studies, pulse oximetry and re-evaluation of patient's condition    Care discussed with: accepting provider at another facility                 China Walsh MD  03/01/25 8035

## 2025-03-01 NOTE — ED PROVIDER NOTES
History of Present Illness     History provided by: Patient and EMS  Limitations to History: None  External Records Reviewed: Prior ED visit note documenting presentation to [facility] and    HPI:  Faye Macdonald is a 79 y.o. 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 after a mechanical fall from ground-level after wind closed a door striking her from behind knocking her to the ground.  She was found to have a subdural as well as subarachnoid hemorrhages.  She denies LOC.  She denies chest pain or shortness of breath or abdominal pain.  She denies reaching out to try to catch herself because she was startled.  Denies any wrist or hand pain.    Physical Exam   Triage vitals:  T    HR    BP    RR    O2        General: Awake, alert, in no acute distress  Eyes: Gaze conjugate.  No scleral icterus or injection  HENT: Right eye with bruising to the periorbital area, the eye itself is atraumatic with no conjunctival injection or erythema, the pupils equal and reactive, patient states extremely poor vision at baseline to finger counting which is intact to her baseline in her bilateral eyes.  Wedge-shaped laceration through the philtrum of her upper lip, hemostatic.  CV: Regular rate, regular rhythm. Radial pulses 2+ bilaterally, 2+ dorsalis pedis and posterior tibialis pulses bilaterally  Resp: Breathing non-labored, speaking in full sentences.  Clear to auscultation bilaterally  GI: Soft, non-distended, non-tender. No rebound or guarding.  : No suprapubic or CVA tenderness  MSK/Extremities: No gross bony deformities. Moving all extremities  Skin: Warm. Appropriate color  Neuro: Awake alert and oriented x 4, pupils equal round reactive to light and accommodation, EOMs are intact, normal facial sensation in all dermatomes, tongue protrusion is midline,  symmetric head turn, no pronator drift, 5 out of 5 strength in the bilateral upper extremities at the major muscle groups, 5 out of 5 strength at the major muscle groups of the bilateral lower extremities, sensation intact to light touch over all 4 extremities, no hyper hyporeflexia, no upgoing toes, no clonus  Psych: Appropriate mood and affect    Medical Decision Making & ED Course   Medical Decision Makin y.o. female hemodynamically stable with a blood pressure of 145/66.  She is neurovascularly intact.  We did reorder a CT scan of the head for stability timed for .  No indication for hand or wrist imaging in this fall.  Will obtain a chest x-ray for preoperative planning although I do not anticipate this will become an operative intervention case immediately.  Will repeat type and screen here.  Remainder of labs were performed at the outside hospital.  Neurosurgery and trauma surgery services were both consulted on this patient.  ----         Social Determinants of Health which Significantly Impact Care: None identified       Chronic conditions affecting the patient's care: See HPI    The patient was discussed with the following consultants/services: Please see ED course for consult transcript        ED Course:  ED Course as of 25 173   Sat Mar 01, 2025   1732 1415: Positive study for acute intracranial hemorrhage.  4 mm in thickness subdural hematoma along the falx.  Scattered areas of subarachnoid hemorrhage involving both frontal and  parietal lobes. No significant mass effect or midline shift otherwise  identified.   [GA]    Received a tetanus update at the outside hospital [GA]      ED Course User Index  [GA] Austyn Hastings MD         Diagnoses as of 25   Subdural hematoma (Multi)   Subarachnoid hematoma, without loss of consciousness, initial encounter (Multi)     Disposition   As a result of their workup, the patient will require admission to the hospital.  The patient was  informed of her diagnosis.  The patient was given the opportunity to ask questions and I answered them. The patient agreed to be admitted to the hospital.    Procedures   Procedures    Patient was seen and discussed with the attending of record.    Austyn Hastings MD  Emergency Medicine

## 2025-03-01 NOTE — H&P
[facility]  TRAUMA SERVICE - HISTORY AND PHYSICAL / CONSULT    Patient Name: Faye Macdonald  MRN: [id number]  Admit Date: 301  : 1945  AGE: 79 y.o.   GENDER: female  ==============================================================================  MECHANISM OF INJURY / CHIEF COMPLAINT:   79 year old female s/p mechanical fall. Patient was walking out her door when the door closed on her and she fell forward. Fell forward and hit her face. No LOC. No blood thinners. No presyncopal symptoms. Patient found to have a subdural hematoma, with scattered SAH at outside facility. Patient was transferred to [facility] for a higher level of care. Patient is not in any current pain. She is otherwise well, no infectious symptoms.     LOC (yes/no?): no  Anticoagulant / Anti-platelet Rx? (for what dx?): ASA  Referring Facility Name (N/A for scene EMR run): [facility]     INJURIES:   4 mm subdural hematoma   Scattered areas of SAH within the frontal and parietal lobes    OTHER MEDICAL PROBLEMS:  cirrhosis   COPD  DM2  CKD3  Hypothyroidism     INCIDENTAL FINDINGS:  R Linear lucency through 5th metacarpal, age-indeterminate fracture   Mild emphysematous changes w/ mild atelectasis   Borderline enlargement spleen, 13.6 cm   Severely atherosclerotic abdominal aorta   Borderline aneursym of intrarenal aorta,   Cirrhosis with portal hypertension, chronic   Moderate volume ascities w/ anasarca     ==============================================================================  ADMISSION PLAN OF CARE:    #subdural hematoma   #scattered SAH   - admit to trauma surgery service  - Multimodal pain control   - PT/OT   - NSGY recs:    - repeart   - Hold ASA   - will continue to follow     #comorbidities   -Formal med rec to be done by pharmacy, will continue home meds as clinically appropriate     #FEN/GI/   -bowel regimen   -monitor and replete electrolytes as indicated      #DVT PPX  -SCDs   -holding lovenox  I/s/o ICH       Consultants notified (specialty, provider name, time): NSGY      Pt seen and discussed with attending Dr. Narayan      Total face to face time spent with patient/family of 30 minutes, with >50% of the time spent discussing plan of care/management, counseling/educating on disease processes, explaining results of diagnostic testing. I have reviewed all medications, laboratory results, and imaging pertinent for today's encounter.    Leslee Pineda DO PGY1  Trauma, Critical Care, Acute Care Surgery   Floor: [id number]  TSICU: [id number]       ==============================================================================  PAST MEDICAL HISTORY:   PMH:   Past Medical History:   Diagnosis Date    Muscle weakness (generalized) 11/19/2015    Muscle weakness    Personal history of other diseases of the digestive system     History of dental problems    Personal history of other diseases of the nervous system and sense organs 11/19/2015    History of carpal tunnel syndrome    Personal history of other diseases of the nervous system and sense organs     History of cataract    Personal history of other endocrine, nutritional and metabolic disease     History of thyroid disease    Personal history of other mental and behavioral disorders     History of depression    Unspecified visual loss 11/19/2015    Vision problems       PSH:   Past Surgical History:   Procedure Laterality Date    HYSTERECTOMY  11/19/2015    Hysterectomy     FH:   No family history on file.  SOCIAL HISTORY:    Smoking: none    Social History     Tobacco Use   Smoking Status Former    Types: Cigarettes   Smokeless Tobacco Never       Alcohol:    Social History     Substance and Sexual Activity   Alcohol Use Never       Drug use: none    MEDICATIONS:   Prior to Admission medications    Medication Sig Start Date End Date Taking? Authorizing Provider   albuterol 90 mcg/actuation aerosol powdr breath activated inhaler Inhale 4 times a day as needed. 9/11/21    Historical Provider, MD   aspirin 81 mg chewable tablet Chew 1 tablet (81 mg) once daily.    Historical Provider, MD   atorvastatin (Lipitor) 20 mg tablet Take by mouth.    Historical Provider, MD   cholecalciferol (Vitamin D-3) 25 MCG (1000 UT) capsule 1 capsule DAILY (route: oral) 7/18/22   Historical Provider, MD   citalopram (CeleXA) 20 mg tablet Take 1 tablet (20 mg) by mouth once daily. 11/19/15   Historical Provider, MD   famotidine (Pepcid) 20 mg tablet TAKE 1 TABLET TWICE A DAY (NEED APPOINTMENT FOR FURTHER REFILLS) 11/19/15   Historical Provider, MD   fluticasone-umeclidin-vilanter (Trelegy Ellipta) 100-62.5-25 mcg blister with device Inhale 1 puff once daily. 5/14/24   Historical Provider, MD   furosemide (Lasix) 20 mg tablet Take 1 tablet (20 mg) by mouth once daily. 5/29/24   Historical Provider, MD   Januvia 100 mg tablet Take 1 tablet (100 mg) by mouth once daily. 11/19/15   Historical Provider, MD   lactulose 10 gram/15 mL (15 mL) solution Use 30 mL in the mouth or throat 3 times a day. Indications: impaired brain function due to liver disease 6/19/24   Historical Provider, MD   levothyroxine (Synthroid, Levoxyl) 150 mcg tablet TAKE 1 TABLET DAILY (NEED APPOINTMENT FOR FURTHER REFILLS) 12/2/22   Historical Provider, MD   nadolol (Corgard) 20 mg tablet Take 1 tablet (20 mg) by mouth once daily. 12/19/22   Historical Provider, MD   rifAXIMin (Xifaxan) 550 mg tablet Take 1 tablet (550 mg) by mouth 2 times a day. 7/12/24   Su Leyva, APRN-CNP   spironolactone (Aldactone) 25 mg tablet Take 1 tablet (25 mg) by mouth once daily. 5/29/24   Historical Provider, MD     ALLERGIES:   Allergies   Allergen Reactions    Cefdinir Diarrhea     Diarrhea    Metformin Swelling     Swelling leg       REVIEW OF SYSTEMS:  Review of Systems   Constitutional:  Negative for activity change, fatigue and fever.   HENT:  Negative for congestion, postnasal drip, rhinorrhea and sinus pain.    Eyes:  Negative for discharge  and visual disturbance.   Respiratory:  Negative for apnea, cough, chest tightness and shortness of breath.    Cardiovascular:  Negative for chest pain, palpitations and leg swelling.   Gastrointestinal:  Negative for abdominal distention, abdominal pain, blood in stool, constipation, diarrhea, nausea and vomiting.   Genitourinary:  Negative for difficulty urinating, dysuria and vaginal pain.   Musculoskeletal:  Negative for back pain, neck pain and neck stiffness.   Skin:  Negative for color change and pallor.   Neurological:  Negative for dizziness, seizures, syncope, speech difficulty, weakness, light-headedness and headaches.   Psychiatric/Behavioral:  Negative for agitation and behavioral problems.      PHYSICAL EXAM:  PRIMARY SURVEY:  Airway  Airway is patent.     Breathing  Breathing is normal. Right breath sounds are normal. Left breath sounds are normal.     Circulation  Cardiac rhythm is regular. Rate is regular.   Pulses  Radial: 2+ on the right; 2+ on the left.  Femoral: 2+ on the right; 2+ on the left.  Pedal: 2+ on the right; 2+ on the left.    Disability  Henrik Coma Score  Eye:4   Verbal:5   Motor:6      15  Pupils  Right Pupil:   round and reactive        Left Pupil:   round and reactive           Motor Strength   strength:  5/5 on the right  5/5 on the left  Dorsiflex strength:  5/5 on the right  5/5 on the left  Plantarflex strength:  5/5 on the right  5/5 on the left  The patient does not have a sensory deficit.       SECONDARY SURVEY/PHYSICAL EXAM:  Physical Exam  Constitutional:       General: She is not in acute distress.     Appearance: She is normal weight. She is not ill-appearing.   HENT:      Head: Normocephalic. Right periorbital erythema present. No laceration.      Comments: Abrasion superior to the upper lip, no active bleeding   See image below      Right Ear: External ear normal.      Left Ear: External ear normal.      Nose: Nose normal. No congestion.      Mouth/Throat:       Mouth: Mucous membranes are moist.      Pharynx: Oropharynx is clear.   Eyes:      Extraocular Movements:      Left eye: Normal extraocular motion.      Conjunctiva/sclera: Conjunctivae normal.      Pupils: Pupils are equal, round, and reactive to light.      Comments: EOM intact    Cardiovascular:      Rate and Rhythm: Normal rate and regular rhythm.      Pulses: Normal pulses.      Heart sounds: Normal heart sounds.   Pulmonary:      Effort: Pulmonary effort is normal.      Breath sounds: Normal breath sounds.   Abdominal:      General: Bowel sounds are normal. There is no distension.      Palpations: Abdomen is soft.      Tenderness: There is no abdominal tenderness.   Musculoskeletal:         General: No swelling, tenderness or signs of injury. Normal range of motion.      Cervical back: Normal range of motion. No rigidity or tenderness.   Skin:     General: Skin is warm and dry.      Capillary Refill: Capillary refill takes less than 2 seconds.      Findings: No bruising.   Neurological:      General: No focal deficit present.      Mental Status: She is alert and oriented to person, place, and time. Mental status is at baseline.      Cranial Nerves: No cranial nerve deficit.      Sensory: No sensory deficit.      Motor: No weakness.   Psychiatric:         Mood and Affect: Mood normal.         Behavior: Behavior normal.                     IMAGING SUMMARY:  (summary of findings, not a copy of dictation)  CT Head/Face: acute subdural hematoma with scattered SAH, no mass effect   CT maxillofacial bones: no acute traumatic injuries   CT C-Spine: no acute traumatic injuries   CT Chest/Abd/Pelvis: no acute traumatic injuries   CXR/PXR: no acute traumatic injures   XR L hand/wrist: no acute traumatic injuries   XR R wrist: no acute traumatic injuries   CT T/L spine: no acute traumatic injuries       LABS:  Results from last 7 days   Lab Units 03/01/25  1336   WBC AUTO x10*3/uL 3.9*   HEMOGLOBIN g/dL 11.0*   HEMATOCRIT  % 36.0   PLATELETS AUTO x10*3/uL 116*   NEUTROS PCT AUTO % 66.3   LYMPHS PCT AUTO % 20.7   MONOS PCT AUTO % 10.2   EOS PCT AUTO % 1.8     Results from last 7 days   Lab Units 03/01/25  1817 03/01/25  1336   APTT seconds 26  --    INR  1.1 1.1     Results from last 7 days   Lab Units 03/01/25  1336   SODIUM mmol/L 140   POTASSIUM mmol/L 4.1   CHLORIDE mmol/L 107   CO2 mmol/L 29   BUN mg/dL 10   CREATININE mg/dL 0.97   CALCIUM mg/dL 8.3*   PROTEIN TOTAL g/dL 5.6*   BILIRUBIN TOTAL mg/dL 1.1   ALK PHOS U/L 142*   ALT U/L 18   AST U/L 33   GLUCOSE mg/dL 180*     Results from last 7 days   Lab Units 03/01/25  1336   BILIRUBIN TOTAL mg/dL 1.1           I have reviewed all laboratory and imaging results ordered/pertinent for this encounter.

## 2025-03-01 NOTE — CONSULTS
Inpatient consult to Neurosurgery  Consult performed by: Minh Falcon MD  Consult ordered by: Lucian Sanchez MD    Date of Service:  3/1/2025 Attending Provider:  Lucian Sanchez MD     Reason for Consultation:  Faye Macdonald is being seen today for a consult requested by Lucian Sanchez MD for traumatic SAH, SDH.    Subjective   History of Present Illness:  Faye is a 79 y.o. female with No Principal Problem: There is no principal problem currently on the Problem List. Please update the Problem List and refresh.    Patient was hit by a door and had a mechanical fall from standing and hit her face. Patient is on aspirin, last dose was this morning. No headaches, nausea or vomiting. Patient denied any weakness, numbness, vision changes, fevers or chills.  CTH w/ CTH scattered bifrontal traumatic SAH, parafalcine SDH, noncompressive, without significant cerebral edema, for which neurosurgery was consulted.    Review of Systems negative other than listed in HPI.    Objective   Vitals:  Vitals:    03/01/25 1753   BP: 145/66   Pulse: 80   Resp: 16   Temp: 36.7 °C (98.1 °F)   SpO2: 99%         Exam:  Constitutional: No acute distress  Resp: breathing comfortably  Cardio: well perfused  GI: nondistended  MSK: full range of motion  Neuro: Awake, Ox3  face symmetric  RUE 5/5  LUE 5/5  RLE 5/5  LLE 5/5  sensation intact to light touch  Psych: appropriate  Skin: bruised R eye    Medical History  Past Medical History:   Diagnosis Date    Muscle weakness (generalized) 11/19/2015    Muscle weakness    Personal history of other diseases of the digestive system     History of dental problems    Personal history of other diseases of the nervous system and sense organs 11/19/2015    History of carpal tunnel syndrome    Personal history of other diseases of the nervous system and sense organs     History of cataract    Personal history of other endocrine, nutritional and metabolic disease     History of thyroid disease     Personal history of other mental and behavioral disorders     History of depression    Unspecified visual loss 11/19/2015    Vision problems       Surgical History  Past Surgical History:   Procedure Laterality Date    HYSTERECTOMY  11/19/2015    Hysterectomy        Medications  Current Outpatient Medications   Medication Instructions    albuterol 90 mcg/actuation aerosol powdr breath activated inhaler inhalation, 4 times daily PRN    aspirin 81 mg, oral, Daily    atorvastatin (Lipitor) 20 mg tablet oral    cholecalciferol (Vitamin D-3) 25 MCG (1000 UT) capsule 1 capsule DAILY (route: oral)    citalopram (CeleXA) 20 mg tablet 1 tablet, oral, Daily    famotidine (Pepcid) 20 mg tablet TAKE 1 TABLET TWICE A DAY (NEED APPOINTMENT FOR FURTHER REFILLS)    fluticasone-umeclidin-vilanter (Trelegy Ellipta) 100-62.5-25 mcg blister with device 1 puff, inhalation, Daily    furosemide (Lasix) 20 mg tablet 1 tablet, oral, Daily    Januvia 100 mg tablet 1 tablet, oral, Daily    lactulose 10 gram/15 mL (15 mL) solution 30 mL, Mouth/Throat, 3 times daily    levothyroxine (Synthroid, Levoxyl) 150 mcg tablet TAKE 1 TABLET DAILY (NEED APPOINTMENT FOR FURTHER REFILLS)    nadolol (Corgard) 20 mg tablet 1 tablet, oral, Daily    rifAXIMin (XIFAXAN) 550 mg, oral, 2 times daily    spironolactone (Aldactone) 25 mg tablet 1 tablet, oral, Daily        Diagnostic Results:    Lab Results   Component Value Date    WBC 3.9 (L) 03/01/2025    HGB 11.0 (L) 03/01/2025    HCT 36.0 03/01/2025    MCV 87 03/01/2025     (L) 03/01/2025     Lab Results   Component Value Date    CREATININE 0.97 03/01/2025    BUN 10 03/01/2025     03/01/2025    K 4.1 03/01/2025     03/01/2025    CO2 29 03/01/2025     Lab Results   Component Value Date    INR 1.1 03/01/2025    INR 1.4 (H) 06/21/2022    INR 1.0 09/10/2021    PROTIME 12.0 03/01/2025    PROTIME 15.7 (H) 06/21/2022    PROTIME 11.4 09/10/2021       === 03/01/25 ===    CT 3D RECONSTRUCTION    -  Impression -  No acute facial bone fracture visualized.    MACRO:  None    Signed by: Guille Irving 3/1/2025 2:20 PM  Dictation workstation:   [id number]  === 06/21/22 ===    MR LUMBAR SPINE W AND WO CONTRAST    - Impression -  Postsurgical changes from an L4 laminectomy. Fluid with surrounding  paraspinal soft tissue as well as edema and enhancement is noted  centered above the laminectomy bed, of uncertain sterility. Enhancing  soft tissue about the thecal sac which could be related to early  granulation tissue or postoperative inflammatory changes, resulting  in severe stenosis at the L4-L5 level.    Degenerative changes and disc bulges which appear superimposed on a  congenitally narrow canal results in severe canal stenosis at L2-L3  and L3-L4 with effacement of the CSF signal and undulation of the  cauda equina.    Fluid collection within the subcutaneous midline fat may be related  to a postsurgical seroma with foci of air or surgical material.    GCS:   - Motor: 6 - Follows simple motor commands  - Verbal: 5 - Alert and oriented  - Eyes: 4 - Opens eyes on own  - TOTAL: 15    Assessment/Plan   Assessment:  79yF h/o 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), trigeminal neuralgia s/p MVD (JPM), HLD, hypothyroidism, COPD, DM2, CKD3, decompensated liver cirrohosis (PRN paracentesis), p/w mechanical fall, CTH scattered bifrontal traumatic SAH, parafalcine SDH, rCTH stable    Plan:  Trauma primary  Ok for floor  Please ensure patient has CBC/RFP/coag/T&S/UA/UPT (if applicable)/EKG/CXR  Hold ASA  Neurosurgery will continue to follow    Minh Falcon MD

## 2025-03-01 NOTE — ED TRIAGE NOTES
Pt transferred from [facility]. Pt fell from standing hitting face. Per imaging at [facility], Pt has SHD, and SAH along with bruising around right eye and small laceration to right upper lip. Pt denies LOC, No thinners, Denies pain at this time. Neurological intact, VSS, NAD at time of triage.

## 2025-03-02 VITALS
BODY MASS INDEX: 31.89 KG/M2 | TEMPERATURE: 97.3 F | OXYGEN SATURATION: 97 % | RESPIRATION RATE: 17 BRPM | DIASTOLIC BLOOD PRESSURE: 75 MMHG | HEART RATE: 73 BPM | HEIGHT: 63 IN | WEIGHT: 180 LBS | SYSTOLIC BLOOD PRESSURE: 124 MMHG

## 2025-03-02 LAB
ERYTHROCYTE [DISTWIDTH] IN BLOOD BY AUTOMATED COUNT: 15.2 % (ref 11.5–14.5)
GLUCOSE BLD MANUAL STRIP-MCNC: 127 MG/DL (ref 74–99)
GLUCOSE BLD MANUAL STRIP-MCNC: 174 MG/DL (ref 74–99)
HCT VFR BLD AUTO: 33.1 % (ref 36–46)
HGB BLD-MCNC: 10.2 G/DL (ref 12–16)
MCH RBC QN AUTO: 27 PG (ref 26–34)
MCHC RBC AUTO-ENTMCNC: 30.8 G/DL (ref 32–36)
MCV RBC AUTO: 88 FL (ref 80–100)
NRBC BLD-RTO: 0 /100 WBCS (ref 0–0)
PLATELET # BLD AUTO: 110 X10*3/UL (ref 150–450)
RBC # BLD AUTO: 3.78 X10*6/UL (ref 4–5.2)
WBC # BLD AUTO: 3.9 X10*3/UL (ref 4.4–11.3)

## 2025-03-02 PROCEDURE — 94640 AIRWAY INHALATION TREATMENT: CPT

## 2025-03-02 PROCEDURE — 99238 HOSP IP/OBS DSCHRG MGMT 30/<: CPT

## 2025-03-02 PROCEDURE — 82947 ASSAY GLUCOSE BLOOD QUANT: CPT

## 2025-03-02 PROCEDURE — G0378 HOSPITAL OBSERVATION PER HR: HCPCS

## 2025-03-02 PROCEDURE — 97161 PT EVAL LOW COMPLEX 20 MIN: CPT | Mod: GP

## 2025-03-02 PROCEDURE — 36415 COLL VENOUS BLD VENIPUNCTURE: CPT

## 2025-03-02 PROCEDURE — 2500000005 HC RX 250 GENERAL PHARMACY W/O HCPCS

## 2025-03-02 PROCEDURE — 2500000002 HC RX 250 W HCPCS SELF ADMINISTERED DRUGS (ALT 637 FOR MEDICARE OP, ALT 636 FOR OP/ED)

## 2025-03-02 PROCEDURE — 85027 COMPLETE CBC AUTOMATED: CPT

## 2025-03-02 PROCEDURE — 2500000001 HC RX 250 WO HCPCS SELF ADMINISTERED DRUGS (ALT 637 FOR MEDICARE OP)

## 2025-03-02 RX ORDER — FAMOTIDINE 20 MG/1
20 TABLET, FILM COATED ORAL DAILY
Status: DISCONTINUED | OUTPATIENT
Start: 2025-03-02 | End: 2025-03-02 | Stop reason: HOSPADM

## 2025-03-02 RX ORDER — LIDOCAINE 560 MG/1
1 PATCH PERCUTANEOUS; TOPICAL; TRANSDERMAL DAILY PRN
Qty: 3 PATCH | Refills: 0 | Status: SHIPPED | OUTPATIENT
Start: 2025-03-02 | End: 2025-03-05

## 2025-03-02 RX ORDER — DEXTROSE 50 % IN WATER (D50W) INTRAVENOUS SYRINGE
12.5
Status: DISCONTINUED | OUTPATIENT
Start: 2025-03-02 | End: 2025-03-02 | Stop reason: HOSPADM

## 2025-03-02 RX ORDER — LACTULOSE 10 G/15ML
30 SOLUTION ORAL 3 TIMES DAILY
Status: DISCONTINUED | OUTPATIENT
Start: 2025-03-02 | End: 2025-03-02 | Stop reason: HOSPADM

## 2025-03-02 RX ORDER — DEXTROSE 50 % IN WATER (D50W) INTRAVENOUS SYRINGE
25
Status: DISCONTINUED | OUTPATIENT
Start: 2025-03-02 | End: 2025-03-02 | Stop reason: HOSPADM

## 2025-03-02 RX ORDER — LIDOCAINE 560 MG/1
1 PATCH PERCUTANEOUS; TOPICAL; TRANSDERMAL DAILY PRN
Qty: 3 PATCH | Refills: 0 | Status: SHIPPED | OUTPATIENT
Start: 2025-03-02 | End: 2025-03-02

## 2025-03-02 RX ORDER — OXYCODONE HYDROCHLORIDE 5 MG/1
2.5 TABLET ORAL EVERY 6 HOURS PRN
Qty: 6 TABLET | Refills: 0 | Status: SHIPPED | OUTPATIENT
Start: 2025-03-02 | End: 2025-03-05

## 2025-03-02 RX ORDER — FLUTICASONE FUROATE AND VILANTEROL 100; 25 UG/1; UG/1
1 POWDER RESPIRATORY (INHALATION)
Status: DISCONTINUED | OUTPATIENT
Start: 2025-03-02 | End: 2025-03-02 | Stop reason: HOSPADM

## 2025-03-02 RX ORDER — ATORVASTATIN CALCIUM 20 MG/1
20 TABLET, FILM COATED ORAL DAILY
Status: DISCONTINUED | OUTPATIENT
Start: 2025-03-02 | End: 2025-03-02 | Stop reason: HOSPADM

## 2025-03-02 RX ORDER — AMOXICILLIN 250 MG
1 CAPSULE ORAL DAILY
Qty: 7 TABLET | Refills: 0 | Status: SHIPPED | OUTPATIENT
Start: 2025-03-02 | End: 2025-03-09

## 2025-03-02 RX ORDER — LEVOTHYROXINE SODIUM 75 UG/1
150 TABLET ORAL DAILY
Status: DISCONTINUED | OUTPATIENT
Start: 2025-03-02 | End: 2025-03-02 | Stop reason: HOSPADM

## 2025-03-02 RX ORDER — NADOLOL 20 MG/1
20 TABLET ORAL DAILY
Status: DISCONTINUED | OUTPATIENT
Start: 2025-03-02 | End: 2025-03-02 | Stop reason: HOSPADM

## 2025-03-02 RX ORDER — OXYCODONE HYDROCHLORIDE 5 MG/1
2.5 TABLET ORAL EVERY 6 HOURS PRN
Qty: 6 TABLET | Refills: 0 | Status: SHIPPED | OUTPATIENT
Start: 2025-03-02 | End: 2025-03-02

## 2025-03-02 RX ORDER — CHOLECALCIFEROL (VITAMIN D3) 25 MCG
1000 TABLET ORAL NIGHTLY
Status: DISCONTINUED | OUTPATIENT
Start: 2025-03-02 | End: 2025-03-02 | Stop reason: HOSPADM

## 2025-03-02 RX ORDER — INSULIN LISPRO 100 [IU]/ML
0-5 INJECTION, SOLUTION INTRAVENOUS; SUBCUTANEOUS
Status: DISCONTINUED | OUTPATIENT
Start: 2025-03-02 | End: 2025-03-02 | Stop reason: HOSPADM

## 2025-03-02 RX ORDER — CITALOPRAM 20 MG/1
20 TABLET, FILM COATED ORAL DAILY
Status: DISCONTINUED | OUTPATIENT
Start: 2025-03-02 | End: 2025-03-02 | Stop reason: HOSPADM

## 2025-03-02 RX ORDER — LACTULOSE 10 G/15ML
20 SOLUTION ORAL; RECTAL 3 TIMES DAILY PRN
COMMUNITY

## 2025-03-02 RX ORDER — AMOXICILLIN 250 MG
1 CAPSULE ORAL DAILY
Qty: 7 TABLET | Refills: 0 | Status: SHIPPED | OUTPATIENT
Start: 2025-03-02 | End: 2025-03-02

## 2025-03-02 RX ORDER — ALBUTEROL SULFATE 90 UG/1
2 INHALANT RESPIRATORY (INHALATION) 4 TIMES DAILY PRN
Status: DISCONTINUED | OUTPATIENT
Start: 2025-03-02 | End: 2025-03-02 | Stop reason: HOSPADM

## 2025-03-02 RX ORDER — ALBUTEROL SULFATE 90 UG/1
2 INHALANT RESPIRATORY (INHALATION)
Status: DISCONTINUED | OUTPATIENT
Start: 2025-03-02 | End: 2025-03-02

## 2025-03-02 RX ADMIN — METHOCARBAMOL TABLETS 500 MG: 500 TABLET, COATED ORAL at 06:22

## 2025-03-02 RX ADMIN — FLUTICASONE FUROATE AND VILANTEROL TRIFENATATE 1 PUFF: 100; 25 POWDER RESPIRATORY (INHALATION) at 11:20

## 2025-03-02 RX ADMIN — LACTULOSE 30 G: 20 SOLUTION ORAL at 09:04

## 2025-03-02 RX ADMIN — RIFAXIMIN 550 MG: 550 TABLET ORAL at 09:04

## 2025-03-02 RX ADMIN — FAMOTIDINE 20 MG: 20 TABLET, FILM COATED ORAL at 09:04

## 2025-03-02 RX ADMIN — NADOLOL 20 MG: 20 TABLET ORAL at 09:04

## 2025-03-02 RX ADMIN — Medication 1 L/MIN: at 09:08

## 2025-03-02 RX ADMIN — CITALOPRAM HYDROBROMIDE 20 MG: 20 TABLET ORAL at 09:04

## 2025-03-02 RX ADMIN — LIDOCAINE 4% 1 PATCH: 40 PATCH TOPICAL at 09:04

## 2025-03-02 RX ADMIN — LEVOTHYROXINE SODIUM 150 MCG: 75 TABLET ORAL at 06:22

## 2025-03-02 RX ADMIN — ATORVASTATIN CALCIUM 20 MG: 20 TABLET, FILM COATED ORAL at 09:04

## 2025-03-02 RX ADMIN — Medication 0.5 L/MIN: at 09:09

## 2025-03-02 RX ADMIN — TIOTROPIUM BROMIDE INHALATION SPRAY 2 PUFF: 3.12 SPRAY, METERED RESPIRATORY (INHALATION) at 11:20

## 2025-03-02 SDOH — ECONOMIC STABILITY: HOUSING INSECURITY: AT ANY TIME IN THE PAST 12 MONTHS, WERE YOU HOMELESS OR LIVING IN A SHELTER (INCLUDING NOW)?: PATIENT DECLINED

## 2025-03-02 SDOH — SOCIAL STABILITY: SOCIAL NETWORK: IN A TYPICAL WEEK, HOW MANY TIMES DO YOU TALK ON THE PHONE WITH FAMILY, FRIENDS, OR NEIGHBORS?: PATIENT DECLINED

## 2025-03-02 SDOH — SOCIAL STABILITY: SOCIAL INSECURITY: ARE YOU MARRIED, WIDOWED, DIVORCED, SEPARATED, NEVER MARRIED, OR LIVING WITH A PARTNER?: PATIENT DECLINED

## 2025-03-02 SDOH — SOCIAL STABILITY: SOCIAL NETWORK
DO YOU BELONG TO ANY CLUBS OR ORGANIZATIONS SUCH AS CHURCH GROUPS, UNIONS, FRATERNAL OR ATHLETIC GROUPS, OR SCHOOL GROUPS?: PATIENT DECLINED

## 2025-03-02 SDOH — SOCIAL STABILITY: SOCIAL INSECURITY: DO YOU FEEL UNSAFE GOING BACK TO THE PLACE WHERE YOU ARE LIVING?: NO

## 2025-03-02 SDOH — ECONOMIC STABILITY: FOOD INSECURITY: HOW HARD IS IT FOR YOU TO PAY FOR THE VERY BASICS LIKE FOOD, HOUSING, MEDICAL CARE, AND HEATING?: PATIENT DECLINED

## 2025-03-02 SDOH — SOCIAL STABILITY: SOCIAL INSECURITY
WITHIN THE LAST YEAR, HAVE YOU BEEN KICKED, HIT, SLAPPED, OR OTHERWISE PHYSICALLY HURT BY YOUR PARTNER OR EX-PARTNER?: PATIENT DECLINED

## 2025-03-02 SDOH — SOCIAL STABILITY: SOCIAL INSECURITY: WITHIN THE LAST YEAR, HAVE YOU BEEN AFRAID OF YOUR PARTNER OR EX-PARTNER?: PATIENT DECLINED

## 2025-03-02 SDOH — SOCIAL STABILITY: SOCIAL INSECURITY: HAVE YOU HAD THOUGHTS OF HARMING ANYONE ELSE?: NO

## 2025-03-02 SDOH — ECONOMIC STABILITY: TRANSPORTATION INSECURITY
IN THE PAST 12 MONTHS, HAS LACK OF TRANSPORTATION KEPT YOU FROM MEDICAL APPOINTMENTS OR FROM GETTING MEDICATIONS?: PATIENT DECLINED

## 2025-03-02 SDOH — SOCIAL STABILITY: SOCIAL INSECURITY: ABUSE: ADULT

## 2025-03-02 SDOH — SOCIAL STABILITY: SOCIAL NETWORK: HOW OFTEN DO YOU ATTEND CHURCH OR RELIGIOUS SERVICES?: PATIENT DECLINED

## 2025-03-02 SDOH — HEALTH STABILITY: PHYSICAL HEALTH: ON AVERAGE, HOW MANY MINUTES DO YOU ENGAGE IN EXERCISE AT THIS LEVEL?: PATIENT DECLINED

## 2025-03-02 SDOH — SOCIAL STABILITY: SOCIAL INSECURITY: DO YOU FEEL ANYONE HAS EXPLOITED OR TAKEN ADVANTAGE OF YOU FINANCIALLY OR OF YOUR PERSONAL PROPERTY?: NO

## 2025-03-02 SDOH — SOCIAL STABILITY: SOCIAL INSECURITY: HAVE YOU HAD ANY THOUGHTS OF HARMING ANYONE ELSE?: NO

## 2025-03-02 SDOH — ECONOMIC STABILITY: INCOME INSECURITY
IN THE PAST 12 MONTHS HAS THE ELECTRIC, GAS, OIL, OR WATER COMPANY THREATENED TO SHUT OFF SERVICES IN YOUR HOME?: PATIENT DECLINED

## 2025-03-02 SDOH — HEALTH STABILITY: PHYSICAL HEALTH
HOW OFTEN DO YOU NEED TO HAVE SOMEONE HELP YOU WHEN YOU READ INSTRUCTIONS, PAMPHLETS, OR OTHER WRITTEN MATERIAL FROM YOUR DOCTOR OR PHARMACY?: PATIENT DECLINES TO RESPOND

## 2025-03-02 SDOH — SOCIAL STABILITY: SOCIAL INSECURITY
WITHIN THE LAST YEAR, HAVE YOU BEEN HUMILIATED OR EMOTIONALLY ABUSED IN OTHER WAYS BY YOUR PARTNER OR EX-PARTNER?: PATIENT DECLINED

## 2025-03-02 SDOH — SOCIAL STABILITY: SOCIAL INSECURITY: ARE YOU OR HAVE YOU BEEN THREATENED OR ABUSED PHYSICALLY, EMOTIONALLY, OR SEXUALLY BY ANYONE?: NO

## 2025-03-02 SDOH — SOCIAL STABILITY: SOCIAL NETWORK: HOW OFTEN DO YOU ATTEND MEETINGS OF THE CLUBS OR ORGANIZATIONS YOU BELONG TO?: PATIENT DECLINED

## 2025-03-02 SDOH — HEALTH STABILITY: PHYSICAL HEALTH
ON AVERAGE, HOW MANY DAYS PER WEEK DO YOU ENGAGE IN MODERATE TO STRENUOUS EXERCISE (LIKE A BRISK WALK)?: PATIENT DECLINED

## 2025-03-02 SDOH — HEALTH STABILITY: MENTAL HEALTH
DO YOU FEEL STRESS - TENSE, RESTLESS, NERVOUS, OR ANXIOUS, OR UNABLE TO SLEEP AT NIGHT BECAUSE YOUR MIND IS TROUBLED ALL THE TIME - THESE DAYS?: PATIENT DECLINED

## 2025-03-02 SDOH — SOCIAL STABILITY: SOCIAL INSECURITY: WERE YOU ABLE TO COMPLETE ALL THE BEHAVIORAL HEALTH SCREENINGS?: YES

## 2025-03-02 SDOH — SOCIAL STABILITY: SOCIAL INSECURITY
WITHIN THE LAST YEAR, HAVE YOU BEEN RAPED OR FORCED TO HAVE ANY KIND OF SEXUAL ACTIVITY BY YOUR PARTNER OR EX-PARTNER?: PATIENT DECLINED

## 2025-03-02 SDOH — SOCIAL STABILITY: SOCIAL INSECURITY: HAS ANYONE EVER THREATENED TO HURT YOUR FAMILY OR YOUR PETS?: NO

## 2025-03-02 SDOH — SOCIAL STABILITY: SOCIAL NETWORK: HOW OFTEN DO YOU GET TOGETHER WITH FRIENDS OR RELATIVES?: PATIENT DECLINED

## 2025-03-02 SDOH — SOCIAL STABILITY: SOCIAL INSECURITY: DOES ANYONE TRY TO KEEP YOU FROM HAVING/CONTACTING OTHER FRIENDS OR DOING THINGS OUTSIDE YOUR HOME?: NO

## 2025-03-02 SDOH — ECONOMIC STABILITY: FOOD INSECURITY
WITHIN THE PAST 12 MONTHS, YOU WORRIED THAT YOUR FOOD WOULD RUN OUT BEFORE YOU GOT THE MONEY TO BUY MORE.: PATIENT DECLINED

## 2025-03-02 SDOH — ECONOMIC STABILITY: FOOD INSECURITY: WITHIN THE PAST 12 MONTHS, THE FOOD YOU BOUGHT JUST DIDN'T LAST AND YOU DIDN'T HAVE MONEY TO GET MORE.: PATIENT DECLINED

## 2025-03-02 SDOH — SOCIAL STABILITY: SOCIAL INSECURITY: ARE THERE ANY APPARENT SIGNS OF INJURIES/BEHAVIORS THAT COULD BE RELATED TO ABUSE/NEGLECT?: NO

## 2025-03-02 SDOH — ECONOMIC STABILITY: HOUSING INSECURITY: IN THE LAST 12 MONTHS, WAS THERE A TIME WHEN YOU WERE NOT ABLE TO PAY THE MORTGAGE OR RENT ON TIME?: PATIENT DECLINED

## 2025-03-02 ASSESSMENT — PAIN - FUNCTIONAL ASSESSMENT
PAIN_FUNCTIONAL_ASSESSMENT: 0-10

## 2025-03-02 ASSESSMENT — ACTIVITIES OF DAILY LIVING (ADL)
ASSISTIVE_DEVICE: EYEGLASSES
DRESSING YOURSELF: INDEPENDENT
TOILETING: INDEPENDENT
GROOMING: INDEPENDENT
BATHING: INDEPENDENT
HEARING - LEFT EAR: FUNCTIONAL
FEEDING YOURSELF: INDEPENDENT
WALKS IN HOME: INDEPENDENT
HEARING - RIGHT EAR: FUNCTIONAL
JUDGMENT_ADEQUATE_SAFELY_COMPLETE_DAILY_ACTIVITIES: YES
PATIENT'S MEMORY ADEQUATE TO SAFELY COMPLETE DAILY ACTIVITIES?: YES
LACK_OF_TRANSPORTATION: PATIENT DECLINED
LACK_OF_TRANSPORTATION: PATIENT DECLINED
ADEQUATE_TO_COMPLETE_ADL: YES

## 2025-03-02 ASSESSMENT — COGNITIVE AND FUNCTIONAL STATUS - GENERAL
STANDING UP FROM CHAIR USING ARMS: A LITTLE
PATIENT BASELINE BEDBOUND: NO
MOBILITY SCORE: 23
DRESSING REGULAR LOWER BODY CLOTHING: A LITTLE
MOBILITY SCORE: 21
CLIMB 3 TO 5 STEPS WITH RAILING: A LITTLE
DAILY ACTIVITIY SCORE: 23
WALKING IN HOSPITAL ROOM: A LITTLE
CLIMB 3 TO 5 STEPS WITH RAILING: A LITTLE

## 2025-03-02 ASSESSMENT — PAIN SCALES - GENERAL
PAINLEVEL_OUTOF10: 0 - NO PAIN
PAINLEVEL_OUTOF10: 5 - MODERATE PAIN

## 2025-03-02 ASSESSMENT — LIFESTYLE VARIABLES
HOW OFTEN DO YOU HAVE A DRINK CONTAINING ALCOHOL: NEVER
HOW MANY STANDARD DRINKS CONTAINING ALCOHOL DO YOU HAVE ON A TYPICAL DAY: PATIENT DOES NOT DRINK
PRESCIPTION_ABUSE_PAST_12_MONTHS: NO
AUDIT-C TOTAL SCORE: 0
HOW OFTEN DO YOU HAVE 6 OR MORE DRINKS ON ONE OCCASION: NEVER
SUBSTANCE_ABUSE_PAST_12_MONTHS: NO
AUDIT-C TOTAL SCORE: 0
SKIP TO QUESTIONS 9-10: 1

## 2025-03-02 ASSESSMENT — PATIENT HEALTH QUESTIONNAIRE - PHQ9
SUM OF ALL RESPONSES TO PHQ9 QUESTIONS 1 & 2: 0
2. FEELING DOWN, DEPRESSED OR HOPELESS: NOT AT ALL
1. LITTLE INTEREST OR PLEASURE IN DOING THINGS: NOT AT ALL

## 2025-03-02 ASSESSMENT — ENCOUNTER SYMPTOMS: HEADACHES: 0

## 2025-03-02 NOTE — CARE PLAN
The patient's goals for the shift include      The clinical goals for the shift include Patient willremain saFE AND FREE FROM INJURY    Over the shift, the patient did not make progress toward the following goals. Barriers to progression include NONE. Recommendations to address these barriers include none  Problem: Fall/Injury  Goal: Not fall by end of shift  Outcome: Progressing  Goal: Be free from injury by end of the shift  Outcome: Progressing  Goal: Verbalize understanding of personal risk factors for fall in the hospital  Outcome: Progressing  Goal: Verbalize understanding of risk factor reduction measures to prevent injury from fall in the home  Outcome: Progressing  Goal: Use assistive devices by end of the shift  Outcome: Progressing  Goal: Pace activities to prevent fatigue by end of the shift  Outcome: Progressing     Problem: Pain - Adult  Goal: Verbalizes/displays adequate comfort level or baseline comfort level  Outcome: Progressing     Problem: Safety - Adult  Goal: Free from fall injury  Outcome: Progressing     Problem: Discharge Planning  Goal: Discharge to home or other facility with appropriate resources  Outcome: Progressing     Problem: Chronic Conditions and Co-morbidities  Goal: Patient's chronic conditions and co-morbidity symptoms are monitored and maintained or improved  Outcome: Progressing     Problem: Nutrition  Goal: Nutrient intake appropriate for maintaining nutritional needs  Outcome: Progressing     Problem: Pain  Goal: Takes deep breaths with improved pain control throughout the shift  Outcome: Progressing  Goal: Turns in bed with improved pain control throughout the shift  Outcome: Progressing  Goal: Walks with improved pain control throughout the shift  Outcome: Progressing  Goal: Performs ADL's with improved pain control throughout shift  Outcome: Progressing  Goal: Participates in PT with improved pain control throughout the shift  Outcome: Progressing  Goal: Free from opioid  side effects throughout the shift  Outcome: Progressing  Goal: Free from acute confusion related to pain meds throughout the shift  Outcome: Progressing   .

## 2025-03-02 NOTE — SIGNIFICANT EVENT
No acute neurosurgical intervention or additional neuroimaging needed at this time. Prophylactic anticoagulation allowed on post bleed day 2. Patient does not need follow up with neurosurgery.    Ok for ASA PBD 5.    Thank you for allowing us to participate in the care of this patient. Will sign off at this time. Please page [phone] with any questions or concerns.    Yesica Bullock MD  PGY-2 Neurosurgery  8:30 AM

## 2025-03-02 NOTE — DISCHARGE INSTRUCTIONS
[facility]  DISCHARGE INSTRUCTIONS    GENERAL INSTRUCTIONS  1) Diet: Resume regular diet that you were consuming prior to admission.     2) Activity:   - Move around as you are able  - Avoid strenuous activities and intensive movements as you are healing.  - Normal activity as tolerated until follow up visits    Driving: No driving while taking narcotic medications and until follow up visits.  Showering/Bathing: You may shower and bathe once all of your dressings are removed. While a surgical dressing or orthopedic splint is in place, we recommend sponge baths only and to avoid getting the area of injury wet until your follow up appointments. Once the surgical dressing, wound dressing, and/or orthopedic splint is removed, you may shower. Please do not scrub the area of injury, but allow soap and water to run gently over the area. No tub soaks, swimming, or hot tub use until your follow up appointment unless otherwise indicated. Further instructions will be provided to you at your follow up appointments.     3) Medications:   - You are to resume all your home medications as previously prescribed unless otherwise indicated or instructed.  - Please DO NOT resume your home aspirin until Thursday, March 6th.  - You have been given prescriptions for Oxycodone (every 6 hours as needed for moderate/severe pain for 3 days), and Vivian-Colace (x7 days for constipation.   - Lidocaine patches may be applied to the wrists as needed. Please apply for 12 hours and remove for 12 hours.   - If refills are needed for your medications, please don't hesitate to reach out to your primary care provider.     4) Wound care:  - Keep wounds clean, dry, and covered with a dressing as desired/necessary. No lotions, powders, or creams over wounds. No tub soaks. Apply bacitracin ointment to wounds/abrasions three times per day and as needed for up to 7 days. You are allowed to shower. Do not scrub lacerations -  simply let soap and water fall over them. Please avoid picking at your lacerations.   - Call the office immediately if you notice any sign of infection such as increased redness, warmth, thick drainage, wound separation, or spiking fever/chills.     5) Follow up appointments:  - Trauma Surgery: A trauma clinic follow up is not necessary regarding your recent hospital admission. Follow up with the trauma surgery clinic as needed/desired. To follow up, please call the trauma clinic at [phone] to schedule your appointment. Do not hesitate to call our outpatient nurse coordinator at [phone] with any questions/concerns. The nurse will get back to you within 48-72 hours. If you feel it is an emergency, please proceed to your nearest Emergency Room.  - Primary Care Provider: Please follow up with your PCP within 1-2 weeks after discharge regarding your recent hospital admission. If you do not have a primary care provider, you may also call the hospital main number at [phone] and ask for a referral.    6) Please notify your physician if you have the following symptoms.   - Fever > 100.5 F (>38 C), chills  - Uncontrolled nausea and/or vomiting  - Chest pain  - New or worsening shortness of breath  - Uncontrolled diarrhea   - You stop passing gas   - Have new bruising, rashes, blistering on your body  - New numbness/tingling, loss of feeling in any part of your body or a new decreased ability to move any part of your body  - New or worsening swelling  - Uncontrolled pain  If you display any of the following signs/symptoms: increased confusion, altered mental status, new numbness or tingling, decreased sensation or movement, pain that is uncontrolled with pain medications, increased shortness of breath, chest pain/palpitations, or any other concerning signs/symptoms, please proceed to your nearest Emergency Department for further evaluation and management.

## 2025-03-02 NOTE — CARE PLAN
The patient's goals for the shift include      The clinical goals for the shift include Pt will remain safe and free from harm throughout my shift      Problem: Fall/Injury  Goal: Not fall by end of shift  Outcome: Progressing  Goal: Be free from injury by end of the shift  Outcome: Progressing  Goal: Verbalize understanding of personal risk factors for fall in the hospital  Outcome: Progressing  Goal: Verbalize understanding of risk factor reduction measures to prevent injury from fall in the home  Outcome: Progressing  Goal: Use assistive devices by end of the shift  Outcome: Progressing  Goal: Pace activities to prevent fatigue by end of the shift  Outcome: Progressing

## 2025-03-02 NOTE — PROGRESS NOTES
Pharmacy Medication History Review    Faye Macdonald is a 79 y.o. female admitted for Subdural hematoma (Multi). Pharmacy reviewed the patient's [id number] medications and allergies for accuracy.    The list below reflects the updated PTA list.   Prior to Admission Medications   Prescriptions Last Dose Informant Patient Reported? Taking?   Januvia 100 mg tablet 2025 Bedtime Self Yes Yes   Sig: Take 1 tablet (100 mg) by mouth once daily.   albuterol 90 mcg/actuation aerosol powdr breath activated inhaler Unknown Self Yes No   Sig: Inhale 4 times a day as needed.   aspirin 81 mg chewable tablet 2025 Bedtime Self Yes Yes   Sig: Chew 1 tablet (81 mg) once daily.   atorvastatin (Lipitor) 20 mg tablet 2025 Bedtime Self Yes Yes   Sig: Take 1 tablet (20 mg) by mouth once daily.   cholecalciferol (Vitamin D-3) 25 MCG (1000 UT) capsule Unknown Self Yes No   Si capsule DAILY (route: oral)   citalopram (CeleXA) 20 mg tablet 2025 Bedtime Self Yes Yes   Sig: Take 1 tablet (20 mg) by mouth once daily.   famotidine (Pepcid) 20 mg tablet 2025 Bedtime Self Yes Yes   Sig: Take 1 tablet (20 mg) by mouth once daily.   fluticasone-umeclidin-vilanter (Trelegy Ellipta) 100-62.5-25 mcg blister with device 2025 Self Yes Yes   Sig: Inhale 1 puff once daily.   lactulose 20 gram/30 mL oral solution Unknown  Yes No   Sig: Take 30 mL (20 g) by mouth 3 times a day as needed.   levothyroxine (Synthroid, Levoxyl) 150 mcg tablet 2025 Morning Self Yes Yes   Sig: TAKE 1 TABLET DAILY (NEED APPOINTMENT FOR FURTHER REFILLS)   nadolol (Corgard) 20 mg tablet 2025 Bedtime Self Yes Yes   Sig: Take 1 tablet (20 mg) by mouth once daily.   rifAXIMin (Xifaxan) 550 mg tablet 2025 Bedtime Child No Yes   Sig: Take 1 tablet (550 mg) by mouth 2 times a day.      Facility-Administered Medications: None        The list below reflects the updated allergy list. Please review each documented allergy for additional  "clarification and justification.  Allergies  Reviewed by Alberto Tamayo, RN on 3/1/2025        Severity Reactions Comments    Cefdinir Not Specified Diarrhea Diarrhea    Metformin Not Specified Swelling Swelling leg            Patient declines M2B at discharge.     Sources:   Patient Interview - moderate historian, able to confirm most medications with name prompting and reached out to daughter Pamela [phone] for confirmation on rest of medications  Admission MedRec Grid  OARRS - none   EPIC medication dispense report    Medications ADDED:  None  Medications CHANGED:  Atorvastatin - added sig  Famotidine - changed frequency from BID to QDay  Medications REMOVED/MARKED NOT TAKING:   Furosemide 20mg  Spironolactone 25mg     Additional Comments:  Patient fills medications through a mail order pharmacy, unable to pull mail order pharmacy dispense data into chart    Gloria Terry, PharmD  Transitions of Care Pharmacist  03/02/25     Secure Chat preferred   If no response call [id number] or [email or web address] \"Med Rec\"    "

## 2025-03-02 NOTE — PROGRESS NOTES
Physical Therapy    Physical Therapy Evaluation    Patient Name: Faye Macdonald  MRN: [id number]  Department: [place] [address]  Room: [address] [place] [address]  Today's Date: 3/2/2025   Time Calculation  Start Time: 1124  Stop Time: 1148  Time Calculation (min): 24 min    Assessment/Plan   PT Assessment  Barriers to Discharge Home: No anticipated barriers  Evaluation/Treatment Tolerance: Patient tolerated treatment well  Medical Staff Made Aware: Yes  Strengths: Premorbid level of function  Barriers to Participation: Housing layout  End of Session Communication: Bedside nurse  Assessment Comment: Previously independent 79 y.o. female presents after fall with SAH, SDH. Pt able to ambulate in [place] without AD and negotiate stairs without assist. Pt is not needing further PT services in house or after DC. Will DC PT orders at this time.  End of Session Patient Position: Up in chair, Alarm on  IP OR SWING BED PT PLAN  Inpatient or Swing Bed: Inpatient  PT Plan  Treatment/Interventions: Transfer training, Bed mobility, Gait training, Stair training, Balance training, Strengthening, Endurance training, Range of motion, Therapeutic exercise, Therapeutic activity  PT Plan: PT Eval only  PT Eval Only Reason: At baseline function  PT Frequency: PT eval only  PT Discharge Recommendations: No PT needed after discharge, No further acute PT  Equipment Recommended upon Discharge:  (Owns)  PT Recommended Transfer Status: Independent  PT - OK to Discharge: Yes    Subjective   General Visit Information:  General  Reason for Referral: Mechanical fall with SAH, SDH  Past Medical History Relevant to Rehab: 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), trigeminal neuralgia s/p MVD (JPM), HLD, hypothyroidism, COPD, DM2, CKD3, decompensated liver cirrohosis  Family/Caregiver Present: No  Prior to Session Communication: Bedside nurse  Patient Position Received: Bed, 3 rail up, Alarm on  Preferred Learning Style: auditory, verbal, visual  General Comment: Pt pleasant and agreeable to PT  session  Home Living:  Home Living  Type of Home: House  Lives With: Alone  Home Adaptive Equipment: Walker rolling or standard, Wheelchair-manual  Home Layout: Two level, Stairs to alternate level with rails  Alternate Level Stairs-Rails: Right  Alternate Level Stairs-Number of Steps: 12  Home Access: Level entry  Bathroom Shower/Tub: Walk-in shower  Bathroom Toilet: Standard  Bathroom Equipment: None  Prior Level of Function:  Prior Function Per Pt/Caregiver Report  Level of [place]: Independent with ADLs and functional transfers, Independent with homemaking with ambulation  Ambulatory Assistance: Independent (Uses FWW as needed)  Vocational: Retired  Prior Function Comments: (+) drives, 2 falls/6 mo  Precautions:  Precautions  Hearing/Visual Limitations: Slightly [place]  Medical Precautions: Fall precautions      Date/Time Vitals Session Patient Position Pulse Resp SpO2 BP MAP (mmHg)    03/02/25 1120 --  --  --  16  97 %  --  --     03/02/25 1154 --  --  73  17  97 %  124/75  91           Objective   Pain:  Pain Assessment  Pain Assessment: 0-10  0-10 (Numeric) Pain Score: 0 - No pain  Cognition:  Cognition  Overall Cognitive Status: Within Functional Limits  Orientation Level: Oriented X4  Insight: Mild  Impulsive: Within functional limits    General Assessments:    Activity Tolerance  Endurance: Endurance does not limit participation in activity    Sensation  Light Touch: No apparent deficits    Coordination  Movements are Fluid and Coordinated: Yes    Postural Control  Postural Control: Within Functional Limits    Static Sitting Balance  Static Sitting-Balance Support: Feet supported, No upper extremity supported  Static Sitting-Level of Assistance: Distant supervision    Static Standing Balance  Static Standing-Balance Support: No upper extremity supported  Static Standing-Level of Assistance: Close supervision  Functional Assessments:     Bed Mobility  Bed Mobility: Yes  Bed Mobility 1  Bed Mobility 1:  Supine to sitting  Level of Assistance 1: Modified independent    Transfers  Transfer: Yes  Transfer 1  Transfer From 1: Bed to  Transfer to 1: Stand  Technique 1: Sit to stand  Transfer Level of Assistance 1: Close supervision  Transfers 2  Transfer From 2: Stand to  Transfer to 2: Chair with arms  Technique 2: Stand to sit  Transfer Level of Assistance 2: Close supervision    Ambulation/Gait Training  Ambulation/Gait Training Performed: Yes  Ambulation/Gait Training 1  Surface 1: Level tile  Device 1: No device  Assistance 1: Close supervision, Contact guard  Quality of Gait 1: Wide base of support, Decreased step length (Decresaed amberly)  Comments/Distance (ft) 1: 120ft X2    Stairs  Stairs: Yes  Stairs  Rails 1: Right  Device 1: Railing  Assistance 1: Contact guard  Comment/Number of Steps 1: 8    Extremity/Trunk Assessments:    RLE   RLE : Within Functional Limits  LLE   LLE : Within Functional Limits  Outcome Measures:  [facility] Basic Mobility  Turning from your back to your side while in a flat bed without using bedrails: None  Moving from lying on your back to sitting on the side of a flat bed without using bedrails: None  Moving to and from bed to chair (including a wheelchair): None  Standing up from a chair using your arms (e.g. wheelchair or bedside chair): None  To walk in hospital room: None  Climbing 3-5 steps with railing: A little  Basic Mobility - Total Score: 23    Encounter Problems       Encounter Problems (Active)       Pain - Adult              Education Documentation  Precautions, taught by Hortensia Edmonds PT at 3/2/2025 12:50 PM.  Learner: Patient  Readiness: Eager  Method: Explanation, Demonstration  Response: Verbalizes Understanding, Demonstrated Understanding  Comment: Educated on benefits of AD use    Body Mechanics, taught by Hortensia Edmonds PT at 3/2/2025 12:50 PM.  Learner: Patient  Readiness: Eager  Method: Explanation, Demonstration  Response: Verbalizes Understanding,  Demonstrated Understanding  Comment: Educated on benefits of AD use    Mobility Training, taught by Hortensia Edmonds PT at 3/2/2025 12:50 PM.  Learner: Patient  Readiness: Eager  Method: Explanation, Demonstration  Response: Verbalizes Understanding, Demonstrated Understanding  Comment: Educated on benefits of AD use    Education Comments  No comments found.

## 2025-03-02 NOTE — NURSING NOTE
Discharge instructions given to patient daughter no question voiced discharged stable condition

## 2025-03-02 NOTE — DISCHARGE SUMMARY
Discharge Diagnosis  - SDH  - Facial ecchymosis, scattered superficial abrasions    Issues Requiring Follow-Up  N/A    Hospital Course  79 year old female s/p mechanical fall. Patient was walking out her door when the door closed on her and she fell forward. Fell forward and hit her face. No LOC. No blood thinners. No presyncopal symptoms. Patient found to have a subdural hematoma, with scattered SAH at outside facility. Patient was transferred to [facility] for a higher level of care.     Repeat 6 hour CT scan revealed stable intracranial hemorrhages. NSGY was consulted. Recommended ASA on PBD 5. Patient also was complaining of bilateral palm pain from FOOSH, bilateral wrist Xrs were negative. Patient was admitted to the trauma service for observation.     Patient remained stable overnight. Continued to have no neuro deficits. Patient was discharged 3/2 with good return precautions.       Pertinent Physical Exam At Time of Discharge  Physical Exam  Vitals reviewed.   HENT:      Head: Normocephalic.      Nose: No rhinorrhea.      Mouth/Throat:      Mouth: Mucous membranes are dry.      Pharynx: Oropharynx is clear.   Eyes:      Extraocular Movements: Extraocular movements intact.      Pupils: Pupils are equal, round, and reactive to light.   Cardiovascular:      Rate and Rhythm: Normal rate.      Pulses: Normal pulses.   Pulmonary:      Effort: Pulmonary effort is normal. No respiratory distress.      Breath sounds: Normal breath sounds. No stridor. No wheezing.   Abdominal:      General: There is no distension.      Palpations: Abdomen is soft.      Tenderness: There is no abdominal tenderness.   Musculoskeletal:         General: No swelling, tenderness or deformity.      Comments: Wrists mildly tender to palpation. No bony deformities.   Skin:     General: Skin is warm and dry.      Findings: Abrasion and bruising present.      Comments: Scattered abrasions to face. Ecchymosis. Right carolynn-orbital ecchymosis.    Neurological:      Mental Status: She is alert and oriented to person, place, and time.      GCS: GCS eye subscore is 4. GCS verbal subscore is 5. GCS motor subscore is 6.      Sensory: No sensory deficit.      Motor: No weakness.   Psychiatric:         Mood and Affect: Mood normal.         Behavior: Behavior normal.       Home Medications     Medication List      START taking these medications     lidocaine 4 % patch; Place 1 patch over 12 hours on the skin once daily   as needed for mild pain (1 - 3) or moderate pain (4 - 6) for up to 3 days.   Place lidocaine patch to R wrist/forearm. Patch to remain in place for 12   hours. Please remove patch for 12 hours. Remove & discard patch within 12   hours or as directed by MD.   oxyCODONE 5 mg immediate release tablet; Commonly known as: Roxicodone;   Take 0.5 tablets (2.5 mg) by mouth every 6 hours if needed for moderate   pain (4 - 6) or severe pain (7 - 10) for up to 3 days.   sennosides-docusate sodium 8.6-50 mg tablet; Commonly known as:   Vivian-Colace; Take 1 tablet by mouth once daily for 7 days.     CONTINUE taking these medications     albuterol 90 mcg/actuation aerosol powdr breath activated inhaler   aspirin 81 mg chewable tablet   atorvastatin 20 mg tablet; Commonly known as: Lipitor   cholecalciferol 25 MCG (1000 UT) capsule; Commonly known as: Vitamin D-3   citalopram 20 mg tablet; Commonly known as: CeleXA   famotidine 20 mg tablet; Commonly known as: Pepcid   Januvia 100 mg tablet; Generic drug: SITagliptin phosphate   lactulose 20 gram/30 mL oral solution   levothyroxine 150 mcg tablet; Commonly known as: Synthroid, Levoxyl   nadolol 20 mg tablet; Commonly known as: Corgard   rifAXIMin 550 mg tablet; Commonly known as: Xifaxan; Take 1 tablet (550   mg) by mouth 2 times a day.   Trelegy Ellipta 100-62.5-25 mcg blister with device; Generic drug:   fluticasone-umeclidin-vilanter     Outpatient Follow-Up  No future appointments.  - Neurosurgery recommended  no additional imaging. No neurosurgery follow up needed. Patient provided appropriate phone numbers for further questions and concerns.   - Patient to resume home aspirin on post-bleed day 5 (Thursday, March 6).  - At the time of discharge, patient's pain was controlled, patient was urinating, having BMs, sleeping, and tolerating feeding without issue. PT evaluated patient and recommended no needs at discharge. Patient was discharged home with family support in stable condition with scripts and follow up appointments as appropriate. Discharge plan was discussed with the patient/family and all questions were answered. Patient/family agreeable to plan. Patient discharged in stable condition.      Patient discussed with attending, Dr. Mendez.    Cha Villagomez PA-C  Trauma, Critical Care, and Acute Care Surgery    Total discharge care time of 28 minutes spent reviewing patient vital signs and medications, performing physical exam, discussing plan, coordinating care, reviewing consultant notes, with >50% of time spent face to face with patient/family.

## 2025-03-02 NOTE — PROGRESS NOTES
Emergency Medicine Transition of Care Note.    I received Faye Macdonald in signout from Dr. Hastings.  Please see the previous ED provider note for all HPI, PE and MDM up to the time of signout at 1900. This is in addition to the primary record.    In brief Faye Macdonald is an 79 y.o. female presenting for fall with SAH, SDH  Chief Complaint   Patient presents with    Fall     At the time of signout we were awaiting: CT imaging and Trauma recs    MDM:  At time of signout patient was pending her repeat stability scan.    Trauma recommended at this and was stable to mid to the floor if they had increased to admit to trauma ICU.    Repeat scan shows stable hemorrhage.  No evidence of mass effect.  Patient admitted to the floor.  ED Course as of 03/01/25 2020   Sat Mar 01, 2025   1732 1415: Positive study for acute intracranial hemorrhage.  4 mm in thickness subdural hematoma along the falx.  Scattered areas of subarachnoid hemorrhage involving both frontal and  parietal lobes. No significant mass effect or midline shift otherwise  identified.   [GA]   1735 Received a tetanus update at the outside hospital [GA]      ED Course User Index  [GA] Austyn Hastings MD         Diagnoses as of 03/01/25 2020   Subdural hematoma (Multi)   Subarachnoid hematoma, without loss of consciousness, initial encounter (Multi)       Padmini Deng DO

## 2025-03-03 ENCOUNTER — TELEPHONE (OUTPATIENT)
Dept: INTERVENTIONAL RADIOLOGY/VASCULAR | Age: 80
End: 2025-03-03

## 2025-03-03 NOTE — HOSPITAL COURSE
79 year old female s/p mechanical fall. Patient was walking out her door when the door closed on her and she fell forward. Fell forward and hit her face. No LOC. No blood thinners. No presyncopal symptoms. Patient found to have a subdural hematoma, with scattered SAH at outside facility. Patient was transferred to [facility] for a higher level of care.     Repeat 6 hour CT scan revealed stable intracranial hemorrhages. NSGY was consulted. Recommended ASA on PBD 5. Patient also was complaining of bilateral palm pain from FOOSH, bilateral wrist Xrs were negative. Patient was admitted to the trauma service for observation.     Patient remained stable overnight. Continued to have no neuro deficits. Patient was discharged 3/2 with good return precautions.

## 2025-03-03 NOTE — TELEPHONE ENCOUNTER
Patient and patient's daughter was given this information prior to leaving per Nany RN in Specials  2.  Spoke to Nany in Specials to schedule procedure    >  Paracentesis is scheduled on 3/7/2025 @ 11:00 am   >  You will need to arrive at 10:30 am from home and check in at the Diagnostic Imaging Check In desk.

## 2025-03-03 NOTE — TELEPHONE ENCOUNTER
----- Message from NILSON Hyde CNP sent at 2/27/2025  1:57 PM EST -----  Para order placed. No labs or med hold needed. Please make sheet. Thank you.  ----- Message -----  From: Nany Umaña RN  Sent: 2/21/2025  11:54 AM EST  To: NILSON Hyde CNP; JOHN Farah para 3/7 @ 1100

## 2025-03-04 ENCOUNTER — HOSPITAL ENCOUNTER (OUTPATIENT)
Dept: INTERVENTIONAL RADIOLOGY/VASCULAR | Age: 80
Discharge: HOME | End: 2025-03-06
Payer: MEDICARE

## 2025-03-04 VITALS — SYSTOLIC BLOOD PRESSURE: 120 MMHG | DIASTOLIC BLOOD PRESSURE: 52 MMHG | HEART RATE: 68 BPM | OXYGEN SATURATION: 98 %

## 2025-03-04 DIAGNOSIS — R18.8 OTHER ASCITES: ICD-10-CM

## 2025-03-04 PROCEDURE — 49083 ABD PARACENTESIS W/IMAGING: CPT | Performed by: RADIOLOGY

## 2025-03-04 PROCEDURE — 6360000002 HC RX W HCPCS: Performed by: NURSE PRACTITIONER

## 2025-03-04 PROCEDURE — 96365 THER/PROPH/DIAG IV INF INIT: CPT

## 2025-03-04 PROCEDURE — 49083 ABD PARACENTESIS W/IMAGING: CPT

## 2025-03-04 PROCEDURE — 2709999900 IR US GUIDED PARACENTESIS

## 2025-03-04 PROCEDURE — P9047 ALBUMIN (HUMAN), 25%, 50ML: HCPCS | Performed by: NURSE PRACTITIONER

## 2025-03-04 RX ORDER — ALBUMIN (HUMAN) 12.5 G/50ML
SOLUTION INTRAVENOUS CONTINUOUS PRN
Status: COMPLETED | OUTPATIENT
Start: 2025-03-04 | End: 2025-03-04

## 2025-03-04 RX ORDER — LIDOCAINE HYDROCHLORIDE 20 MG/ML
INJECTION, SOLUTION INFILTRATION; PERINEURAL PRN
Status: COMPLETED | OUTPATIENT
Start: 2025-03-04 | End: 2025-03-04

## 2025-03-04 RX ADMIN — ALBUMIN (HUMAN) 25 G: 12.5 SOLUTION INTRAVENOUS at 15:00

## 2025-03-04 RX ADMIN — LIDOCAINE HYDROCHLORIDE 10 ML: 20 INJECTION, SOLUTION INFILTRATION; PERINEURAL at 14:24

## 2025-03-04 NOTE — OR NURSING
1410 Pt arrived to Specials room 6 via WC. Hx, allergies, medications reviewed. Pt denies pain at this time. Consent verified for ultrasound guided paracentesis. Pt assisted onto cart and turned onto left side, foam wedge placed to assist with positioning. Abdomen assessed with ultrasound to confirm fluid. VSS.     1418 Area cleansed with large tinted chloraprep. After 3 minute dry time, draped with fenestrated drape and sterile towels by HILARY RN.     1423 Timeout completed. Using U/S guidance, Sara Hernández CNP numbed site with 2% lidocaine, see eMar.     1424 Using U/S guidance, access obtained with [id number] centesis 5F catheter with return of fluid that appears clear yellow. Catheter connected to tubing and Marsha machine with suction at 200 mmHG and draining well. Pt tolerating well. VSS.     1500 Pts VSS. IV established to R AV. Albumin infusing without complication.     1411 Drainage complete. Total 6840 ml removed. Centesis catheter removed and digital pressure held to site for 1 minute. LLQ site soft, no drainage, large bandaid applied. VSS. Iv removed, bandage applied.     1519 Discharge instructions reviewed. Pt taken to discharge exit. Jerica, pts daughter, to drive them home.

## 2025-03-04 NOTE — DISCHARGE INSTRUCTIONS
Ultrasound Guided Paracentesis Discharge Instructions     Rest today. No heavy lifting, bending, stretching or pulling.  Some tenderness will be normal at the procedure site for a few days.    You may remove the bandaid in 48 hours.     If you experience any drainage or bleeding at the site, hold pressure for 30 minutes and lay on side opposite of the procedure site (move fluid away from the area of leakage). If drainage or bleeding does not stop and seems excessive, call your physician or proceed to the ER.      Watch for signs of infection such as fever, chills, drainage or redness at the site. If you experience any of these symptoms, call your primary doctor or go to the emergency room.       Please keep all follow-up appointments with your Hepatologist. Schedule follow-up appointment for repeat paracentesis if necessary.     If you have any concerns please contact the [facility] Radiology Department at [phone].

## 2025-03-04 NOTE — OR NURSING
1410 Pt arrived to Specials room 6 via WC. Hx, allergies, medications reviewed. Pt denies pain at this time. Consent verified for ultrasound guided paracentesis. Pt assisted onto cart and turned onto left side, foam wedge placed to assist with positioning. Abdomen assessed with ultrasound to confirm fluid. VSS.     1418 Area cleansed with large tinted chloraprep. After 3 minute dry time, draped with fenestrated drape and sterile towels by HILARY RN.     1423 Timeout completed. Using U/S guidance, Sara Hernández CNP numbed site with 2% lidocaine, see eMar.     1424 Using U/S guidance, access obtained with [id number] centesis 5F catheter with return of fluid that appears clear yellow. Catheter connected to tubing and Marsha machine with suction at 200 mmHG and draining well. Pt tolerating well. VSS.     Drainage complete. Total @ ml removed. Centesis catheter removed and digital pressure held to site for 1 minute.     LLQ site soft, no drainage, large bandaid applied. VSS.     Discharge instructions reviewed. Pt taken to discharge exit. Jerica, pts daughter, to drive them home.

## 2025-03-05 ENCOUNTER — FOLLOWUP TELEPHONE ENCOUNTER (OUTPATIENT)
Dept: INTERVENTIONAL RADIOLOGY/VASCULAR | Age: 80
End: 2025-03-05

## 2025-03-05 NOTE — FLOWSHEET NOTE
RN call to patient to follow up post-op, as pt had paracentesis on 3/4/25 with hunter Hernández CNP.     No answer. No option to leave VM. Electronically signed by Sharmaine Cartagena RN on 3/5/2025 at 9:51 AM

## 2025-03-07 ENCOUNTER — OFFICE VISIT (OUTPATIENT)
Dept: PRIMARY CARE CLINIC | Age: 80
End: 2025-03-07
Payer: MEDICARE

## 2025-03-07 VITALS
HEART RATE: 74 BPM | BODY MASS INDEX: 30.11 KG/M2 | DIASTOLIC BLOOD PRESSURE: 64 MMHG | SYSTOLIC BLOOD PRESSURE: 118 MMHG | WEIGHT: 170 LBS | OXYGEN SATURATION: 99 % | TEMPERATURE: 98.4 F | RESPIRATION RATE: 18 BRPM

## 2025-03-07 DIAGNOSIS — S92.354D CLOSED NONDISPLACED FRACTURE OF FIFTH METATARSAL BONE OF RIGHT FOOT WITH ROUTINE HEALING, SUBSEQUENT ENCOUNTER: ICD-10-CM

## 2025-03-07 DIAGNOSIS — M79.89 PAIN AND SWELLING OF FOREARM, RIGHT: Primary | ICD-10-CM

## 2025-03-07 DIAGNOSIS — M79.631 PAIN AND SWELLING OF FOREARM, RIGHT: Primary | ICD-10-CM

## 2025-03-07 DIAGNOSIS — Z71.89 ACP (ADVANCE CARE PLANNING): ICD-10-CM
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
## 2025-03-07 PROCEDURE — G8427 DOCREV CUR MEDS BY ELIG CLIN: HCPCS | Performed by: INTERNAL MEDICINE

## 2025-03-07 PROCEDURE — 1126F AMNT PAIN NOTED NONE PRSNT: CPT | Performed by: INTERNAL MEDICINE

## 2025-03-07 PROCEDURE — 1090F PRES/ABSN URINE INCON ASSESS: CPT | Performed by: INTERNAL MEDICINE

## 2025-03-07 PROCEDURE — 1123F ACP DISCUSS/DSCN MKR DOCD: CPT | Performed by: INTERNAL MEDICINE

## 2025-03-07 PROCEDURE — G8417 CALC BMI ABV UP PARAM F/U: HCPCS | Performed by: INTERNAL MEDICINE

## 2025-03-07 PROCEDURE — 1036F TOBACCO NON-USER: CPT | Performed by: INTERNAL MEDICINE

## 2025-03-07 PROCEDURE — 1159F MED LIST DOCD IN RCRD: CPT | Performed by: INTERNAL MEDICINE

## 2025-03-07 PROCEDURE — 99214 OFFICE O/P EST MOD 30 MIN: CPT | Performed by: INTERNAL MEDICINE

## 2025-03-07 PROCEDURE — G8400 PT W/DXA NO RESULTS DOC: HCPCS | Performed by: INTERNAL MEDICINE

## 2025-03-07 PROCEDURE — 1160F RVW MEDS BY RX/DR IN RCRD: CPT | Performed by: INTERNAL MEDICINE

## 2025-03-07 RX ORDER — AMOXICILLIN 500 MG/1
500 CAPSULE ORAL 2 TIMES DAILY
Qty: 14 CAPSULE | Refills: 0 | Status: SHIPPED | OUTPATIENT
Start: 2025-03-07 | End: 2025-03-14

## 2025-03-07 RX ORDER — SULFAMETHOXAZOLE AND TRIMETHOPRIM 800; 160 MG/1; MG/1
1 TABLET ORAL 2 TIMES DAILY
Qty: 14 TABLET | Refills: 0 | Status: SHIPPED | OUTPATIENT
Start: 2025-03-07 | End: 2025-03-14

## 2025-03-07 SDOH — ECONOMIC STABILITY: FOOD INSECURITY: WITHIN THE PAST 12 MONTHS, THE FOOD YOU BOUGHT JUST DIDN'T LAST AND YOU DIDN'T HAVE MONEY TO GET MORE.: NEVER TRUE

## 2025-03-07 SDOH — ECONOMIC STABILITY: FOOD INSECURITY: WITHIN THE PAST 12 MONTHS, YOU WORRIED THAT YOUR FOOD WOULD RUN OUT BEFORE YOU GOT MONEY TO BUY MORE.: NEVER TRUE

## 2025-03-07 ASSESSMENT — PATIENT HEALTH QUESTIONNAIRE - PHQ9
SUM OF ALL RESPONSES TO PHQ QUESTIONS 1-9: 0
2. FEELING DOWN, DEPRESSED OR HOPELESS: NOT AT ALL
5. POOR APPETITE OR OVEREATING: NOT AT ALL
8. MOVING OR SPEAKING SO SLOWLY THAT OTHER PEOPLE COULD HAVE NOTICED. OR THE OPPOSITE, BEING SO FIGETY OR RESTLESS THAT YOU HAVE BEEN MOVING AROUND A LOT MORE THAN USUAL: NOT AT ALL
1. LITTLE INTEREST OR PLEASURE IN DOING THINGS: NOT AT ALL
7. TROUBLE CONCENTRATING ON THINGS, SUCH AS READING THE NEWSPAPER OR WATCHING TELEVISION: NOT AT ALL
6. FEELING BAD ABOUT YOURSELF - OR THAT YOU ARE A FAILURE OR HAVE LET YOURSELF OR YOUR FAMILY DOWN: NOT AT ALL
9. THOUGHTS THAT YOU WOULD BE BETTER OFF DEAD, OR OF HURTING YOURSELF: NOT AT ALL
4. FEELING TIRED OR HAVING LITTLE ENERGY: NOT AT ALL
SUM OF ALL RESPONSES TO PHQ QUESTIONS 1-9: 0
SUM OF ALL RESPONSES TO PHQ QUESTIONS 1-9: 0
10. IF YOU CHECKED OFF ANY PROBLEMS, HOW DIFFICULT HAVE THESE PROBLEMS MADE IT FOR YOU TO DO YOUR WORK, TAKE CARE OF THINGS AT HOME, OR GET ALONG WITH OTHER PEOPLE: NOT DIFFICULT AT ALL
SUM OF ALL RESPONSES TO PHQ QUESTIONS 1-9: 0
3. TROUBLE FALLING OR STAYING ASLEEP: NOT AT ALL

## 2025-03-07 NOTE — PROGRESS NOTES
forearm, right Inadequately Controlled Heavnely Goldberg MD [facility] [place]    XR RADIUS ULNA RIGHT (2 VIEWS)    amoxicillin (AMOXIL) 500 MG capsule    sulfamethoxazole-trimethoprim (BACTRIM DS) 800-160 MG per tablet    will treat for cellulitis but r/o radius or ulnar fracture      2. Closed nondisplaced fracture of fifth metatarsal bone of right foot with routine healing, subsequent encounter  Heavenly Goldberg MD [facility]    [employer] Wrist Brace, Right ()    will place in a brace and see ortho      3. Fall, initial encounter        4. ACP (advance care planning)          Plan:      Orders Placed This Encounter   Procedures    XR RADIUS ULNA RIGHT (2 VIEWS)     Standing Status:   Future     Number of Occurrences:   1     Expected Date:   3/7/2025     Expiration Date:   3/7/2026     Reason for exam::   r/o fracture, post fall    Heavenly Goldberg MD [facility]     Referral Priority:   Routine     Referral Type:   Eval and Treat     Referral Reason:   Specialty Services Required     Referred to Provider:   Heavenly Gray MD     Requested Specialty:   Orthopedic Surgery     Number of Visits Requested:   1    [employer] Wrist Brace, Right ()     Equipment::   Wrist Brace, Right ()     Orders Placed This Encounter   Medications    amoxicillin (AMOXIL) 500 MG capsule     Sig: Take 1 capsule by mouth 2 times daily for 7 days     Dispense:  14 capsule     Refill:  0    sulfamethoxazole-trimethoprim (BACTRIM DS) 800-160 MG per tablet     Sig: Take 1 tablet by mouth 2 times daily for 7 days     Dispense:  14 tablet     Refill:  0     Return in about 1 month (around 4/7/2025) for follow up with PCP.  Advance Care Planning   Discussed the patient’s choices for care and treatment preferences in case of a health event that adversely affects decision-making abilities or is life-limiting.   Pt

## 2025-03-07 NOTE — PATIENT INSTRUCTIONS

## 2025-03-08 ENCOUNTER — RESULTS FOLLOW-UP (OUTPATIENT)
Dept: PRIMARY CARE CLINIC | Age: 80
End: 2025-03-08

## 2025-03-10 ENCOUNTER — OFFICE VISIT (OUTPATIENT)
Dept: ORTHOPEDIC SURGERY | Age: 80
End: 2025-03-10

## 2025-03-10 VITALS
TEMPERATURE: 97 F | OXYGEN SATURATION: 98 % | BODY MASS INDEX: 30.83 KG/M2 | WEIGHT: 174 LBS | HEIGHT: 63 IN | HEART RATE: 62 BPM

## 2025-03-10 DIAGNOSIS — S51.811A LACERATION OF RIGHT FOREARM, INITIAL ENCOUNTER: Primary | ICD-10-CM

## 2025-03-10 ASSESSMENT — ENCOUNTER SYMPTOMS
EYES NEGATIVE: 1
RESPIRATORY NEGATIVE: 1
GASTROINTESTINAL NEGATIVE: 1

## 2025-03-10 NOTE — PROGRESS NOTES
Zach Aguirre (:  1945) is a 79 y.o. female,New patient, consult from Dr. Viramontes here for evaluation of the following chief complaint(s):  Wrist Pain (Right wrist pain. Present for approximately 2 months. )         Assessment & Plan  Laceration of right forearm, initial encounter   Chronic, at goal (stable), continue antibiotics and wound care.  Begin range of motion exercises.  Follow-up in 3 weeks           Assessment & Plan  1. Arm injury.  Wound shows no infection, redness due to new skin. X-ray shows no fractures. Advise avoiding sun exposure on the area. Recommend stress ball or racquetball exercises for fluid drainage and range of motion exercises at home. No brace needed. Keep arm elevated when sitting and wiggle fingers. Suggest acquiring Life Alert system. Continue antibiotics until completion. If redness recurs post-antibiotics, contact office with a photo.    Follow-up in 3 weeks.        No follow-ups on file.       Subjective   History of Present Illness  79-year-old female presents for arm injury evaluation.    Arm Injury  Injured in 2025 with initial bleeding, now ceased. Managing wound without Band-Aid due to friction discomfort. Reports slight redness and mild swelling, previously more pronounced. Prescribed two antibiotics by Dr. Viramontes for suspected infection. Wound described as removing skin layers, now dried. Inquires about continued brace use. Concerned about home safety when alone, no Life Alert system.  - Onset: Injured in 2025.  - Location: Arm.  - Duration: Initial bleeding has ceased; redness and swelling previously more pronounced.  - Character: Wound removing skin layers, now dried; slight redness and mild swelling.  - Alleviating/Aggravating Factors: Managing wound without Band-Aid due to friction discomfort; prescribed two antibiotics for suspected infection.  - Severity: Mild swelling and slight redness; concerned about home safety when alone.    Supplemental

## 2025-03-11 ENCOUNTER — OFFICE VISIT (OUTPATIENT)
Dept: PULMONOLOGY | Age: 80
End: 2025-03-11
Payer: MEDICARE

## 2025-03-11 VITALS
WEIGHT: 172 LBS | BODY MASS INDEX: 30.48 KG/M2 | TEMPERATURE: 97.6 F | OXYGEN SATURATION: 98 % | HEART RATE: 84 BPM | DIASTOLIC BLOOD PRESSURE: 68 MMHG | HEIGHT: 63 IN | SYSTOLIC BLOOD PRESSURE: 118 MMHG

## 2025-03-11 DIAGNOSIS — J44.9 CHRONIC OBSTRUCTIVE PULMONARY DISEASE, UNSPECIFIED COPD TYPE (HCC): ICD-10-CM

## 2025-03-11 DIAGNOSIS — R91.1 LUNG NODULE: Primary | ICD-10-CM

## 2025-03-11 PROCEDURE — 3023F SPIROM DOC REV: CPT | Performed by: INTERNAL MEDICINE

## 2025-03-11 PROCEDURE — 1159F MED LIST DOCD IN RCRD: CPT | Performed by: INTERNAL MEDICINE

## 2025-03-11 PROCEDURE — G8400 PT W/DXA NO RESULTS DOC: HCPCS | Performed by: INTERNAL MEDICINE

## 2025-03-11 PROCEDURE — 1123F ACP DISCUSS/DSCN MKR DOCD: CPT | Performed by: INTERNAL MEDICINE

## 2025-03-11 PROCEDURE — 1036F TOBACCO NON-USER: CPT | Performed by: INTERNAL MEDICINE

## 2025-03-11 PROCEDURE — 1090F PRES/ABSN URINE INCON ASSESS: CPT | Performed by: INTERNAL MEDICINE

## 2025-03-11 PROCEDURE — 99213 OFFICE O/P EST LOW 20 MIN: CPT | Performed by: INTERNAL MEDICINE

## 2025-03-11 PROCEDURE — G8417 CALC BMI ABV UP PARAM F/U: HCPCS | Performed by: INTERNAL MEDICINE

## 2025-03-11 PROCEDURE — G8427 DOCREV CUR MEDS BY ELIG CLIN: HCPCS | Performed by: INTERNAL MEDICINE

## 2025-03-11 NOTE — PROGRESS NOTES
PATIENT VISIT-PULMONARY/SLEEP    3/11/2025     REFERRING PHYSICIAN:  Yesenia Viramontes MD     REASON FOR REFERRAL:  Lung Nodule    HPI:     Zach Aguirre is a 79 y.o. female who was referred to pulmonary clinic for evaluation.   Has been seen in the past by pulmonary at [facility].  Has not been seen by anyone for quite some time.  Had a CT chest for lung screening. CT chest reviewed personally and interpreted results independently.  There is small nodule in RUL measuring 4 mm.  There is evidence of emphysema.  There is additional nodule at the base of right lower lobe that measures a few millimeters.  She was told in the past she has COPD.  He has been on trilogy but she ran out.  She uses albuterol rescue inhaler and nebulizer.  Quit smoking 11 years ago.  Has shortness of breath with minimal activities.  Has intermittent cough which is dry.       3/11/25:    Patient comes back for follow-up. Has been doing relatively well.  Underwent CT chest which I reviewed personally and interpreted breast results independently.  The nodule that she had in right upper lobe had resolved.     Has been using Trelegy daily.  Does not use rescue inhalers.  Has been having biweekly paracentesis for ascites.        Past Medical History   Past Medical History:   Diagnosis Date    Anxiety     DM (diabetes mellitus) (HCC) 4/14/2015    Hepatitis B infection     Hyperlipidemia     Hypothyroidism     Insomnia     Leg pain 4/14/2015    Post herpetic neuralgia     Unspecified sleep apnea     after seeing [facility] they state she is does not have sleep apnea.  Not using cpap       Past Surgical History  Past Surgical History:   Procedure Laterality Date    ANKLE FRACTURE SURGERY  09/2016    DR ANDRADE  LT    BIOPSY / LIGATION TEMPORAL ARTERY Right 04/26/2019    RIGHT TEMPORAL ARTERY BIOPSY performed by Emmanuel Irwin MD at [facility] [place]    BRAIN SURGERY  2015    surgery for transgeminal neuraglia.  Insertion of sponge for chronic pain

## 2025-03-17 ENCOUNTER — PRE-PROCEDURE TELEPHONE (OUTPATIENT)
Dept: INTERVENTIONAL RADIOLOGY/VASCULAR | Age: 80
End: 2025-03-17

## 2025-03-17 NOTE — FLOWSHEET NOTE
Pre-procedure call placed re: upcoming IR appointment. No answer, unable to leave voicemail message with patient.     Daughter Jerica called - voicemail message left with appointment information.    >  Paracentesis is scheduled on 3/18/2025 @ 11:00 am   >  You will need to arrive at 10:30 am from home and check in at the Diagnostic Imaging Check In desk.      Radiology phone # provided with instructions to call and ask for RN for any issues / concerns. Electronically signed by Stefani Gonzales RN on 3/17/2025 at 1:51 PM

## 2025-03-18 ENCOUNTER — HOSPITAL ENCOUNTER (OUTPATIENT)
Dept: INTERVENTIONAL RADIOLOGY/VASCULAR | Age: 80
Discharge: HOME | End: 2025-03-20
Payer: MEDICARE

## 2025-03-18 VITALS
DIASTOLIC BLOOD PRESSURE: 56 MMHG | OXYGEN SATURATION: 97 % | HEART RATE: 74 BPM | SYSTOLIC BLOOD PRESSURE: 116 MMHG | RESPIRATION RATE: 16 BRPM

## 2025-03-18 DIAGNOSIS — R18.8 OTHER ASCITES: ICD-10-CM

## 2025-03-18 PROCEDURE — 6360000002 HC RX W HCPCS: Performed by: NURSE PRACTITIONER

## 2025-03-18 PROCEDURE — C1729 CATH, DRAINAGE: HCPCS

## 2025-03-18 PROCEDURE — 49083 ABD PARACENTESIS W/IMAGING: CPT

## 2025-03-18 PROCEDURE — 96365 THER/PROPH/DIAG IV INF INIT: CPT

## 2025-03-18 PROCEDURE — P9047 ALBUMIN (HUMAN), 25%, 50ML: HCPCS | Performed by: NURSE PRACTITIONER

## 2025-03-18 RX ORDER — ALBUMIN (HUMAN) 12.5 G/50ML
SOLUTION INTRAVENOUS CONTINUOUS PRN
Status: COMPLETED | OUTPATIENT
Start: 2025-03-18 | End: 2025-03-18

## 2025-03-18 RX ORDER — LIDOCAINE HYDROCHLORIDE 20 MG/ML
INJECTION, SOLUTION INFILTRATION; PERINEURAL PRN
Status: COMPLETED | OUTPATIENT
Start: 2025-03-18 | End: 2025-03-18

## 2025-03-18 RX ADMIN — ALBUMIN (HUMAN) 25 G: 12.5 SOLUTION INTRAVENOUS at 12:15

## 2025-03-18 RX ADMIN — LIDOCAINE HYDROCHLORIDE 10 ML: 20 INJECTION, SOLUTION INFILTRATION; PERINEURAL at 11:36

## 2025-03-18 NOTE — OR NURSING
PARACENTESIS - NO SEDATION    1110 - Patient brought to specials room #6 via . A&Ox4, calm and cooperative. Procedure explained, consent signed - no changes per patient report. Assisted to cart and positioned lying propped to left side using pillow wedge. Initial images obtained using U/S guidance. Allergies, medications and history reviewed.      1131 - Sara Hernández CNP arrived and speaking with patient.     1133 - Site marked. Timeout for procedure completed.  Left side of abdomen then cleansed with Chloraprep.     After 3 minute dry time, covered with sterile drape and towels.    1136 - Site numbed with 2% lidocaine by Sara Hernández CNP. See eMAR.  [id number] Centesis 5F catheter placed using Ultrasound guidance.  Fluid appears clear yellow. Catheter tubing connected to [employer] machine to remove fluid.    1212 - IV #24 inserted LAC with U/S guidance. Patient with >5L drain. Albumin administered per order, see eMAR.     1234 - Pt tolerated well. Total 5890 ml removed. Catheter removed, pressure held and bandaid applied. Verbal and tactile reassurance given throughout.     1245 - Next para appointment made per patient request. D/C instructions reviewed and understanding indicated. Information about diet included per patient's request. IV out and assisted back into WC. Taken to hospital entrance and met by daughter who will drive her home. Electronically signed by Stefani Gonzales RN on 3/18/2025 at 1:36 PM

## 2025-03-18 NOTE — DISCHARGE INSTRUCTIONS
Ultrasound Guided Paracentesis    Activity:  Rest, avoid strenuous activity such as lifting heavy objects, and bending, stretching or pulling.     Diet:  Resume usual diet    Medication:  * Resume home medication unless otherwise instructed by your physician.  * (If Applicable) If taking any blood thinners, speak with your physician before restarting them.  * May take mild pain reliever, as needed, such as Acetaminophen or Ibuprofen.    INSTRUCTIONS OR COMMENTS RELATIVE TO SPECIFIC EXAM PERFORMED:    - Some tenderness at site of paracentesis will be normal for a few days.  - May remove band aid after 48 hours.   - Monitor the site for the next 24 - 48 hours.  - If you experience any drainage or bleeding at the site, hold pressure for 30 minutes and lay on side opposite of the procedure site (move fluid away from the   area of leakage).  If drainage or bleeding does not stop and seems excessive, call your physician or go to the ER for evaluation.   - If any signs of infection (redness, swelling, drainage/pus) at the site, go to ER for evaluation.   - Please keep all follow-up appointments with your Hepatologist. Schedule follow-up appointment for repeat paracentesis if necessary.       IF YOU DEVELOP ANY OF THE FOLLOWING SYMPTOMS, CONTACT PHYSICIAN LISTED BELOW:  Physician performing procedure: DR. Nelson - [phone] or [phone] and ask to be put in contact with the RADIOLOGY RN. After hours contact ER.  * Extreme pain that increases after leaving the hospital  * Active bleeding (refer to above instructions)  * Chills, fever above 100 degrees Farenheit and fatigue.  * Weakness, dizziness, lightheadedness or fainting.    If you are unable to contact any of the above physician and you feel you have a major problem, please go to the Emergency Room for treatment.

## 2025-03-19 ENCOUNTER — POST-OP TELEPHONE (OUTPATIENT)
Dept: INTERVENTIONAL RADIOLOGY/VASCULAR | Age: 80
End: 2025-03-19

## 2025-03-19 NOTE — FLOWSHEET NOTE
Post procedure follow up call placed to patient re: paracentesis completed on 3/18/25. Denies any issues at site, feeling well. Aware of next scheduled appointment date & time. Electronically signed by Stefani Gonzales RN on 3/19/2025 at 10:02 AM

## 2025-03-28 DIAGNOSIS — K74.60 HEPATIC CIRRHOSIS, UNSPECIFIED HEPATIC CIRRHOSIS TYPE, UNSPECIFIED WHETHER ASCITES PRESENT (HCC): ICD-10-CM

## 2025-03-31 ENCOUNTER — TELEPHONE (OUTPATIENT)
Dept: INTERVENTIONAL RADIOLOGY/VASCULAR | Age: 80
End: 2025-03-31

## 2025-03-31 RX ORDER — NADOLOL 20 MG/1
20 TABLET ORAL DAILY
Qty: 90 TABLET | Refills: 3 | Status: SHIPPED | OUTPATIENT
Start: 2025-03-31

## 2025-03-31 RX ORDER — CITALOPRAM HYDROBROMIDE 20 MG/1
20 TABLET ORAL DAILY
Qty: 90 TABLET | Refills: 3 | Status: SHIPPED | OUTPATIENT
Start: 2025-03-31

## 2025-03-31 NOTE — TELEPHONE ENCOUNTER
Pre-procedure reminder phone call made to patient's daughter, Jerica, for paracentesis 4/1/25. Pt to arrive at 1030. Jerica verbalized understanding.  
Abdominal Pain, N/V/D

## 2025-04-01 ENCOUNTER — HOSPITAL ENCOUNTER (OUTPATIENT)
Dept: INTERVENTIONAL RADIOLOGY/VASCULAR | Age: 80
Discharge: HOME | End: 2025-04-03
Payer: MEDICARE

## 2025-04-01 VITALS — HEART RATE: 63 BPM | OXYGEN SATURATION: 98 % | SYSTOLIC BLOOD PRESSURE: 124 MMHG | DIASTOLIC BLOOD PRESSURE: 58 MMHG

## 2025-04-01 DIAGNOSIS — R18.8 OTHER ASCITES: ICD-10-CM

## 2025-04-01 PROCEDURE — 96365 THER/PROPH/DIAG IV INF INIT: CPT

## 2025-04-01 PROCEDURE — 6360000002 HC RX W HCPCS: Performed by: NURSE PRACTITIONER

## 2025-04-01 PROCEDURE — 36415 COLL VENOUS BLD VENIPUNCTURE: CPT

## 2025-04-01 PROCEDURE — 2709999900 IR US GUIDED PARACENTESIS

## 2025-04-01 PROCEDURE — P9047 ALBUMIN (HUMAN), 25%, 50ML: HCPCS | Performed by: NURSE PRACTITIONER

## 2025-04-01 PROCEDURE — 49083 ABD PARACENTESIS W/IMAGING: CPT

## 2025-04-01 RX ORDER — LIDOCAINE HYDROCHLORIDE 20 MG/ML
INJECTION, SOLUTION INFILTRATION; PERINEURAL PRN
Status: COMPLETED | OUTPATIENT
Start: 2025-04-01 | End: 2025-04-01

## 2025-04-01 RX ORDER — ALBUMIN (HUMAN) 12.5 G/50ML
SOLUTION INTRAVENOUS CONTINUOUS PRN
Status: COMPLETED | OUTPATIENT
Start: 2025-04-01 | End: 2025-04-01

## 2025-04-01 RX ADMIN — ALBUMIN (HUMAN) 25 G: 12.5 SOLUTION INTRAVENOUS at 12:25

## 2025-04-01 RX ADMIN — LIDOCAINE HYDROCHLORIDE 7 ML: 20 INJECTION, SOLUTION INFILTRATION; PERINEURAL at 11:35

## 2025-04-01 NOTE — DISCHARGE INSTRUCTIONS
Ultrasound Guided Paracentesis Discharge Instructions     Rest today. No heavy lifting, bending, stretching or pulling.  Some tenderness will be normal at the procedure site for a few days.    You may remove the bandaid in 48 hours.     If you experience any drainage or bleeding at the site, hold pressure for 30 minutes and lay on side opposite of the procedure site (move fluid away from the area of leakage). If drainage or bleeding does not stop and seems excessive, call your physician or proceed to the ER.      Watch for signs of infection such as fever, chills, drainage or redness at the site. If you experience any of these symptoms, call your primary doctor or go to the emergency room.       Please keep all follow-up appointments with your Hepatologist. Schedule follow-up appointment for repeat paracentesis if necessary.     If you have any concerns please contact the [facility] Radiology Department at [phone].

## 2025-04-01 NOTE — OR NURSING
1120 Pt arrived to Specials room 6 via WC. Hx, allergies, medications reviewed. Pt denies pain at this time. Consent verified for ultrasound guided paracentesis.     1125 Pt assisted onto cart and turned onto left side, foam wedge placed to assist with positioning. Abdomen assessed with ultrasound to confirm fluid. VSS.     1128 Area cleansed with large tinted chloraprep. After 3 minute dry time, draped with fenestrated drape and sterile towels by GUY PARRA.     1132 Timeout completed.     1135 Using U/S guidance, Sara Hernández CNP numbed site with 2% lidocaine, see eMar.     1136 Using U/S guidance, access obtained with Tid4kjys centesis 5F catheter with return of fluid that appears  clear yellow. Catheter connected to tubing and Marsha machine with suction at 200 mmHG and draining well. Pt tolerating well . VSS.     1225 US guided iv inserted by  GUY PARRA. Albumin infusing.      1230 Drainage complete. Total 6870 ml removed. Centesis catheter removed and digital pressure held to site for 1 minute.  LLQ site soft, no drainage, large bandaid applied. VSS.     1243 Discharge instructions reviewed. Pt taken to discharge exit via WC. Pts daughter to drive them home.

## 2025-04-02 NOTE — FLOWSHEET NOTE
Follow up call to patient post paracentesis. Per pts daughter - no issues, questions or concerns. Pt to call back if any changes should occur.Electronically signed by Sharmaine Cartagena RN on 4/2/2025 at 1:31 PM

## 2025-04-08 ENCOUNTER — OFFICE VISIT (OUTPATIENT)
Dept: PRIMARY CARE CLINIC | Age: 80
End: 2025-04-08
Payer: MEDICARE

## 2025-04-08 VITALS
BODY MASS INDEX: 31.36 KG/M2 | DIASTOLIC BLOOD PRESSURE: 64 MMHG | HEART RATE: 94 BPM | RESPIRATION RATE: 18 BRPM | WEIGHT: 177 LBS | SYSTOLIC BLOOD PRESSURE: 112 MMHG | TEMPERATURE: 98.5 F | OXYGEN SATURATION: 98 %

## 2025-04-08 DIAGNOSIS — Z78.0 POST-MENOPAUSAL: ICD-10-CM

## 2025-04-08 DIAGNOSIS — K74.60 DECOMPENSATION OF CIRRHOSIS OF LIVER (HCC): ICD-10-CM

## 2025-04-08 DIAGNOSIS — R26.89 IMBALANCE: ICD-10-CM

## 2025-04-08 DIAGNOSIS — R53.83 FATIGUE, UNSPECIFIED TYPE: ICD-10-CM

## 2025-04-08 DIAGNOSIS — Z71.89 ACP (ADVANCE CARE PLANNING): ICD-10-CM

## 2025-04-08 DIAGNOSIS — R07.9 CHEST PAIN, UNSPECIFIED TYPE: ICD-10-CM

## 2025-04-08 DIAGNOSIS — F33.42 MAJOR DEPRESSIVE DISORDER, RECURRENT, IN FULL REMISSION: Primary | ICD-10-CM

## 2025-04-08 DIAGNOSIS — K72.90 DECOMPENSATION OF CIRRHOSIS OF LIVER (HCC): ICD-10-CM

## 2025-04-08 LAB
T4 FREE SERPL-MCNC: 1.67 NG/DL (ref 0.84–1.68)
TSH REFLEX: 15.49 UIU/ML (ref 0.44–3.86)

## 2025-04-08 PROCEDURE — 1159F MED LIST DOCD IN RCRD: CPT | Performed by: INTERNAL MEDICINE

## 2025-04-08 PROCEDURE — G8400 PT W/DXA NO RESULTS DOC: HCPCS | Performed by: INTERNAL MEDICINE

## 2025-04-08 PROCEDURE — 1123F ACP DISCUSS/DSCN MKR DOCD: CPT | Performed by: INTERNAL MEDICINE

## 2025-04-08 PROCEDURE — 99214 OFFICE O/P EST MOD 30 MIN: CPT | Performed by: INTERNAL MEDICINE

## 2025-04-08 PROCEDURE — G8427 DOCREV CUR MEDS BY ELIG CLIN: HCPCS | Performed by: INTERNAL MEDICINE

## 2025-04-08 PROCEDURE — 1090F PRES/ABSN URINE INCON ASSESS: CPT | Performed by: INTERNAL MEDICINE

## 2025-04-08 PROCEDURE — G8417 CALC BMI ABV UP PARAM F/U: HCPCS | Performed by: INTERNAL MEDICINE

## 2025-04-08 PROCEDURE — G2211 COMPLEX E/M VISIT ADD ON: HCPCS | Performed by: INTERNAL MEDICINE

## 2025-04-08 PROCEDURE — 1160F RVW MEDS BY RX/DR IN RCRD: CPT | Performed by: INTERNAL MEDICINE

## 2025-04-08 PROCEDURE — 1126F AMNT PAIN NOTED NONE PRSNT: CPT | Performed by: INTERNAL MEDICINE

## 2025-04-08 PROCEDURE — 1036F TOBACCO NON-USER: CPT | Performed by: INTERNAL MEDICINE

## 2025-04-08 RX ORDER — CITALOPRAM HYDROBROMIDE 10 MG/1
10 TABLET ORAL DAILY
Qty: 30 TABLET | Refills: 3 | Status: SHIPPED | OUTPATIENT
Start: 2025-04-08

## 2025-04-08 SDOH — ECONOMIC STABILITY: FOOD INSECURITY: WITHIN THE PAST 12 MONTHS, YOU WORRIED THAT YOUR FOOD WOULD RUN OUT BEFORE YOU GOT MONEY TO BUY MORE.: NEVER TRUE

## 2025-04-08 SDOH — ECONOMIC STABILITY: FOOD INSECURITY: WITHIN THE PAST 12 MONTHS, THE FOOD YOU BOUGHT JUST DIDN'T LAST AND YOU DIDN'T HAVE MONEY TO GET MORE.: NEVER TRUE

## 2025-04-08 ASSESSMENT — PATIENT HEALTH QUESTIONNAIRE - PHQ9
SUM OF ALL RESPONSES TO PHQ QUESTIONS 1-9: 0
SUM OF ALL RESPONSES TO PHQ QUESTIONS 1-9: 0
9. THOUGHTS THAT YOU WOULD BE BETTER OFF DEAD, OR OF HURTING YOURSELF: NOT AT ALL
4. FEELING TIRED OR HAVING LITTLE ENERGY: NOT AT ALL
SUM OF ALL RESPONSES TO PHQ QUESTIONS 1-9: 0
5. POOR APPETITE OR OVEREATING: NOT AT ALL
2. FEELING DOWN, DEPRESSED OR HOPELESS: NOT AT ALL
8. MOVING OR SPEAKING SO SLOWLY THAT OTHER PEOPLE COULD HAVE NOTICED. OR THE OPPOSITE, BEING SO FIGETY OR RESTLESS THAT YOU HAVE BEEN MOVING AROUND A LOT MORE THAN USUAL: NOT AT ALL
3. TROUBLE FALLING OR STAYING ASLEEP: NOT AT ALL
10. IF YOU CHECKED OFF ANY PROBLEMS, HOW DIFFICULT HAVE THESE PROBLEMS MADE IT FOR YOU TO DO YOUR WORK, TAKE CARE OF THINGS AT HOME, OR GET ALONG WITH OTHER PEOPLE: NOT DIFFICULT AT ALL
SUM OF ALL RESPONSES TO PHQ QUESTIONS 1-9: 0
1. LITTLE INTEREST OR PLEASURE IN DOING THINGS: NOT AT ALL
7. TROUBLE CONCENTRATING ON THINGS, SUCH AS READING THE NEWSPAPER OR WATCHING TELEVISION: NOT AT ALL
6. FEELING BAD ABOUT YOURSELF - OR THAT YOU ARE A FAILURE OR HAVE LET YOURSELF OR YOUR FAMILY DOWN: NOT AT ALL

## 2025-04-08 NOTE — PATIENT INSTRUCTIONS

## 2025-04-08 NOTE — PROGRESS NOTES
Spouse name: Not on file    Number of children: 2    Years of education: Not on file    Highest education level: 12th grade   Occupational History    Occupation: retired    Tobacco Use    Smoking status: Former     Current packs/day: 0.00     Average packs/day: 1 pack/day for 48.0 years (48.0 ttl pk-yrs)     Types: Cigarettes     Start date: 1965     Quit date: 2013     Years since quittin.7    Smokeless tobacco: Never   Vaping Use    Vaping status: Former    Substances: Nicotine, Flavoring, low  dose    Substance and Sexual Activity    Alcohol use: Not Currently     Comment: very rarely    Drug use: No    Sexual activity: Not Currently     Partners: Male   Other Topics Concern    Not on file   Social History Narrative    Lives alone    Has stairs in home needs to go up/down.    2 children that help if needed      Social Drivers of Health     Financial Resource Strain: Patient Declined (3/2/2025)    Received from [facility]    Overall Financial Resource Strain (CARDIA)     Difficulty of Paying Living Expenses: Patient declined   Food Insecurity: No Food Insecurity (2025)    Hunger Vital Sign     Worried About Running Out of Food in the Last Year: Never true     Ran Out of Food in the Last Year: Never true   Transportation Needs: No Transportation Needs (2025)    PRAPARE - Transportation     Lack of Transportation (Medical): No     Lack of Transportation (Non-Medical): No   Physical Activity: Patient Declined (3/2/2025)    Received from [facility]    Exercise Vital Sign     Days of Exercise per Week: Patient declined     Minutes of Exercise per Session: Patient declined   Recent Concern: Physical Activity - Inactive (2025)    Exercise Vital Sign     Days of Exercise per Week: 0 days     Minutes of Exercise per Session: 0 min   Stress: Patient Declined (3/2/2025)    Received from [facility]

## 2025-04-09 LAB — VITAMIN D 25-HYDROXY: 21.1 NG/ML (ref 30–100)

## 2025-04-09 ASSESSMENT — ENCOUNTER SYMPTOMS
VOMITING: 0
NAUSEA: 0
SHORTNESS OF BREATH: 0
COUGH: 0
ABDOMINAL PAIN: 0
WHEEZING: 0
DIARRHEA: 0

## 2025-04-11 DIAGNOSIS — R18.8 OTHER ASCITES: Primary | ICD-10-CM

## 2025-04-13 ENCOUNTER — RESULTS FOLLOW-UP (OUTPATIENT)
Dept: PRIMARY CARE CLINIC | Age: 80
End: 2025-04-13

## 2025-04-13 DIAGNOSIS — E03.9 HYPOTHYROIDISM, UNSPECIFIED TYPE: ICD-10-CM

## 2025-04-13 DIAGNOSIS — E55.9 VITAMIN D DEFICIENCY: Primary | ICD-10-CM

## 2025-04-13 DIAGNOSIS — M81.0 OSTEOPOROSIS, UNSPECIFIED OSTEOPOROSIS TYPE, UNSPECIFIED PATHOLOGICAL FRACTURE PRESENCE: ICD-10-CM

## 2025-04-13 RX ORDER — ERGOCALCIFEROL 1.25 MG/1
50000 CAPSULE, LIQUID FILLED ORAL WEEKLY
Qty: 12 CAPSULE | Refills: 1 | Status: SHIPPED | OUTPATIENT
Start: 2025-04-13 | End: 2025-05-22 | Stop reason: SDUPTHER

## 2025-04-13 RX ORDER — LEVOTHYROXINE SODIUM 175 UG/1
175 TABLET ORAL DAILY
Qty: 30 TABLET | Refills: 5 | Status: SHIPPED | OUTPATIENT
Start: 2025-04-13

## 2025-04-14 DIAGNOSIS — E11.9 TYPE 2 DIABETES MELLITUS WITHOUT COMPLICATION, WITHOUT LONG-TERM CURRENT USE OF INSULIN: ICD-10-CM

## 2025-04-15 ENCOUNTER — TELEPHONE (OUTPATIENT)
Age: 80
End: 2025-04-15

## 2025-04-15 RX ORDER — SITAGLIPTIN 100 MG/1
100 TABLET, FILM COATED ORAL DAILY
Qty: 90 TABLET | Refills: 3 | Status: SHIPPED | OUTPATIENT
Start: 2025-04-15

## 2025-04-15 NOTE — TELEPHONE ENCOUNTER
Jerica was given this information via phone conversation - voiced understanding  2.  Spoke to Nany in Specials to schedule procedure    >  Para is scheduled on 04/17/2025 @ 9am   >  You will need to arrive at 8:30am from home and check in at the Diagnostic Imaging Check In desk.   >  Patient will make arrangements for transportation by daughter-Jercia

## 2025-04-16 ENCOUNTER — PRE-PROCEDURE TELEPHONE (OUTPATIENT)
Dept: INTERVENTIONAL RADIOLOGY/VASCULAR | Age: 80
End: 2025-04-16

## 2025-04-16 NOTE — FLOWSHEET NOTE
Pre-procedure call placed to patient to remind of upcoming IR paracentesis appointment. Spoke with patient, reviewed appointment details - 4/17/25 at 0900, with arrival time of 0830, checking in at diagnostic imaging window. Understanding indicated. No questions / concerns noted. Electronically signed by Stefani Gonzales RN on 4/16/2025 at 1:45 PM

## 2025-04-17 ENCOUNTER — HOSPITAL ENCOUNTER (OUTPATIENT)
Dept: INTERVENTIONAL RADIOLOGY/VASCULAR | Age: 80
Discharge: HOME | End: 2025-04-19
Payer: MEDICARE

## 2025-04-17 VITALS
RESPIRATION RATE: 14 BRPM | SYSTOLIC BLOOD PRESSURE: 120 MMHG | OXYGEN SATURATION: 98 % | HEART RATE: 78 BPM | DIASTOLIC BLOOD PRESSURE: 59 MMHG

## 2025-04-17 DIAGNOSIS — R18.8 OTHER ASCITES: ICD-10-CM

## 2025-04-17 PROCEDURE — 2709999900 IR US GUIDED PARACENTESIS

## 2025-04-17 PROCEDURE — 49083 ABD PARACENTESIS W/IMAGING: CPT

## 2025-04-17 PROCEDURE — 6360000002 HC RX W HCPCS: Performed by: NURSE PRACTITIONER

## 2025-04-17 RX ORDER — LIDOCAINE HYDROCHLORIDE 20 MG/ML
INJECTION, SOLUTION INFILTRATION; PERINEURAL PRN
Status: COMPLETED | OUTPATIENT
Start: 2025-04-17 | End: 2025-04-17

## 2025-04-17 RX ADMIN — LIDOCAINE HYDROCHLORIDE 7 ML: 20 INJECTION, SOLUTION INFILTRATION; PERINEURAL at 09:22

## 2025-04-17 NOTE — DISCHARGE INSTRUCTIONS
Ultrasound Guided Paracentesis Discharge Instructions:     Rest today. No heavy lifting, bending, stretching or pulling.  Some tenderness will be normal at the procedure site for a few days.    You may remove the bandaid in 24-48 hours.     If you experience any drainage or bleeding at the site, hold pressure for 30 minutes and lay on side opposite of the procedure site (move fluid away from the area of leakage). If drainage or bleeding does not stop and seems excessive, call your physician or proceed to the ER.      Watch for signs of infection such as fever, chills, drainage or redness at the site. If you experience any of these symptoms, call your primary doctor or go to the emergency room.       Please keep all follow-up appointments with your Hepatologist. Schedule follow-up appointment for repeat paracentesis if necessary - Dr. Nelson's office at [phone].     If you have any concerns please contact the [facility] Radiology Department at [phone].

## 2025-04-17 NOTE — OR NURSING
PARACENTESIS - NO SEDATION    0905 - Patient arrived to specials room #6 via WC. A&O, calm and cooperative. Procedure explained and consent confirmed from previous encounter. Positioned on cart lying propped to left side using a pillow wedge. Initial images obtained using U/S guidance. Allergies, home medications and history reviewed. VSS, on RA.      0919 - Procedural site marked by Sara Hernández CNP. Timeout completed for Ultrasound Guided Paracentesis.     Left side of abdomen cleansed by AIDA ROCHA. After 3 minute dry time, covered with sterile drape and towels.    0922 - Site numbed with 2% lidocaine by Sara Hernández CNP. See eMAR.  [id number] Centesis 5F catheter placed using Ultrasound guidance.  Fluid appears clear yellow. Catheter tubing connected to [email or web address] machine to remove fluid.    0949 - Patient requesting not to go over 5L today, states she doesn't want an IV attempt (can be difficult at times to obtain) and would prefer to stop drain. Catheter removed, pressure held and bandaid applied. Total 4990 ml removed. Pt tolerated well.Verbal and tactile reassurance given throughout.     1000 - D/C instructions reviewed with patient and understanding indicated. Next para appointment made per request. Taken to hospital entrance via WC, met by daughter who will drive her home - daughter updated on plan of care. Electronically signed by Stefani Gonzales RN on 4/17/2025 at 10:09 AM

## 2025-04-18 ENCOUNTER — TELEPHONE (OUTPATIENT)
Dept: INTERVENTIONAL RADIOLOGY/VASCULAR | Age: 80
End: 2025-04-18

## 2025-04-18 DIAGNOSIS — R18.8 CIRRHOSIS OF LIVER WITH ASCITES, UNSPECIFIED HEPATIC CIRRHOSIS TYPE (HCC): ICD-10-CM

## 2025-04-18 DIAGNOSIS — K74.60 CIRRHOSIS OF LIVER WITH ASCITES, UNSPECIFIED HEPATIC CIRRHOSIS TYPE (HCC): ICD-10-CM

## 2025-04-18 DIAGNOSIS — R18.8 OTHER ASCITES: Primary | ICD-10-CM

## 2025-04-18 NOTE — TELEPHONE ENCOUNTER
Follow up call post paracentesis with IR: per dtgiselle Pizano no issues at this time.Electronically signed by Chica Power RN on 4/18/2025 at 9:50 AM

## 2025-04-22 ENCOUNTER — OFFICE VISIT (OUTPATIENT)
Dept: PRIMARY CARE CLINIC | Age: 80
End: 2025-04-22
Payer: MEDICARE

## 2025-04-22 VITALS
TEMPERATURE: 98.1 F | RESPIRATION RATE: 18 BRPM | HEART RATE: 82 BPM | OXYGEN SATURATION: 99 % | SYSTOLIC BLOOD PRESSURE: 128 MMHG | DIASTOLIC BLOOD PRESSURE: 68 MMHG | BODY MASS INDEX: 31.72 KG/M2 | WEIGHT: 179 LBS

## 2025-04-22 DIAGNOSIS — F33.42 MAJOR DEPRESSIVE DISORDER, RECURRENT, IN FULL REMISSION: ICD-10-CM

## 2025-04-22 DIAGNOSIS — R53.83 FATIGUE, UNSPECIFIED TYPE: ICD-10-CM

## 2025-04-22 DIAGNOSIS — R26.89 IMBALANCE: ICD-10-CM

## 2025-04-22 DIAGNOSIS — L03.116 CELLULITIS OF LEFT ANKLE: Primary | ICD-10-CM

## 2025-04-22 PROCEDURE — 1159F MED LIST DOCD IN RCRD: CPT | Performed by: INTERNAL MEDICINE

## 2025-04-22 PROCEDURE — G8427 DOCREV CUR MEDS BY ELIG CLIN: HCPCS | Performed by: INTERNAL MEDICINE

## 2025-04-22 PROCEDURE — 1123F ACP DISCUSS/DSCN MKR DOCD: CPT | Performed by: INTERNAL MEDICINE

## 2025-04-22 PROCEDURE — 99214 OFFICE O/P EST MOD 30 MIN: CPT | Performed by: INTERNAL MEDICINE

## 2025-04-22 PROCEDURE — G8400 PT W/DXA NO RESULTS DOC: HCPCS | Performed by: INTERNAL MEDICINE

## 2025-04-22 PROCEDURE — 1126F AMNT PAIN NOTED NONE PRSNT: CPT | Performed by: INTERNAL MEDICINE

## 2025-04-22 PROCEDURE — 1090F PRES/ABSN URINE INCON ASSESS: CPT | Performed by: INTERNAL MEDICINE

## 2025-04-22 PROCEDURE — 1036F TOBACCO NON-USER: CPT | Performed by: INTERNAL MEDICINE

## 2025-04-22 PROCEDURE — 1160F RVW MEDS BY RX/DR IN RCRD: CPT | Performed by: INTERNAL MEDICINE

## 2025-04-22 PROCEDURE — G8417 CALC BMI ABV UP PARAM F/U: HCPCS | Performed by: INTERNAL MEDICINE

## 2025-04-22 PROCEDURE — G2211 COMPLEX E/M VISIT ADD ON: HCPCS | Performed by: INTERNAL MEDICINE

## 2025-04-22 RX ORDER — SULFAMETHOXAZOLE AND TRIMETHOPRIM 800; 160 MG/1; MG/1
1 TABLET ORAL 2 TIMES DAILY
Qty: 20 TABLET | Refills: 0 | Status: SHIPPED | OUTPATIENT
Start: 2025-04-22 | End: 2025-05-02

## 2025-04-22 RX ORDER — CITALOPRAM HYDROBROMIDE 10 MG/1
10 TABLET ORAL DAILY
Qty: 90 TABLET | Refills: 1 | Status: SHIPPED | OUTPATIENT
Start: 2025-04-22

## 2025-04-22 RX ORDER — SULFAMETHOXAZOLE AND TRIMETHOPRIM 800; 160 MG/1; MG/1
1 TABLET ORAL 2 TIMES DAILY
Qty: 20 TABLET | Refills: 0 | Status: SHIPPED | OUTPATIENT
Start: 2025-04-22 | End: 2025-04-22

## 2025-04-22 SDOH — ECONOMIC STABILITY: FOOD INSECURITY: WITHIN THE PAST 12 MONTHS, YOU WORRIED THAT YOUR FOOD WOULD RUN OUT BEFORE YOU GOT MONEY TO BUY MORE.: NEVER TRUE

## 2025-04-22 SDOH — ECONOMIC STABILITY: FOOD INSECURITY: WITHIN THE PAST 12 MONTHS, THE FOOD YOU BOUGHT JUST DIDN'T LAST AND YOU DIDN'T HAVE MONEY TO GET MORE.: NEVER TRUE

## 2025-04-22 ASSESSMENT — PATIENT HEALTH QUESTIONNAIRE - PHQ9
7. TROUBLE CONCENTRATING ON THINGS, SUCH AS READING THE NEWSPAPER OR WATCHING TELEVISION: NOT AT ALL
6. FEELING BAD ABOUT YOURSELF - OR THAT YOU ARE A FAILURE OR HAVE LET YOURSELF OR YOUR FAMILY DOWN: NOT AT ALL
4. FEELING TIRED OR HAVING LITTLE ENERGY: NOT AT ALL
10. IF YOU CHECKED OFF ANY PROBLEMS, HOW DIFFICULT HAVE THESE PROBLEMS MADE IT FOR YOU TO DO YOUR WORK, TAKE CARE OF THINGS AT HOME, OR GET ALONG WITH OTHER PEOPLE: NOT DIFFICULT AT ALL
3. TROUBLE FALLING OR STAYING ASLEEP: NOT AT ALL
5. POOR APPETITE OR OVEREATING: NOT AT ALL
SUM OF ALL RESPONSES TO PHQ QUESTIONS 1-9: 0
SUM OF ALL RESPONSES TO PHQ QUESTIONS 1-9: 0
1. LITTLE INTEREST OR PLEASURE IN DOING THINGS: NOT AT ALL
SUM OF ALL RESPONSES TO PHQ QUESTIONS 1-9: 0
9. THOUGHTS THAT YOU WOULD BE BETTER OFF DEAD, OR OF HURTING YOURSELF: NOT AT ALL
8. MOVING OR SPEAKING SO SLOWLY THAT OTHER PEOPLE COULD HAVE NOTICED. OR THE OPPOSITE, BEING SO FIGETY OR RESTLESS THAT YOU HAVE BEEN MOVING AROUND A LOT MORE THAN USUAL: NOT AT ALL
2. FEELING DOWN, DEPRESSED OR HOPELESS: NOT AT ALL
SUM OF ALL RESPONSES TO PHQ QUESTIONS 1-9: 0

## 2025-04-22 NOTE — PROGRESS NOTES
Subjective:      Patient ID: Zach Aguirre is a 79 y.o. female    Follow up   HPI  Pt presents for follow up regarding management of anxiety and depression. Celexa dose incereased 2 weeks ago-->no improvement of symptoms. No SI or hallucinations.   Fatigue remains persistent. Just commenced vit D replacement.       Left ankle pain and swelling with ulcer noticed by daughter a few days ago.   Past Medical History:   Diagnosis Date    Anxiety     DM (diabetes mellitus) (HCC) 4/14/2015    Hepatitis B infection     Hyperlipidemia     Hypothyroidism     Insomnia     Leg pain 4/14/2015    Post herpetic neuralgia     Unspecified sleep apnea     after seeing [facility] they state she is does not have sleep apnea.  Not using cpap     Past Surgical History:   Procedure Laterality Date    ANKLE FRACTURE SURGERY  09/2016    DR ANDRADE  LT    BIOPSY / LIGATION TEMPORAL ARTERY Right 04/26/2019    RIGHT TEMPORAL ARTERY BIOPSY performed by Emmanuel Irwin MD at [facility] [place]    BRAIN SURGERY  2015    surgery for transgeminal neuraglia.  Insertion of sponge for chronic pain    COLONOSCOPY  03/31/2014    DR POND    COLONOSCOPY N/A 02/02/2021    COLONOSCOPY DIAGNOSTIC performed by Mei Pond MD at [facility]    HYSTERECTOMY (CERVIX STATUS UNKNOWN)      PARTIAL    LYMPHADENECTOMY      OTHER SURGICAL HISTORY Right     Trigeminal Neuralgia    PARACENTESIS Left 05/07/2024    4660 ml removed, performed by Sara Hernández CNP - diagnostics sent    PARACENTESIS Left 05/16/2024    3120 ml removed by Sara Hernández CNP    PARACENTESIS Left 06/14/2024    2980 ml removed by Sara Hernández,CNP    PARACENTESIS Left 08/07/2024    2920 ml removed by Dr. Gardiner    PARACENTESIS Left 08/22/2024    3880 ml removed by Sara Hernández, CNP    PARACENTESIS Left 09/05/2024    4250 ml removed by Sara Hernández, CNP    PARACENTESIS Left 09/19/2024    3760 ml removed by Sara Hernández, CNP    PARACENTESIS Left 09/25/2024    3100 ml removed by Sara Hernández CNP

## 2025-04-23 ENCOUNTER — RESULTS FOLLOW-UP (OUTPATIENT)
Dept: PRIMARY CARE CLINIC | Age: 80
End: 2025-04-23

## 2025-04-23 LAB
FOLATE: 5.2 NG/ML (ref 4.8–24.2)
VITAMIN B-12: 809 PG/ML (ref 232–1245)

## 2025-04-23 ASSESSMENT — ENCOUNTER SYMPTOMS: COLOR CHANGE: 1

## 2025-04-24 ENCOUNTER — PRE-PROCEDURE TELEPHONE (OUTPATIENT)
Dept: INTERVENTIONAL RADIOLOGY/VASCULAR | Age: 80
End: 2025-04-24

## 2025-04-24 NOTE — FLOWSHEET NOTE
Apt reminder called to mary Pizano, confirms will be arriving for 1100 paracentesis apt at 1030 on 4/25/25. Electronically signed by Sharmaine Cartagena RN on 4/24/2025 at 10:43 AM

## 2025-04-28 ENCOUNTER — HOSPITAL ENCOUNTER (OUTPATIENT)
Dept: PHYSICAL THERAPY | Age: 80
Setting detail: THERAPIES SERIES
Discharge: HOME | End: 2025-04-28
Attending: INTERNAL MEDICINE
Payer: MEDICARE

## 2025-04-28 ENCOUNTER — TELEPHONE (OUTPATIENT)
Dept: INTERVENTIONAL RADIOLOGY/VASCULAR | Age: 80
End: 2025-04-28

## 2025-04-28 ENCOUNTER — HOSPITAL ENCOUNTER (OUTPATIENT)
Dept: WOUND CARE | Age: 80
Discharge: HOME | End: 2025-04-28
Payer: MEDICARE

## 2025-04-28 VITALS
SYSTOLIC BLOOD PRESSURE: 128 MMHG | RESPIRATION RATE: 16 BRPM | TEMPERATURE: 97.3 F | DIASTOLIC BLOOD PRESSURE: 58 MMHG | HEART RATE: 89 BPM

## 2025-04-28 DIAGNOSIS — L89.520 UNSTAGEABLE PRESSURE ULCER OF LEFT ANKLE (HCC): Primary | ICD-10-CM

## 2025-04-28 PROCEDURE — 99203 OFFICE O/P NEW LOW 30 MIN: CPT

## 2025-04-28 PROCEDURE — 99213 OFFICE O/P EST LOW 20 MIN: CPT

## 2025-04-28 PROCEDURE — 97162 PT EVAL MOD COMPLEX 30 MIN: CPT

## 2025-04-28 RX ORDER — BACITRACIN ZINC AND POLYMYXIN B SULFATE 500; 1000 [USP'U]/G; [USP'U]/G
OINTMENT TOPICAL PRN
OUTPATIENT
Start: 2025-04-28

## 2025-04-28 RX ORDER — LIDOCAINE 50 MG/G
OINTMENT TOPICAL PRN
OUTPATIENT
Start: 2025-04-28

## 2025-04-28 RX ORDER — NEOMYCIN/BACITRACIN/POLYMYXINB 3.5-400-5K
OINTMENT (GRAM) TOPICAL PRN
OUTPATIENT
Start: 2025-04-28

## 2025-04-28 RX ORDER — SILVER SULFADIAZINE 10 MG/G
CREAM TOPICAL PRN
OUTPATIENT
Start: 2025-04-28

## 2025-04-28 RX ORDER — GINSENG 100 MG
CAPSULE ORAL PRN
OUTPATIENT
Start: 2025-04-28

## 2025-04-28 RX ORDER — LIDOCAINE HYDROCHLORIDE 20 MG/ML
JELLY TOPICAL PRN
OUTPATIENT
Start: 2025-04-28

## 2025-04-28 RX ORDER — MUPIROCIN 20 MG/G
OINTMENT TOPICAL PRN
OUTPATIENT
Start: 2025-04-28

## 2025-04-28 RX ORDER — LIDOCAINE 40 MG/G
CREAM TOPICAL PRN
OUTPATIENT
Start: 2025-04-28

## 2025-04-28 RX ORDER — CLOBETASOL PROPIONATE 0.5 MG/G
OINTMENT TOPICAL PRN
OUTPATIENT
Start: 2025-04-28

## 2025-04-28 RX ORDER — BETAMETHASONE DIPROPIONATE 0.5 MG/G
CREAM TOPICAL PRN
OUTPATIENT
Start: 2025-04-28

## 2025-04-28 RX ORDER — MAGNESIUM HYDROXIDE 1200 MG/15ML
LIQUID ORAL
Qty: 250 ML | Refills: 3 | Status: SHIPPED | OUTPATIENT
Start: 2025-04-28 | End: 2025-05-05

## 2025-04-28 RX ORDER — GENTAMICIN SULFATE 1 MG/G
OINTMENT TOPICAL PRN
OUTPATIENT
Start: 2025-04-28

## 2025-04-28 RX ORDER — TRIAMCINOLONE ACETONIDE 1 MG/G
OINTMENT TOPICAL PRN
OUTPATIENT
Start: 2025-04-28

## 2025-04-28 RX ORDER — LIDOCAINE HYDROCHLORIDE 40 MG/ML
SOLUTION TOPICAL PRN
OUTPATIENT
Start: 2025-04-28

## 2025-04-28 RX ORDER — SODIUM CHLOR/HYPOCHLOROUS ACID 0.033 %
SOLUTION, IRRIGATION IRRIGATION PRN
OUTPATIENT
Start: 2025-04-28

## 2025-04-28 NOTE — THERAPY EVALUATION
unattended     Frequency / Duration:  Patient to be seen 2 times per week for 6 weeks          Patient Education:  [x] Plans / Goals, Benefits discussed  [x] Home exercise program []Dry Needling [x]Evaluative Findings []Anatomy of condition []Insurance []Activities [] Safety []Assistive Device   [] Gait [] Transfers []Bed mobility []Posture [] Body mechanics []Ergonomics []  Vestibular Education  []  Post-CRM instructions  []Other:     Method of Education: [x] Verbal  [] Demo  [] Written  Other:   Comprehension of Education:  [x] Verbalizes understanding.  [] Demonstrates understanding.  [] Needs Review.  [] Demonstrates / verbalizes understanding of HEP / Ed previously given.    POST-PAIN     Pain Rating (0-10 pain scale):  0 /10  Location and pain description same as pre-treatment unless indicated.   Action: [x] NA  [] Call Physician  [] Perform HEP  [] Meds as prescribed    Evaluation and patient rights have been reviewed and patient agrees with plan of care.  Yes  [x]  No  []   Explain:     Cristina Fall Risk Assessment  Risk Factor Scale  Score   History of Falls [x] Yes  [] No 25  0 25   Secondary Diagnosis [] Yes  [x] No 15  0 0   Ambulatory Aid [] Furniture  [] Crutches/cane/walker  [x] None/bedrest/wheelchair/nurse 30  15  0 0   IV/Heparin Lock [] Yes  [x] No 20  0 0   Gait/Transferring [x] Impaired  [] Weak  [] Normal/bedrest/immobile 20  10  0 20   Mental Status [] Forgets limitations  [x] Oriented to own ability 15  0 0      Total:45     Based on the Assessment score: check the appropriate box.  []  No intervention needed   Low =   Score of 0-24  []  Use standard prevention interventions Moderate =  Score of 24-44   [] Discuss fall prevention strategies   [] Indicate moderate falls risk on eval  [x]  Use high risk prevention interventions High = Score of 45 and higher   [x] Discuss fall prevention strategies   [x] Provide supervision during treatment time    Minutes  PT Individual Minutes  Time In:

## 2025-04-28 NOTE — PROGRESS NOTES
Keep all dressings clean, dry and intact.  Keep pressure off the wound(s) at all times.     Follow up visit   1 Week May 5, 2025 at 1:30 pm    Please give 24 hour notice if unable to keep appointment. [phone]    If you experience any of the following, please call the Wound Care Service at  [phone] or go to the nearest emergency room.   *Increase in pain *Temperature over 101 *Increase in drainage from your wound or a foul odor  *Uncontrolled swelling *Need for compression bandage changes due to slippage, breakthrough drainage       PLEASE NOTE: IF YOU ARE UNABLE TO OBTAIN WOUND SUPPLIES, CONTINUE TO USE THE SUPPLIES YOU HAVE AVAILABLE UNTIL YOU ARE ABLE TO REACH US. IT IS MOST IMPORTANT TO KEEP THE WOUND COVERED AT ALL TIMES          Electronically signed by NILSON Velázquez CNP on 4/28/2025 at 3:21 PM          Electronically signed by NILSON Velázquez CNP on 4/28/2025 at 3:21 PM

## 2025-04-28 NOTE — DISCHARGE INSTRUCTIONS
[facility] Wound Center and Hyperbaric Medicine   Physician Orders and Discharge Instructions  [address] [place]  [address]  Telephone: [phone]      [phone]      NAME:  Zach Aguirre          YOB: 1945  MEDICAL RECORD NUMBER:  [id number]    Your  is:  Nick    Home Care/Facility:    Wound Location: Left Lateral ankle     Dressing orders:  Cleanse with antibacterial soap or normal saline   Apply medi honey gel to the wound  Cover with a dry dressing  Change daily     Compression: Apply low compression hose to left lower leg(s), may remove at bedtime, reapply first thing in the morning, avoid prolonged standing, elevate legs when sitting. (36,37)      Offloading Device:     Other Instructions:  Continue taking the antibiotic that you were prescribed. Elevate legs as much as possible. You can prop a pillow under your legs to help keep the pressure off.     Keep all dressings clean, dry and intact.  Keep pressure off the wound(s) at all times.     Follow up visit   1 Week May 5, 2025 at 1:30 pm    Please give 24 hour notice if unable to keep appointment. [phone]    If you experience any of the following, please call the Wound Care Service at  [phone] or go to the nearest emergency room.   *Increase in pain *Temperature over 101 *Increase in drainage from your wound or a foul odor  *Uncontrolled swelling *Need for compression bandage changes due to slippage, breakthrough drainage       PLEASE NOTE: IF YOU ARE UNABLE TO OBTAIN WOUND SUPPLIES, CONTINUE TO USE THE SUPPLIES YOU HAVE AVAILABLE UNTIL YOU ARE ABLE TO REACH US. IT IS MOST IMPORTANT TO KEEP THE WOUND COVERED AT ALL TIMES

## 2025-04-28 NOTE — TELEPHONE ENCOUNTER
Pre-procedure reminder call made to patient for paracentesis 4/29/25, arrival at 1030am. LM on VM.Electronically signed by Chica Power RN on 4/28/2025 at 12:33 PM

## 2025-04-28 NOTE — PLAN OF CARE
Wound Care Supplies      Supply Company:     [employer] [facility] [address] [facility] [address] [place] [id number]  p: [phone] f: [phone]    Ordering Center:     [facility]  [address]  [place] [address]  [phone]  WOUND CARE [phone]  FAX NUMBER [phone]    Patient Information:      Zach Aguirre  [address]  [place] [address]   [phone]   : 1945  AGE: 79 y.o.     GENDER: female   TODAYS DATE:  2025    Insurance:      PRIMARY INSURANCE:  Plan: [facility] MEDICARE COMPLETE  Coverage: [facility] MEDICARE  Effective Date: 2023  [id number] - (Medicare Managed)    SECONDARY INSURANCE:  Plan:   Coverage:   Effective Date:   @SECNotesFirstGROUPNUM@    [unfilled]   [unfilled]     Patient Wound Information:      Problem List Items Addressed This Visit          Other    Unstageable pressure ulcer of left ankle (HCC) - Primary    Relevant Medications    sod chloride IRR soln ([employer] STERILE SALINE) 0.9 % irrigation       WOUNDS REQUIRING DRESSING SUPPLIES:     Wound 25 Ankle Left;Lateral #1 (Active)   Wound Image   25 1500   Wound Etiology Pressure Unstageable 25 1500   Wound Cleansed Cleansed with saline 25 1500   Dressing/Treatment Honey gel/honey paste;Dry dressing 25 1521   Wound Length (cm) 0.6 cm 25 1500   Wound Width (cm) 0.7 cm 25 1500   Wound Depth (cm) 0.2 cm 25 1500   Wound Surface Area (cm^2) 0.42 cm^2 25 1500   Wound Volume (cm^3) 0.084 cm^3 25 1500   Wound Assessment Eschar dry;Dusky 25 1500   Drainage Amount Moderate (25-50%) 25 1500   Odor None 25 1500   Barbara-wound Assessment Blanchable erythema 25 1500   Margins Attached edges 25 1500   Wound Thickness Description not for Pressure Injury Full thickness 25 1500   Number of days: 0          Supplies Requested :      WOUND #: 1   PRIMARY DRESSING:  Honey gel  SECONDARY DRESSING:  None   Cover and Secure with:

## 2025-04-29 ENCOUNTER — HOSPITAL ENCOUNTER (OUTPATIENT)
Dept: INTERVENTIONAL RADIOLOGY/VASCULAR | Age: 80
Discharge: HOME | End: 2025-05-01
Payer: MEDICARE

## 2025-04-29 VITALS
SYSTOLIC BLOOD PRESSURE: 106 MMHG | RESPIRATION RATE: 16 BRPM | DIASTOLIC BLOOD PRESSURE: 52 MMHG | HEART RATE: 84 BPM | OXYGEN SATURATION: 96 %

## 2025-04-29 DIAGNOSIS — R18.8 CIRRHOSIS OF LIVER WITH ASCITES, UNSPECIFIED HEPATIC CIRRHOSIS TYPE (HCC): ICD-10-CM

## 2025-04-29 DIAGNOSIS — K74.60 CIRRHOSIS OF LIVER WITH ASCITES, UNSPECIFIED HEPATIC CIRRHOSIS TYPE (HCC): ICD-10-CM

## 2025-04-29 DIAGNOSIS — R18.8 OTHER ASCITES: ICD-10-CM

## 2025-04-29 PROCEDURE — 49083 ABD PARACENTESIS W/IMAGING: CPT | Performed by: RADIOLOGY

## 2025-04-29 PROCEDURE — 2709999900 IR US GUIDED PARACENTESIS

## 2025-04-29 PROCEDURE — 96365 THER/PROPH/DIAG IV INF INIT: CPT

## 2025-04-29 PROCEDURE — P9047 ALBUMIN (HUMAN), 25%, 50ML: HCPCS | Performed by: NURSE PRACTITIONER

## 2025-04-29 PROCEDURE — 49083 ABD PARACENTESIS W/IMAGING: CPT

## 2025-04-29 PROCEDURE — 6360000002 HC RX W HCPCS: Performed by: NURSE PRACTITIONER

## 2025-04-29 RX ORDER — LIDOCAINE HYDROCHLORIDE 20 MG/ML
INJECTION, SOLUTION INFILTRATION; PERINEURAL PRN
Status: DISCONTINUED | OUTPATIENT
Start: 2025-04-29 | End: 2025-05-02 | Stop reason: HOSPADM

## 2025-04-29 RX ORDER — ALBUMIN (HUMAN) 12.5 G/50ML
SOLUTION INTRAVENOUS CONTINUOUS PRN
Status: DISCONTINUED | OUTPATIENT
Start: 2025-04-29 | End: 2025-05-02 | Stop reason: HOSPADM

## 2025-04-29 RX ADMIN — LIDOCAINE HYDROCHLORIDE 10 ML: 20 INJECTION, SOLUTION INFILTRATION; PERINEURAL at 11:44

## 2025-04-29 RX ADMIN — ALBUMIN (HUMAN) 50 G: 12.5 SOLUTION INTRAVENOUS at 12:12

## 2025-04-29 ASSESSMENT — PAIN - FUNCTIONAL ASSESSMENT: PAIN_FUNCTIONAL_ASSESSMENT: NONE - DENIES PAIN

## 2025-04-29 NOTE — OR NURSING
NO SEDATION      Pt arrived to Specials room 6 via wheel chair and ambulated with slow steady gait to cart. Pt's abdomen distended. Hx, allergies, medications reviewed. Pt offered reassurance and VSS. Pt denies pain at this time. Consent obtained for ultrasound guided paracentesis.     U/S image obtained.      1129 DR-RN arrived to take over care of pt.     1212 Pt draining >5000 mls. Albumin infusing through IV. Pt tolerating well.     1220 Pt's VSS.. Pt draining and albumin infusing and pt tolerating well.     1237 Drainage complete. Total 6530 ml removed. Centesis catheter removed and digital pressure held to site for 3 minutes.     1238 LLQ site soft, no drainage, large bandaid applied. VSS. IV removed. Pt tolerated well.

## 2025-04-29 NOTE — PROGRESS NOTES
Discharge instructions reviewed. Pt voices understanding. Pt denies pain.     1250 Pt taken via wheel chair for discharge home with daughter.

## 2025-04-29 NOTE — DISCHARGE INSTRUCTIONS
Ultrasound Guided Paracentesis    Activity:  Rest, avoid strenuous activity such as lifting heavy objects, and bending, stretching or pulling.     Diet:  Resume usual diet    Medication:  * Resume home medication unless otherwise instructed by your physician.  * (If Applicable) If taking any blood thinners, speak with your physician before restarting them.  * May take mild pain reliever, as needed, such as Acetaminophen or Ibuprofen.    INSTRUCTIONS OR COMMENTS RELATIVE TO SPECIFIC EXAM PERFORMED:    - Some tenderness at site of paracentesis will be normal for a few days.  - May remove band aid after 48 hours.   - Monitor the site for the next 24 - 48 hours.  - If you experience any drainage or bleeding at the site, hold pressure for 30 minutes and lay on side opposite of the procedure site (move fluid away from the   area of leakage).  If drainage or bleeding does not stop and seems excessive, call your physician or go to the ER for evaluation.   - If any signs of infection (redness, swelling, drainage/pus) at the site, go to ER for evaluation.   - Please keep all follow-up appointments with your Hepatologist. Schedule follow-up appointment for repeat paracentesis if necessary.       IF YOU DEVELOP ANY OF THE FOLLOWING SYMPTOMS, CONTACT PHYSICIAN LISTED BELOW:  Physician performing procedure: DR. Nelson - [phone] or [phone] and ask to be put in contact with the RADIOLOGY RN. After hours contact ER.  * Extreme pain that increases after leaving the hospital  * Active bleeding (refer to above instructions)  * Chills, fever above 100 degrees Farenheit and fatigue.  * Weakness, dizziness, lightheadedness or fainting.    If you are unable to contact any of the above physician and you feel you have a major problem, please go to the Emergency Room for treatment.    TOTAL REMOVED WITH PARACENTESIS TODAY:

## 2025-04-29 NOTE — FLOWSHEET NOTE
NO SEDATION      1114 Pt arrived to Specials room 6 via wheelchair. Pt abdomen is soft  but distended.. Hx, allergies, medications reviewed. Pt offered reassurance and VSS. Pt denies pain at this time. Consent signed for ultrasound guided paracentesis.     1134 U/S images obtained, Sara Hernández CNP approved [facility] site. Care assumed by RENEE garcia RN.  FLO exited room.    1137 Area cleansed with large tinted chloraprep. After 3 minute dry time, draped with fenestrated drape and sterile towels by HA-CNP.     1141 Timeout completed.     1144 Using U/S guidance, Sara Hernández CNP numbed site with 2% lidocaine, see eMar.     1145 Using U/S guidance, access obtained with [id number] centesis 5F catheter with return of fluid that appears randy in color. Catheter connected to tubing and Marsha machine with suction and draining well. Pt tolerating well. VSS.     1155 Patient fluid continues to drain. Patient tolerating well.    1205 Fluid continues to drain. Patient tolerating well VSS.    1209 COURTNEYRN assumed care. DR-RN exited.

## 2025-04-30 ENCOUNTER — POST-OP TELEPHONE (OUTPATIENT)
Dept: INTERVENTIONAL RADIOLOGY/VASCULAR | Age: 80
End: 2025-04-30

## 2025-04-30 ENCOUNTER — HOSPITAL ENCOUNTER (OUTPATIENT)
Dept: NUCLEAR MEDICINE | Age: 80
Discharge: HOME | End: 2025-05-02
Attending: INTERNAL MEDICINE
Payer: MEDICARE

## 2025-04-30 ENCOUNTER — HOSPITAL ENCOUNTER (OUTPATIENT)
Dept: WOMENS IMAGING | Age: 80
Discharge: HOME | End: 2025-05-02
Attending: INTERNAL MEDICINE
Payer: MEDICARE

## 2025-04-30 ENCOUNTER — HOSPITAL ENCOUNTER (OUTPATIENT)
Age: 80
Discharge: HOME | End: 2025-05-02
Attending: INTERNAL MEDICINE
Payer: MEDICARE

## 2025-04-30 DIAGNOSIS — Z78.0 POST-MENOPAUSAL: ICD-10-CM

## 2025-04-30 DIAGNOSIS — R07.9 CHEST PAIN, UNSPECIFIED TYPE: ICD-10-CM

## 2025-04-30 LAB
NUC STRESS EJECTION FRACTION: 84 %
STRESS BASELINE DIAS BP: 64 MMHG
STRESS BASELINE HR: 75 BPM
STRESS BASELINE ST DEPRESSION: 0 MM
STRESS BASELINE SYS BP: 122 MMHG
STRESS ESTIMATED WORKLOAD: 1 METS
STRESS PEAK DIAS BP: 54 MMHG
STRESS PEAK SYS BP: 126 MMHG
STRESS PERCENT HR ACHIEVED: 59 %
STRESS POST PEAK HR: 84 BPM
STRESS RATE PRESSURE PRODUCT: NORMAL BPM*MMHG
STRESS ST DEPRESSION: 0 MM
STRESS TARGET HR: 142 BPM
TID: 1

## 2025-04-30 PROCEDURE — 77080 DXA BONE DENSITY AXIAL: CPT

## 2025-04-30 PROCEDURE — 93017 CV STRESS TEST TRACING ONLY: CPT

## 2025-04-30 PROCEDURE — 6360000002 HC RX W HCPCS: Performed by: INTERNAL MEDICINE

## 2025-04-30 PROCEDURE — 78452 HT MUSCLE IMAGE SPECT MULT: CPT | Performed by: INTERNAL MEDICINE

## 2025-04-30 PROCEDURE — 93016 CV STRESS TEST SUPVJ ONLY: CPT | Performed by: INTERNAL MEDICINE

## 2025-04-30 PROCEDURE — 93018 CV STRESS TEST I&R ONLY: CPT | Performed by: INTERNAL MEDICINE

## 2025-04-30 PROCEDURE — 3430000000 HC RX DIAGNOSTIC RADIOPHARMACEUTICAL: Performed by: INTERNAL MEDICINE

## 2025-04-30 PROCEDURE — 2500000003 HC RX 250 WO HCPCS: Performed by: INTERNAL MEDICINE

## 2025-04-30 PROCEDURE — A9502 TC99M TETROFOSMIN: HCPCS | Performed by: INTERNAL MEDICINE

## 2025-04-30 PROCEDURE — 78452 HT MUSCLE IMAGE SPECT MULT: CPT

## 2025-04-30 RX ORDER — LEVOTHYROXINE SODIUM 150 UG/1
150 TABLET ORAL DAILY
Qty: 90 TABLET | Refills: 3 | OUTPATIENT
Start: 2025-04-30

## 2025-04-30 RX ORDER — SODIUM CHLORIDE 0.9 % (FLUSH) 0.9 %
10 SYRINGE (ML) INJECTION PRN
Status: COMPLETED | OUTPATIENT
Start: 2025-04-30 | End: 2025-04-30

## 2025-04-30 RX ORDER — REGADENOSON 0.08 MG/ML
0.4 INJECTION, SOLUTION INTRAVENOUS
Status: COMPLETED | OUTPATIENT
Start: 2025-04-30 | End: 2025-04-30

## 2025-04-30 RX ADMIN — Medication 10 ML: at 08:43

## 2025-04-30 RX ADMIN — REGADENOSON 0.4 MG: 0.08 INJECTION, SOLUTION INTRAVENOUS at 09:53

## 2025-04-30 RX ADMIN — Medication 10 ML: at 09:54

## 2025-04-30 RX ADMIN — TETROFOSMIN 35.5 MILLICURIE: 1.38 INJECTION, POWDER, LYOPHILIZED, FOR SOLUTION INTRAVENOUS at 09:53

## 2025-04-30 RX ADMIN — TETROFOSMIN 12 MILLICURIE: 1.38 INJECTION, POWDER, LYOPHILIZED, FOR SOLUTION INTRAVENOUS at 08:43

## 2025-04-30 RX ADMIN — Medication 10 ML: at 09:52

## 2025-05-03 RX ORDER — IBANDRONATE SODIUM 150 MG/1
150 TABLET, FILM COATED ORAL
Qty: 3 TABLET | Refills: 3 | Status: SHIPPED | OUTPATIENT
Start: 2025-05-03

## 2025-05-05 ENCOUNTER — HOSPITAL ENCOUNTER (OUTPATIENT)
Dept: WOUND CARE | Age: 80
Discharge: HOME | End: 2025-05-05
Payer: MEDICARE

## 2025-05-05 VITALS
RESPIRATION RATE: 17 BRPM | HEART RATE: 76 BPM | TEMPERATURE: 97 F | DIASTOLIC BLOOD PRESSURE: 54 MMHG | SYSTOLIC BLOOD PRESSURE: 131 MMHG

## 2025-05-05 DIAGNOSIS — R18.8 OTHER ASCITES: Primary | ICD-10-CM

## 2025-05-05 DIAGNOSIS — L89.520 UNSTAGEABLE PRESSURE ULCER OF LEFT ANKLE (HCC): Primary | ICD-10-CM

## 2025-05-05 PROCEDURE — 11042 DBRDMT SUBQ TIS 1ST 20SQCM/<: CPT

## 2025-05-05 PROCEDURE — 6370000000 HC RX 637 (ALT 250 FOR IP)

## 2025-05-05 RX ORDER — SODIUM CHLOR/HYPOCHLOROUS ACID 0.033 %
SOLUTION, IRRIGATION IRRIGATION PRN
OUTPATIENT
Start: 2025-05-05

## 2025-05-05 RX ORDER — BETAMETHASONE DIPROPIONATE 0.5 MG/G
CREAM TOPICAL PRN
OUTPATIENT
Start: 2025-05-05

## 2025-05-05 RX ORDER — LIDOCAINE HYDROCHLORIDE 40 MG/ML
SOLUTION TOPICAL PRN
OUTPATIENT
Start: 2025-05-05

## 2025-05-05 RX ORDER — GINSENG 100 MG
CAPSULE ORAL PRN
OUTPATIENT
Start: 2025-05-05

## 2025-05-05 RX ORDER — NEOMYCIN/BACITRACIN/POLYMYXINB 3.5-400-5K
OINTMENT (GRAM) TOPICAL PRN
OUTPATIENT
Start: 2025-05-05

## 2025-05-05 RX ORDER — MUPIROCIN 20 MG/G
OINTMENT TOPICAL PRN
OUTPATIENT
Start: 2025-05-05

## 2025-05-05 RX ORDER — LIDOCAINE 40 MG/G
CREAM TOPICAL PRN
OUTPATIENT
Start: 2025-05-05

## 2025-05-05 RX ORDER — SILVER SULFADIAZINE 10 MG/G
CREAM TOPICAL PRN
OUTPATIENT
Start: 2025-05-05

## 2025-05-05 RX ORDER — LIDOCAINE HYDROCHLORIDE 20 MG/ML
JELLY TOPICAL PRN
OUTPATIENT
Start: 2025-05-05

## 2025-05-05 RX ORDER — GENTAMICIN SULFATE 1 MG/G
OINTMENT TOPICAL PRN
OUTPATIENT
Start: 2025-05-05

## 2025-05-05 RX ORDER — BACITRACIN ZINC AND POLYMYXIN B SULFATE 500; 1000 [USP'U]/G; [USP'U]/G
OINTMENT TOPICAL PRN
OUTPATIENT
Start: 2025-05-05

## 2025-05-05 RX ORDER — LIDOCAINE 50 MG/G
OINTMENT TOPICAL PRN
OUTPATIENT
Start: 2025-05-05

## 2025-05-05 RX ORDER — TRIAMCINOLONE ACETONIDE 1 MG/G
OINTMENT TOPICAL PRN
OUTPATIENT
Start: 2025-05-05

## 2025-05-05 RX ORDER — CLOBETASOL PROPIONATE 0.5 MG/G
OINTMENT TOPICAL PRN
OUTPATIENT
Start: 2025-05-05

## 2025-05-05 RX ORDER — LIDOCAINE 40 MG/G
CREAM TOPICAL PRN
Status: DISCONTINUED | OUTPATIENT
Start: 2025-05-05 | End: 2025-05-06 | Stop reason: HOSPADM

## 2025-05-05 RX ADMIN — LIDOCAINE 4%: 4 CREAM TOPICAL at 14:15

## 2025-05-05 NOTE — PROGRESS NOTES
[facility] Wound Care Center                                                   Progress Note and Procedure Note      Zach Aguirre  MEDICAL RECORD NUMBER:  [id number]  AGE: 80 y.o.   GENDER: female  : 1945  EPISODE DATE:  2025    Subjective:     Chief Complaint   Patient presents with    Wound Check         HISTORY of PRESENT ILLNESS HPI     Zach Aguirre is a 80 y.o. female who presents today for wound/ulcer evaluation.   History of Wound Context: Unstageable pressure ulcer to left lateral ankle. Patient is unsure how long it has been there for but reports she sleeps on her left side. History of venous ulcers. Saw PCP who started her on bactrim -- still taking. Xray negative for OM. Patient denies fever, chills, nausea, vomiting.   Wound/Ulcer Pain Timing/Severity: intermittent  Quality of pain: tender  Severity:   10   Modifying Factors: Pain worsens with pressure  Associated Signs/Symptoms: pain    Ulcer Identification:  Ulcer Type: pressure  Contributing Factors: edema, chronic pressure, decreased mobility, and shear force          PAST MEDICAL HISTORY        Diagnosis Date    Anxiety     DM (diabetes mellitus) (HCC) 2015    Hepatitis B infection     Hyperlipidemia     Hypothyroidism     Insomnia     Leg pain 2015    Post herpetic neuralgia     Unspecified sleep apnea     after seeing [facility] they state she is does not have sleep apnea.  Not using cpap       PAST SURGICAL HISTORY    Past Surgical History:   Procedure Laterality Date    ANKLE FRACTURE SURGERY  2016    DR ANDRADE  LT    BIOPSY / LIGATION TEMPORAL ARTERY Right 2019    RIGHT TEMPORAL ARTERY BIOPSY performed by Emmanuel Irwin MD at [facility]    BRAIN SURGERY      surgery for transgeminal neuraglia.  Insertion of sponge for chronic pain    COLONOSCOPY  2014    DR POND    COLONOSCOPY N/A 2021    COLONOSCOPY DIAGNOSTIC performed by Mei Pond MD at [facility] CENTER    HYSTERECTOMY (CERVIX STATUS

## 2025-05-05 NOTE — DISCHARGE INSTRUCTIONS
[facility] Wound Center and Hyperbaric Medicine   Physician Orders and Discharge Instructions  [facility]  [address] [place]  [address]  Telephone: [phone]      [phone]        NAME:  Zach Aguirre                                                                                                  YOB: 1945  MEDICAL RECORD NUMBER:  [id number]     Your  is:  Nick     Home Care/Facility:     Wound Location: Left Lateral ankle      Dressing orders:  Cleanse with antibacterial soap or normal saline   Apply medi honey gel to the wound  Cover with a dry dressing  Change daily      Compression: Apply low compression hose to left lower leg(s), may remove at bedtime, reapply first thing in the morning, avoid prolonged standing, elevate legs when sitting. (34,36)        Offloading Device:      Other Instructions:  Continue taking the antibiotic that you were prescribed. Elevate legs as much as possible. You can prop a pillow under your legs to help keep the pressure off. We ordered an MRI, they will call you to set up and appointment.      Keep all dressings clean, dry and intact.  Keep pressure off the wound(s) at all times.      Follow up visit   1 Week May 9 , 2025 at 130pm     Please give 24 hour notice if unable to keep appointment. [phone]     If you experience any of the following, please call the Wound Care Service at  [phone] or go to the nearest emergency room.        *Increase in pain*Temperature over 101*Increase in drainage from your wound or a foul odor  *Uncontrolled swelling*Need for compression bandage changes due to slippage, breakthrough drainage       PLEASE NOTE: IF YOU ARE UNABLE TO OBTAIN WOUND SUPPLIES, CONTINUE TO USE THE SUPPLIES YOU HAVE AVAILABLE UNTIL YOU ARE ABLE TO REACH US. IT IS MOST IMPORTANT TO KEEP THE WOUND COVERED AT ALL TIMES    Electronically signed by NILSON Velázquez CNP on  No

## 2025-05-07 ENCOUNTER — HOSPITAL ENCOUNTER (OUTPATIENT)
Dept: PHYSICAL THERAPY | Age: 80
Setting detail: THERAPIES SERIES
Discharge: HOME | End: 2025-05-07
Attending: INTERNAL MEDICINE
Payer: MEDICARE

## 2025-05-07 PROCEDURE — 97110 THERAPEUTIC EXERCISES: CPT

## 2025-05-07 PROCEDURE — 97112 NEUROMUSCULAR REEDUCATION: CPT

## 2025-05-07 NOTE — PROGRESS NOTES
[facility]  Outpatient Physical Therapy    Treatment Note        Date: 2025  Patient: Zach Aguirre  : 1945   Confirmed: Yes  MRN: [id number]  Referring Provider: Yesenia Viramontes MD    Medical Diagnosis: Imbalance [R26.89]  Fatigue, unspecified type [R53.83]       Treatment Diagnosis: Imbalance    Visit Information:  Insurance: Payor: [facility] MEDICARE / Plan: [facility] MEDICARE COMPLETE / Product Type: *No Product type* /   PT Visit Information  PT Insurance Information: [facility] Medicare  Total # of Visits Approved: 12 (Eval only)  Total # of Visits to Date: 2  No Show: 0  Progress Note Due Date: 25  Canceled Appointment: 0  Progress Note Counter:  (12v until 25)    Subjective Information:  Subjective: Daughter reports patient will be having an MRI of Lt ankle due to new wound, Wound Clinic wants imaging to see how deep the wound is. Patient reports decreased compliance with HEP due to frequent Dr appts.  HEP Compliance:  [] Good [x] Fair [] Poor [] Reports not doing due to:               Pain Screening  Patient Currently in Pain: No    Treatment:  Exercises:  Exercises  Exercise 2: Single stepping over cane with/without opp reaching  Exercise 3: Gait drills: forward with focus on arm swing, retro/lateral/march and holds all supported  Exercise 7: 2 way SLR Roel x10 ea  Exercise 8: Jen Cartwright 3 '' x10 ea  Exercise 9: STS from chair x5, increased time to complete  Exercise 10: Scifit level 1 for 5min to increase strength  Exercise 20: HEP: leslie leos, 2way SLR, STS            *Indicates exercise, modality, or manual techniques to be initiated when appropriate           Assessment:   Body Structures, Functions, Activity Limitations Requiring Skilled Therapeutic Intervention: Decreased functional mobility , Decreased strength, Decreased balance, Decreased endurance  Assessment: Patient presents for first treatment after Evaluation. Initiated LE strengthening, patient with mild difficulty

## 2025-05-08 ENCOUNTER — HOSPITAL ENCOUNTER (OUTPATIENT)
Dept: INTERVENTIONAL RADIOLOGY/VASCULAR | Age: 80
Discharge: HOME | End: 2025-05-10
Payer: MEDICARE

## 2025-05-08 VITALS
OXYGEN SATURATION: 98 % | HEART RATE: 82 BPM | RESPIRATION RATE: 14 BRPM | SYSTOLIC BLOOD PRESSURE: 116 MMHG | DIASTOLIC BLOOD PRESSURE: 56 MMHG

## 2025-05-08 DIAGNOSIS — R18.8 OTHER ASCITES: ICD-10-CM

## 2025-05-08 PROCEDURE — C1729 CATH, DRAINAGE: HCPCS

## 2025-05-08 PROCEDURE — P9047 ALBUMIN (HUMAN), 25%, 50ML: HCPCS | Performed by: NURSE PRACTITIONER

## 2025-05-08 PROCEDURE — 49083 ABD PARACENTESIS W/IMAGING: CPT

## 2025-05-08 PROCEDURE — 96365 THER/PROPH/DIAG IV INF INIT: CPT

## 2025-05-08 PROCEDURE — 6360000002 HC RX W HCPCS: Performed by: NURSE PRACTITIONER

## 2025-05-08 RX ORDER — ALBUMIN (HUMAN) 12.5 G/50ML
SOLUTION INTRAVENOUS CONTINUOUS PRN
Status: COMPLETED | OUTPATIENT
Start: 2025-05-08 | End: 2025-05-08

## 2025-05-08 RX ORDER — LIDOCAINE HYDROCHLORIDE 20 MG/ML
INJECTION, SOLUTION INFILTRATION; PERINEURAL PRN
Status: COMPLETED | OUTPATIENT
Start: 2025-05-08 | End: 2025-05-08

## 2025-05-08 RX ADMIN — ALBUMIN (HUMAN) 25 G: 12.5 SOLUTION INTRAVENOUS at 12:01

## 2025-05-08 RX ADMIN — LIDOCAINE HYDROCHLORIDE 9 ML: 20 INJECTION, SOLUTION INFILTRATION; PERINEURAL at 11:24

## 2025-05-08 NOTE — DISCHARGE INSTRUCTIONS
Ultrasound Guided Paracentesis Discharge Instructions:    Rest today. No heavy lifting, bending, stretching or pulling.  Some tenderness will be normal at the procedure site for a few days.    You may remove the bandaid in 24-48 hours.     If you experience any drainage or bleeding at the site, hold pressure for 30 minutes and lay on side opposite of the procedure site (move fluid away from the area of leakage). If drainage or bleeding does not stop and seems excessive, call your physician or proceed to the ER.      Watch for signs of infection such as fever, chills, drainage or redness at the site. If you experience any of these symptoms, call your primary doctor or go to the emergency room.       Please keep all follow-up appointments with your Hepatologist. Schedule follow-up appointment for repeat paracentesis if necessary - Dr. Nelson's office at [phone].     If you have any concerns please contact the [facility] Radiology Department at [phone].

## 2025-05-08 NOTE — OR NURSING
PARACENTESIS - NO SEDATION    1110 - Patient arrived to specials room #6 via . A&O x4, calm and cooperative. Procedure explained and consent confirmed from previous encounter. Assisted out of WC and onto cart, positioned lying propped to left side with pillow wedge. Allergies, home medications and history reviewed. VSS, on RA.     Procedural site marked. Left side of abdomen cleansed with Chloraprep. After 3 minute dry time, draped with sterile drape and towels.     1122 - Timeout completed for Ultrasound Guided Paracentesis.     1124 - Site numbed with 2% lidocaine by Sara Hernández CNP. See eMAR.  [id number] Centesis 5F catheter placed using Ultrasound guidance.  Fluid appears clear yellow. Catheter tubing then connected to [employer] machine to remove fluid.    1156 - Patient with >5L drain. IV #22 inserted LAC without issue, tolerated well. Albumin administered per order, see eMAR.     1210 - Pt tolerated well. Total 6940 ml removed. Catheter removed, pressure held and bandaid applied. Verbal and tactile reassurance given throughout.     1221 - D/C instructions reviewed with patient and understanding indicated. IV removed. Next para appt made per request. Assisted back into  and taken to hospital entrance, met by daughter who will drive her home. Electronically signed by Stefani Gonzales RN on 5/8/2025 at 12:36 PM

## 2025-05-09 ENCOUNTER — POST-OP TELEPHONE (OUTPATIENT)
Dept: INTERVENTIONAL RADIOLOGY/VASCULAR | Age: 80
End: 2025-05-09

## 2025-05-09 ENCOUNTER — HOSPITAL ENCOUNTER (OUTPATIENT)
Dept: PHYSICAL THERAPY | Age: 80
Setting detail: THERAPIES SERIES
Discharge: HOME | End: 2025-05-09
Attending: INTERNAL MEDICINE
Payer: MEDICARE

## 2025-05-09 PROCEDURE — 97110 THERAPEUTIC EXERCISES: CPT

## 2025-05-09 NOTE — PROGRESS NOTES
[facility]  Outpatient Physical Therapy    Treatment Note        Date: 2025  Patient: Zach Aguirre  : 1945   Confirmed: Yes  MRN: [id number]  Referring Provider: Yesenia Viramontes MD   Secondary Referring Provider (If applicable):     Medical Diagnosis: Imbalance [R26.89]  Fatigue, unspecified type [R53.83]    Treatment Diagnosis: Imbalance    Visit Information:  Insurance: Payor: [employer] MEDICARE / Plan: [facility] MEDICARE COMPLETE / Product Type: *No Product type* /   PT Visit Information  PT Insurance Information: [facility] Medicare  Total # of Visits Approved: 12 (Eval only)  Total # of Visits to Date: 3  No Show: 0  Progress Note Due Date: 25  Canceled Appointment: 0  Progress Note Counter:  (12v until 25)    Subjective Information:  Subjective: \"Nothing signficant since the last time I was in.\"  HEP Compliance:  [x] Good [] Fair [] Poor [] Reports not doing due to:    Pain Screening  Patient Currently in Pain: Denies    Treatment:  Exercises:  Exercises  Exercise 2: single Stepping over 6\" hurdles  x 10 fwd 1UE support  Exercise 3: Gait drills: forward with focus on arm swing, retro/lateral/march and holds all supported  Exercise 7: 2 way SLR Roel x10 ea  Exercise 8: Jen Cartwright 5 '' x10  x 2ea  Exercise 9: STS from chair 2 x 5, increased time to complete  Exercise 10: Scifit level 1 for 5min to increase strength  Exercise 20: HEP: leslie leos, 2way SLR, STS        *Indicates exercise, modality, or manual techniques to be initiated when appropriate    Objective Measures:         Assessment:   Body Structures, Functions, Activity Limitations Requiring Skilled Therapeutic Intervention: Decreased functional mobility , Decreased strength, Decreased balance, Decreased endurance  Assessment: Zach reported lightheadedness/ dizziness following Sci-fit this date, Zach's vitals were checked with BP 98/47mmHg and SpO2 % with pulse of 83bpm this would improve on following measures with

## 2025-05-09 NOTE — FLOWSHEET NOTE
Post procedure follow up call placed to patient re: IR paracentesis completed on 5/8/25. Patient reports no issues or pain at site. \"Feels good.\" Aware of next schedule para appointment. No questions / concerns noted at this time. Electronically signed by Stefani Gonzales RN on 5/9/2025 at 9:58 AM

## 2025-05-12 ENCOUNTER — HOSPITAL ENCOUNTER (OUTPATIENT)
Dept: WOUND CARE | Age: 80
Discharge: HOME | End: 2025-05-12
Payer: MEDICARE

## 2025-05-12 VITALS
SYSTOLIC BLOOD PRESSURE: 107 MMHG | TEMPERATURE: 96.8 F | DIASTOLIC BLOOD PRESSURE: 54 MMHG | HEART RATE: 80 BPM | RESPIRATION RATE: 16 BRPM

## 2025-05-12 DIAGNOSIS — L89.520 UNSTAGEABLE PRESSURE ULCER OF LEFT ANKLE (HCC): Primary | ICD-10-CM

## 2025-05-12 PROCEDURE — 11042 DBRDMT SUBQ TIS 1ST 20SQCM/<: CPT

## 2025-05-12 PROCEDURE — 6370000000 HC RX 637 (ALT 250 FOR IP)

## 2025-05-12 RX ORDER — LIDOCAINE HYDROCHLORIDE 40 MG/ML
SOLUTION TOPICAL PRN
OUTPATIENT
Start: 2025-05-12

## 2025-05-12 RX ORDER — MUPIROCIN 20 MG/G
OINTMENT TOPICAL PRN
OUTPATIENT
Start: 2025-05-12

## 2025-05-12 RX ORDER — LIDOCAINE HYDROCHLORIDE 20 MG/ML
JELLY TOPICAL PRN
OUTPATIENT
Start: 2025-05-12

## 2025-05-12 RX ORDER — GENTAMICIN SULFATE 1 MG/G
OINTMENT TOPICAL PRN
OUTPATIENT
Start: 2025-05-12

## 2025-05-12 RX ORDER — GINSENG 100 MG
CAPSULE ORAL PRN
OUTPATIENT
Start: 2025-05-12

## 2025-05-12 RX ORDER — SILVER SULFADIAZINE 10 MG/G
CREAM TOPICAL PRN
OUTPATIENT
Start: 2025-05-12

## 2025-05-12 RX ORDER — LIDOCAINE HYDROCHLORIDE 20 MG/ML
JELLY TOPICAL PRN
Status: DISCONTINUED | OUTPATIENT
Start: 2025-05-12 | End: 2025-05-13 | Stop reason: HOSPADM

## 2025-05-12 RX ORDER — TRIAMCINOLONE ACETONIDE 1 MG/G
OINTMENT TOPICAL PRN
OUTPATIENT
Start: 2025-05-12

## 2025-05-12 RX ORDER — BACITRACIN ZINC AND POLYMYXIN B SULFATE 500; 1000 [USP'U]/G; [USP'U]/G
OINTMENT TOPICAL PRN
OUTPATIENT
Start: 2025-05-12

## 2025-05-12 RX ORDER — LIDOCAINE 50 MG/G
OINTMENT TOPICAL PRN
OUTPATIENT
Start: 2025-05-12

## 2025-05-12 RX ORDER — BETAMETHASONE DIPROPIONATE 0.5 MG/G
CREAM TOPICAL PRN
OUTPATIENT
Start: 2025-05-12

## 2025-05-12 RX ORDER — CLOBETASOL PROPIONATE 0.5 MG/G
OINTMENT TOPICAL PRN
OUTPATIENT
Start: 2025-05-12

## 2025-05-12 RX ORDER — LIDOCAINE 40 MG/G
CREAM TOPICAL PRN
OUTPATIENT
Start: 2025-05-12

## 2025-05-12 RX ORDER — NEOMYCIN/BACITRACIN/POLYMYXINB 3.5-400-5K
OINTMENT (GRAM) TOPICAL PRN
OUTPATIENT
Start: 2025-05-12

## 2025-05-12 RX ORDER — SODIUM CHLOR/HYPOCHLOROUS ACID 0.033 %
SOLUTION, IRRIGATION IRRIGATION PRN
OUTPATIENT
Start: 2025-05-12

## 2025-05-12 RX ADMIN — LIDOCAINE HYDROCHLORIDE: 20 JELLY TOPICAL at 13:51

## 2025-05-12 ASSESSMENT — PAIN SCALES - GENERAL: PAINLEVEL_OUTOF10: 8

## 2025-05-12 ASSESSMENT — PAIN DESCRIPTION - ORIENTATION: ORIENTATION: LEFT

## 2025-05-12 ASSESSMENT — PAIN DESCRIPTION - LOCATION: LOCATION: ANKLE

## 2025-05-12 NOTE — DISCHARGE INSTRUCTIONS
[facility] Wound Center and Hyperbaric Medicine   Physician Orders and Discharge Instructions  [facility]  [address] [place]  [address]  Telephone: [phone]      [phone]        NAME:  Zach Aguirre                                                                                                  YOB: 1945  MEDICAL RECORD NUMBER:  [id number]     Your  is:  DoloresRomina     Home Care/Facility: None     Wound Location: Left Lateral ankle      Dressing orders:  Cleanse with antibacterial soap or normal saline   Apply medi honey gel to the wound  Cover with a dry dressing  Change daily      Compression: Apply low compression hose to left lower leg(s), may remove at bedtime, reapply first thing in the morning, avoid prolonged standing, elevate legs when sitting. (34,36)        Offloading Device:      Other Instructions: A Culture was done today.  Elevate legs as much as possible. You can prop a pillow under your legs to help keep the pressure off. Keep your 6/3/25 MRI appt.      Keep all dressings clean, dry and intact.  Keep pressure off the wound(s) at all times.      Follow up visit   1 Week  May 19, 2025 at  1:00     Please give 24 hour notice if unable to keep appointment. [phone]     If you experience any of the following, please call the Wound Care Service at  [phone] or go to the nearest emergency room.        *Increase in pain*Temperature over 101*Increase in drainage from your wound or a foul odor  *Uncontrolled swelling*Need for compression bandage changes due to slippage, breakthrough drainage       PLEASE NOTE: IF YOU ARE UNABLE TO OBTAIN WOUND SUPPLIES, CONTINUE TO USE THE SUPPLIES YOU HAVE AVAILABLE UNTIL YOU ARE ABLE TO REACH US. IT IS MOST IMPORTANT TO KEEP THE WOUND COVERED AT ALL TIMES

## 2025-05-12 NOTE — PLAN OF CARE
Problem: Wound:  Goal: Will show signs of wound healing; wound closure and no evidence of infection  Description: Will show signs of wound healing; wound closure and no evidence of infection  5/12/2025 1333 by Cat Loredo RN  Outcome: Progressing  5/12/2025 1333 by Cat Loredo, RN  Outcome: Progressing

## 2025-05-12 NOTE — PROGRESS NOTES
[facility]                                                   Progress Note and Procedure Note      Zach Aguirre  MEDICAL RECORD NUMBER:  [id number]  AGE: 80 y.o.   GENDER: female  : 1945  EPISODE DATE:  2025    Subjective:     Chief Complaint   Patient presents with    Wound Check         HISTORY of PRESENT ILLNESS HPI     Zach Aguirre is a 80 y.o. female who presents today for wound/ulcer evaluation.   History of Wound Context: Unstageable pressure ulcer to left lateral ankle. Patient is unsure how long it has been there for but reports she sleeps on her left side. History of venous ulcers. Saw PCP who started her on bactrim which she has completed. Xray negative for OM, scheduled for MRI in . Patient denies fever, chills, nausea, vomiting.   Wound/Ulcer Pain Timing/Severity: intermittent  Quality of pain: tender  Severity:  4 / 10   Modifying Factors: Pain worsens with pressure  Associated Signs/Symptoms: pain    Ulcer Identification:  Ulcer Type: pressure  Contributing Factors: edema, chronic pressure, decreased mobility, and shear force          PAST MEDICAL HISTORY        Diagnosis Date    Anxiety     DM (diabetes mellitus) (HCC) 2015    Hepatitis B infection     Hyperlipidemia     Hypothyroidism     Insomnia     Leg pain 2015    Post herpetic neuralgia     Unspecified sleep apnea     after seeing [facility] they state she is does not have sleep apnea.  Not using cpap       PAST SURGICAL HISTORY    Past Surgical History:   Procedure Laterality Date    ANKLE FRACTURE SURGERY  2016    DR ANDRADE  LT    BIOPSY / LIGATION TEMPORAL ARTERY Right 2019    RIGHT TEMPORAL ARTERY BIOPSY performed by Emmanuel Irwin MD at [facility] [place]    BRAIN SURGERY      surgery for transgeminal neuraglia.  Insertion of sponge for chronic pain    COLONOSCOPY  2014    DR POND    COLONOSCOPY N/A 2021    COLONOSCOPY DIAGNOSTIC performed by Mei Pond MD at [facility]STR[facility]

## 2025-05-13 ENCOUNTER — HOSPITAL ENCOUNTER (OUTPATIENT)
Dept: PHYSICAL THERAPY | Age: 80
Setting detail: THERAPIES SERIES
Discharge: HOME | End: 2025-05-13
Attending: INTERNAL MEDICINE
Payer: MEDICARE

## 2025-05-13 PROCEDURE — 97110 THERAPEUTIC EXERCISES: CPT

## 2025-05-13 PROCEDURE — 97112 NEUROMUSCULAR REEDUCATION: CPT

## 2025-05-13 NOTE — PROGRESS NOTES
[facility]  Outpatient Physical Therapy   Treatment Note        Date: 2025  Patient: Zach Aguirre  : 1945   Confirmed: Yes  MRN: [id number]  Referring Provider: Yesenia Viramontes MD      Medical Diagnosis: Imbalance [R26.89]  Fatigue, unspecified type [R53.83]      Treatment Diagnosis: Imbalance    Visit Information:  Insurance: Payor: [facility] MEDICARE / Plan: [facility] MEDICARE COMPLETE / Product Type: *No Product type* /   PT Visit Information  PT Insurance Information: [facility] Medicare  Total # of Visits Approved: 12 (Eval only)  Total # of Visits to Date: 4  No Show: 0  Progress Note Due Date: 25  Canceled Appointment: 0  Progress Note Counter: 3/12 (12v until 25)    Subjective Information:  Subjective: Pt reports waiting to the 3 of gavin to get MRI of ankle initially, daughter returned and states next week for MRI  HEP Compliance:  [x] Good [] Fair [] Poor [] Reports not doing due to:       Pain Screening  Patient Currently in Pain: Denies    Treatment:  Exercises:  Exercises  Exercise 2: single Stepping over 6\" yuly  x 10 fwd 1UE support  Exercise 3: Gait drills: forward with focus on arm swing, retro/lateral/march and holds all supported  Exercise 5: Amb over hurdles  Roel UE support, F/L x 4  Exercise 6: Step ups 6 inch step x10 f/l  Exercise 10: alt tapping  to 6\" yuly  Exercise 20: HEP: continue current, SLS with march, FT, partial tandem     *Indicates exercise, modality, or manual techniques to be initiated when appropriate    Objective Measures: : Initiated tx for strength and balance   Assessment:   Body Structures, Functions, Activity Limitations Requiring Skilled Therapeutic Intervention: Decreased functional mobility , Decreased strength, Decreased balance, Decreased endurance  Assessment: Held sci fit due wo wound on L ankle. Pt tolerated all standing activity with minimal RBs. Pt demo's decreased speed with head turns and difficulty transitioning with speed differentions.Some

## 2025-05-14 ENCOUNTER — TELEPHONE (OUTPATIENT)
Age: 80
End: 2025-05-14

## 2025-05-14 DIAGNOSIS — K74.60 CIRRHOSIS OF LIVER WITH ASCITES, UNSPECIFIED HEPATIC CIRRHOSIS TYPE (HCC): ICD-10-CM

## 2025-05-14 DIAGNOSIS — R18.8 CIRRHOSIS OF LIVER WITH ASCITES, UNSPECIFIED HEPATIC CIRRHOSIS TYPE (HCC): ICD-10-CM

## 2025-05-14 DIAGNOSIS — R18.8 OTHER ASCITES: Primary | ICD-10-CM

## 2025-05-14 NOTE — TELEPHONE ENCOUNTER
----- Message from NILSON Hyde CNP sent at 5/14/2025  9:41 AM EDT -----  Paracentesis order placed. No labs or medication hold needed. She needs follow up with GI, either Dr. Kitchen or at  with PA/NP-Hepner per the chart. Thank you. ~Sara  ----- Message -----  From: Nany Umaña RN  Sent: 5/8/2025  11:36 AM EDT  To: NILSON Hyde CNP; Jenny Jones MA    Next para 5/19 @ 1100

## 2025-05-15 ENCOUNTER — HOSPITAL ENCOUNTER (OUTPATIENT)
Dept: PHYSICAL THERAPY | Age: 80
Setting detail: THERAPIES SERIES
Discharge: HOME | End: 2025-05-15
Attending: INTERNAL MEDICINE
Payer: MEDICARE

## 2025-05-15 PROCEDURE — 97110 THERAPEUTIC EXERCISES: CPT

## 2025-05-15 PROCEDURE — 97112 NEUROMUSCULAR REEDUCATION: CPT

## 2025-05-15 ASSESSMENT — PAIN DESCRIPTION - DESCRIPTORS: DESCRIPTORS: SHOOTING

## 2025-05-15 ASSESSMENT — PAIN DESCRIPTION - LOCATION: LOCATION: ANKLE

## 2025-05-15 ASSESSMENT — PAIN DESCRIPTION - ORIENTATION: ORIENTATION: LEFT

## 2025-05-15 ASSESSMENT — PAIN SCALES - GENERAL: PAINLEVEL_OUTOF10: 4

## 2025-05-15 NOTE — TELEPHONE ENCOUNTER
Patient's daughter called back and spoke to Cheyenne Abreu - she is aware of instructions and voiced understanding

## 2025-05-15 NOTE — PROGRESS NOTES
[facility]  Outpatient Physical Therapy    Treatment Note        Date: 5/15/2025  Patient: Zach Aguirre  : 1945   Confirmed: Yes  MRN: [id number]  Referring Provider: Yesenia Viramontes MD    Medical Diagnosis: Imbalance [R26.89]  Fatigue, unspecified type [R53.83]       Treatment Diagnosis: Imbalance    Visit Information:  Insurance: Payor: [facility] MEDICARE / Plan: [facility] MEDICARE COMPLETE / Product Type: *No Product type* /   PT Visit Information  PT Insurance Information: [facility] Medicare  Total # of Visits Approved: 12 (Eval only)  Total # of Visits to Date: 5  No Show: 0  Progress Note Due Date: 25  Canceled Appointment: 0  Progress Note Counter:  (12v until 25)    Subjective Information:  Subjective: Pt states her Lt ankle is hurting  HEP Compliance:  [x] Good [] Fair [] Poor [] Reports not doing due to:      Pain Screening  Patient Currently in Pain: Yes  Pain Assessment: 0-10  Pain Level: 4  Pain Location: Ankle  Pain Orientation: Left  Pain Descriptors: Shooting    Treatment:  Exercises:  Exercises  Exercise 1: Foam: FA/FT/EO/EC, Stepping on/off  Exercise 2: single Stepping over 6\" yuly  x 10 fwd/lat no support  Exercise 3: Gait drills: , retro/lateral/march and holds all supported, Tandem f/r, head turns  Exercise 4: 4 square stepping and F/R stepping with VCs, no LOB  Exercise 6: Step ups 6 inch step x10 f/l  Exercise 7: Sinks Ex's x 10, no ankles  Exercise 10: alt tapping  to 6\" Box, SLS supported on 6\" box with cane lifts  Exercise 20: HEP: continue current+ sink ex's       *Indicates exercise, modality, or manual techniques to be initiated when appropriate    Objective Measures:      Strength: [x] NT  [] MMT completed:   ROM: [x] NT  [] ROM measurements:   LTG 3 Current Status:: 5/15: Initiated balance tasks to improve reaction times     Assessment:   Body Structures, Functions, Activity Limitations Requiring Skilled Therapeutic Intervention: Decreased functional mobility , Decreased

## 2025-05-16 ENCOUNTER — PRE-PROCEDURE TELEPHONE (OUTPATIENT)
Dept: INTERVENTIONAL RADIOLOGY/VASCULAR | Age: 80
End: 2025-05-16

## 2025-05-16 NOTE — FLOWSHEET NOTE
Call to patients daughter as apt reminder for pt as previously requested. No answer. VM left. Message informed that pt has paracentesis scheduled on 5/19/25 at 1100 with 1030 arrival time. Rad phone number left for call back.  Electronically signed by Sharmaine Cartagena RN on 5/16/2025 at 10:39 AM

## 2025-05-18 LAB — CULTURE WOUND: NORMAL

## 2025-05-19 ENCOUNTER — HOSPITAL ENCOUNTER (OUTPATIENT)
Dept: INTERVENTIONAL RADIOLOGY/VASCULAR | Age: 80
Discharge: HOME | End: 2025-05-21
Payer: MEDICARE

## 2025-05-19 ENCOUNTER — HOSPITAL ENCOUNTER (OUTPATIENT)
Dept: INTERVENTIONAL RADIOLOGY/VASCULAR | Age: 80
End: 2025-05-19
Attending: RADIOLOGY
Payer: MEDICARE

## 2025-05-19 VITALS — SYSTOLIC BLOOD PRESSURE: 119 MMHG | DIASTOLIC BLOOD PRESSURE: 56 MMHG | OXYGEN SATURATION: 99 % | HEART RATE: 81 BPM

## 2025-05-19 DIAGNOSIS — K74.60 CIRRHOSIS OF LIVER WITH ASCITES, UNSPECIFIED HEPATIC CIRRHOSIS TYPE (HCC): ICD-10-CM

## 2025-05-19 DIAGNOSIS — R18.8 CIRRHOSIS OF LIVER WITH ASCITES, UNSPECIFIED HEPATIC CIRRHOSIS TYPE (HCC): ICD-10-CM

## 2025-05-19 DIAGNOSIS — R18.8 OTHER ASCITES: ICD-10-CM

## 2025-05-19 PROCEDURE — 49083 ABD PARACENTESIS W/IMAGING: CPT | Performed by: RADIOLOGY

## 2025-05-19 PROCEDURE — P9047 ALBUMIN (HUMAN), 25%, 50ML: HCPCS | Performed by: RADIOLOGY

## 2025-05-19 PROCEDURE — 2709999900 IR US GUIDED PARACENTESIS

## 2025-05-19 PROCEDURE — 6360000002 HC RX W HCPCS: Performed by: RADIOLOGY

## 2025-05-19 PROCEDURE — 96365 THER/PROPH/DIAG IV INF INIT: CPT

## 2025-05-19 PROCEDURE — 49083 ABD PARACENTESIS W/IMAGING: CPT

## 2025-05-19 RX ORDER — LIDOCAINE HYDROCHLORIDE 20 MG/ML
INJECTION, SOLUTION INFILTRATION; PERINEURAL PRN
Status: COMPLETED | OUTPATIENT
Start: 2025-05-19 | End: 2025-05-19

## 2025-05-19 RX ORDER — ALBUMIN (HUMAN) 12.5 G/50ML
SOLUTION INTRAVENOUS CONTINUOUS PRN
Status: COMPLETED | OUTPATIENT
Start: 2025-05-19 | End: 2025-05-19

## 2025-05-19 RX ADMIN — ALBUMIN (HUMAN) 25 G: 12.5 SOLUTION INTRAVENOUS at 14:53

## 2025-05-19 RX ADMIN — ALBUMIN (HUMAN) 25 G: 12.5 SOLUTION INTRAVENOUS at 14:43

## 2025-05-19 RX ADMIN — LIDOCAINE HYDROCHLORIDE 6 ML: 20 INJECTION, SOLUTION INFILTRATION; PERINEURAL at 13:25

## 2025-05-19 NOTE — DISCHARGE INSTRUCTIONS
Ultrasound Guided Paracentesis Discharge Instructions     Rest today. No heavy lifting, bending, stretching or pulling.  Some tenderness will be normal at the procedure site for a few days.    You may remove the bandaid in 48 hours.     If you experience any drainage or bleeding at the site, hold pressure for 30 minutes and lay on side opposite of the procedure site (move fluid away from the area of leakage). If drainage or bleeding does not stop and seems excessive, call your physician or proceed to the ER.      Watch for signs of infection such as fever, chills, drainage or redness at the site. If you experience any of these symptoms, call your primary doctor or go to the emergency room.       Please keep all follow-up appointments with your Hepatologist. Schedule follow-up appointment for repeat paracentesis if necessary.     If you have any concerns please contact the [facility] Radiology Department at [phone].

## 2025-05-19 NOTE — OR NURSING
NO SEDATION  Pt to specials room 6 via wheelchair. Alert and oriented. C/o diarrhea this am, feeling better now.  RN tech obtained images prior to start of procedure using U/S. Pt  VSS.     Pt's left lower abd prepped with Chloraprep.    After 3 minute dry time, draped with sterile drape and towels.     1324 Procedure explained, consent verified.  Timeout completed for Ultrasound Guided Paracentesis. Using U/S, Dr. Nelson marked pt's left lower abdomen.     1325 Site numbed with lidocaine.  [id number] Centesis 5F catheter placed using Ultrasound guidance.  Fluid appears clear yellow. Catheter tubing connected to oNoise machine to remove fluid.      1415 Pt tolerated well. Total 7210 ml removed. Catheter removed, pressure held and bandaid applied. Verbal and tactile reassurance given throughout.   2 nurses attempted IV access, with and without US, unsuccessful. Pt wishes to leave without albumin. Risks explained, including hypotension and falls. Pt still wishes to not have any more IV attempts and does not want albumin.     1419 Dr. Nelson in to speak to patient, explaining why pt needs IV access. Dr. Nelson ordering midline insertion, pt agreeable.    1427 Pt's daughter Jerica called by this nurse to update her, per pt request.     1442 IV access obtained, #22 left AC. Pt tolerated it well. Albumin 50g given IV for paracentesis >7L.    1508 Albumin finished. IV d/c'd intact. Next appt made for 5/28 at 1100. Pt wheeled to front door to her daughter, Jerica.

## 2025-05-20 ENCOUNTER — HOSPITAL ENCOUNTER (OUTPATIENT)
Dept: PHYSICAL THERAPY | Age: 80
Setting detail: THERAPIES SERIES
Discharge: HOME | End: 2025-05-20
Attending: INTERNAL MEDICINE
Payer: MEDICARE

## 2025-05-20 ENCOUNTER — TELEPHONE (OUTPATIENT)
Dept: INTERVENTIONAL RADIOLOGY/VASCULAR | Age: 80
End: 2025-05-20

## 2025-05-20 NOTE — PROGRESS NOTES
Therapy                            Cancellation/No-show Note    Date: 2025  Patient: Zach Aguirre (80 y.o. female)  : 1945  MRN:  [id number]  Referring Physician: Yesenia Viramontes MD    Medical Diagnosis: Imbalance [R26.89]  Fatigue, unspecified type [R53.83]      Visit Information:  Insurance: Payor: [facility] MEDICARE / Plan: [facility] MEDICARE COMPLETE / Product Type: *No Product type* /   Visits to Date: 5   No Show/Cancelled Appts: 0       For today's appointment patient:  [x]  Cancelled  []  Rescheduled appointment  []  No-show   []  Called pt to remind of next appointment     Reason given by patient:  []  Patient ill  []  Conflicting appointment  []  No transportation    []  Conflict with work  []  No reason given  [x]  Other:  LLE is hurting, a lot of pain    [x] Pt has future appointments scheduled, no follow up needed  [] Pt requests to be on hold.    Reason:   If > 2 weeks please discuss with therapist.  [] Therapist to call pt for follow up  [] [place] to call pt to reschedule   Comments:       Signature: Electronically signed by Sana Gray PTA on 25 at 1:06 PM EDT

## 2025-05-20 NOTE — TELEPHONE ENCOUNTER
Follow up call post paracentesis with IR: LM on  to call [phone] and ask for Radiology RN if had any questions or concerns.Electronically signed by Chica Power RN on 5/20/2025 at 10:19 AM

## 2025-05-22 ENCOUNTER — HOSPITAL ENCOUNTER (OUTPATIENT)
Dept: MRI IMAGING | Age: 80
Discharge: HOME | End: 2025-05-24
Payer: MEDICARE

## 2025-05-22 ENCOUNTER — HOSPITAL ENCOUNTER (OUTPATIENT)
Dept: PHYSICAL THERAPY | Age: 80
Setting detail: THERAPIES SERIES
Discharge: HOME | End: 2025-05-22
Attending: INTERNAL MEDICINE
Payer: MEDICARE

## 2025-05-22 ENCOUNTER — OFFICE VISIT (OUTPATIENT)
Dept: PRIMARY CARE CLINIC | Age: 80
End: 2025-05-22
Payer: MEDICARE

## 2025-05-22 VITALS
HEART RATE: 75 BPM | SYSTOLIC BLOOD PRESSURE: 110 MMHG | OXYGEN SATURATION: 98 % | BODY MASS INDEX: 29.77 KG/M2 | WEIGHT: 168 LBS | TEMPERATURE: 97.4 F | RESPIRATION RATE: 18 BRPM | DIASTOLIC BLOOD PRESSURE: 60 MMHG

## 2025-05-22 DIAGNOSIS — F33.42 MAJOR DEPRESSIVE DISORDER, RECURRENT, IN FULL REMISSION: Primary | ICD-10-CM

## 2025-05-22 DIAGNOSIS — R18.8 CIRRHOSIS OF LIVER WITH ASCITES, UNSPECIFIED HEPATIC CIRRHOSIS TYPE (HCC): Primary | ICD-10-CM

## 2025-05-22 DIAGNOSIS — K21.9 GASTROESOPHAGEAL REFLUX DISEASE WITHOUT ESOPHAGITIS: ICD-10-CM

## 2025-05-22 DIAGNOSIS — Z71.89 ACP (ADVANCE CARE PLANNING): ICD-10-CM

## 2025-05-22 DIAGNOSIS — K74.60 CIRRHOSIS OF LIVER WITH ASCITES, UNSPECIFIED HEPATIC CIRRHOSIS TYPE (HCC): Primary | ICD-10-CM

## 2025-05-22 DIAGNOSIS — E55.9 VITAMIN D DEFICIENCY: ICD-10-CM

## 2025-05-22 DIAGNOSIS — L89.520 UNSTAGEABLE PRESSURE ULCER OF LEFT ANKLE (HCC): ICD-10-CM

## 2025-05-22 LAB
PERFORMED ON: NORMAL
POC CREATININE: 0.9 MG/DL (ref 0.6–1.2)
POC SAMPLE TYPE: NORMAL

## 2025-05-22 PROCEDURE — 1123F ACP DISCUSS/DSCN MKR DOCD: CPT | Performed by: INTERNAL MEDICINE

## 2025-05-22 PROCEDURE — A9579 GAD-BASE MR CONTRAST NOS,1ML: HCPCS

## 2025-05-22 PROCEDURE — G8427 DOCREV CUR MEDS BY ELIG CLIN: HCPCS | Performed by: INTERNAL MEDICINE

## 2025-05-22 PROCEDURE — 6360000004 HC RX CONTRAST MEDICATION

## 2025-05-22 PROCEDURE — G8417 CALC BMI ABV UP PARAM F/U: HCPCS | Performed by: INTERNAL MEDICINE

## 2025-05-22 PROCEDURE — 99214 OFFICE O/P EST MOD 30 MIN: CPT | Performed by: INTERNAL MEDICINE

## 2025-05-22 PROCEDURE — 1036F TOBACCO NON-USER: CPT | Performed by: INTERNAL MEDICINE

## 2025-05-22 PROCEDURE — 1125F AMNT PAIN NOTED PAIN PRSNT: CPT | Performed by: INTERNAL MEDICINE

## 2025-05-22 PROCEDURE — 73723 MRI JOINT LWR EXTR W/O&W/DYE: CPT

## 2025-05-22 PROCEDURE — 1090F PRES/ABSN URINE INCON ASSESS: CPT | Performed by: INTERNAL MEDICINE

## 2025-05-22 PROCEDURE — G8399 PT W/DXA RESULTS DOCUMENT: HCPCS | Performed by: INTERNAL MEDICINE

## 2025-05-22 RX ORDER — OMEPRAZOLE 40 MG/1
40 CAPSULE, DELAYED RELEASE ORAL
Qty: 90 CAPSULE | Refills: 1 | Status: SHIPPED | OUTPATIENT
Start: 2025-05-22

## 2025-05-22 RX ORDER — CITALOPRAM HYDROBROMIDE 10 MG/1
10 TABLET ORAL DAILY
Qty: 90 TABLET | Refills: 1 | Status: SHIPPED | OUTPATIENT
Start: 2025-05-22

## 2025-05-22 RX ORDER — CITALOPRAM HYDROBROMIDE 20 MG/1
20 TABLET ORAL DAILY
Qty: 90 TABLET | Refills: 1 | Status: SHIPPED | OUTPATIENT
Start: 2025-05-22

## 2025-05-22 RX ORDER — ERGOCALCIFEROL 1.25 MG/1
50000 CAPSULE, LIQUID FILLED ORAL WEEKLY
Qty: 12 CAPSULE | Refills: 1 | Status: SHIPPED | OUTPATIENT
Start: 2025-05-22

## 2025-05-22 RX ADMIN — GADOTERIDOL 15 ML: 279.3 INJECTION, SOLUTION INTRAVENOUS at 09:50

## 2025-05-22 SDOH — ECONOMIC STABILITY: FOOD INSECURITY: WITHIN THE PAST 12 MONTHS, YOU WORRIED THAT YOUR FOOD WOULD RUN OUT BEFORE YOU GOT MONEY TO BUY MORE.: NEVER TRUE

## 2025-05-22 SDOH — ECONOMIC STABILITY: FOOD INSECURITY: WITHIN THE PAST 12 MONTHS, THE FOOD YOU BOUGHT JUST DIDN'T LAST AND YOU DIDN'T HAVE MONEY TO GET MORE.: NEVER TRUE

## 2025-05-22 ASSESSMENT — PATIENT HEALTH QUESTIONNAIRE - PHQ9
1. LITTLE INTEREST OR PLEASURE IN DOING THINGS: NOT AT ALL
10. IF YOU CHECKED OFF ANY PROBLEMS, HOW DIFFICULT HAVE THESE PROBLEMS MADE IT FOR YOU TO DO YOUR WORK, TAKE CARE OF THINGS AT HOME, OR GET ALONG WITH OTHER PEOPLE: NOT DIFFICULT AT ALL
SUM OF ALL RESPONSES TO PHQ QUESTIONS 1-9: 0
3. TROUBLE FALLING OR STAYING ASLEEP: NOT AT ALL
7. TROUBLE CONCENTRATING ON THINGS, SUCH AS READING THE NEWSPAPER OR WATCHING TELEVISION: NOT AT ALL
5. POOR APPETITE OR OVEREATING: NOT AT ALL
6. FEELING BAD ABOUT YOURSELF - OR THAT YOU ARE A FAILURE OR HAVE LET YOURSELF OR YOUR FAMILY DOWN: NOT AT ALL
SUM OF ALL RESPONSES TO PHQ QUESTIONS 1-9: 0
SUM OF ALL RESPONSES TO PHQ QUESTIONS 1-9: 0
4. FEELING TIRED OR HAVING LITTLE ENERGY: NOT AT ALL
8. MOVING OR SPEAKING SO SLOWLY THAT OTHER PEOPLE COULD HAVE NOTICED. OR THE OPPOSITE, BEING SO FIGETY OR RESTLESS THAT YOU HAVE BEEN MOVING AROUND A LOT MORE THAN USUAL: NOT AT ALL
9. THOUGHTS THAT YOU WOULD BE BETTER OFF DEAD, OR OF HURTING YOURSELF: NOT AT ALL
2. FEELING DOWN, DEPRESSED OR HOPELESS: NOT AT ALL
SUM OF ALL RESPONSES TO PHQ QUESTIONS 1-9: 0

## 2025-05-22 NOTE — PATIENT INSTRUCTIONS

## 2025-05-22 NOTE — PROGRESS NOTES
is life-limiting. Recommended the patient document care preferences in state-specific advance directives. Also reviewed the process of designating a trusted capable adult as an Agent (or Health Care Power of ) to make health care decisions for the patient if the patient becomes unable due to incapacity. Patient was asked to complete advance directive forms, if not already done, and to provide a dated, signed and witnessed (or notarized) copy, per the forms' requirements, to the practice office.    Time spent (minutes): <16 minutes (Non-Billable)

## 2025-05-23 ENCOUNTER — HOSPITAL ENCOUNTER (OUTPATIENT)
Dept: WOUND CARE | Age: 80
Discharge: HOME | End: 2025-05-23
Payer: MEDICARE

## 2025-05-23 VITALS
HEART RATE: 70 BPM | TEMPERATURE: 97.9 F | DIASTOLIC BLOOD PRESSURE: 49 MMHG | SYSTOLIC BLOOD PRESSURE: 116 MMHG | RESPIRATION RATE: 18 BRPM

## 2025-05-23 DIAGNOSIS — L89.520 UNSTAGEABLE PRESSURE ULCER OF LEFT ANKLE (HCC): ICD-10-CM

## 2025-05-23 DIAGNOSIS — L89.523 PRESSURE INJURY OF LEFT ANKLE, STAGE 3 (HCC): Primary | ICD-10-CM

## 2025-05-23 PROCEDURE — 6370000000 HC RX 637 (ALT 250 FOR IP)

## 2025-05-23 PROCEDURE — 11042 DBRDMT SUBQ TIS 1ST 20SQCM/<: CPT

## 2025-05-23 RX ORDER — GINSENG 100 MG
CAPSULE ORAL PRN
OUTPATIENT
Start: 2025-05-23

## 2025-05-23 RX ORDER — MUPIROCIN 20 MG/G
OINTMENT TOPICAL PRN
OUTPATIENT
Start: 2025-05-23

## 2025-05-23 RX ORDER — LIDOCAINE HYDROCHLORIDE 20 MG/ML
JELLY TOPICAL PRN
OUTPATIENT
Start: 2025-05-23

## 2025-05-23 RX ORDER — NEOMYCIN/BACITRACIN/POLYMYXINB 3.5-400-5K
OINTMENT (GRAM) TOPICAL PRN
OUTPATIENT
Start: 2025-05-23

## 2025-05-23 RX ORDER — BETAMETHASONE DIPROPIONATE 0.5 MG/G
CREAM TOPICAL PRN
OUTPATIENT
Start: 2025-05-23

## 2025-05-23 RX ORDER — LIDOCAINE HYDROCHLORIDE 40 MG/ML
SOLUTION TOPICAL PRN
OUTPATIENT
Start: 2025-05-23

## 2025-05-23 RX ORDER — LIDOCAINE 40 MG/G
CREAM TOPICAL PRN
OUTPATIENT
Start: 2025-05-23

## 2025-05-23 RX ORDER — LIDOCAINE 50 MG/G
OINTMENT TOPICAL PRN
OUTPATIENT
Start: 2025-05-23

## 2025-05-23 RX ORDER — CLOBETASOL PROPIONATE 0.5 MG/G
OINTMENT TOPICAL PRN
OUTPATIENT
Start: 2025-05-23

## 2025-05-23 RX ORDER — GENTAMICIN SULFATE 1 MG/G
OINTMENT TOPICAL PRN
OUTPATIENT
Start: 2025-05-23

## 2025-05-23 RX ORDER — TRIAMCINOLONE ACETONIDE 1 MG/G
OINTMENT TOPICAL PRN
OUTPATIENT
Start: 2025-05-23

## 2025-05-23 RX ORDER — GENTAMICIN SULFATE 1 MG/G
OINTMENT TOPICAL
Qty: 30 G | Refills: 1 | Status: SHIPPED | OUTPATIENT
Start: 2025-05-23

## 2025-05-23 RX ORDER — BACITRACIN ZINC AND POLYMYXIN B SULFATE 500; 1000 [USP'U]/G; [USP'U]/G
OINTMENT TOPICAL PRN
OUTPATIENT
Start: 2025-05-23

## 2025-05-23 RX ORDER — LIDOCAINE 40 MG/G
CREAM TOPICAL PRN
Status: DISCONTINUED | OUTPATIENT
Start: 2025-05-23 | End: 2025-05-24 | Stop reason: HOSPADM

## 2025-05-23 RX ORDER — SILVER SULFADIAZINE 10 MG/G
CREAM TOPICAL PRN
OUTPATIENT
Start: 2025-05-23

## 2025-05-23 RX ORDER — SODIUM CHLOR/HYPOCHLOROUS ACID 0.033 %
SOLUTION, IRRIGATION IRRIGATION PRN
OUTPATIENT
Start: 2025-05-23

## 2025-05-23 RX ADMIN — LIDOCAINE 4%: 4 CREAM TOPICAL at 11:01

## 2025-05-23 ASSESSMENT — PAIN SCALES - GENERAL: PAINLEVEL_OUTOF10: 9

## 2025-05-23 ASSESSMENT — PAIN DESCRIPTION - ORIENTATION: ORIENTATION: LEFT

## 2025-05-23 ASSESSMENT — PAIN DESCRIPTION - LOCATION: LOCATION: ANKLE

## 2025-05-23 NOTE — PROGRESS NOTES
days: 24            Percent of Wound/Ulcer Debrided: 100%    Total Surface Area Debrided:  1.5 sq cm     Diabetic/Pressure/Non Pressure Ulcers:  Ulcer: Pressure ulcer, Stage 3      Bleeding:  Minimal    Hemostasis Achieved:  not needed    Procedural Pain:  3  / 10     Post Procedural Pain:  1 / 10     Response to treatment:  Well tolerated by patient.       Plan:     Ulcer debrided. MRI pending. Wound culture negative, will send in tissue culture. Genamicin sent to pharmacy.  Advised to go to the emergency room with any signs or symptoms of infection including fever, chills, lethargy, pain, redness, purulence, odor or warmth. Follow up in 1 week.         Treatment Note please see attached Discharge Instructions    Written patient dismissal instructions given to patient and signed by patient or POA.         Discharge Instructions         [facility]   Physician Orders and Discharge Instructions  [place] [id number]  Telephone: [phone]      [phone]        NAME:  Zach Aguirre                                                                                                  YOB: 1945  MEDICAL RECORD NUMBER:  [id number]     Your  is:  Nick     Home Care/Facility: None     Wound Location: Left Lateral ankle      Dressing orders:  Cleanse with antibacterial soap or normal saline   Apply Gentamicin ointment  to the wound  Cover with a dry dressing  Change daily      Compression: Apply low compression hose to left lower leg(s), may remove at bedtime, reapply first thing in the morning, avoid prolonged standing, elevate legs when sitting. (34,36)        Offloading Device:      Other Instructions:  antibiotic ointment from the pharmacy and use as prescribed. A tissue culture was taken today. We will call you if you need an oral antibiotic. Nereida will call you if the MRI

## 2025-05-23 NOTE — DISCHARGE INSTRUCTIONS
[facility]   Physician Orders and Discharge Instructions  [address] [place]  [address]  Telephone: [phone]      [phone]        NAME:  Zach Aguirre                                                                                                  YOB: 1945  MEDICAL RECORD NUMBER:  [id number]     Your  is:  Dolores/Cat     Home Care/Facility: None     Wound Location: Left Lateral ankle      Dressing orders:  Cleanse with antibacterial soap or normal saline   Apply Gentamicin ointment  to the wound  Cover with a dry dressing  Change daily      Compression: Apply low compression hose to left lower leg(s), may remove at bedtime, reapply first thing in the morning, avoid prolonged standing, elevate legs when sitting. (34,36)        Offloading Device:      Other Instructions:  antibiotic ointment from the pharmacy and use as prescribed. A tissue culture was taken today. We will call you if you need an oral antibiotic. Nereida will call you if the MRI results come back before your next appointment. Elevate legs as much as possible. You can prop a pillow under your legs to help keep the pressure off.       Keep all dressings clean, dry and intact.  Keep pressure off the wound(s) at all times.      Follow up visit   1 Week May 30, 2025 at 1:45pm     Please give 24 hour notice if unable to keep appointment. [phone]     If you experience any of the following, please call the Wound Care Service at  [phone] or go to the nearest emergency room.        *Increase in pain*Temperature over 101*Increase in drainage from your wound or a foul odor  *Uncontrolled swelling*Need for compression bandage changes due to slippage, breakthrough drainage       PLEASE NOTE: IF YOU ARE UNABLE TO OBTAIN WOUND SUPPLIES, CONTINUE TO USE THE SUPPLIES YOU HAVE AVAILABLE UNTIL YOU ARE ABLE TO REACH US. IT

## 2025-05-25 LAB — CULTURE WOUND: NORMAL

## 2025-05-27 ENCOUNTER — TELEPHONE (OUTPATIENT)
Dept: INTERVENTIONAL RADIOLOGY/VASCULAR | Age: 80
End: 2025-05-27

## 2025-05-27 ENCOUNTER — APPOINTMENT (OUTPATIENT)
Dept: PHYSICAL THERAPY | Age: 80
End: 2025-05-27
Attending: INTERNAL MEDICINE
Payer: MEDICARE

## 2025-05-27 NOTE — TELEPHONE ENCOUNTER
Pre-procedure reminder call made to patient. Paracentesis scheduled for tomorrow at 1100, arrival time of 1030. Pt confirms she will be here.

## 2025-05-28 ENCOUNTER — HOSPITAL ENCOUNTER (OUTPATIENT)
Dept: INTERVENTIONAL RADIOLOGY/VASCULAR | Age: 80
Discharge: HOME | End: 2025-05-30
Payer: MEDICARE

## 2025-05-28 VITALS
HEART RATE: 74 BPM | OXYGEN SATURATION: 98 % | SYSTOLIC BLOOD PRESSURE: 113 MMHG | RESPIRATION RATE: 12 BRPM | DIASTOLIC BLOOD PRESSURE: 53 MMHG

## 2025-05-28 DIAGNOSIS — R18.8 OTHER ASCITES: ICD-10-CM

## 2025-05-28 LAB — CULTURE WOUND: NORMAL

## 2025-05-28 PROCEDURE — 49083 ABD PARACENTESIS W/IMAGING: CPT

## 2025-05-28 PROCEDURE — 49083 ABD PARACENTESIS W/IMAGING: CPT | Performed by: RADIOLOGY

## 2025-05-28 PROCEDURE — C1729 CATH, DRAINAGE: HCPCS

## 2025-05-28 PROCEDURE — P9047 ALBUMIN (HUMAN), 25%, 50ML: HCPCS | Performed by: NURSE PRACTITIONER

## 2025-05-28 PROCEDURE — 96365 THER/PROPH/DIAG IV INF INIT: CPT

## 2025-05-28 PROCEDURE — 6360000002 HC RX W HCPCS: Performed by: NURSE PRACTITIONER

## 2025-05-28 RX ORDER — ALBUMIN (HUMAN) 12.5 G/50ML
SOLUTION INTRAVENOUS CONTINUOUS PRN
Status: COMPLETED | OUTPATIENT
Start: 2025-05-28 | End: 2025-05-28

## 2025-05-28 RX ORDER — LIDOCAINE HYDROCHLORIDE 20 MG/ML
INJECTION, SOLUTION INFILTRATION; PERINEURAL PRN
Status: COMPLETED | OUTPATIENT
Start: 2025-05-28 | End: 2025-05-28

## 2025-05-28 RX ADMIN — ALBUMIN (HUMAN) 25 G: 12.5 SOLUTION INTRAVENOUS at 11:56

## 2025-05-28 RX ADMIN — LIDOCAINE HYDROCHLORIDE 7 ML: 20 INJECTION, SOLUTION INFILTRATION; PERINEURAL at 11:24

## 2025-05-28 NOTE — DISCHARGE INSTRUCTIONS
Ultrasound Guided Paracentesis    Activity:  Rest, avoid strenuous activity such as lifting heavy objects, and bending, stretching or pulling.     Diet:  Resume usual diet    Medication:  * Resume home medication unless otherwise instructed by your physician.  * (If Applicable) If taking any blood thinners, speak with your physician before restarting them.  * May take mild pain reliever, as needed, such as Acetaminophen or Ibuprofen.    INSTRUCTIONS OR COMMENTS RELATIVE TO SPECIFIC EXAM PERFORMED:    - Some tenderness at site of paracentesis will be normal for a few days.  - May remove band aid after 48 hours.   - Monitor the site for the next 24 - 48 hours.  - If you experience any drainage or bleeding at the site, hold pressure for 30 minutes and lay on side opposite of the procedure site (move fluid away from the   area of leakage).  If drainage or bleeding does not stop and seems excessive, call your physician or go to the ER for evaluation.   - If any signs of infection (redness, swelling, drainage/pus) at the site, go to ER for evaluation.   - Please keep all follow-up appointments with your Hepatologist. Schedule follow-up appointment for repeat paracentesis if necessary.       IF YOU DEVELOP ANY OF THE FOLLOWING SYMPTOMS, CONTACT PHYSICIAN LISTED BELOW:  Physician performing procedure: DR. Nelson - [phone] or [phone] and ask to be put in contact with the RADIOLOGY RN. After hours contact ER.  * Extreme pain that increases after leaving the hospital  * Active bleeding (refer to above instructions)  * Chills, fever above 100 degrees Farenheit and fatigue.  * Weakness, dizziness, lightheadedness or fainting.    If you are unable to contact any of the above physician and you feel you have a major problem, please go to the Emergency Room for treatment.    TOTAL REMOVED WITH PARACENTESIS TODAY: 3180

## 2025-05-28 NOTE — OR NURSING
PARACENTESIS - NO SEDATION    1111 - Patient brought to specials room #6 via WC. A&Ox4, calm and cooperative. Procedure explained and consent signed - no changes per patient report. Allergies and medications reviewed. Positioned lying propped to left side using a pillow wedge. Initial images obtained prior to start of the procedure, site marked by Sara Hernández. VSS, on RA.      1120 - Timeout completed for Ultrasound Guided Paracentesis. Left side of abdomen cleansed with Chloraprep. After 3 minute dry time, covered with sterile drape and towels.    1124 - Site numbed with 2% lidocaine by Sara Hernández, RUBIN.  Mid6fefv Centesis 5F catheter placed using Ultrasound guidance.  Fluid appears clear yellow. Catheter tubing then connected to Jalousier machine to remove fluid.    Patient with >5L drain. IV #22 inserted LAC without issue by ADOLFO, RN. Albumin administered per order.     1200 - Pt tolerated well. Total 6100 ml removed. Catheter removed, pressure held and bandaid applied. Verbal and tactile reassurance given throughout.     1206 - Albumin completed and IV removed. Next appointment made per patient request. D/C instructions reviewed verbally and understanding indicated. Taken to hospital entrance via WC, picked up by daughter who will drive her home. Electronically signed by Stefani Gonzales RN on 5/28/2025 at 12:07 PM

## 2025-05-29 ENCOUNTER — TELEPHONE (OUTPATIENT)
Dept: INTERVENTIONAL RADIOLOGY/VASCULAR | Age: 80
End: 2025-05-29

## 2025-05-29 ENCOUNTER — APPOINTMENT (OUTPATIENT)
Dept: PHYSICAL THERAPY | Age: 80
End: 2025-05-29
Attending: INTERNAL MEDICINE
Payer: MEDICARE

## 2025-05-29 NOTE — TELEPHONE ENCOUNTER
Follow up call post paracentesis with IR: per pt luisrJerica, no issues or concerns at this time.Electronically signed by Chica Power RN on 5/29/2025 at 12:48 PM

## 2025-05-30 ENCOUNTER — HOSPITAL ENCOUNTER (OUTPATIENT)
Dept: WOUND CARE | Age: 80
Discharge: HOME | End: 2025-05-30
Payer: MEDICARE

## 2025-05-30 VITALS
SYSTOLIC BLOOD PRESSURE: 125 MMHG | DIASTOLIC BLOOD PRESSURE: 48 MMHG | RESPIRATION RATE: 16 BRPM | TEMPERATURE: 97.5 F | HEART RATE: 65 BPM

## 2025-05-30 DIAGNOSIS — L89.523 PRESSURE INJURY OF LEFT ANKLE, STAGE 3 (HCC): Primary | ICD-10-CM

## 2025-05-30 PROCEDURE — 99213 OFFICE O/P EST LOW 20 MIN: CPT

## 2025-05-30 PROCEDURE — 6370000000 HC RX 637 (ALT 250 FOR IP)

## 2025-05-30 RX ORDER — CLOBETASOL PROPIONATE 0.5 MG/G
OINTMENT TOPICAL PRN
OUTPATIENT
Start: 2025-05-30

## 2025-05-30 RX ORDER — LIDOCAINE HYDROCHLORIDE 20 MG/ML
JELLY TOPICAL PRN
OUTPATIENT
Start: 2025-05-30

## 2025-05-30 RX ORDER — BETAMETHASONE DIPROPIONATE 0.5 MG/G
CREAM TOPICAL PRN
OUTPATIENT
Start: 2025-05-30

## 2025-05-30 RX ORDER — NEOMYCIN/BACITRACIN/POLYMYXINB 3.5-400-5K
OINTMENT (GRAM) TOPICAL PRN
OUTPATIENT
Start: 2025-05-30

## 2025-05-30 RX ORDER — LIDOCAINE 50 MG/G
OINTMENT TOPICAL PRN
OUTPATIENT
Start: 2025-05-30

## 2025-05-30 RX ORDER — GINSENG 100 MG
CAPSULE ORAL PRN
OUTPATIENT
Start: 2025-05-30

## 2025-05-30 RX ORDER — TRIAMCINOLONE ACETONIDE 1 MG/G
OINTMENT TOPICAL PRN
OUTPATIENT
Start: 2025-05-30

## 2025-05-30 RX ORDER — SILVER SULFADIAZINE 10 MG/G
CREAM TOPICAL PRN
OUTPATIENT
Start: 2025-05-30

## 2025-05-30 RX ORDER — SODIUM CHLOR/HYPOCHLOROUS ACID 0.033 %
SOLUTION, IRRIGATION IRRIGATION PRN
OUTPATIENT
Start: 2025-05-30

## 2025-05-30 RX ORDER — GENTAMICIN SULFATE 1 MG/G
OINTMENT TOPICAL PRN
OUTPATIENT
Start: 2025-05-30

## 2025-05-30 RX ORDER — LIDOCAINE 40 MG/G
CREAM TOPICAL PRN
OUTPATIENT
Start: 2025-05-30

## 2025-05-30 RX ORDER — LIDOCAINE HYDROCHLORIDE 20 MG/ML
JELLY TOPICAL PRN
Status: DISCONTINUED | OUTPATIENT
Start: 2025-05-30 | End: 2025-05-31 | Stop reason: HOSPADM

## 2025-05-30 RX ORDER — BACITRACIN ZINC AND POLYMYXIN B SULFATE 500; 1000 [USP'U]/G; [USP'U]/G
OINTMENT TOPICAL PRN
OUTPATIENT
Start: 2025-05-30

## 2025-05-30 RX ORDER — MUPIROCIN 20 MG/G
OINTMENT TOPICAL PRN
OUTPATIENT
Start: 2025-05-30

## 2025-05-30 RX ORDER — LIDOCAINE HYDROCHLORIDE 40 MG/ML
SOLUTION TOPICAL PRN
OUTPATIENT
Start: 2025-05-30

## 2025-05-30 RX ADMIN — LIDOCAINE HYDROCHLORIDE: 20 JELLY TOPICAL at 13:59

## 2025-05-30 NOTE — DISCHARGE INSTRUCTIONS
[facility]   Physician Orders and Discharge Instructions  [facility]  [address] [place]  [address]  Telephone: [phone]      [phone]        NAME:  Zach Aguirre                                                                                                  YOB: 1945  MEDICAL RECORD NUMBER:  [id number]     Your  is:  Nick     Home Care/Facility: None     Wound Location: Left Lateral ankle      Dressing orders:  1.Cleanse wound(s) with normal saline.  2. Apply dry HYDROFERA BLUE READY FOAM or equivalent to wound bed.  NOTE: If your HYDROFRERA BLUE is not soft and pliable, moisten with saline until it is sponge like and then ring out excess saline and apply to wound bed.  3. Cover with 4x4's and wrap with gauze (monroe or kerlix)  4. Change  Every other day or Monday, Wednesday, and Friday     Compression: Apply low compression hose to left lower leg(s), may remove at bedtime, reapply first thing in the morning, avoid prolonged standing, elevate legs when sitting. (33,34.5)        Offloading Device:      Other Instructions:  Elevate legs as much as possible. You can prop a pillow under your legs to help keep the pressure off.       Keep all dressings clean, dry and intact.  Keep pressure off the wound(s) at all times.      Follow up visit   Jun 5 , 2025 at 1115AM with Dr Lin     Please give 24 hour notice if unable to keep appointment. [phone]     If you experience any of the following, please call the Wound Care Service at  [phone] or go to the nearest emergency room.        *Increase in pain*Temperature over 101*Increase in drainage from your wound or a foul odor  *Uncontrolled swelling*Need for compression bandage changes due to slippage, breakthrough drainage       PLEASE NOTE: IF YOU ARE UNABLE TO OBTAIN WOUND SUPPLIES, CONTINUE TO USE THE SUPPLIES YOU HAVE AVAILABLE UNTIL

## 2025-05-30 NOTE — PROGRESS NOTES
PARACENTESIS Left 2025    7210 ml removed by Dr. Nelson    PARACENTESIS Left 2025    6100 ml removed by Sara Hernández CNP    [place] COLON CA SCRN NOT HI RSK IND N/A 2017    COLONOSCOPY performed by Mei Kitchen MD at [facility]    UPPER GASTROINTESTINAL ENDOSCOPY N/A 2021    EGD ESOPHAGOGASTRODUODENOSCOPY performed by Mei Kitchen MD at [facility]    UPPER GASTROINTESTINAL ENDOSCOPY N/A 2022    EGD ESOPHAGOGASTRODUODENOSCOPY performed by Mei Kitchen MD at [facility]    UPPER GASTROINTESTINAL ENDOSCOPY N/A 2023    EGD ESOPHAGOGASTRODUODENOSCOPY WITH BANDING performed by Mei Kitchen MD at [facility]    UPPER GASTROINTESTINAL ENDOSCOPY N/A 2024    ESOPHAGOGASTRODUODENOSCOPY DIAGNOSTIC ONLY WITH ESOPHAGEAL BANDING performed by Mei Kitchen MD at [facility]       FAMILY HISTORY    Family History   Problem Relation Age of Onset    Stroke Mother     High Cholesterol Mother     Cancer Mother     Colon Cancer Neg Hx     Celiac Disease Neg Hx     Crohn's Disease Neg Hx        SOCIAL HISTORY    Social History     Tobacco Use    Smoking status: Former     Current packs/day: 0.00     Average packs/day: 1 pack/day for 48.0 years (48.0 ttl pk-yrs)     Types: Cigarettes     Start date: 1965     Quit date: 2013     Years since quittin.8    Smokeless tobacco: Never   Vaping Use    Vaping status: Former    Substances: Nicotine, Flavoring, low  dose    Substance Use Topics    Alcohol use: Not Currently     Comment: very rarely    Drug use: No       ALLERGIES    Allergies   Allergen Reactions    Cefdinir      Diarrhea      Metformin And Related      Swelling leg       MEDICATIONS    Current Outpatient Medications on File Prior to Encounter   Medication Sig Dispense Refill    gentamicin (GARAMYCIN) 0.1 % ointment Apply topically 3 times daily. 30 g 1    omeprazole (PRILOSEC) 40 MG delayed release capsule Take 1 capsule by

## 2025-06-02 ENCOUNTER — OFFICE VISIT (OUTPATIENT)
Dept: GASTROENTEROLOGY | Facility: CLINIC | Age: 80
End: 2025-06-02
Payer: MEDICARE

## 2025-06-02 VITALS
BODY MASS INDEX: 29.77 KG/M2 | HEART RATE: 76 BPM | TEMPERATURE: 97.3 F | DIASTOLIC BLOOD PRESSURE: 73 MMHG | HEIGHT: 63 IN | SYSTOLIC BLOOD PRESSURE: 120 MMHG | WEIGHT: 168 LBS

## 2025-06-02 DIAGNOSIS — K74.60 CIRRHOSIS OF LIVER WITH ASCITES, UNSPECIFIED HEPATIC CIRRHOSIS TYPE (HCC): ICD-10-CM

## 2025-06-02 DIAGNOSIS — R18.8 CIRRHOSIS OF LIVER WITH ASCITES, UNSPECIFIED HEPATIC CIRRHOSIS TYPE (HCC): ICD-10-CM

## 2025-06-02 DIAGNOSIS — R18.8 OTHER ASCITES: Primary | ICD-10-CM

## 2025-06-02 DIAGNOSIS — K74.60 LIVER DISEASE, CHRONIC, WITH CIRRHOSIS (MULTI): Primary | ICD-10-CM

## 2025-06-02 DIAGNOSIS — K76.9 LIVER DISEASE, CHRONIC, WITH CIRRHOSIS (MULTI): Primary | ICD-10-CM

## 2025-06-02 PROCEDURE — 1126F AMNT PAIN NOTED NONE PRSNT: CPT | Performed by: NURSE PRACTITIONER

## 2025-06-02 PROCEDURE — 99214 OFFICE O/P EST MOD 30 MIN: CPT | Performed by: NURSE PRACTITIONER

## 2025-06-02 PROCEDURE — 1160F RVW MEDS BY RX/DR IN RCRD: CPT | Performed by: NURSE PRACTITIONER

## 2025-06-02 PROCEDURE — 1159F MED LIST DOCD IN RCRD: CPT | Performed by: NURSE PRACTITIONER

## 2025-06-02 RX ORDER — FERROUS SULFATE 325(65) MG
325 TABLET, DELAYED RELEASE (ENTERIC COATED) ORAL
COMMUNITY

## 2025-06-02 ASSESSMENT — ENCOUNTER SYMPTOMS
FREQUENCY: 0
DIARRHEA: 0
NAUSEA: 0
ABDOMINAL PAIN: 0
SLEEP DISTURBANCE: 0
HEADACHES: 0
VOMITING: 0
NUMBNESS: 0
TREMORS: 0
DYSURIA: 0
LIGHT-HEADEDNESS: 0
UNEXPECTED WEIGHT CHANGE: 0
COUGH: 0
WHEEZING: 0
COLOR CHANGE: 0
CONSTIPATION: 0
AGITATION: 0
TROUBLE SWALLOWING: 0
BLOOD IN STOOL: 0
PALPITATIONS: 0
DIZZINESS: 0
VOICE CHANGE: 0
BRUISES/BLEEDS EASILY: 0
ABDOMINAL DISTENTION: 0
CHILLS: 0
HEMATURIA: 0
DIFFICULTY URINATING: 0
SHORTNESS OF BREATH: 0
FEVER: 0
JOINT SWELLING: 0
CONFUSION: 0
APPETITE CHANGE: 1
WEAKNESS: 1

## 2025-06-02 ASSESSMENT — PAIN SCALES - GENERAL: PAINLEVEL_OUTOF10: 0-NO PAIN

## 2025-06-02 NOTE — THERAPY DISCHARGE
[facility]  PHYSICAL THERAPY PLAN OF CARE                                    [facility] [place] [address]     Ph: [phone]  Fax: [phone]    [] Certification  [] Recertification []  Plan of Care  [] Progress Note [x] Discharge      Referring Provider: Yesenia Viramontes MD    From: Pamella Hinkle, PT, DPT    Patient: Zach Aguirre (80 y.o. female) : 1945 Date: 2025   Medical Diagnosis: Imbalance [R26.89]  Fatigue, unspecified type [R53.83]    Treatment Diagnosis: Imbalance       Progress Report Period from: 2025  to 5/15/2025    Visits to Date: 5 No Show: 0 Cancelled Appts: 2    OBJECTIVE:   Short Term Goals - Time Frame for Short Term Goals: 2 weeks    Goals Current/Discharge status  Status   Short Term Goal 1: Independent with HEP.     Patient reports fair compliance.  Partially met     Long Term Goals - Time Frame for Long Term Goals : 6 weeks  Goals Current/ Discharge status Status   Long Term Goal 1: Improve amanda LE strength to >/= 4+/5 to improve pt's stability with standing and walking.    Unable to formally assess due to unexpected discharge.  Unable to assess   Long Term Goal 2: Pt will ambulate without an AD >/= 250' on even and uneven surfaces with improved foot clearance and heel strike S/I.    Unable to formally assess due to unexpected discharge. Unable to assess   Long Term Goal 3: Erwin >/= 45/56 and DGI >/= 21/24 to reduce pt's risk for falls.    Unable to formally assess due to unexpected discharge. Unable to assess   Long Term Goal 4: 5xSTS from chair without UEs </= 15sec to improve pt's functional strength.    Unable to formally assess due to unexpected discharge. Unable to assess       Body Structures, Functions, Activity Limitations Requiring Skilled Therapeutic Intervention: Decreased functional mobility , Decreased strength, Decreased balance, Decreased endurance  Assessment: Called patient regarding being placed on hold due to increased ankle pain. Patient

## 2025-06-02 NOTE — PROGRESS NOTES
[facility] SERVICES [facility]   [facility] [address]  Dept: [phone]  Dept Fax: [phone]  Loc: [phone]      Date: 2025  Patient Name: Zach Aguirre  : 1945  MRN: [id number]    Called patient regarding being placed on hold due to increased ankle pain. Patient reports had an MRI done that came back clear. Has an appt with Podiatry next week. Requests to be discharged at this time due to approaching insurance date range.     Electronically signed by Sabra Das PTA on 2025 at 1:22 PM

## 2025-06-02 NOTE — PROGRESS NOTES
Patient ID: Fyae Macdonald is a 80 y.o. female who presents for cirrhosis.    06/02/25:  Since last visit had a fall in March-subdural hematoma-stable intracranial hemorrhages.  Continues paracentesis every 10-14 days.     Labs 03/2025:  Plt 116, Hgb 11.0, WBC 3.9, Alb 3.0, Alk Phos 142, AST 33, ALT 18, Bili 1.1  No longer on diuretics-she refuses due to increase in frequency of urination, only trace LE edema.  Undergoing wound care of pressure ulcer of left ankle from prior hospitalization.  Continues to live at home alone.  No recent HE episodes.  Continues Xifaxan.    Paracentesis management being handled by primary care.    ------------------------------------------------------------------    10/21/24:  3 month FUV.  She has required more frequent paracentesis over the last 2 months.  She remains on low dose diuretics but has not been eating as well.  She is not getting near enough protein and not eating enough calories.  Pt does feel very tired and often sleeps through meals.  Remains on low dose diuretics.  Pt is taking Xifaxan and lactulose for HE.  Continues to live alone with family close.  She has not been getting albumin infusions with paracentesis, which could be one reason she is developing fluid quicker.    Labs are stable, though albumin level is low.  No other issues.    -----------------------------------------------------------------------------    7/11/2024:  Here for FUV for cirrhosis.   Since her last visit she has required paracentesis x1.   She completed PT/OT at home and was told she doesn't need it anymore.  The biggest issues currently include mobility and the need for assistance at home for meals, cleaning and light housework.  She has some intermittent confusion, but does not have features of HE.  Labs are stable.  Xifaxan was not approved and will work on auth for this as the lactulose is causing more gas.  She continues on diuretics with good fluid management.    Denies jaundice,  icterus, itching, abdominal distension, edema, bleeding or confusion.    Denies fevers, chills, abdominal pain.       ---------------------------------------------------------------------------    HPI  79 year old with history of HLD, hypothyroidism, DM and MASH related decompensated cirrhosis.  She is here today with daughter Pamela and granddaughter to discuss transplant candidacy.    She was diagnosed with cirrhosis secondary to fatty liver ~ 5 years ago.  She had been doing well until earlier this year when she contracted COVID and started to show signs of decompensation with edema, ascites and weakness.   She recently was admitted for increased SOB, hypoxia related to progressive ascites.   Pt also has history of esophageal varices-now on nadolol.   She underwent paracentesis while hospitalized without signs of SBP.   She was discharged on diuretics and her last paracentesis was ~ 2 weeks ago.  Currently she is tolerating low dose diuretics with good response.   SOB has improved.      Pt does admit to poor appetite with abdominal distension.  She lives alone and prepares her own meals.   Currently no overt signs of HE, however daughter is concerned she is showing some mild signs of confusion.       Denies jaundice, icterus, itching, edema, bleeding.   Denies fevers, chills, abdominal pain.       Review of Systems   Constitutional:  Positive for appetite change. Negative for chills, fever and unexpected weight change.   HENT:  Negative for mouth sores, nosebleeds, trouble swallowing and voice change.    Eyes:  Negative for visual disturbance.   Respiratory:  Negative for cough, shortness of breath and wheezing.    Cardiovascular:  Negative for chest pain, palpitations and leg swelling.   Gastrointestinal:  Negative for abdominal distention, abdominal pain, blood in stool, constipation, diarrhea, nausea and vomiting.   Genitourinary:  Negative for decreased urine volume, difficulty urinating, dysuria, frequency,  hematuria and urgency.   Musculoskeletal:  Negative for gait problem and joint swelling.   Skin:  Negative for color change, pallor and rash.   Neurological:  Positive for weakness. Negative for dizziness, tremors, light-headedness, numbness and headaches.   Hematological:  Does not bruise/bleed easily.   Psychiatric/Behavioral:  Negative for agitation, behavioral problems, confusion and sleep disturbance.        Objective   Physical Exam  Constitutional:       General: She is awake.      Appearance: Normal appearance. She is well-developed.   HENT:      Head: Normocephalic and atraumatic.      Right Ear: Hearing normal.      Left Ear: Hearing normal.      Nose: Nose normal.      Mouth/Throat:      Lips: Pink.      Mouth: Mucous membranes are moist.   Eyes:      General: Lids are normal.      Extraocular Movements: Extraocular movements intact.      Conjunctiva/sclera: Conjunctivae normal.      Pupils: Pupils are equal, round, and reactive to light.   Cardiovascular:      Rate and Rhythm: Normal rate and regular rhythm.      Pulses: Normal pulses.      Heart sounds: Normal heart sounds.   Pulmonary:      Effort: Pulmonary effort is normal.      Breath sounds: Normal breath sounds.   Abdominal:      General: Bowel sounds are normal. There is distension.      Palpations: Abdomen is soft. There is hepatomegaly.   Musculoskeletal:      Cervical back: Normal range of motion and neck supple.   Feet:      Right foot:      Skin integrity: Skin integrity normal.      Left foot:      Skin integrity: Skin integrity normal.   Skin:     General: Skin is warm.   Neurological:      General: No focal deficit present.      Mental Status: She is alert and oriented to person, place, and time.      Cranial Nerves: Cranial nerves 2-12 are intact.      Sensory: Sensation is intact.      Motor: Motor function is intact.      Coordination: Coordination is intact.      Gait: Gait is intact.   Psychiatric:         Attention and Perception:  Attention and perception normal.         Mood and Affect: Mood normal.         Speech: Speech normal.         Behavior: Behavior is cooperative.         Thought Content: Thought content normal.         Cognition and Memory: Cognition normal.         Judgment: Judgment normal.         Current Outpatient Medications   Medication Sig Dispense Refill    albuterol 90 mcg/actuation aerosol powdr breath activated inhaler Inhale 4 times a day as needed.      aspirin 81 mg chewable tablet Chew 1 tablet (81 mg) once daily.      atorvastatin (Lipitor) 20 mg tablet Take 1 tablet (20 mg) by mouth once daily.      cholecalciferol (Vitamin D-3) 25 MCG (1000 UT) capsule 1 capsule DAILY (route: oral)      citalopram (CeleXA) 20 mg tablet Take 1 tablet (20 mg) by mouth once daily.      famotidine (Pepcid) 20 mg tablet Take 1 tablet (20 mg) by mouth once daily.      fluticasone-umeclidin-vilanter (Trelegy Ellipta) 100-62.5-25 mcg blister with device Inhale 1 puff once daily.      Januvia 100 mg tablet Take 1 tablet (100 mg) by mouth once daily.      levothyroxine (Synthroid, Levoxyl) 150 mcg tablet TAKE 1 TABLET DAILY (NEED APPOINTMENT FOR FURTHER REFILLS)      nadolol (Corgard) 20 mg tablet Take 1 tablet (20 mg) by mouth once daily.      rifAXIMin (Xifaxan) 550 mg tablet Take 1 tablet (550 mg) by mouth 2 times a day. 60 tablet 3    ferrous sulfate 325 (65 Fe) mg EC tablet Take 1 tablet by mouth 3 times daily (morning, midday, late afternoon). Do not crush, chew, or split.       No current facility-administered medications for this visit.     Labs 5/9/2024:  AST 33, ALT 15, Alk Phos 97, Alb 28, Na 142, Scr 1.19  WBC 5.1, Hgb 8.1, Plt 118  MELD 14    CT abdomen 5/6/24:  Cirrhosis with collateral vessels including esophageal varices as well as increased mild-moderate volume of ascites suggestive of worsening portal hypertension/decompensation.      Assessment/Plan   Problem List Items Addressed This Visit           ICD-10-CM    Liver  disease, chronic, with cirrhosis (Multi) - Primary K74.60, K76.9    MASH related cirrhosis with decompensation by way of ascites.   Continues paracentesis every 2 weeks with albumin infusions.  No longer on diuretics-risky with frequent falls and pt refuses to take them.    She is now taking Xifaxan and using lactulose as needed.  HE symptoms stable.  Reviewed importance for increased protein-including ensure 1-2 per day and higher protein meals/snacks.   Continue low sodium.    FU in 6  months.            Relevant Orders    Alpha-Fetoprotein    CBC and Auto Differential    Comprehensive Metabolic Panel    Protime-INR                ANGEL Herrera-CNP 06/03/25 2:03 PM

## 2025-06-03 ENCOUNTER — HOSPITAL ENCOUNTER (OUTPATIENT)
Dept: PHYSICAL THERAPY | Age: 80
Setting detail: THERAPIES SERIES
Discharge: HOME | End: 2025-06-03
Attending: INTERNAL MEDICINE

## 2025-06-03 NOTE — ASSESSMENT & PLAN NOTE
MASH related cirrhosis with decompensation by way of ascites.   Continues paracentesis every 2 weeks with albumin infusions.  No longer on diuretics-risky with frequent falls and pt refuses to take them.    She is now taking Xifaxan and using lactulose as needed.  HE symptoms stable.  Reviewed importance for increased protein-including ensure 1-2 per day and higher protein meals/snacks.   Continue low sodium.    FU in 6  months.

## 2025-06-05 ENCOUNTER — HOSPITAL ENCOUNTER (OUTPATIENT)
Dept: WOUND CARE | Age: 80
Discharge: HOME | End: 2025-06-05
Payer: MEDICARE

## 2025-06-05 VITALS
SYSTOLIC BLOOD PRESSURE: 128 MMHG | RESPIRATION RATE: 19 BRPM | HEART RATE: 78 BPM | TEMPERATURE: 96.6 F | DIASTOLIC BLOOD PRESSURE: 55 MMHG

## 2025-06-05 DIAGNOSIS — E11.622 TYPE 2 DIABETES MELLITUS WITH OTHER SKIN ULCER (CODE) (HCC): ICD-10-CM

## 2025-06-05 DIAGNOSIS — L89.523 PRESSURE INJURY OF LEFT ANKLE, STAGE 3 (HCC): Primary | ICD-10-CM

## 2025-06-05 DIAGNOSIS — L97.323: ICD-10-CM

## 2025-06-05 PROCEDURE — 11043 DBRDMT MUSC&/FSCA 1ST 20/<: CPT

## 2025-06-05 PROCEDURE — 6370000000 HC RX 637 (ALT 250 FOR IP)

## 2025-06-05 RX ORDER — SILVER SULFADIAZINE 10 MG/G
CREAM TOPICAL PRN
OUTPATIENT
Start: 2025-06-05

## 2025-06-05 RX ORDER — LIDOCAINE HYDROCHLORIDE 40 MG/ML
SOLUTION TOPICAL PRN
OUTPATIENT
Start: 2025-06-05

## 2025-06-05 RX ORDER — GENTAMICIN SULFATE 1 MG/G
OINTMENT TOPICAL PRN
OUTPATIENT
Start: 2025-06-05

## 2025-06-05 RX ORDER — MUPIROCIN 20 MG/G
OINTMENT TOPICAL PRN
OUTPATIENT
Start: 2025-06-05

## 2025-06-05 RX ORDER — LIDOCAINE 50 MG/G
OINTMENT TOPICAL PRN
OUTPATIENT
Start: 2025-06-05

## 2025-06-05 RX ORDER — LIDOCAINE HYDROCHLORIDE 20 MG/ML
JELLY TOPICAL PRN
Status: DISCONTINUED | OUTPATIENT
Start: 2025-06-05 | End: 2025-06-06 | Stop reason: HOSPADM

## 2025-06-05 RX ORDER — LIDOCAINE 40 MG/G
CREAM TOPICAL PRN
Status: DISCONTINUED | OUTPATIENT
Start: 2025-06-05 | End: 2025-06-06 | Stop reason: HOSPADM

## 2025-06-05 RX ORDER — LIDOCAINE 40 MG/G
CREAM TOPICAL PRN
OUTPATIENT
Start: 2025-06-05

## 2025-06-05 RX ORDER — LIDOCAINE HYDROCHLORIDE 20 MG/ML
JELLY TOPICAL PRN
OUTPATIENT
Start: 2025-06-05

## 2025-06-05 RX ORDER — CLOBETASOL PROPIONATE 0.5 MG/G
OINTMENT TOPICAL PRN
OUTPATIENT
Start: 2025-06-05

## 2025-06-05 RX ORDER — BACITRACIN ZINC AND POLYMYXIN B SULFATE 500; 1000 [USP'U]/G; [USP'U]/G
OINTMENT TOPICAL PRN
OUTPATIENT
Start: 2025-06-05

## 2025-06-05 RX ORDER — GINSENG 100 MG
CAPSULE ORAL PRN
OUTPATIENT
Start: 2025-06-05

## 2025-06-05 RX ORDER — SODIUM CHLOR/HYPOCHLOROUS ACID 0.033 %
SOLUTION, IRRIGATION IRRIGATION PRN
OUTPATIENT
Start: 2025-06-05

## 2025-06-05 RX ORDER — BETAMETHASONE DIPROPIONATE 0.5 MG/G
CREAM TOPICAL PRN
OUTPATIENT
Start: 2025-06-05

## 2025-06-05 RX ORDER — NEOMYCIN/BACITRACIN/POLYMYXINB 3.5-400-5K
OINTMENT (GRAM) TOPICAL PRN
OUTPATIENT
Start: 2025-06-05

## 2025-06-05 RX ORDER — TRIAMCINOLONE ACETONIDE 1 MG/G
OINTMENT TOPICAL PRN
OUTPATIENT
Start: 2025-06-05

## 2025-06-05 RX ADMIN — LIDOCAINE 4%: 4 CREAM TOPICAL at 11:31

## 2025-06-05 NOTE — PROGRESS NOTES
[facility]                                                   Progress Note and Procedure Note      Zach Aguirre  MEDICAL RECORD NUMBER:  [id number]  AGE: 80 y.o.   GENDER: female  : 1945  EPISODE DATE:  2025    Subjective:     No chief complaint on file.        HISTORY of PRESENT ILLNESS HPI     Zach Agiurre is a 80 y.o. female who presents today for wound/ulcer evaluation.   History of Wound Context: Patient presents for continued treatment of ulceration of the left ankle.  Patient is accompanied by her daughter they report improvement to the wound since he began using Hydrofera Blue last week.  They report continued offloading of the wound while at rest.  They have not observed rodolfo purulence, redness, or increased warmth.    Patient denies nausea, vomiting, fever, chills, or chest pain, patient admits to chronic shortness of breath.    Wound/Ulcer Pain Timing/Severity: none  Quality of pain: N/A  Severity:  0 / 10   Modifying Factors: None  Associated Signs/Symptoms: drainage    Ulcer Identification:  Ulcer Type: diabetic  Contributing Factors: chronic pressure    Wound:  Full-thickness ulceration left lateral ankle        PAST MEDICAL HISTORY        Diagnosis Date    Anxiety     DM (diabetes mellitus) (HCC) 2015    Hepatitis B infection     Hyperlipidemia     Hypothyroidism     Insomnia     Leg pain 2015    Post herpetic neuralgia     Unspecified sleep apnea     after seeing [facility] they state she is does not have sleep apnea.  Not using cpap       PAST SURGICAL HISTORY    Past Surgical History:   Procedure Laterality Date    ANKLE FRACTURE SURGERY  2016    DR ANDRADE  LT    BIOPSY / LIGATION TEMPORAL ARTERY Right 2019    RIGHT TEMPORAL ARTERY [place] [facility] [place]    BRAIN SURGERY      surgery for transgeminal neuraglia.  Insertion of sponge for chronic pain    COLONOSCOPY  2014    DR POND    COLONOSCOPY N/A 2021

## 2025-06-09 ENCOUNTER — HOSPITAL ENCOUNTER (OUTPATIENT)
Dept: INTERVENTIONAL RADIOLOGY/VASCULAR | Age: 80
Discharge: HOME | End: 2025-06-11
Payer: MEDICARE

## 2025-06-09 VITALS
RESPIRATION RATE: 16 BRPM | HEART RATE: 87 BPM | OXYGEN SATURATION: 98 % | SYSTOLIC BLOOD PRESSURE: 132 MMHG | DIASTOLIC BLOOD PRESSURE: 58 MMHG

## 2025-06-09 DIAGNOSIS — R18.8 CIRRHOSIS OF LIVER WITH ASCITES, UNSPECIFIED HEPATIC CIRRHOSIS TYPE (HCC): ICD-10-CM

## 2025-06-09 DIAGNOSIS — K74.60 CIRRHOSIS OF LIVER WITH ASCITES, UNSPECIFIED HEPATIC CIRRHOSIS TYPE (HCC): ICD-10-CM

## 2025-06-09 DIAGNOSIS — R18.8 OTHER ASCITES: ICD-10-CM

## 2025-06-09 PROCEDURE — 2709999900 IR US GUIDED PARACENTESIS

## 2025-06-09 PROCEDURE — 49083 ABD PARACENTESIS W/IMAGING: CPT | Performed by: RADIOLOGY

## 2025-06-09 PROCEDURE — P9047 ALBUMIN (HUMAN), 25%, 50ML: HCPCS | Performed by: NURSE PRACTITIONER

## 2025-06-09 PROCEDURE — 6360000002 HC RX W HCPCS: Performed by: NURSE PRACTITIONER

## 2025-06-09 PROCEDURE — 49083 ABD PARACENTESIS W/IMAGING: CPT

## 2025-06-09 PROCEDURE — 96365 THER/PROPH/DIAG IV INF INIT: CPT

## 2025-06-09 RX ORDER — ALBUMIN (HUMAN) 12.5 G/50ML
SOLUTION INTRAVENOUS CONTINUOUS PRN
Status: COMPLETED | OUTPATIENT
Start: 2025-06-09 | End: 2025-06-09

## 2025-06-09 RX ORDER — LIDOCAINE HYDROCHLORIDE 20 MG/ML
INJECTION, SOLUTION INFILTRATION; PERINEURAL PRN
Status: COMPLETED | OUTPATIENT
Start: 2025-06-09 | End: 2025-06-09

## 2025-06-09 RX ADMIN — ALBUMIN (HUMAN) 25 G: 12.5 SOLUTION INTRAVENOUS at 12:20

## 2025-06-09 RX ADMIN — LIDOCAINE HYDROCHLORIDE 10 ML: 20 INJECTION, SOLUTION INFILTRATION; PERINEURAL at 11:48

## 2025-06-09 RX ADMIN — ALBUMIN (HUMAN) 25 G: 12.5 SOLUTION INTRAVENOUS at 12:30

## 2025-06-09 NOTE — DISCHARGE INSTRUCTIONS
Ultrasound Guided Paracentesis Discharge Instructions     Rest today. No heavy lifting, bending, stretching or pulling.  Some tenderness will be normal at the procedure site for a few days.    You may remove the bandaid in 48 hours.     If you experience any drainage or bleeding at the site, hold pressure for 30 minutes and lay on side opposite of the procedure site (move fluid away from the area of leakage). If drainage or bleeding does not stop and seems excessive, call your physician or proceed to the ER.      Watch for signs of infection such as fever, chills, drainage or redness at the site. If you experience any of these symptoms, call your primary doctor or go to the emergency room.       Please keep all follow-up appointments with your Hepatologist. Schedule follow-up appointment for repeat paracentesis if necessary.     If you have any concerns please contact the [facility] Radiology Department at [phone].       Ultrasound Guided Paracentesis Discharge Instructions     Rest today. No heavy lifting, bending, stretching or pulling.  Some tenderness will be normal at the procedure site for a few days.    You may remove the bandaid in 48 hours.     If you experience any drainage or bleeding at the site, hold pressure for 30 minutes and lay on side opposite of the procedure site (move fluid away from the area of leakage). If drainage or bleeding does not stop and seems excessive, call your physician or proceed to the ER.      Watch for signs of infection such as fever, chills, drainage or redness at the site. If you experience any of these symptoms, call your primary doctor or go to the emergency room.       Please keep all follow-up appointments with your Hepatologist. Schedule follow-up appointment for repeat paracentesis if necessary.     If you have any concerns please contact the [facility] Radiology Department at [phone].

## 2025-06-09 NOTE — OR NURSING
NO SEDATION  Pt to special procedures room 6 via wheelchair. Alert and oriented, feeling \"tired\" today. Abd soft, slightly distended.  RN obtained images prior to start of procedure using U/S. Pt  VSS.     Pt's left lower abdomen prepped with Chloraprep.    After 3 minute dry time, draped with sterile drape and towels.     Procedure explained, consent verified from 5/28/25.      1146 Timeout completed for Ultrasound Guided Paracentesis. Using U/S, Sara Hernández CNP marked pt's left lower abdomen.     1148 Site numbed with lidocaine.  [id number] Centesis 5F catheter placed using Ultrasound guidance.  Fluid appears pale, clear yellow. Catheter tubing connected to [employer] machine to remove fluid.    1220 IV started and albumin 25g given IV for paracentesis >5L    1230 Second bottle of albumin 25g given IV for paracentesis >7L.    1235 Pt tolerated well. Total 7850 ml removed. Catheter removed, pressure held and bandaid applied. Verbal and tactile reassurance given throughout.     1242 Pt's daughter, Jerica, notified pt ready to be picked up. Pt taken to daughter's car at front entrance via wheelchair.    
[[id number]]

## 2025-06-10 ENCOUNTER — POST-OP TELEPHONE (OUTPATIENT)
Dept: INTERVENTIONAL RADIOLOGY/VASCULAR | Age: 80
End: 2025-06-10

## 2025-06-10 NOTE — FLOWSHEET NOTE
RN call to patient to follow up post-op, as pt had 6/09/25 on paracentesis with Sara Hernández NP.     Pt reports 0/10 pain to site.     Pt confirms site and dressing is clean, dry, and intact.     Pt has no questions or concerns at this time.     Pt instructed to call back the radiology dept and request to speak with a nurse if any questions or concerns should develop at [phone].

## 2025-06-12 DIAGNOSIS — K74.60 CIRRHOSIS OF LIVER WITH ASCITES, UNSPECIFIED HEPATIC CIRRHOSIS TYPE (HCC): Primary | ICD-10-CM

## 2025-06-12 DIAGNOSIS — R18.8 CIRRHOSIS OF LIVER WITH ASCITES, UNSPECIFIED HEPATIC CIRRHOSIS TYPE (HCC): Primary | ICD-10-CM

## 2025-06-13 ENCOUNTER — TELEPHONE (OUTPATIENT)
Age: 80
End: 2025-06-13

## 2025-06-13 NOTE — TELEPHONE ENCOUNTER
----- Message from NILSON Hyde CNP sent at 6/12/2025  2:33 PM EDT -----  Paracentesis order placed. Please make order sheet. No medication hold. She does need updated INR and CBC prior to her next para, orders placed. Thank you. ~Sara  ----- Message -----  From: Nany Umaña RN  Sent: 6/9/2025  12:18 PM EDT  To: NILSON Hyde CNP; Jenny Jones MA    Next para 6/18 @ 1300

## 2025-06-13 NOTE — TELEPHONE ENCOUNTER
----- Message from NILSON Hyde CNP sent at 6/2/2025  2:03 PM EDT -----  Para order placed. Please make order sheet. No med hold. No labs needed. Please make sure patient keeps appointment with GI. Thank you.        ----- Message -----  From: Nany Umaña RN  Sent: 5/28/2025  12:01 PM EDT  To: NILSON Hyde CNP; JOHN Farah para 6/6 @ 1100

## 2025-06-17 ENCOUNTER — PRE-PROCEDURE TELEPHONE (OUTPATIENT)
Dept: INTERVENTIONAL RADIOLOGY/VASCULAR | Age: 80
End: 2025-06-17

## 2025-06-17 NOTE — FLOWSHEET NOTE
Pre-procedure call placed to patient re: upcoming IR paracentesis appointment on 6/18/25 at 1300. Spoke with daughter, Jerica, who confirms appointment and she will be bringing patient. No questions noted at this time. Electronically signed by Stefani Gonzales RN on 6/17/2025 at 9:50 AM

## 2025-06-18 ENCOUNTER — HOSPITAL ENCOUNTER (OUTPATIENT)
Dept: INTERVENTIONAL RADIOLOGY/VASCULAR | Age: 80
Discharge: HOME | End: 2025-06-20
Payer: MEDICARE

## 2025-06-18 ENCOUNTER — HOSPITAL ENCOUNTER (OUTPATIENT)
Dept: WOUND CARE | Age: 80
Discharge: HOME | End: 2025-06-18
Attending: FAMILY MEDICINE
Payer: MEDICARE

## 2025-06-18 VITALS
HEART RATE: 76 BPM | RESPIRATION RATE: 16 BRPM | DIASTOLIC BLOOD PRESSURE: 45 MMHG | TEMPERATURE: 97.5 F | SYSTOLIC BLOOD PRESSURE: 88 MMHG

## 2025-06-18 VITALS
HEART RATE: 70 BPM | DIASTOLIC BLOOD PRESSURE: 47 MMHG | OXYGEN SATURATION: 94 % | SYSTOLIC BLOOD PRESSURE: 98 MMHG | RESPIRATION RATE: 12 BRPM

## 2025-06-18 DIAGNOSIS — L97.323: Primary | ICD-10-CM

## 2025-06-18 DIAGNOSIS — L97.323: ICD-10-CM

## 2025-06-18 DIAGNOSIS — L89.523 PRESSURE INJURY OF LEFT ANKLE, STAGE 3 (HCC): ICD-10-CM

## 2025-06-18 DIAGNOSIS — L89.520 UNSTAGEABLE PRESSURE ULCER OF LEFT ANKLE (HCC): ICD-10-CM

## 2025-06-18 DIAGNOSIS — R18.8 CIRRHOSIS OF LIVER WITH ASCITES, UNSPECIFIED HEPATIC CIRRHOSIS TYPE (HCC): ICD-10-CM

## 2025-06-18 DIAGNOSIS — K74.60 CIRRHOSIS OF LIVER WITH ASCITES, UNSPECIFIED HEPATIC CIRRHOSIS TYPE (HCC): ICD-10-CM

## 2025-06-18 LAB
ALBUMIN SERPL-MCNC: 3.4 G/DL (ref 3.5–4.6)
ALP SERPL-CCNC: 143 U/L (ref 40–130)
ALT SERPL-CCNC: 13 U/L (ref 0–33)
ANION GAP SERPL CALCULATED.3IONS-SCNC: 11 MEQ/L (ref 9–15)
AST SERPL-CCNC: 27 U/L (ref 0–35)
BASOPHILS # BLD: 0 K/UL (ref 0–0.2)
BASOPHILS NFR BLD: 0.4 %
BILIRUB SERPL-MCNC: 1.4 MG/DL (ref 0.2–0.7)
BUN SERPL-MCNC: 15 MG/DL (ref 8–23)
CALCIUM SERPL-MCNC: 8.2 MG/DL (ref 8.5–9.9)
CHLORIDE SERPL-SCNC: 106 MEQ/L (ref 95–107)
CO2 SERPL-SCNC: 22 MEQ/L (ref 20–31)
CREAT SERPL-MCNC: 1.06 MG/DL (ref 0.5–0.9)
EOSINOPHIL # BLD: 0 K/UL (ref 0–0.7)
EOSINOPHIL NFR BLD: 0.6 %
ERYTHROCYTE [DISTWIDTH] IN BLOOD BY AUTOMATED COUNT: 16.8 % (ref 11.5–14.5)
ERYTHROCYTE [DISTWIDTH] IN BLOOD BY AUTOMATED COUNT: 16.8 % (ref 11.5–14.5)
GLOBULIN SER CALC-MCNC: 2.2 G/DL (ref 2.3–3.5)
GLUCOSE SERPL-MCNC: 187 MG/DL (ref 70–99)
HCT VFR BLD AUTO: 29.7 % (ref 37–47)
HCT VFR BLD AUTO: 30.4 % (ref 37–47)
HGB BLD-MCNC: 9 G/DL (ref 12–16)
HGB BLD-MCNC: 9.1 G/DL (ref 12–16)
INR PPP: 1.2
LYMPHOCYTES # BLD: 0.7 K/UL (ref 1–4.8)
LYMPHOCYTES NFR BLD: 14.5 %
MCH RBC QN AUTO: 23.8 PG (ref 27–31.3)
MCH RBC QN AUTO: 24.5 PG (ref 27–31.3)
MCHC RBC AUTO-ENTMCNC: 29.9 % (ref 33–37)
MCHC RBC AUTO-ENTMCNC: 30.3 % (ref 33–37)
MCV RBC AUTO: 79.4 FL (ref 79.4–94.8)
MCV RBC AUTO: 80.7 FL (ref 79.4–94.8)
MONOCYTES # BLD: 0.5 K/UL (ref 0.2–0.8)
MONOCYTES NFR BLD: 10.1 %
NEUTROPHILS # BLD: 3.5 K/UL (ref 1.4–6.5)
NEUTS SEG NFR BLD: 74 %
PLATELET # BLD AUTO: 114 K/UL (ref 130–400)
PLATELET # BLD AUTO: 122 K/UL (ref 130–400)
POTASSIUM SERPL-SCNC: 4.1 MEQ/L (ref 3.4–4.9)
PROT SERPL-MCNC: 5.6 G/DL (ref 6.3–8)
PROTHROMBIN TIME: 15.8 SEC (ref 12.3–14.9)
RBC # BLD AUTO: 3.68 M/UL (ref 4.2–5.4)
RBC # BLD AUTO: 3.83 M/UL (ref 4.2–5.4)
SODIUM SERPL-SCNC: 139 MEQ/L (ref 135–144)
WBC # BLD AUTO: 4.8 K/UL (ref 4.8–10.8)
WBC # BLD AUTO: 4.9 K/UL (ref 4.8–10.8)

## 2025-06-18 PROCEDURE — 85610 PROTHROMBIN TIME: CPT

## 2025-06-18 PROCEDURE — 49083 ABD PARACENTESIS W/IMAGING: CPT

## 2025-06-18 PROCEDURE — 97597 DBRDMT OPN WND 1ST 20 CM/<: CPT

## 2025-06-18 PROCEDURE — 6370000000 HC RX 637 (ALT 250 FOR IP)

## 2025-06-18 PROCEDURE — 96365 THER/PROPH/DIAG IV INF INIT: CPT

## 2025-06-18 PROCEDURE — C1729 CATH, DRAINAGE: HCPCS

## 2025-06-18 PROCEDURE — P9047 ALBUMIN (HUMAN), 25%, 50ML: HCPCS | Performed by: RADIOLOGY

## 2025-06-18 PROCEDURE — 6360000002 HC RX W HCPCS: Performed by: RADIOLOGY

## 2025-06-18 PROCEDURE — 85027 COMPLETE CBC AUTOMATED: CPT

## 2025-06-18 PROCEDURE — 97597 DBRDMT OPN WND 1ST 20 CM/<: CPT | Performed by: FAMILY MEDICINE

## 2025-06-18 RX ORDER — BETAMETHASONE DIPROPIONATE 0.5 MG/G
CREAM TOPICAL PRN
OUTPATIENT
Start: 2025-06-18

## 2025-06-18 RX ORDER — LIDOCAINE HYDROCHLORIDE 40 MG/ML
SOLUTION TOPICAL PRN
OUTPATIENT
Start: 2025-06-18

## 2025-06-18 RX ORDER — MUPIROCIN 2 %
OINTMENT (GRAM) TOPICAL PRN
OUTPATIENT
Start: 2025-06-18

## 2025-06-18 RX ORDER — LIDOCAINE HYDROCHLORIDE 20 MG/ML
JELLY TOPICAL PRN
OUTPATIENT
Start: 2025-06-18

## 2025-06-18 RX ORDER — LIDOCAINE 40 MG/G
CREAM TOPICAL PRN
Status: DISCONTINUED | OUTPATIENT
Start: 2025-06-18 | End: 2025-06-19 | Stop reason: HOSPADM

## 2025-06-18 RX ORDER — TRIAMCINOLONE ACETONIDE 1 MG/G
OINTMENT TOPICAL PRN
OUTPATIENT
Start: 2025-06-18

## 2025-06-18 RX ORDER — NEOMYCIN/BACITRACIN/POLYMYXINB 3.5-400-5K
OINTMENT (GRAM) TOPICAL PRN
OUTPATIENT
Start: 2025-06-18

## 2025-06-18 RX ORDER — BACITRACIN ZINC AND POLYMYXIN B SULFATE 500; 1000 [USP'U]/G; [USP'U]/G
OINTMENT TOPICAL PRN
OUTPATIENT
Start: 2025-06-18

## 2025-06-18 RX ORDER — GINSENG 100 MG
CAPSULE ORAL PRN
OUTPATIENT
Start: 2025-06-18

## 2025-06-18 RX ORDER — SODIUM CHLOR/HYPOCHLOROUS ACID 0.033 %
SOLUTION, IRRIGATION IRRIGATION PRN
OUTPATIENT
Start: 2025-06-18

## 2025-06-18 RX ORDER — LIDOCAINE HYDROCHLORIDE 20 MG/ML
INJECTION, SOLUTION INFILTRATION; PERINEURAL PRN
Status: COMPLETED | OUTPATIENT
Start: 2025-06-18 | End: 2025-06-18

## 2025-06-18 RX ORDER — LIDOCAINE 40 MG/G
CREAM TOPICAL PRN
OUTPATIENT
Start: 2025-06-18

## 2025-06-18 RX ORDER — CLOBETASOL PROPIONATE 0.5 MG/G
OINTMENT TOPICAL PRN
OUTPATIENT
Start: 2025-06-18

## 2025-06-18 RX ORDER — SILVER SULFADIAZINE 10 MG/G
CREAM TOPICAL PRN
OUTPATIENT
Start: 2025-06-18

## 2025-06-18 RX ORDER — ALBUMIN (HUMAN) 12.5 G/50ML
SOLUTION INTRAVENOUS CONTINUOUS PRN
Status: COMPLETED | OUTPATIENT
Start: 2025-06-18 | End: 2025-06-18

## 2025-06-18 RX ORDER — GENTAMICIN SULFATE 1 MG/G
OINTMENT TOPICAL PRN
OUTPATIENT
Start: 2025-06-18

## 2025-06-18 RX ORDER — LIDOCAINE 50 MG/G
OINTMENT TOPICAL PRN
OUTPATIENT
Start: 2025-06-18

## 2025-06-18 RX ADMIN — LIDOCAINE HYDROCHLORIDE 8 ML: 20 INJECTION, SOLUTION INFILTRATION; PERINEURAL at 13:14

## 2025-06-18 RX ADMIN — ALBUMIN (HUMAN) 25 G: 12.5 SOLUTION INTRAVENOUS at 13:52

## 2025-06-18 RX ADMIN — LIDOCAINE 4%: 4 CREAM TOPICAL at 15:15

## 2025-06-18 ASSESSMENT — PAIN SCALES - GENERAL: PAINLEVEL_OUTOF10: 0

## 2025-06-18 NOTE — DISCHARGE INSTRUCTIONS
[facility]   Physician Orders and Discharge Instructions  [facility]  [address] [place]  [address]  Telephone: [phone]      [phone]        NAME:  Zach Aguirre                                                                                                  YOB: 1945  MEDICAL RECORD NUMBER:  [id number]     Your  is:  Dolores     Home Care/Facility: None     Wound Location: Left Lateral ankle      Dressing orders:  1.Cleanse wound(s) with normal saline.  2. Apply dry JÚNIOR  Or equivalent to wound bed.  3. Moisten JÚNIOR with a few drops of normal saline.  4. Cover with 4x4's and wrap with gauze (monroe or kerlix)  5. Change  Every other day or Monday, Wednesday, and Friday     Compression: Apply low compression hose to left lower leg(s), may remove at bedtime, reapply first thing in the morning, avoid prolonged standing, elevate legs when sitting. (33)      Offloading Device:      Other Instructions:  Elevate legs as much as possible. You can prop a pillow under your legs to help keep the pressure off. We ordered lab work for you, please get the lab work today when you leave here. We will call you if there are any concerns with your lab work     Keep all dressings clean, dry and intact.  Keep pressure off the wound(s) at all times.      Follow up visit  Monday June 23 2025 at 1130am with Dr Reese     Please give 24 hour notice if unable to keep appointment. [phone]     If you experience any of the following, please call the Wound Care Service at  [phone] or go to the nearest emergency room.        *Increase in pain*Temperature over 101*Increase in drainage from your wound or a foul odor  *Uncontrolled swelling*Need for compression bandage changes due to slippage, breakthrough drainage       PLEASE NOTE: IF YOU ARE UNABLE TO OBTAIN WOUND SUPPLIES, CONTINUE TO USE THE SUPPLIES YOU HAVE

## 2025-06-18 NOTE — PROGRESS NOTES
[facility]                                                   Progress Note and Procedure Note      Zach Aguirre  MEDICAL RECORD NUMBER:  [id number]  AGE: 80 y.o.   GENDER: female  : 1945  EPISODE DATE:  2025    Subjective:     Chief Complaint   Patient presents with    Wound Check         HISTORY of PRESENT ILLNESS HPI     Zach Aguirre is a 80 y.o. female who presents today for wound/ulcer evaluation.   History of Wound Context: Patient presents for evaluation of wound on left ankle.  She has history of hardware placement in left ankle.  She complains of no fever or chills.  She has noticed no spreading redness, purulence, odor or increased swelling.  Mild warmth noted by patient.  Positive history of diabetes, hemoglobin A1c in 2025 5.5.  MRI last month neg.  Wound/Ulcer Pain Timing/Severity: intermittent  Quality of pain: aching  Severity:  4 / 10   Modifying Factors: None  Associated Signs/Symptoms: pain    Ulcer Identification:  Ulcer Type: pressure  Contributing Factors: edema, diabetes, chronic pressure, decreased mobility, shear force, and hardware    Wound: N/A        PAST MEDICAL HISTORY        Diagnosis Date    Anxiety     DM (diabetes mellitus) (HCC) 2015    Hepatitis B infection     Hyperlipidemia     Hypothyroidism     Insomnia     Leg pain 2015    Post herpetic neuralgia     Unspecified sleep apnea     after seeing [facility] they state she is does not have sleep apnea.  Not using cpap       PAST SURGICAL HISTORY    Past Surgical History:   Procedure Laterality Date    ANKLE FRACTURE SURGERY  2016    DR ANDRADE  LT    BIOPSY / LIGATION TEMPORAL ARTERY Right 2019    RIGHT TEMPORAL ARTERY BIOPSY performed by Emmanuel Irwin MD at [facility] [place]    BRAIN SURGERY      surgery for transgeminal neuraglia.  Insertion of sponge for chronic pain    COLONOSCOPY  2014    DR POND    COLONOSCOPY N/A 2021    COLONOSCOPY DIAGNOSTIC performed by Mei

## 2025-06-18 NOTE — DISCHARGE INSTRUCTIONS
Ultrasound Guided Paracentesis Discharge Instructions:     Rest today. No heavy lifting, bending, stretching or pulling.  Some tenderness will be normal at the procedure site for a few days.    You may remove the bandaid in 24-48 hours.     If you experience any drainage or bleeding at the site, hold pressure for 30 minutes and lay on side opposite of the procedure site (move fluid away from the area of leakage). If drainage or bleeding does not stop and seems excessive, call your physician or proceed to the ER.      Watch for signs of infection such as fever, chills, drainage or redness at the site. If you experience any of these symptoms, call your primary doctor or go to the emergency room.       Please keep all follow-up appointments with your Hepatologist. Schedule follow-up appointment for repeat paracentesis if necessary - Dr. Nelson's office at [phone].     If you have any concerns please contact the [facility] Radiology Department at [phone].

## 2025-06-18 NOTE — OR NURSING
PARACENTESIS - NO SEDATION    1259 - Patient brought [facility], [facility]6 via WC. A&Ox4, calm and cooperative. Procedure explained and consent signed. Assisted to cart and positioned lying propped to left side using pillow wedge. Initial images obtained prior to start of procedure using U/S. Pt  VSS, on RA. Allergies, medications and history reviewed - no changes per patient reports, confirms is not taking a blood thinner.     1311 - Timeout completed for Ultrasound Guided Paracentesis. Site marked by Dr. Gardiner. Left side of abdomen cleansed with Chloraprep by AJ, RN. After 3 minute dry time, covered with sterile drape and towels.    1314 - Site numbed with 2% lidocaine by Dr. Gardiner.  [id number] Centesis 5F catheter placed using Ultrasound guidance.  Fluid appears clear yellow. Catheter tubing then connected to [employer] machine to remove fluid.    1348 - Patient with >5L drain. IV #22 inserted LFA by AIDA MATA (per patient request) and tolerated well. Lab samples obtained - ordered by RUBIN VORA. Albumin IV administered per order, see eMAR.     1400 - Pt tolerated well. Total 6760 ml removed. Catheter removed, pressure held and bandaid applied. Verbal and tactile reassurance given throughout. Serum specimens taken to lab by NH, [facility] tanisha.     1409 - Albumin completed, IV removed. D/C instructions reviewed with patient and understanding indicated. Taken to hospital lobby, met by daughter. Has wound care clinic appointment next. Electronically signed by Stefani Gonzales RN on 6/18/2025 at 2:22 PM

## 2025-06-19 ENCOUNTER — POST-OP TELEPHONE (OUTPATIENT)
Dept: INTERVENTIONAL RADIOLOGY/VASCULAR | Age: 80
End: 2025-06-19

## 2025-06-19 NOTE — FLOWSHEET NOTE
Follow up call placed to patient re: IR paracentesis completed on 6/18/25. Spoke with daughter Jerica who reports no issues follow procedure. States patient went to wound care center after paracentesis, is worried about CBC results - HGB dropped, anemia. Encouraged to follow up with PCP for recommendations. Understanding indicated, has next para appointment scheduled. Electronically signed by Stefani Gonzales RN on 6/19/2025 at 10:53 AM

## 2025-06-20 NOTE — PROGRESS NOTES
I called and spoke with patients daughter about CMP and CBC results. WBC WNL but hemoglobin and platlets are low and concerning. Advised to speak with PCP asap. Patient is doing well and wound is not worsening. Plans to see podiatry Monday.

## 2025-06-23 ENCOUNTER — HOSPITAL ENCOUNTER (OUTPATIENT)
Dept: WOUND CARE | Age: 80
Discharge: HOME | End: 2025-06-23
Payer: MEDICARE

## 2025-06-23 VITALS
RESPIRATION RATE: 16 BRPM | HEART RATE: 69 BPM | SYSTOLIC BLOOD PRESSURE: 111 MMHG | DIASTOLIC BLOOD PRESSURE: 55 MMHG | TEMPERATURE: 96.9 F

## 2025-06-23 DIAGNOSIS — R18.8 CIRRHOSIS OF LIVER WITH ASCITES, UNSPECIFIED HEPATIC CIRRHOSIS TYPE (HCC): Primary | ICD-10-CM

## 2025-06-23 DIAGNOSIS — K74.60 CIRRHOSIS OF LIVER WITH ASCITES, UNSPECIFIED HEPATIC CIRRHOSIS TYPE (HCC): Primary | ICD-10-CM

## 2025-06-23 DIAGNOSIS — R18.8 OTHER ASCITES: ICD-10-CM

## 2025-06-23 DIAGNOSIS — L89.523 PRESSURE INJURY OF LEFT ANKLE, STAGE 3 (HCC): Primary | ICD-10-CM

## 2025-06-23 PROCEDURE — 87070 CULTURE OTHR SPECIMN AEROBIC: CPT

## 2025-06-23 PROCEDURE — 6370000000 HC RX 637 (ALT 250 FOR IP)

## 2025-06-23 PROCEDURE — 87186 SC STD MICRODIL/AGAR DIL: CPT

## 2025-06-23 PROCEDURE — 86403 PARTICLE AGGLUT ANTBDY SCRN: CPT

## 2025-06-23 PROCEDURE — 11043 DBRDMT MUSC&/FSCA 1ST 20/<: CPT

## 2025-06-23 RX ORDER — TRIAMCINOLONE ACETONIDE 1 MG/G
OINTMENT TOPICAL PRN
OUTPATIENT
Start: 2025-06-23

## 2025-06-23 RX ORDER — MUPIROCIN 2 %
OINTMENT (GRAM) TOPICAL PRN
OUTPATIENT
Start: 2025-06-23

## 2025-06-23 RX ORDER — LIDOCAINE 50 MG/G
OINTMENT TOPICAL PRN
OUTPATIENT
Start: 2025-06-23

## 2025-06-23 RX ORDER — GENTAMICIN SULFATE 1 MG/G
OINTMENT TOPICAL PRN
OUTPATIENT
Start: 2025-06-23

## 2025-06-23 RX ORDER — GINSENG 100 MG
CAPSULE ORAL PRN
OUTPATIENT
Start: 2025-06-23

## 2025-06-23 RX ORDER — LIDOCAINE HYDROCHLORIDE 20 MG/ML
JELLY TOPICAL PRN
Status: DISCONTINUED | OUTPATIENT
Start: 2025-06-23 | End: 2025-06-24 | Stop reason: HOSPADM

## 2025-06-23 RX ORDER — SODIUM CHLOR/HYPOCHLOROUS ACID 0.033 %
SOLUTION, IRRIGATION IRRIGATION PRN
OUTPATIENT
Start: 2025-06-23

## 2025-06-23 RX ORDER — BACITRACIN ZINC AND POLYMYXIN B SULFATE 500; 1000 [USP'U]/G; [USP'U]/G
OINTMENT TOPICAL PRN
OUTPATIENT
Start: 2025-06-23

## 2025-06-23 RX ORDER — NEOMYCIN/BACITRACIN/POLYMYXINB 3.5-400-5K
OINTMENT (GRAM) TOPICAL PRN
OUTPATIENT
Start: 2025-06-23

## 2025-06-23 RX ORDER — BETAMETHASONE DIPROPIONATE 0.5 MG/G
CREAM TOPICAL PRN
OUTPATIENT
Start: 2025-06-23

## 2025-06-23 RX ORDER — CLOBETASOL PROPIONATE 0.5 MG/G
OINTMENT TOPICAL PRN
OUTPATIENT
Start: 2025-06-23

## 2025-06-23 RX ORDER — LIDOCAINE 40 MG/G
CREAM TOPICAL PRN
OUTPATIENT
Start: 2025-06-23

## 2025-06-23 RX ORDER — LIDOCAINE HYDROCHLORIDE 20 MG/ML
JELLY TOPICAL PRN
OUTPATIENT
Start: 2025-06-23

## 2025-06-23 RX ORDER — SILVER SULFADIAZINE 10 MG/G
CREAM TOPICAL PRN
OUTPATIENT
Start: 2025-06-23

## 2025-06-23 RX ORDER — LIDOCAINE HYDROCHLORIDE 40 MG/ML
SOLUTION TOPICAL PRN
OUTPATIENT
Start: 2025-06-23

## 2025-06-23 RX ADMIN — LIDOCAINE HYDROCHLORIDE: 20 JELLY TOPICAL at 11:47

## 2025-06-23 ASSESSMENT — PAIN SCALES - GENERAL: PAINLEVEL_OUTOF10: 3

## 2025-06-23 ASSESSMENT — PAIN DESCRIPTION - LOCATION: LOCATION: FOOT

## 2025-06-23 ASSESSMENT — PAIN DESCRIPTION - ORIENTATION: ORIENTATION: LEFT

## 2025-06-23 NOTE — DISCHARGE INSTRUCTIONS
[facility]   Physician Orders and Discharge Instructions  [facility]  [address] [place]  [address]  Telephone: [phone]      [phone]        NAME:  Zach Aguirre                                                                                                  YOB: 1945  MEDICAL RECORD NUMBER:  [id number]     Your  is:  Paulina      Home Care/Facility: None     Wound Location: Left Lateral ankle      Dressing orders:  1.Cleanse wound(s) with normal saline.  2. Apply dry JÚNIOR  Or equivalent to wound bed.  3. Moisten JÚNIOR with a few drops of normal saline.  4. Cover with 4x4's and wrap with gauze (monroe or kerlix)  5. Change  Every other day or Monday, Wednesday, and Friday      Compression: not at this time      Offloading Device:      Other Instructions:  Elevate legs as much as possible. You can prop a pillow under your legs to help keep the pressure off. Wound culture taken today. We will call you if you need an antibiotic.      Keep all dressings clean, dry and intact.  Keep pressure off the wound(s) at all times.      Follow up visit  2 weeks July 7, 2025 11:30 am     Please give 24 hour notice if unable to keep appointment. [phone]     If you experience any of the following, please call the Wound Care Service at  [phone] or go to the nearest emergency room.        *Increase in pain*Temperature over 101*Increase in drainage from your wound or a foul odor  *Uncontrolled swelling*Need for compression bandage changes due to slippage, breakthrough drainage       PLEASE NOTE: IF YOU ARE UNABLE TO OBTAIN WOUND SUPPLIES, CONTINUE TO USE THE SUPPLIES YOU HAVE AVAILABLE UNTIL YOU ARE ABLE TO REACH US. IT IS MOST IMPORTANT TO KEEP THE WOUND COVERED AT ALL TIMES

## 2025-06-23 NOTE — PLAN OF CARE
Wound Care Supplies      Supply Company:     [employer] [facility] [address] [facility] [address] [place] [id number]  p: [phone] f: [phone]    Ordering Center:     [facility]  [address] [place] [address]  [place] [address]  [phone]  WOUND CARE [phone]  FAX NUMBER [phone]    Patient Information:      Zach Aguirre  [address]  [place] [address]   [phone]   : 1945  AGE: 80 y.o.     GENDER: female   TODAYS DATE:  2025    Insurance:      PRIMARY INSURANCE:  Plan: [facility] MEDICARE COMPLETE  Coverage: [facility] MEDICARE  Effective Date: 2023  [id number] - (Medicare Managed)    SECONDARY INSURANCE:  Plan:   Coverage:   Effective Date:   @SECLumidigmGROUPNUM@    [unfilled]   [unfilled]     Patient Wound Information:      Problem List Items Addressed This Visit          Other    Pressure injury of left ankle, stage 3 (HCC) - Primary    Relevant Medications    lidocaine (XYLOCAINE) 2 % uro-jet    Other Relevant Orders    MD COMMUNICATION 1    MD COMMUNICATION 2    Initiate Outpatient Wound Care Protocol    Initiate Oxygen Therapy Protocol       WOUNDS REQUIRING DRESSING SUPPLIES:     Wound 25 Ankle Left;Lateral #1 (Active)   Wound Image   25 1144   Wound Etiology Pressure Stage 3 25 1144   Dressing Status New dressing applied 25 1421   Wound Cleansed Cleansed with saline 25 1144   Dressing/Treatment Collagen with Ag;Dry dressing 25 1209   Wound Length (cm) 1.6 cm 25 1144   Wound Width (cm) 1 cm 25 1144   Wound Depth (cm) 0.1 cm 25 1144   Wound Surface Area (cm^2) 1.6 cm^2 25 1144   Change in Wound Size % (l*w) -280.95 25 1144   Wound Volume (cm^3) 0.16 cm^3 25 1144   Wound Healing % -90 25 1144   Post-Procedure Length (cm) 1.6 cm 25 1159   Post-Procedure Width (cm) 1 cm 25 1159   Post-Procedure Depth (cm) 0.1 cm 25 1159   Post-Procedure Surface Area (cm^2) 1.6 cm^2 25 1159

## 2025-06-23 NOTE — WOUND CARE
[facility] Physician Billing Sheet.     Zach Aguirre  AGE: 80 y.o.   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

## 2025-06-23 NOTE — PROGRESS NOTES
[facility]   Progress Note and Procedure Note      Zach Aguirre  MEDICAL RECORD NUMBER:  [id number]  AGE: 80 y.o.   GENDER: female  : 1945  EPISODE DATE:  2025    Subjective:     Chief Complaint   Patient presents with    Wound Check         HISTORY of PRESENT ILLNESS HPI     Zach Aguirre is a 80 y.o. female who presents today for wound/ulcer evaluation.   History of Wound Context: Left lateral ankle diabetic ulcer.  She has underlying hardware.  Recent MRI and XRays.  Denies nausea, vomiting, fevers, or chills.  Wound/Ulcer Pain Timing/Severity: intermittent  Quality of pain: sharp  Severity:  2 / 10   Modifying Factors: Pain is relieved/improved with rest  Associated Signs/Symptoms: drainage, numbness, and pain    Ulcer Identification:  Ulcer Type: diabetic  Contributing Factors: edema, diabetes, and obesity    Wound: N/A        PAST MEDICAL HISTORY        Diagnosis Date    Anxiety     DM (diabetes mellitus) (HCC) 2015    Hepatitis B infection     Hyperlipidemia     Hypothyroidism     Insomnia     Leg pain 2015    Post herpetic neuralgia     Unspecified sleep apnea     after seeing [facility] they state she is does not have sleep apnea.  Not using cpap       PAST SURGICAL HISTORY    Past Surgical History:   Procedure Laterality Date    ANKLE FRACTURE SURGERY  2016    DR ANDRADE  LT    BIOPSY / LIGATION TEMPORAL ARTERY Right 2019    RIGHT TEMPORAL ARTERY BIOPSY performed by Emmanuel Irwin MD at [facility] OR    BRAIN SURGERY      surgery for transgeminal neuraglia.  Insertion of sponge for chronic pain    COLONOSCOPY  2014    DR POND    COLONOSCOPY N/A 2021    COLONOSCOPY DIAGNOSTIC performed by Mei Pond MD at [facility] CENTER    HYSTERECTOMY (CERVIX STATUS UNKNOWN)      PARTIAL    LYMPHADENECTOMY      OTHER SURGICAL HISTORY Right     Trigeminal Neuralgia    PARACENTESIS Left 2024    4660 ml removed, performed by Sara Hernández CNP

## 2025-06-25 ENCOUNTER — OFFICE VISIT (OUTPATIENT)
Dept: PRIMARY CARE CLINIC | Age: 80
End: 2025-06-25
Payer: MEDICARE

## 2025-06-25 ENCOUNTER — TELEPHONE (OUTPATIENT)
Dept: PRIMARY CARE CLINIC | Age: 80
End: 2025-06-25

## 2025-06-25 VITALS
WEIGHT: 166.2 LBS | DIASTOLIC BLOOD PRESSURE: 64 MMHG | SYSTOLIC BLOOD PRESSURE: 104 MMHG | OXYGEN SATURATION: 98 % | HEART RATE: 74 BPM | HEIGHT: 62 IN | BODY MASS INDEX: 30.59 KG/M2

## 2025-06-25 DIAGNOSIS — J44.9 CHRONIC OBSTRUCTIVE PULMONARY DISEASE, UNSPECIFIED COPD TYPE (HCC): ICD-10-CM

## 2025-06-25 DIAGNOSIS — L08.9 WOUND INFECTION: Primary | ICD-10-CM

## 2025-06-25 DIAGNOSIS — D50.9 IRON DEFICIENCY ANEMIA, UNSPECIFIED IRON DEFICIENCY ANEMIA TYPE: ICD-10-CM

## 2025-06-25 DIAGNOSIS — T14.8XXA WOUND INFECTION: Primary | ICD-10-CM

## 2025-06-25 DIAGNOSIS — Z71.89 ACP (ADVANCE CARE PLANNING): ICD-10-CM

## 2025-06-25 DIAGNOSIS — K74.60 HEPATIC CIRRHOSIS, UNSPECIFIED HEPATIC CIRRHOSIS TYPE, UNSPECIFIED WHETHER ASCITES PRESENT (HCC): ICD-10-CM

## 2025-06-25 PROCEDURE — 99214 OFFICE O/P EST MOD 30 MIN: CPT | Performed by: INTERNAL MEDICINE

## 2025-06-25 PROCEDURE — G8427 DOCREV CUR MEDS BY ELIG CLIN: HCPCS | Performed by: INTERNAL MEDICINE

## 2025-06-25 PROCEDURE — 1090F PRES/ABSN URINE INCON ASSESS: CPT | Performed by: INTERNAL MEDICINE

## 2025-06-25 PROCEDURE — 3023F SPIROM DOC REV: CPT | Performed by: INTERNAL MEDICINE

## 2025-06-25 PROCEDURE — 1159F MED LIST DOCD IN RCRD: CPT | Performed by: INTERNAL MEDICINE

## 2025-06-25 PROCEDURE — 1123F ACP DISCUSS/DSCN MKR DOCD: CPT | Performed by: INTERNAL MEDICINE

## 2025-06-25 PROCEDURE — G2211 COMPLEX E/M VISIT ADD ON: HCPCS | Performed by: INTERNAL MEDICINE

## 2025-06-25 PROCEDURE — 1160F RVW MEDS BY RX/DR IN RCRD: CPT | Performed by: INTERNAL MEDICINE

## 2025-06-25 PROCEDURE — 1036F TOBACCO NON-USER: CPT | Performed by: INTERNAL MEDICINE

## 2025-06-25 PROCEDURE — G8417 CALC BMI ABV UP PARAM F/U: HCPCS | Performed by: INTERNAL MEDICINE

## 2025-06-25 PROCEDURE — G8399 PT W/DXA RESULTS DOCUMENT: HCPCS | Performed by: INTERNAL MEDICINE

## 2025-06-25 RX ORDER — SULFAMETHOXAZOLE AND TRIMETHOPRIM 800; 160 MG/1; MG/1
1 TABLET ORAL 2 TIMES DAILY
Qty: 28 TABLET | Refills: 0 | Status: SHIPPED | OUTPATIENT
Start: 2025-06-25 | End: 2025-06-25

## 2025-06-25 RX ORDER — SULFAMETHOXAZOLE AND TRIMETHOPRIM 800; 160 MG/1; MG/1
1 TABLET ORAL 2 TIMES DAILY
Qty: 28 TABLET | Refills: 0 | Status: SHIPPED | OUTPATIENT
Start: 2025-06-25 | End: 2025-07-09

## 2025-06-25 RX ORDER — FLUTICASONE FUROATE, UMECLIDINIUM BROMIDE AND VILANTEROL TRIFENATATE 100; 62.5; 25 UG/1; UG/1; UG/1
1 POWDER RESPIRATORY (INHALATION) DAILY
Qty: 60 EACH | Refills: 5 | Status: SHIPPED | OUTPATIENT
Start: 2025-06-25

## 2025-06-25 NOTE — TELEPHONE ENCOUNTER
Received a call from the Hematology Oncology office stating need office notes,testing done to patient as well reason why patient is being referred to be faxed to their office at [phone]. Office contact number [phone].    Thank you

## 2025-06-25 NOTE — PROGRESS NOTES
Subjective:      Patient ID: Zach Aguirre is a 80 y.o. female    Follow up   HPI  Pt presents for follow up regarding recent lab report. Hypochromic anemia persists. Low platelet count.    Left ankle ulcer managed by podiatrist. MRI showed no abscess. Culture positive for S. Aureus.    Past Medical History:   Diagnosis Date    Anxiety     DM (diabetes mellitus) (HCC) 4/14/2015    Hepatitis B infection     Hyperlipidemia     Hypothyroidism     Insomnia     Leg pain 4/14/2015    Post herpetic neuralgia     Unspecified sleep apnea     after seeing [facility] they state she is does not have sleep apnea.  Not using cpap     Past Surgical History:   Procedure Laterality Date    ANKLE FRACTURE SURGERY  09/2016    DR ANDRADE  LT    BIOPSY / LIGATION TEMPORAL ARTERY Right 04/26/2019    RIGHT TEMPORAL ARTERY BIOPSY performed by Emmanuel Irwin MD at [facility] OR    BRAIN SURGERY  2015    surgery for transgeminal neuraglia.  Insertion of sponge for chronic pain    COLONOSCOPY  03/31/2014    DR POND    COLONOSCOPY N/A 02/02/2021    COLONOSCOPY DIAGNOSTIC performed by Mei Pond MD at [facility] CENTER    HYSTERECTOMY (CERVIX STATUS UNKNOWN)      PARTIAL    LYMPHADENECTOMY      OTHER SURGICAL HISTORY Right     Trigeminal Neuralgia    PARACENTESIS Left 05/07/2024    4660 ml removed, performed by Sara Hernández CNP - diagnostics sent    PARACENTESIS Left 05/16/2024    3120 ml removed by Sara Hernández CNP    PARACENTESIS Left 06/14/2024    2980 ml removed by Sara Hernández,CNP    PARACENTESIS Left 08/07/2024    2920 ml removed by Dr. Gardiner    PARACENTESIS Left 08/22/2024    3880 ml removed by Sara Hernández, CNP    PARACENTESIS Left 09/05/2024    4250 ml removed by Sara Hernández, CNP    PARACENTESIS Left 09/19/2024    3760 ml removed by Sara Hernández, CNP    PARACENTESIS Left 09/25/2024    3100 ml removed by Sara Hernández CNP    PARACENTESIS Left 10/04/2024    3440 ml removed by Dr. Gardiner.    PARACENTESIS Left 10/16/2024    4580 mls

## 2025-06-25 NOTE — PATIENT INSTRUCTIONS

## 2025-06-26 LAB
BACTERIA SPEC ANAEROBE+AEROBE CULT: ABNORMAL
BACTERIA SPEC ANAEROBE+AEROBE CULT: ABNORMAL
ORGANISM: ABNORMAL

## 2025-06-26 ASSESSMENT — ENCOUNTER SYMPTOMS
ABDOMINAL PAIN: 0
WHEEZING: 0
SHORTNESS OF BREATH: 0
COUGH: 0
VOMITING: 0
DIARRHEA: 0
NAUSEA: 0

## 2025-06-26 NOTE — TELEPHONE ENCOUNTER
Please fax the note to them. The reason for referral is iron deficiency anemia. It's on the referral.

## 2025-06-27 ENCOUNTER — HOSPITAL ENCOUNTER (OUTPATIENT)
Dept: INTERVENTIONAL RADIOLOGY/VASCULAR | Age: 80
Discharge: HOME | End: 2025-06-29
Payer: MEDICARE

## 2025-06-27 VITALS — OXYGEN SATURATION: 99 % | DIASTOLIC BLOOD PRESSURE: 49 MMHG | SYSTOLIC BLOOD PRESSURE: 101 MMHG | HEART RATE: 64 BPM

## 2025-06-27 DIAGNOSIS — K74.60 CIRRHOSIS OF LIVER WITH ASCITES, UNSPECIFIED HEPATIC CIRRHOSIS TYPE (HCC): ICD-10-CM

## 2025-06-27 DIAGNOSIS — R18.8 CIRRHOSIS OF LIVER WITH ASCITES, UNSPECIFIED HEPATIC CIRRHOSIS TYPE (HCC): ICD-10-CM

## 2025-06-27 DIAGNOSIS — R18.8 OTHER ASCITES: ICD-10-CM

## 2025-06-27 PROCEDURE — 96365 THER/PROPH/DIAG IV INF INIT: CPT

## 2025-06-27 PROCEDURE — 49083 ABD PARACENTESIS W/IMAGING: CPT

## 2025-06-27 PROCEDURE — 6360000002 HC RX W HCPCS: Performed by: NURSE PRACTITIONER

## 2025-06-27 PROCEDURE — P9047 ALBUMIN (HUMAN), 25%, 50ML: HCPCS | Performed by: NURSE PRACTITIONER

## 2025-06-27 PROCEDURE — 49083 ABD PARACENTESIS W/IMAGING: CPT | Performed by: RADIOLOGY

## 2025-06-27 PROCEDURE — 2709999900 IR US GUIDED PARACENTESIS

## 2025-06-27 RX ORDER — LIDOCAINE HYDROCHLORIDE 20 MG/ML
INJECTION, SOLUTION INFILTRATION; PERINEURAL PRN
Status: COMPLETED | OUTPATIENT
Start: 2025-06-27 | End: 2025-06-27

## 2025-06-27 RX ORDER — ALBUMIN (HUMAN) 12.5 G/50ML
SOLUTION INTRAVENOUS CONTINUOUS PRN
Status: COMPLETED | OUTPATIENT
Start: 2025-06-27 | End: 2025-06-27

## 2025-06-27 RX ADMIN — ALBUMIN (HUMAN) 25 G: 12.5 SOLUTION INTRAVENOUS at 12:14

## 2025-06-27 RX ADMIN — LIDOCAINE HYDROCHLORIDE 10 ML: 20 INJECTION, SOLUTION INFILTRATION; PERINEURAL at 11:28

## 2025-06-27 NOTE — DISCHARGE INSTRUCTIONS
Ultrasound Guided Paracentesis Discharge Instructions     Rest today. No heavy lifting, bending, stretching or pulling.  Some tenderness will be normal at the procedure site for a few days.    You may remove the bandaid in 48 hours.     If you experience any drainage or bleeding at the site, hold pressure for 30 minutes and lay on side opposite of the procedure site (move fluid away from the area of leakage). If drainage or bleeding does not stop and seems excessive, call your physician or proceed to the ER.      Watch for signs of infection such as fever, chills, drainage or redness at the site. If you experience any of these symptoms, call your primary doctor or go to the emergency room.       Please keep all follow-up appointments with your Hepatologist. Schedule follow-up appointment for repeat paracentesis if necessary.     If you have any concerns please contact the [facility] Radiology Department at [phone].

## 2025-06-27 NOTE — OR NURSING
1115 Pt arrived to Specials room 6 via WC. Hx, allergies, medications reviewed. Pt denies pain at this time. Consent obtained for ultrasound guided paracentesis.     1120 Pt assisted onto cart and turned onto left side, foam wedge placed to assist with positioning. Abdomen assessed with ultrasound to confirm fluid. VSS. Ultrasound student also scanning pt with US at this time.     1125 Timeout completed. Area cleansed with large tinted chloraprep. After 3 minute dry time, draped with fenestrated drape and sterile towels by HA CNP.     1128 Using U/S guidance, Sara Hernández CNP numbed site with 2% lidocaine, see eMar.     1129 Using U/S guidance, access obtained with [id number] centesis 5F catheter with return of fluid that appears clear yellow. Catheter connected to tubing and Marsha machine with suction at 200 mmHG and draining well. Pt tolerating well. VSS.     1200 US guided IV established to L AC - albumin infusing at this time.     1220 Drainage complete. Total 6370 ml removed. Centesis catheter removed and digital pressure held to site for 1 minute.     1225 LLQ site soft, no drainage, large bandaid applied. VSS. IV removed and bandage applied.     1230 Discharge instructions reviewed. Pt taken via WC to discharge exit. Pts daughter to drive her home. Pt ans daughter aware of next paracentesis apt.

## 2025-06-27 NOTE — OR NURSING
1115 Pt arrived to Specials room 6 via WC. Hx, allergies, medications reviewed. Pt denies pain at this time. Consent obtained for ultrasound guided paracentesis.     1120 Pt assisted onto cart and turned onto left side, foam wedge placed to assist with positioning. Abdomen assessed with ultrasound to confirm fluid. VSS. Ultrasound student also scanning pt with US at this time.     1125 Timeout completed. Area cleansed with large tinted chloraprep. After 3 minute dry time, draped with fenestrated drape and sterile towels by HA CNP.     1128 Using U/S guidance, Sara Hernández CNP numbed site with 2% lidocaine, see eMar.     1129 Using U/S guidance, access obtained with [id number] centesis 5F catheter with return of fluid that appears clear yellow. Catheter connected to tubing and Marsha machine with suction at 200 mmHG and draining well. Pt tolerating well. VSS.     Drainage complete. Total @ ml removed. Centesis catheter removed and digital pressure held to site for 1 minute.     LLQ site soft, no drainage, large bandaid applied. VSS.     Discharge instructions reviewed. Pt taken to discharge exit. Pt daughter to drive them home.

## 2025-06-30 ENCOUNTER — TELEPHONE (OUTPATIENT)
Dept: INTERVENTIONAL RADIOLOGY/VASCULAR | Age: 80
End: 2025-06-30

## 2025-06-30 ENCOUNTER — TELEPHONE (OUTPATIENT)
Dept: PRIMARY CARE CLINIC | Age: 80
End: 2025-06-30

## 2025-06-30 NOTE — TELEPHONE ENCOUNTER
Alfred from Hematology Oncology called and stated that patient is not ready to start this and cancelled with them  They wanted to make you aware  ([phone])

## 2025-06-30 NOTE — TELEPHONE ENCOUNTER
1112 Post procedure phone call placed. Spoke to daughter of this patient. Patient had a paracentesis done on 06/27/2025. The daughter stated that the patient is doing fine. She stated that the patient feels pretty good today. No issues,problems, or complications over the weekend.

## 2025-07-02 ENCOUNTER — PRE-PROCEDURE TELEPHONE (OUTPATIENT)
Dept: INTERVENTIONAL RADIOLOGY/VASCULAR | Age: 80
End: 2025-07-02

## 2025-07-02 DIAGNOSIS — K74.60 CIRRHOSIS OF LIVER WITH ASCITES, UNSPECIFIED HEPATIC CIRRHOSIS TYPE (HCC): Primary | ICD-10-CM

## 2025-07-02 DIAGNOSIS — R18.8 CIRRHOSIS OF LIVER WITH ASCITES, UNSPECIFIED HEPATIC CIRRHOSIS TYPE (HCC): Primary | ICD-10-CM

## 2025-07-02 DIAGNOSIS — R18.8 OTHER ASCITES: ICD-10-CM

## 2025-07-02 NOTE — FLOWSHEET NOTE
Apt reminder called to pts daughterPretty Pizano aware of paracentesis apt on 7/3/25 at 1100.     Electronically signed by Sharmaine Cartagena RN on 7/2/2025 at 1:58 PM

## 2025-07-02 NOTE — TELEPHONE ENCOUNTER
The referral you placed for Dr. Monroe   Quality 130: Documentation Of Current Medications In The Medical Record: Current Medications Documented Detail Level: Detailed

## 2025-07-03 ENCOUNTER — TELEPHONE (OUTPATIENT)
Age: 80
End: 2025-07-03

## 2025-07-03 ENCOUNTER — HOSPITAL ENCOUNTER (OUTPATIENT)
Dept: INTERVENTIONAL RADIOLOGY/VASCULAR | Age: 80
Discharge: HOME | End: 2025-07-05
Payer: MEDICARE

## 2025-07-03 VITALS
OXYGEN SATURATION: 100 % | DIASTOLIC BLOOD PRESSURE: 53 MMHG | RESPIRATION RATE: 14 BRPM | SYSTOLIC BLOOD PRESSURE: 109 MMHG | HEART RATE: 63 BPM

## 2025-07-03 DIAGNOSIS — R18.8 CIRRHOSIS OF LIVER WITH ASCITES, UNSPECIFIED HEPATIC CIRRHOSIS TYPE (HCC): ICD-10-CM

## 2025-07-03 DIAGNOSIS — R18.8 OTHER ASCITES: ICD-10-CM

## 2025-07-03 DIAGNOSIS — K74.60 CIRRHOSIS OF LIVER WITH ASCITES, UNSPECIFIED HEPATIC CIRRHOSIS TYPE (HCC): ICD-10-CM

## 2025-07-03 PROCEDURE — C1729 CATH, DRAINAGE: HCPCS

## 2025-07-03 PROCEDURE — 6360000002 HC RX W HCPCS: Performed by: NURSE PRACTITIONER

## 2025-07-03 PROCEDURE — 49083 ABD PARACENTESIS W/IMAGING: CPT

## 2025-07-03 RX ORDER — LIDOCAINE HYDROCHLORIDE 20 MG/ML
INJECTION, SOLUTION INFILTRATION; PERINEURAL PRN
Status: COMPLETED | OUTPATIENT
Start: 2025-07-03 | End: 2025-07-03

## 2025-07-03 RX ADMIN — LIDOCAINE HYDROCHLORIDE 10 ML: 20 INJECTION, SOLUTION INFILTRATION; PERINEURAL at 10:59

## 2025-07-03 NOTE — TELEPHONE ENCOUNTER
Patient was given this information prior to leaving the Specials Department - voiced understanding    >  Paracentesis is scheduled on 7/3/2025 @ 11:00 am   >  You will need to arrive at 10:30 am from home and check in at the Diagnostic Imaging Check In desk.

## 2025-07-03 NOTE — OR NURSING
PARACENTESIS - NO SEDATION    1045 - Patient brought to specials room #6 via WC, A&Ox4 - calm and cooperative. Procedure explained and consent confirmed from previous encounter, no changes per request. Assisted to cart and positioned lying propped to left side using a pillow wedge. Initial images obtained using U/S guidance by NH, rad tech. Allergies, home medications and history reviewed.     Left side of abdomen cleansed by NH. After 3 minute dry time, draped with sterile drape and towels. HA, CNP paged.      1057 - Timeout completed for Ultrasound Guided Paracentesis.     1059 -  Site numbed with 2% lidocaine.  [id number] Centesis 5F catheter placed using Ultrasound guidance.  Fluid appears clear yellow. Catheter tubing connected to [employer] machine to remove fluid.    1134 - Pt tolerated well. Total 4990 ml removed. Patient's abdomen assessed with U/S guidance - could have a small amount left to drain, however this would put her over the 5L phuc. Patient declines, doesn't wish to drain over 5L or get an IV today. Catheter removed, pressure held and bandaid applied. Verbal and tactile reassurance given throughout.     1144 - Next para appointment made per patient request and confirmed with daughter Jerica. Assisted back into  and taken to hospital entrance, met by daughter who will drive her home. Electronically signed by Stefani Gonzales RN on 7/3/2025 at 11:49 AM

## 2025-07-03 NOTE — TELEPHONE ENCOUNTER
----- Message from NILSON Hyde CNP sent at 7/2/2025  3:00 PM EDT -----  Order placed. Please make order sheet. No labs or medication hold needed. Last GI appt in June with f/U in 6 months.      ----- Message -----  From: Nany Umaña RN  Sent: 6/27/2025  12:00 PM EDT  To: NILSON Hyde CNP; Jenny Jones MA    Next para 7/3 @ 1100

## 2025-07-10 ENCOUNTER — PRE-PROCEDURE TELEPHONE (OUTPATIENT)
Dept: INTERVENTIONAL RADIOLOGY/VASCULAR | Age: 80
End: 2025-07-10

## 2025-07-10 NOTE — FLOWSHEET NOTE
Apt reminder called to pts daughter, Jerica.     Confirmed paracentesis apt at 1100 on 7/11/25 with a 1030 arrival time. Electronically signed by Sharmaine Cartagena RN on 7/10/2025 at 11:18 AM

## 2025-07-11 ENCOUNTER — HOSPITAL ENCOUNTER (OUTPATIENT)
Dept: INTERVENTIONAL RADIOLOGY/VASCULAR | Age: 80
Discharge: HOME | End: 2025-07-13
Payer: MEDICARE

## 2025-07-11 VITALS — HEART RATE: 60 BPM | DIASTOLIC BLOOD PRESSURE: 51 MMHG | SYSTOLIC BLOOD PRESSURE: 103 MMHG | OXYGEN SATURATION: 99 %

## 2025-07-11 DIAGNOSIS — R18.8 ASCITES OF LIVER: ICD-10-CM

## 2025-07-11 PROCEDURE — 96365 THER/PROPH/DIAG IV INF INIT: CPT

## 2025-07-11 PROCEDURE — 6360000002 HC RX W HCPCS: Performed by: RADIOLOGY

## 2025-07-11 PROCEDURE — P9047 ALBUMIN (HUMAN), 25%, 50ML: HCPCS | Performed by: RADIOLOGY

## 2025-07-11 PROCEDURE — 2709999900 IR US GUIDED PARACENTESIS

## 2025-07-11 PROCEDURE — 49083 ABD PARACENTESIS W/IMAGING: CPT

## 2025-07-11 RX ORDER — LIDOCAINE HYDROCHLORIDE 20 MG/ML
INJECTION, SOLUTION INFILTRATION; PERINEURAL PRN
Status: DISCONTINUED | OUTPATIENT
Start: 2025-07-11 | End: 2025-07-14 | Stop reason: HOSPADM

## 2025-07-11 RX ORDER — ALBUMIN (HUMAN) 12.5 G/50ML
SOLUTION INTRAVENOUS CONTINUOUS PRN
Status: DISCONTINUED | OUTPATIENT
Start: 2025-07-11 | End: 2025-07-14 | Stop reason: HOSPADM

## 2025-07-11 RX ADMIN — ALBUMIN (HUMAN) 25 G: 12.5 SOLUTION INTRAVENOUS at 11:45

## 2025-07-11 RX ADMIN — LIDOCAINE HYDROCHLORIDE 10 ML: 20 INJECTION, SOLUTION INFILTRATION; PERINEURAL at 11:15

## 2025-07-11 NOTE — DISCHARGE INSTRUCTIONS
Ultrasound Guided Paracentesis    Activity:  Rest, avoid strenuous activity such as lifting heavy objects, and bending, stretching or pulling.     Diet:  Resume usual diet    Medication:  * Resume home medication unless otherwise instructed by your physician.  * (If Applicable) If taking any blood thinners, speak with your physician before restarting them.  * May take mild pain reliever, as needed, such as Acetaminophen or Ibuprofen.    INSTRUCTIONS OR COMMENTS RELATIVE TO SPECIFIC EXAM PERFORMED:    - Some tenderness at site of paracentesis will be normal for a few days.  - May remove band aid after 48 hours.   - Monitor the site for the next 24 - 48 hours.  - If you experience any drainage or bleeding at the site, hold pressure for 30 minutes and lay on side opposite of the procedure site (move fluid away from the   area of leakage).  If drainage or bleeding does not stop and seems excessive, call your physician or go to the ER for evaluation.   - If any signs of infection (redness, swelling, drainage/pus) at the site, go to ER for evaluation.   - Please keep all follow-up appointments with your Hepatologist. Schedule follow-up appointment for repeat paracentesis if necessary.       IF YOU DEVELOP ANY OF THE FOLLOWING SYMPTOMS, CONTACT PHYSICIAN LISTED BELOW:  Physician performing procedure: DR. Nelson - [phone] or [phone] and ask to be put in contact with the RADIOLOGY RN. After hours contact ER.  * Extreme pain that increases after leaving the hospital  * Active bleeding (refer to above instructions)  * Chills, fever above 100 degrees Farenheit and fatigue.  * Weakness, dizziness, lightheadedness or fainting.    If you are unable to contact any of the above physician and you feel you have a major problem, please go to the Emergency Room for treatment.    TOTAL REMOVED WITH PARACENTESIS TODAY: 6330 ml

## 2025-07-11 NOTE — OR NURSING
1105 Pt arrived to Specials room 6 via WC. Hx, allergies, medications reviewed. Pt denies abdominal pain at this time. Consent verified for ultrasound guided paracentesis.     1109 Pt assisted onto cart and turned onto left side, foam wedge placed to assist with positioning. Abdomen assessed with ultrasound to confirm fluid. VSS.     1111  cleansed with large tinted chloraprep. After 3 minute dry time, draped with fenestrated drape and sterile towels by  RN.     1114 Timeout completed.     1115 Using U/S guidance, Dr. Nelson numbed site with 2% lidocaine, see eMar.     1116 Using U/S guidance, access obtained with [id number] centesis 5F catheter with return of fluid that appears clear pale yellow. Catheter connected to tubing and Marsha machine with suction at 200 mmHG and draining well. Pt tolerating well . VSS.     1129 GUY RN in to speak with pt and offer apt slots for next paracentesis. Pt would like GUY RN to call her daughter to see what apt time works best as her daughter is her .     1145 IV established to R AC with US guidance. Albumin infusing at this time.     1200 Drainage complete. Total 6330 ml removed. Centesis catheter removed and digital pressure held to site for 1 minute.  LLQ site soft, no drainage, large bandaid applied. VSS.     1208 IV removed and bandage applied.     1210 Discharge instructions reviewed. Pt taken to discharge exit. Pts daughter Jerica to drive her home.

## 2025-07-14 ENCOUNTER — FOLLOWUP TELEPHONE ENCOUNTER (OUTPATIENT)
Dept: INTERVENTIONAL RADIOLOGY/VASCULAR | Age: 80
End: 2025-07-14

## 2025-07-14 ENCOUNTER — HOSPITAL ENCOUNTER (OUTPATIENT)
Dept: WOUND CARE | Age: 80
Discharge: HOME | End: 2025-07-14
Payer: MEDICARE

## 2025-07-14 VITALS
DIASTOLIC BLOOD PRESSURE: 49 MMHG | SYSTOLIC BLOOD PRESSURE: 81 MMHG | RESPIRATION RATE: 16 BRPM | HEART RATE: 76 BPM | TEMPERATURE: 97.9 F

## 2025-07-14 DIAGNOSIS — R18.8 ASCITES OF LIVER: Primary | ICD-10-CM

## 2025-07-14 DIAGNOSIS — R18.8 CIRRHOSIS OF LIVER WITH ASCITES, UNSPECIFIED HEPATIC CIRRHOSIS TYPE (HCC): ICD-10-CM

## 2025-07-14 DIAGNOSIS — L89.523 PRESSURE INJURY OF LEFT ANKLE, STAGE 3 (HCC): Primary | ICD-10-CM

## 2025-07-14 DIAGNOSIS — K74.60 CIRRHOSIS OF LIVER WITH ASCITES, UNSPECIFIED HEPATIC CIRRHOSIS TYPE (HCC): ICD-10-CM

## 2025-07-14 PROCEDURE — 6370000000 HC RX 637 (ALT 250 FOR IP)

## 2025-07-14 PROCEDURE — 11042 DBRDMT SUBQ TIS 1ST 20SQCM/<: CPT

## 2025-07-14 RX ORDER — LIDOCAINE HYDROCHLORIDE 20 MG/ML
JELLY TOPICAL PRN
Status: DISCONTINUED | OUTPATIENT
Start: 2025-07-14 | End: 2025-07-15 | Stop reason: HOSPADM

## 2025-07-14 RX ORDER — BETAMETHASONE DIPROPIONATE 0.5 MG/G
CREAM TOPICAL PRN
OUTPATIENT
Start: 2025-07-14

## 2025-07-14 RX ORDER — LIDOCAINE HYDROCHLORIDE 20 MG/ML
JELLY TOPICAL PRN
OUTPATIENT
Start: 2025-07-14

## 2025-07-14 RX ORDER — NEOMYCIN/BACITRACIN/POLYMYXINB 3.5-400-5K
OINTMENT (GRAM) TOPICAL PRN
OUTPATIENT
Start: 2025-07-14

## 2025-07-14 RX ORDER — SILVER SULFADIAZINE 10 MG/G
CREAM TOPICAL PRN
OUTPATIENT
Start: 2025-07-14

## 2025-07-14 RX ORDER — GENTAMICIN SULFATE 1 MG/G
OINTMENT TOPICAL PRN
OUTPATIENT
Start: 2025-07-14

## 2025-07-14 RX ORDER — LIDOCAINE 50 MG/G
OINTMENT TOPICAL PRN
OUTPATIENT
Start: 2025-07-14

## 2025-07-14 RX ORDER — CLOBETASOL PROPIONATE 0.5 MG/G
OINTMENT TOPICAL PRN
OUTPATIENT
Start: 2025-07-14

## 2025-07-14 RX ORDER — LIDOCAINE 40 MG/G
CREAM TOPICAL PRN
OUTPATIENT
Start: 2025-07-14

## 2025-07-14 RX ORDER — MUPIROCIN 2 %
OINTMENT (GRAM) TOPICAL PRN
OUTPATIENT
Start: 2025-07-14

## 2025-07-14 RX ORDER — BACITRACIN ZINC AND POLYMYXIN B SULFATE 500; 1000 [USP'U]/G; [USP'U]/G
OINTMENT TOPICAL PRN
OUTPATIENT
Start: 2025-07-14

## 2025-07-14 RX ORDER — LIDOCAINE HYDROCHLORIDE 40 MG/ML
SOLUTION TOPICAL PRN
OUTPATIENT
Start: 2025-07-14

## 2025-07-14 RX ORDER — TRIAMCINOLONE ACETONIDE 1 MG/G
OINTMENT TOPICAL PRN
OUTPATIENT
Start: 2025-07-14

## 2025-07-14 RX ORDER — SODIUM CHLOR/HYPOCHLOROUS ACID 0.033 %
SOLUTION, IRRIGATION IRRIGATION PRN
OUTPATIENT
Start: 2025-07-14

## 2025-07-14 RX ORDER — GINSENG 100 MG
CAPSULE ORAL PRN
OUTPATIENT
Start: 2025-07-14

## 2025-07-14 RX ADMIN — LIDOCAINE HYDROCHLORIDE: 20 JELLY TOPICAL at 10:06

## 2025-07-14 NOTE — DISCHARGE INSTRUCTIONS
[facility] and Hyper[facility] Medicine   Physician Orders and Discharge Instructions  [facility]  [address] [place]  [address]  Telephone: [phone]      [phone]        NAME:  Zach Aguirre                                                                                                  YOB: 1945  MEDICAL RECORD NUMBER:  [id number]     Your  is:  Paulina      Home Care/Facility: None     Wound Location: Left Lateral ankle      Dressing orders:  1.Cleanse wound(s) with normal saline.  2. Apply dry JÚNIOR  Or equivalent to wound bed.  3. Moisten JÚNIOR with a few drops of normal saline.  4. Cover with a bordered gauze   5. Change  Every other day or Monday, Wednesday, and Friday      Compression: not at this time      Offloading Device:      Other Instructions:  Elevate legs as much as possible. You can prop a pillow under your legs to help keep the pressure off.      Keep all dressings clean, dry and intact.  Keep pressure off the wound(s) at all times.      Follow up visit  2 weeks August 4, 2025 at 11:00 am      Please give 24 hour notice if unable to keep appointment. [phone]     If you experience any of the following, please call the Wound Care Service at  [phone] or go to the nearest emergency room.        *Increase in pain*Temperature over 101*Increase in drainage from your wound or a foul odor  *Uncontrolled swelling*Need for compression bandage changes due to slippage, breakthrough drainage       PLEASE NOTE: IF YOU ARE UNABLE TO OBTAIN WOUND SUPPLIES, CONTINUE TO USE THE SUPPLIES YOU HAVE AVAILABLE UNTIL YOU ARE ABLE TO REACH US. IT IS MOST IMPORTANT TO KEEP THE WOUND COVERED AT ALL TIMES     Electronically signed by Matias Maciel DPM on 7/14/2025 at 10:27 AM

## 2025-07-14 NOTE — PROGRESS NOTES
[facility]   Progress Note and Procedure Note      Zach Aguirre  MEDICAL RECORD NUMBER:  [id number]  AGE: 80 y.o.   GENDER: female  : 1945  EPISODE DATE:  2025    Subjective:     Chief Complaint   Patient presents with    Wound Check         HISTORY of PRESENT ILLNESS HPI     Zach Aguirre is a 80 y.o. female who presents today for wound/ulcer evaluation.   History of Wound Context: Left ankle diabetic ulcer.  She is improving.  Culture pos MSSA.  Tolerating PO bactrim well.  Denies nausea, vomiting, fevers, or chills.  Wound/Ulcer Pain Timing/Severity: intermittent  Quality of pain: sharp  Severity:   10   Modifying Factors: Pain is relieved/improved with rest  Associated Signs/Symptoms: drainage, numbness, and pain    Ulcer Identification:  Ulcer Type: diabetic  Contributing Factors: diabetes    Wound: N/A        PAST MEDICAL HISTORY        Diagnosis Date    Anxiety     DM (diabetes mellitus) (HCC) 2015    Hepatitis B infection     Hyperlipidemia     Hypothyroidism     Insomnia     Leg pain 2015    Post herpetic neuralgia     Unspecified sleep apnea     after seeing [facility] they state she is does not have sleep apnea.  Not using cpap       PAST SURGICAL HISTORY    Past Surgical History:   Procedure Laterality Date    ANKLE FRACTURE SURGERY  2016    DR ANDRADE  LT    BIOPSY / LIGATION TEMPORAL ARTERY Right 2019    RIGHT TEMPORAL ARTERY BIOPSY [place] [facility] [place]    BRAIN SURGERY      surgery for transgeminal neuraglia.  Insertion of sponge for chronic pain    COLONOSCOPY  2014    DR POND    COLONOSCOPY N/A 2021    COLONOSCOPY DIAGNOSTIC performed by Mei Pond MD at [facility] CENTER    HYSTERECTOMY (CERVIX STATUS UNKNOWN)      PARTIAL    LYMPHADENECTOMY      OTHER SURGICAL HISTORY Right     Trigeminal Neuralgia    PARACENTESIS Left 2024    4660 ml removed, performed by Sara Hernández CNP - diagnostics

## 2025-07-14 NOTE — FLOWSHEET NOTE
RN call to patient to follow up post-op, as pt had paracentesis on 7/11/25 with Dr. Nelson. Spoke to pts daughter as well.     Pt reports 0/10 pain to site.     Pt confirms site and dressing is clean, dry, and intact.     Pt has no questions or concerns at this time.     Pt instructed to call back the radiology dept and request to speak with a nurse if any questions or concerns should develop at [phone].

## 2025-07-14 NOTE — WOUND CARE
[facility] Physician Billing Sheet.     Zach Aguirre  AGE: 80 y.o.   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

## 2025-07-15 ENCOUNTER — TELEPHONE (OUTPATIENT)
Dept: PRIMARY CARE CLINIC | Age: 80
End: 2025-07-15

## 2025-07-15 NOTE — TELEPHONE ENCOUNTER
Albuterol inhaler was written for prn shortness of breath or wheezing purpose. I Dced it now. But my represcribe it if needed in future.     Wound care instructions will be per wound clinic.

## 2025-07-15 NOTE — TELEPHONE ENCOUNTER
[facility] nurse called and wanted to let you know they had start of care 7/14/25.     There are 2 level 2 med interactions:    Albuterol inhaler and Nadalol (dont see an inhaler in her recent meds list now),   And   Nadalol and trelegy.      Please clarify if the patient is still to be taking the albuterol inhaler.      Also she has been seeing the wound clinic, and they now have her down to 1 visit every 3 weeks.  They said if you want to change the ([facility]) frequency of their visits let them know.  You may also want to give orders for wound care also, since they dont have any orders for that.    Please contact [facility] nurse Deborah to discuss  if you would like.

## 2025-07-16 ENCOUNTER — TELEPHONE (OUTPATIENT)
Dept: PRIMARY CARE CLINIC | Age: 80
End: 2025-07-16

## 2025-07-16 NOTE — TELEPHONE ENCOUNTER
Edwardo called to give update to frequency of social work visits.  After today they will be telephone visits only until no longer needed.  These visits are non-billable.

## 2025-07-17 ENCOUNTER — PRE-PROCEDURE TELEPHONE (OUTPATIENT)
Dept: INTERVENTIONAL RADIOLOGY/VASCULAR | Age: 80
End: 2025-07-17

## 2025-07-17 NOTE — FLOWSHEET NOTE
Pre-procedure call placed re: upcoming IR appointment. Spoke with patient's daughter and reviewed the following information:     >  Paracentesis is scheduled on 7/18/2025 @ 1:00 pm     >  You will need to arrive at 12:30 pm from home and check in at the Diagnostic Imaging Check In desk.     Understanding indicated, no questions / concerns. Electronically signed by Stefani Gonzales RN on 7/17/2025 at 1:18 PM

## 2025-07-18 ENCOUNTER — HOSPITAL ENCOUNTER (OUTPATIENT)
Dept: INTERVENTIONAL RADIOLOGY/VASCULAR | Age: 80
Discharge: HOME | End: 2025-07-20
Payer: MEDICARE

## 2025-07-18 VITALS
DIASTOLIC BLOOD PRESSURE: 54 MMHG | HEART RATE: 71 BPM | OXYGEN SATURATION: 100 % | RESPIRATION RATE: 14 BRPM | SYSTOLIC BLOOD PRESSURE: 106 MMHG

## 2025-07-18 DIAGNOSIS — R18.8 CIRRHOSIS OF LIVER WITH ASCITES, UNSPECIFIED HEPATIC CIRRHOSIS TYPE (HCC): ICD-10-CM

## 2025-07-18 DIAGNOSIS — K74.60 CIRRHOSIS OF LIVER WITH ASCITES, UNSPECIFIED HEPATIC CIRRHOSIS TYPE (HCC): ICD-10-CM

## 2025-07-18 DIAGNOSIS — R18.8 ASCITES OF LIVER: ICD-10-CM

## 2025-07-18 LAB
ALBUMIN SERPL-MCNC: 2.9 G/DL (ref 3.5–4.6)
ALP SERPL-CCNC: 166 U/L (ref 40–130)
ALT SERPL-CCNC: 22 U/L (ref 0–33)
ANION GAP SERPL CALCULATED.3IONS-SCNC: 9 MEQ/L (ref 9–15)
AST SERPL-CCNC: 35 U/L (ref 0–35)
BASOPHILS # BLD: 0 K/UL (ref 0–0.2)
BASOPHILS NFR BLD: 0.5 %
BILIRUB SERPL-MCNC: 0.5 MG/DL (ref 0.2–0.7)
BUN SERPL-MCNC: 23 MG/DL (ref 8–23)
CALCIUM SERPL-MCNC: 8.3 MG/DL (ref 8.5–9.9)
CHLORIDE SERPL-SCNC: 106 MEQ/L (ref 95–107)
CO2 SERPL-SCNC: 21 MEQ/L (ref 20–31)
CREAT SERPL-MCNC: 1.29 MG/DL (ref 0.5–0.9)
EOSINOPHIL # BLD: 0.1 K/UL (ref 0–0.7)
EOSINOPHIL NFR BLD: 1.8 %
ERYTHROCYTE [DISTWIDTH] IN BLOOD BY AUTOMATED COUNT: 18.5 % (ref 11.5–14.5)
GLOBULIN SER CALC-MCNC: 2.7 G/DL (ref 2.3–3.5)
GLUCOSE SERPL-MCNC: 155 MG/DL (ref 70–99)
HCT VFR BLD AUTO: 26.5 % (ref 37–47)
HGB BLD-MCNC: 8 G/DL (ref 12–16)
LDH SERPL-CCNC: 204 U/L (ref 135–214)
LYMPHOCYTES # BLD: 0.6 K/UL (ref 1–4.8)
LYMPHOCYTES NFR BLD: 16.1 %
MCH RBC QN AUTO: 23.7 PG (ref 27–31.3)
MCHC RBC AUTO-ENTMCNC: 30.2 % (ref 33–37)
MCV RBC AUTO: 78.4 FL (ref 79.4–94.8)
MONOCYTES # BLD: 0.5 K/UL (ref 0.2–0.8)
MONOCYTES NFR BLD: 11.6 %
NEUTROPHILS # BLD: 2.8 K/UL (ref 1.4–6.5)
NEUTS SEG NFR BLD: 69.7 %
PLATELET # BLD AUTO: 137 K/UL (ref 130–400)
POTASSIUM SERPL-SCNC: 3.9 MEQ/L (ref 3.4–4.9)
PROT SERPL-MCNC: 5.6 G/DL (ref 6.3–8)
RBC # BLD AUTO: 3.38 M/UL (ref 4.2–5.4)
RETICS # AUTO: 0.06 M/CUMM (ref 0.02–0.11)
RETICS/RBC NFR: 1.9 % (ref 0.6–2.2)
SODIUM SERPL-SCNC: 136 MEQ/L (ref 135–144)
WBC # BLD AUTO: 4 K/UL (ref 4.8–10.8)

## 2025-07-18 PROCEDURE — 83615 LACTATE (LD) (LDH) ENZYME: CPT

## 2025-07-18 PROCEDURE — 85046 RETICYTE/HGB CONCENTRATE: CPT

## 2025-07-18 PROCEDURE — 82784 ASSAY IGA/IGD/IGG/IGM EACH: CPT

## 2025-07-18 PROCEDURE — 49083 ABD PARACENTESIS W/IMAGING: CPT | Performed by: RADIOLOGY

## 2025-07-18 PROCEDURE — 82728 ASSAY OF FERRITIN: CPT

## 2025-07-18 PROCEDURE — P9047 ALBUMIN (HUMAN), 25%, 50ML: HCPCS | Performed by: NURSE PRACTITIONER

## 2025-07-18 PROCEDURE — 6360000002 HC RX W HCPCS: Performed by: NURSE PRACTITIONER

## 2025-07-18 PROCEDURE — 85025 COMPLETE CBC W/AUTO DIFF WBC: CPT

## 2025-07-18 PROCEDURE — 96365 THER/PROPH/DIAG IV INF INIT: CPT

## 2025-07-18 PROCEDURE — 84466 ASSAY OF TRANSFERRIN: CPT

## 2025-07-18 PROCEDURE — 83520 IMMUNOASSAY QUANT NOS NONAB: CPT

## 2025-07-18 PROCEDURE — C1729 CATH, DRAINAGE: HCPCS

## 2025-07-18 PROCEDURE — 83540 ASSAY OF IRON: CPT

## 2025-07-18 PROCEDURE — 49083 ABD PARACENTESIS W/IMAGING: CPT

## 2025-07-18 PROCEDURE — 80053 COMPREHEN METABOLIC PANEL: CPT

## 2025-07-18 PROCEDURE — 82746 ASSAY OF FOLIC ACID SERUM: CPT

## 2025-07-18 RX ORDER — LIDOCAINE HYDROCHLORIDE 20 MG/ML
INJECTION, SOLUTION INFILTRATION; PERINEURAL PRN
Status: COMPLETED | OUTPATIENT
Start: 2025-07-18 | End: 2025-07-18

## 2025-07-18 RX ORDER — ALBUMIN (HUMAN) 12.5 G/50ML
SOLUTION INTRAVENOUS CONTINUOUS PRN
Status: COMPLETED | OUTPATIENT
Start: 2025-07-18 | End: 2025-07-18

## 2025-07-18 RX ADMIN — LIDOCAINE HYDROCHLORIDE 5 ML: 20 INJECTION, SOLUTION INFILTRATION; PERINEURAL at 13:27

## 2025-07-18 RX ADMIN — ALBUMIN (HUMAN) 25 G: 12.5 SOLUTION INTRAVENOUS at 13:50

## 2025-07-18 NOTE — OR NURSING
NO SEDATION      Pt arrived to Specials room 6 via wc and ambulated to cart, steady gait. Pt A&Ox4, respirations even and unlabored, abdomen distended but soft. Hx, allergies, medications reviewed. Pt offered reassurance and VSS. Pt denies pain at this time. Consent confirmed for ultrasound guided paracentesis from 6/27/25.     U/S image obtained, Sara Hernández CNP approved Q site.     Area cleansed with large tinted chloraprep. After 3 minute dry time, draped with fenestrated drape and sterile towels by HA-CNP.     Timeout completed.     Using U/S guidance, HA-CNP numbed site with 2% lidocaine, see eMar.     Using U/S guidance, access obtained with [id number] centesis 5F catheter with return of fluid that appears clear pale yellow. Catheter connected to tubing and Marsha machine with suction and draining well. Pt tolerating well. VSS.     Handoff of care to Sharmaine PARRA. Pt draining well.Electronically signed by Chica Power RN on 7/18/2025 at 1:51 PM

## 2025-07-18 NOTE — OR NURSING
1350 Assumed care of patient. US guided 20 g iv placed to R brachial vein. Labs drawn per pt request as she was at outpt lab yesterday and were unsuccessful obtaining lab work.  Pt tolerated very well. Albumin infusion initiated.     1405 Pt tolerating well. VSS. Albumin continues to infuse without complication.     1422 Drainage complete. Total 6230 ml removed. Centesis catheter removed and digital pressure held to site for 1 minute. LLQ site soft, no drainage, large bandaid applied. VSS.     1428 Iv removed, bandage applied. Discharge instructions reviewed. Pt taken to discharge exit. Pts daughter to drive her home. Pt aware of next Friday apt. At 1100.  PT tolerated well.     1436 RH RN to take labs to lab.

## 2025-07-18 NOTE — DISCHARGE INSTRUCTIONS
Ultrasound Guided Paracentesis    Activity:  Rest, avoid strenuous activity such as lifting heavy objects, and bending, stretching or pulling.     Diet:  Resume usual diet    Medication:  * Resume home medication unless otherwise instructed by your physician.  * (If Applicable) If taking any blood thinners, speak with your physician before restarting them.  * May take mild pain reliever, as needed, such as Acetaminophen or Ibuprofen.    INSTRUCTIONS OR COMMENTS RELATIVE TO SPECIFIC EXAM PERFORMED:    - Some tenderness at site of paracentesis will be normal for a few days.  - May remove band aid after 48 hours.   - Monitor the site for the next 24 - 48 hours.  - If you experience any drainage or bleeding at the site, hold pressure for 30 minutes and lay on side opposite of the procedure site (move fluid away from the   area of leakage).  If drainage or bleeding does not stop and seems excessive, call your physician or go to the ER for evaluation.   - If any signs of infection (redness, swelling, drainage/pus) at the site, go to ER for evaluation.   - Please keep all follow-up appointments with your Hepatologist. Schedule follow-up appointment for repeat paracentesis if necessary.       IF YOU DEVELOP ANY OF THE FOLLOWING SYMPTOMS, CONTACT PHYSICIAN LISTED BELOW:  Physician performing procedure: DR. Nelson - [phone] or [phone] and ask to be put in contact with the RADIOLOGY RN. After hours contact ER.  * Extreme pain that increases after leaving the hospital  * Active bleeding (refer to above instructions)  * Chills, fever above 100 degrees Farenheit and fatigue.  * Weakness, dizziness, lightheadedness or fainting.    If you are unable to contact any of the above physician and you feel you have a major problem, please go to the Emergency Room for treatment.

## 2025-07-19 LAB
FERRITIN: 14 NG/ML
FOLATE: 8.9 NG/ML (ref 4.8–24.2)
IRON % SATURATION: 7 % (ref 20–55)
IRON: 17 UG/DL (ref 37–145)
TOTAL IRON BINDING CAPACITY: 235 UG/DL (ref 250–450)
UNSATURATED IRON BINDING CAPACITY: 218 UG/DL (ref 112–347)

## 2025-07-20 LAB
DEPRECATED KAPPA LC FREE/LAMBDA SER: 1.3 {RATIO} (ref 0.26–1.65)
IGA SERPL-MCNC: 594 MG/DL (ref 68–408)
IGG SERPL-MCNC: 859 MG/DL (ref 768–1632)
IGM SERPL-MCNC: 80 MG/DL (ref 35–263)
KAPPA LC FREE SER-MCNC: 65.18 MG/L (ref 3.3–19.4)
LAMBDA LC FREE SERPL-MCNC: 50.31 MG/L (ref 5.71–26.3)

## 2025-07-21 ENCOUNTER — POST-OP TELEPHONE (OUTPATIENT)
Dept: INTERVENTIONAL RADIOLOGY/VASCULAR | Age: 80
End: 2025-07-21

## 2025-07-21 LAB — TRANSFERRIN SERPL-MCNC: 196 MG/DL (ref 200–360)

## 2025-07-21 NOTE — FLOWSHEET NOTE
IR follow up re: paracentesis procedure completed on 7/18/25. Spoke with patient's daughter Jerica who reports no issues at this time. Aware of next scheduled para date & time. Electronically signed by Stefani Gonzales RN on 7/21/2025 at 11:00 AM

## 2025-07-24 ENCOUNTER — TELEPHONE (OUTPATIENT)
Dept: INTERVENTIONAL RADIOLOGY/VASCULAR | Age: 80
End: 2025-07-24

## 2025-07-28 ENCOUNTER — TELEPHONE (OUTPATIENT)
Dept: PRIMARY CARE CLINIC | Age: 80
End: 2025-07-28

## 2025-07-28 ENCOUNTER — TELEPHONE (OUTPATIENT)
Dept: INTERVENTIONAL RADIOLOGY/VASCULAR | Age: 80
End: 2025-07-28

## 2025-07-28 NOTE — TELEPHONE ENCOUNTER
Pre-procedure reminder call made to pt's daughter, Jerica. Jerica confirms pt is feeling better and will arrive at 1030 on 7/29/25.

## 2025-07-29 ENCOUNTER — APPOINTMENT (OUTPATIENT)
Dept: GENERAL RADIOLOGY | Age: 80
DRG: 682 | End: 2025-07-29
Payer: MEDICARE

## 2025-07-29 ENCOUNTER — APPOINTMENT (OUTPATIENT)
Dept: ULTRASOUND IMAGING | Age: 80
DRG: 682 | End: 2025-07-29
Attending: STUDENT IN AN ORGANIZED HEALTH CARE EDUCATION/TRAINING PROGRAM
Payer: MEDICARE

## 2025-07-29 ENCOUNTER — PRE-PROCEDURE TELEPHONE (OUTPATIENT)
Dept: INTERVENTIONAL RADIOLOGY/VASCULAR | Age: 80
End: 2025-07-29

## 2025-07-29 ENCOUNTER — APPOINTMENT (OUTPATIENT)
Dept: CT IMAGING | Age: 80
DRG: 682 | End: 2025-07-29
Attending: STUDENT IN AN ORGANIZED HEALTH CARE EDUCATION/TRAINING PROGRAM
Payer: MEDICARE

## 2025-07-29 ENCOUNTER — HOSPITAL ENCOUNTER (EMERGENCY)
Dept: INTERVENTIONAL RADIOLOGY/VASCULAR | Age: 80
Discharge: HOME | DRG: 682 | End: 2025-07-31
Payer: MEDICARE

## 2025-07-29 ENCOUNTER — HOSPITAL ENCOUNTER (INPATIENT)
Age: 80
LOS: 1 days | Discharge: HOME | DRG: 682 | End: 2025-07-30
Attending: STUDENT IN AN ORGANIZED HEALTH CARE EDUCATION/TRAINING PROGRAM | Admitting: INTERNAL MEDICINE
Payer: MEDICARE

## 2025-07-29 ENCOUNTER — APPOINTMENT (OUTPATIENT)
Dept: CT IMAGING | Age: 80
DRG: 682 | End: 2025-07-29
Payer: MEDICARE

## 2025-07-29 VITALS
SYSTOLIC BLOOD PRESSURE: 117 MMHG | HEART RATE: 80 BPM | OXYGEN SATURATION: 98 % | DIASTOLIC BLOOD PRESSURE: 57 MMHG | RESPIRATION RATE: 14 BRPM

## 2025-07-29 DIAGNOSIS — R18.8 OTHER ASCITES: Primary | ICD-10-CM

## 2025-07-29 DIAGNOSIS — N17.9 AKI (ACUTE KIDNEY INJURY): ICD-10-CM

## 2025-07-29 DIAGNOSIS — K21.9 GASTROESOPHAGEAL REFLUX DISEASE WITHOUT ESOPHAGITIS: ICD-10-CM

## 2025-07-29 DIAGNOSIS — E86.0 DEHYDRATION: ICD-10-CM

## 2025-07-29 DIAGNOSIS — R18.8 CIRRHOSIS OF LIVER WITH ASCITES, UNSPECIFIED HEPATIC CIRRHOSIS TYPE (HCC): ICD-10-CM

## 2025-07-29 DIAGNOSIS — K74.60 CIRRHOSIS OF LIVER WITH ASCITES, UNSPECIFIED HEPATIC CIRRHOSIS TYPE (HCC): ICD-10-CM

## 2025-07-29 LAB
ABO/RH: NORMAL
ACANTHOCYTES BLD QL SMEAR: ABNORMAL
ALBUMIN SERPL-MCNC: 2.9 G/DL (ref 3.5–4.6)
ALP SERPL-CCNC: 166 U/L (ref 40–130)
ALT SERPL-CCNC: 25 U/L (ref 0–33)
AMMONIA PLAS-SCNC: 25 UMOL/L (ref 11–51)
ANION GAP SERPL CALCULATED.3IONS-SCNC: 9 MEQ/L (ref 9–15)
ANISOCYTOSIS BLD QL SMEAR: ABNORMAL
ANTIBODY SCREEN: NORMAL
APPEARANCE FLUID: NORMAL
APTT PPP: 25.5 SEC (ref 24.4–36.8)
AST SERPL-CCNC: 36 U/L (ref 0–35)
BACTERIA URNS QL MICRO: NEGATIVE /HPF
BASE EXCESS VENOUS: -2 (ref -3–3)
BASOPHILS # BLD: 0 K/UL (ref 0–0.2)
BASOPHILS NFR BLD: 0.6 %
BILIRUB SERPL-MCNC: 1.2 MG/DL (ref 0.2–0.7)
BILIRUB UR QL STRIP: NEGATIVE
BNP BLD-MCNC: 799 PG/ML
BUN SERPL-MCNC: 32 MG/DL (ref 8–23)
BURR CELLS BLD QL SMEAR: ABNORMAL
CALCIUM IONIZED: 1.23 MMOL/L (ref 1.12–1.32)
CALCIUM SERPL-MCNC: 8.7 MG/DL (ref 8.5–9.9)
CELL COUNT FLUID TYPE: NORMAL
CHLORIDE SERPL-SCNC: 108 MEQ/L (ref 95–107)
CK SERPL-CCNC: 62 U/L (ref 0–170)
CLARITY UR: CLEAR
CLOT EVALUATION: NORMAL
CO2 SERPL-SCNC: 22 MEQ/L (ref 20–31)
COLOR FLUID: YELLOW
COLOR UR: YELLOW
CREAT SERPL-MCNC: 1.42 MG/DL (ref 0.5–0.9)
EOSINOPHIL # BLD: 0.2 K/UL (ref 0–0.7)
EOSINOPHIL NFR BLD: 3 %
EPI CELLS #/AREA URNS AUTO: ABNORMAL /HPF (ref 0–5)
ERYTHROCYTE [DISTWIDTH] IN BLOOD BY AUTOMATED COUNT: 22.4 % (ref 11.5–14.5)
FLUID PATH CONSULT: YES
FLUID TYPE: NORMAL
GLOBULIN SER CALC-MCNC: 3.1 G/DL (ref 2.3–3.5)
GLUCOSE BLD-MCNC: 139 MG/DL (ref 70–99)
GLUCOSE BLD-MCNC: 167 MG/DL (ref 70–99)
GLUCOSE FLD-MCNC: 160 MG/DL
GLUCOSE SERPL-MCNC: 155 MG/DL (ref 70–99)
GLUCOSE UR STRIP-MCNC: NEGATIVE MG/DL
HCO3 VENOUS: 23.4 MMOL/L (ref 23–29)
HCT VFR BLD AUTO: 31 % (ref 36–48)
HCT VFR BLD AUTO: 31.8 % (ref 37–47)
HGB BLD CALC-MCNC: 10.6 GM/DL (ref 12–16)
HGB BLD-MCNC: 9.5 G/DL (ref 12–16)
HGB UR QL STRIP: NEGATIVE
HYALINE CASTS #/AREA URNS AUTO: ABNORMAL /HPF (ref 0–5)
HYPOCHROMIA BLD QL SMEAR: ABNORMAL
INR PPP: 1.1
KETONES UR STRIP-MCNC: NEGATIVE MG/DL
LACTATE BLDV-SCNC: 1.6 MMOL/L (ref 0.5–2.2)
LACTATE: 1.77 MMOL/L (ref 0.4–2)
LEUKOCYTE ESTERASE UR QL STRIP: ABNORMAL
LIPASE SERPL-CCNC: 80 U/L (ref 12–95)
LYMPHOCYTES # BLD: 0.4 K/UL (ref 1–4.8)
LYMPHOCYTES NFR BLD: 5 %
LYMPHOCYTES, BODY FLUID: 26 %
MAGNESIUM SERPL-MCNC: 2.3 MG/DL (ref 1.7–2.4)
MCH RBC QN AUTO: 24.9 PG (ref 27–31.3)
MCHC RBC AUTO-ENTMCNC: 29.9 % (ref 33–37)
MCV RBC AUTO: 83.2 FL (ref 79.4–94.8)
MESOTHELIAL FLUID: 6 %
MONOCYTE, FLUID: 13 %
MONOCYTES # BLD: 0.3 K/UL (ref 0.2–0.8)
MONOCYTES NFR BLD: 3.8 %
NEUTROPHIL, FLUID: 55 %
NEUTROPHILS # BLD: 6.3 K/UL (ref 1.4–6.5)
NEUTS SEG NFR BLD: 88 %
NITRITE UR QL STRIP: NEGATIVE
NUCLEATED CELLS FLUID: 152 /CUMM
NUMBER OF CELLS COUNTED FLUID: 100
O2 SAT, VEN: 46 %
OVALOCYTES BLD QL SMEAR: ABNORMAL
PCO2 VENOUS: 40.6 MM HG (ref 40–50)
PERFORMED ON: ABNORMAL
PERFORMED ON: ABNORMAL
PH UR STRIP: 5.5 [PH] (ref 5–9)
PH VENOUS: 7.37 (ref 7.32–7.42)
PLATELET # BLD AUTO: 167 K/UL (ref 130–400)
PLATELET BLD QL SMEAR: ABNORMAL
PO2 VENOUS: 26 MM HG
POC CHLORIDE: 111 MEQ/L (ref 99–110)
POC CREATININE: 1.4 MG/DL (ref 0.6–1.2)
POC SAMPLE TYPE: ABNORMAL
POIKILOCYTOSIS BLD QL SMEAR: ABNORMAL
POLYCHROMASIA BLD QL SMEAR: ABNORMAL
POTASSIUM SERPL-SCNC: 4 MEQ/L (ref 3.4–4.9)
POTASSIUM SERPL-SCNC: 4.6 MEQ/L (ref 3.5–5.1)
PROCALCITONIN SERPL IA-MCNC: 0.35 NG/ML (ref 0–0.15)
PROMYELOCYTES NFR BLD MANUAL: 1 %
PROT FLD-MCNC: 0.9 G/DL
PROT SERPL-MCNC: 6 G/DL (ref 6.3–8)
PROT UR STRIP-MCNC: NEGATIVE MG/DL
PROTHROMBIN TIME: 14.9 SEC (ref 12.3–14.9)
RBC # BLD AUTO: 3.82 M/UL (ref 4.2–5.4)
RBC #/AREA URNS AUTO: ABNORMAL /HPF (ref 0–5)
RBC FLUID: <2000 /CUMM
RENAL EPI CELLS #/AREA URNS HPF: ABNORMAL /HPF
SODIUM BLD-SCNC: 142 MEQ/L (ref 136–145)
SODIUM SERPL-SCNC: 139 MEQ/L (ref 135–144)
SP GR UR STRIP: 1.02 (ref 1–1.03)
T4 FREE SERPL-MCNC: 1.27 NG/DL (ref 0.84–1.68)
TCO2 CALC VENOUS: 25 MMOL/L
TSH REFLEX: 14.81 UIU/ML (ref 0.44–3.86)
URINE REFLEX TO CULTURE: YES
UROBILINOGEN UR STRIP-ACNC: 1 E.U./DL
WBC # BLD AUTO: 7.1 K/UL (ref 4.8–10.8)
WBC #/AREA URNS AUTO: ABNORMAL /HPF (ref 0–5)

## 2025-07-29 PROCEDURE — 6360000002 HC RX W HCPCS: Performed by: RADIOLOGY

## 2025-07-29 PROCEDURE — 85025 COMPLETE CBC W/AUTO DIFF WBC: CPT

## 2025-07-29 PROCEDURE — 82565 ASSAY OF CREATININE: CPT

## 2025-07-29 PROCEDURE — 83690 ASSAY OF LIPASE: CPT

## 2025-07-29 PROCEDURE — 85610 PROTHROMBIN TIME: CPT

## 2025-07-29 PROCEDURE — 36415 COLL VENOUS BLD VENIPUNCTURE: CPT

## 2025-07-29 PROCEDURE — 36600 WITHDRAWAL OF ARTERIAL BLOOD: CPT

## 2025-07-29 PROCEDURE — 76775 US EXAM ABDO BACK WALL LIM: CPT

## 2025-07-29 PROCEDURE — 2580000003 HC RX 258: Performed by: STUDENT IN AN ORGANIZED HEALTH CARE EDUCATION/TRAINING PROGRAM

## 2025-07-29 PROCEDURE — 84145 PROCALCITONIN (PCT): CPT

## 2025-07-29 PROCEDURE — 82330 ASSAY OF CALCIUM: CPT

## 2025-07-29 PROCEDURE — 99285 EMERGENCY DEPT VISIT HI MDM: CPT

## 2025-07-29 PROCEDURE — 84295 ASSAY OF SERUM SODIUM: CPT

## 2025-07-29 PROCEDURE — 6370000000 HC RX 637 (ALT 250 FOR IP): Performed by: INTERNAL MEDICINE

## 2025-07-29 PROCEDURE — 85730 THROMBOPLASTIN TIME PARTIAL: CPT

## 2025-07-29 PROCEDURE — 88305 TISSUE EXAM BY PATHOLOGIST: CPT

## 2025-07-29 PROCEDURE — 49083 ABD PARACENTESIS W/IMAGING: CPT

## 2025-07-29 PROCEDURE — 83735 ASSAY OF MAGNESIUM: CPT

## 2025-07-29 PROCEDURE — 88342 IMHCHEM/IMCYTCHM 1ST ANTB: CPT

## 2025-07-29 PROCEDURE — 86901 BLOOD TYPING SEROLOGIC RH(D): CPT

## 2025-07-29 PROCEDURE — 86850 RBC ANTIBODY SCREEN: CPT

## 2025-07-29 PROCEDURE — 96360 HYDRATION IV INFUSION INIT: CPT

## 2025-07-29 PROCEDURE — 82803 BLOOD GASES ANY COMBINATION: CPT

## 2025-07-29 PROCEDURE — 70450 CT HEAD/BRAIN W/O DYE: CPT

## 2025-07-29 PROCEDURE — 83880 ASSAY OF NATRIURETIC PEPTIDE: CPT

## 2025-07-29 PROCEDURE — 2500000003 HC RX 250 WO HCPCS: Performed by: INTERNAL MEDICINE

## 2025-07-29 PROCEDURE — C1729 CATH, DRAINAGE: HCPCS

## 2025-07-29 PROCEDURE — 83605 ASSAY OF LACTIC ACID: CPT

## 2025-07-29 PROCEDURE — 82550 ASSAY OF CK (CPK): CPT

## 2025-07-29 PROCEDURE — 82435 ASSAY OF BLOOD CHLORIDE: CPT

## 2025-07-29 PROCEDURE — 0W9G3ZZ DRAINAGE OF PERITONEAL CAVITY, PERCUTANEOUS APPROACH: ICD-10-PCS | Performed by: RADIOLOGY

## 2025-07-29 PROCEDURE — 74176 CT ABD & PELVIS W/O CONTRAST: CPT

## 2025-07-29 PROCEDURE — 87070 CULTURE OTHR SPECIMN AEROBIC: CPT

## 2025-07-29 PROCEDURE — 86900 BLOOD TYPING SEROLOGIC ABO: CPT

## 2025-07-29 PROCEDURE — 82042 OTHER SOURCE ALBUMIN QUAN EA: CPT

## 2025-07-29 PROCEDURE — 84443 ASSAY THYROID STIM HORMONE: CPT

## 2025-07-29 PROCEDURE — 82945 GLUCOSE OTHER FLUID: CPT

## 2025-07-29 PROCEDURE — 87077 CULTURE AEROBIC IDENTIFY: CPT

## 2025-07-29 PROCEDURE — 93005 ELECTROCARDIOGRAM TRACING: CPT | Performed by: STUDENT IN AN ORGANIZED HEALTH CARE EDUCATION/TRAINING PROGRAM

## 2025-07-29 PROCEDURE — P9047 ALBUMIN (HUMAN), 25%, 50ML: HCPCS | Performed by: RADIOLOGY

## 2025-07-29 PROCEDURE — 80053 COMPREHEN METABOLIC PANEL: CPT

## 2025-07-29 PROCEDURE — 87186 SC STD MICRODIL/AGAR DIL: CPT

## 2025-07-29 PROCEDURE — 1210000000 HC MED SURG R&B

## 2025-07-29 PROCEDURE — 85014 HEMATOCRIT: CPT

## 2025-07-29 PROCEDURE — 82140 ASSAY OF AMMONIA: CPT

## 2025-07-29 PROCEDURE — 82150 ASSAY OF AMYLASE: CPT

## 2025-07-29 PROCEDURE — 84439 ASSAY OF FREE THYROXINE: CPT

## 2025-07-29 PROCEDURE — 84132 ASSAY OF SERUM POTASSIUM: CPT

## 2025-07-29 PROCEDURE — 83615 LACTATE (LD) (LDH) ENZYME: CPT

## 2025-07-29 PROCEDURE — 81001 URINALYSIS AUTO W/SCOPE: CPT

## 2025-07-29 PROCEDURE — 96361 HYDRATE IV INFUSION ADD-ON: CPT

## 2025-07-29 PROCEDURE — 72125 CT NECK SPINE W/O DYE: CPT

## 2025-07-29 PROCEDURE — 84157 ASSAY OF PROTEIN OTHER: CPT

## 2025-07-29 PROCEDURE — 88341 IMHCHEM/IMCYTCHM EA ADD ANTB: CPT

## 2025-07-29 PROCEDURE — 71045 X-RAY EXAM CHEST 1 VIEW: CPT

## 2025-07-29 PROCEDURE — 83036 HEMOGLOBIN GLYCOSYLATED A1C: CPT

## 2025-07-29 PROCEDURE — 6360000002 HC RX W HCPCS: Performed by: INTERNAL MEDICINE

## 2025-07-29 PROCEDURE — 87086 URINE CULTURE/COLONY COUNT: CPT

## 2025-07-29 PROCEDURE — 88112 CYTOPATH CELL ENHANCE TECH: CPT

## 2025-07-29 PROCEDURE — 89051 BODY FLUID CELL COUNT: CPT

## 2025-07-29 RX ORDER — POTASSIUM CHLORIDE 1500 MG/1
40 TABLET, EXTENDED RELEASE ORAL PRN
Status: DISCONTINUED | OUTPATIENT
Start: 2025-07-29 | End: 2025-07-30 | Stop reason: HOSPADM

## 2025-07-29 RX ORDER — ONDANSETRON 2 MG/ML
4 INJECTION INTRAMUSCULAR; INTRAVENOUS EVERY 6 HOURS PRN
Status: DISCONTINUED | OUTPATIENT
Start: 2025-07-29 | End: 2025-07-30 | Stop reason: HOSPADM

## 2025-07-29 RX ORDER — 0.9 % SODIUM CHLORIDE 0.9 %
1000 INTRAVENOUS SOLUTION INTRAVENOUS ONCE
Status: COMPLETED | OUTPATIENT
Start: 2025-07-29 | End: 2025-07-29

## 2025-07-29 RX ORDER — INSULIN LISPRO 100 [IU]/ML
0-4 INJECTION, SOLUTION INTRAVENOUS; SUBCUTANEOUS
Status: DISCONTINUED | OUTPATIENT
Start: 2025-07-29 | End: 2025-07-30 | Stop reason: HOSPADM

## 2025-07-29 RX ORDER — ALBUMIN (HUMAN) 12.5 G/50ML
SOLUTION INTRAVENOUS CONTINUOUS PRN
Status: COMPLETED | OUTPATIENT
Start: 2025-07-29 | End: 2025-07-29

## 2025-07-29 RX ORDER — NADOLOL 40 MG/1
20 TABLET ORAL DAILY
Status: DISCONTINUED | OUTPATIENT
Start: 2025-07-30 | End: 2025-07-30 | Stop reason: HOSPADM

## 2025-07-29 RX ORDER — CITALOPRAM HYDROBROMIDE 10 MG/1
30 TABLET ORAL DAILY
Status: DISCONTINUED | OUTPATIENT
Start: 2025-07-29 | End: 2025-07-30 | Stop reason: HOSPADM

## 2025-07-29 RX ORDER — POLYETHYLENE GLYCOL 3350 17 G/17G
17 POWDER, FOR SOLUTION ORAL DAILY PRN
Status: DISCONTINUED | OUTPATIENT
Start: 2025-07-29 | End: 2025-07-30 | Stop reason: HOSPADM

## 2025-07-29 RX ORDER — SODIUM CHLORIDE 0.9 % (FLUSH) 0.9 %
5-40 SYRINGE (ML) INJECTION EVERY 12 HOURS SCHEDULED
Status: DISCONTINUED | OUTPATIENT
Start: 2025-07-29 | End: 2025-07-30 | Stop reason: HOSPADM

## 2025-07-29 RX ORDER — BUDESONIDE AND FORMOTEROL FUMARATE DIHYDRATE 80; 4.5 UG/1; UG/1
2 AEROSOL RESPIRATORY (INHALATION)
Status: DISCONTINUED | OUTPATIENT
Start: 2025-07-29 | End: 2025-07-30 | Stop reason: HOSPADM

## 2025-07-29 RX ORDER — ACETAMINOPHEN 650 MG/1
650 SUPPOSITORY RECTAL EVERY 6 HOURS PRN
Status: DISCONTINUED | OUTPATIENT
Start: 2025-07-29 | End: 2025-07-30 | Stop reason: HOSPADM

## 2025-07-29 RX ORDER — ACETAMINOPHEN 325 MG/1
650 TABLET ORAL EVERY 6 HOURS PRN
Status: DISCONTINUED | OUTPATIENT
Start: 2025-07-29 | End: 2025-07-30 | Stop reason: HOSPADM

## 2025-07-29 RX ORDER — DEXTROSE MONOHYDRATE 100 MG/ML
INJECTION, SOLUTION INTRAVENOUS CONTINUOUS PRN
Status: DISCONTINUED | OUTPATIENT
Start: 2025-07-29 | End: 2025-07-30 | Stop reason: HOSPADM

## 2025-07-29 RX ORDER — LIDOCAINE HYDROCHLORIDE 20 MG/ML
INJECTION, SOLUTION INFILTRATION; PERINEURAL PRN
Status: COMPLETED | OUTPATIENT
Start: 2025-07-29 | End: 2025-07-29

## 2025-07-29 RX ORDER — PANTOPRAZOLE SODIUM 40 MG/1
40 TABLET, DELAYED RELEASE ORAL
Status: DISCONTINUED | OUTPATIENT
Start: 2025-07-30 | End: 2025-07-30 | Stop reason: HOSPADM

## 2025-07-29 RX ORDER — LACTULOSE 10 G/15ML
30 SOLUTION ORAL ONCE
Status: COMPLETED | OUTPATIENT
Start: 2025-07-29 | End: 2025-07-29

## 2025-07-29 RX ORDER — ERGOCALCIFEROL 1.25 MG/1
50000 CAPSULE, LIQUID FILLED ORAL WEEKLY
Status: DISCONTINUED | OUTPATIENT
Start: 2025-08-04 | End: 2025-07-30 | Stop reason: HOSPADM

## 2025-07-29 RX ORDER — LACTULOSE 10 G/15ML
20 SOLUTION ORAL 3 TIMES DAILY
Status: DISCONTINUED | OUTPATIENT
Start: 2025-07-30 | End: 2025-07-30 | Stop reason: HOSPADM

## 2025-07-29 RX ORDER — MAGNESIUM SULFATE IN WATER 40 MG/ML
2000 INJECTION, SOLUTION INTRAVENOUS PRN
Status: DISCONTINUED | OUTPATIENT
Start: 2025-07-29 | End: 2025-07-30 | Stop reason: HOSPADM

## 2025-07-29 RX ORDER — SODIUM CHLORIDE 9 MG/ML
INJECTION, SOLUTION INTRAVENOUS PRN
Status: DISCONTINUED | OUTPATIENT
Start: 2025-07-29 | End: 2025-07-30 | Stop reason: HOSPADM

## 2025-07-29 RX ORDER — SODIUM CHLORIDE 0.9 % (FLUSH) 0.9 %
5-40 SYRINGE (ML) INJECTION PRN
Status: DISCONTINUED | OUTPATIENT
Start: 2025-07-29 | End: 2025-07-30 | Stop reason: HOSPADM

## 2025-07-29 RX ORDER — GLUCAGON 1 MG/ML
1 KIT INJECTION PRN
Status: DISCONTINUED | OUTPATIENT
Start: 2025-07-29 | End: 2025-07-30 | Stop reason: HOSPADM

## 2025-07-29 RX ORDER — POTASSIUM CHLORIDE 7.45 MG/ML
10 INJECTION INTRAVENOUS PRN
Status: DISCONTINUED | OUTPATIENT
Start: 2025-07-29 | End: 2025-07-30 | Stop reason: HOSPADM

## 2025-07-29 RX ORDER — ONDANSETRON 4 MG/1
4 TABLET, ORALLY DISINTEGRATING ORAL EVERY 8 HOURS PRN
Status: DISCONTINUED | OUTPATIENT
Start: 2025-07-29 | End: 2025-07-30 | Stop reason: HOSPADM

## 2025-07-29 RX ORDER — CITALOPRAM HYDROBROMIDE 10 MG/1
20 TABLET ORAL DAILY
Status: DISCONTINUED | OUTPATIENT
Start: 2025-07-29 | End: 2025-07-29

## 2025-07-29 RX ORDER — ENOXAPARIN SODIUM 100 MG/ML
40 INJECTION SUBCUTANEOUS DAILY
Status: DISCONTINUED | OUTPATIENT
Start: 2025-07-29 | End: 2025-07-30 | Stop reason: HOSPADM

## 2025-07-29 RX ORDER — CITALOPRAM HYDROBROMIDE 10 MG/1
10 TABLET ORAL DAILY
Status: DISCONTINUED | OUTPATIENT
Start: 2025-07-29 | End: 2025-07-29

## 2025-07-29 RX ADMIN — CITALOPRAM HYDROBROMIDE 30 MG: 10 TABLET ORAL at 18:06

## 2025-07-29 RX ADMIN — SODIUM CHLORIDE 1000 ML: 0.9 INJECTION, SOLUTION INTRAVENOUS at 11:49

## 2025-07-29 RX ADMIN — LIDOCAINE HYDROCHLORIDE 2 ML: 20 INJECTION, SOLUTION INFILTRATION; PERINEURAL at 14:10

## 2025-07-29 RX ADMIN — LACTULOSE 30 G: 10 SOLUTION ORAL at 18:05

## 2025-07-29 RX ADMIN — SODIUM CHLORIDE, PRESERVATIVE FREE 10 ML: 5 INJECTION INTRAVENOUS at 21:43

## 2025-07-29 RX ADMIN — ALBUMIN (HUMAN) 50 G: 12.5 SOLUTION INTRAVENOUS at 14:40

## 2025-07-29 RX ADMIN — ENOXAPARIN SODIUM 40 MG: 100 INJECTION SUBCUTANEOUS at 18:06

## 2025-07-29 RX ADMIN — RIFAXIMIN 550 MG: 550 TABLET ORAL at 21:42

## 2025-07-29 RX ADMIN — WATER 1000 MG: 1 INJECTION INTRAMUSCULAR; INTRAVENOUS; SUBCUTANEOUS at 18:05

## 2025-07-29 ASSESSMENT — LIFESTYLE VARIABLES
HOW OFTEN DO YOU HAVE A DRINK CONTAINING ALCOHOL: NEVER
HOW OFTEN DO YOU HAVE A DRINK CONTAINING ALCOHOL: NEVER
HOW MANY STANDARD DRINKS CONTAINING ALCOHOL DO YOU HAVE ON A TYPICAL DAY: PATIENT DOES NOT DRINK
HOW MANY STANDARD DRINKS CONTAINING ALCOHOL DO YOU HAVE ON A TYPICAL DAY: PATIENT DOES NOT DRINK

## 2025-07-29 ASSESSMENT — PAIN - FUNCTIONAL ASSESSMENT: PAIN_FUNCTIONAL_ASSESSMENT: NONE - DENIES PAIN

## 2025-07-29 NOTE — OR NURSING
NO SEDATION      1400 Pt arrived to Specials room 6 via cart from ED room 5. Pt is alert and oriented.  Hx, allergies, medications reviewed. Pt offered reassurance and VSS. Pt denies pain at this time. Consent obtained for ultrasound guided paracentesis.     1406 U/S image obtained,  ([facility]) approved LLQ site.     1407 Area cleansed with large tinted chloraprep. After 3 minute dry time, draped with fenestrated drape and sterile towels by Dr Power.     1408 Timeout completed.   Using U/S guidance, Dr. Power numbed site with 2% lidocaine, see eMar.     1412 Using U/S guidance, access obtained with [id number] centesis 5F catheter with return of fluid that appears pale clear yellow. Catheter connected to tubing and Marsha machine with suction and draining well.     1417 Pt tolerating very well. VSS.     1430 IV albumin started per protocol order.     1437 ascites specimen ordered by JOSEP Hernández CNP, taken to lab.     1451 Drainage complete. Total 7530 ml removed. Centesis catheter removed and digital pressure held to site for 2 minutes.  LLQ site soft, no drainage, large bandaid applied. VSS.       1455 Pt taken back to ER bay 5, report to be given at bedside.

## 2025-07-29 NOTE — ED TRIAGE NOTES
Pt c/o weakness, poor appetite, needs paracentesis of liver. Pt a&ox3, skin w/d/pale, pt answers correctly, pt denies pain at this time, pt to er with daughter, lives alone. Pt also repots redness to l lower ext, pt has wound to l ankle area and has home health aid taking care of it, last dressing change yesterday per daughter, pt not on any antibiotics at this time, stopped week ago. Abd noted rounded, distended and rigid.

## 2025-07-29 NOTE — ED NOTES
Pressure channel 1 zero failed. Xray at bedside.  Pt resting in bd, a&ox4, skin w/d/pale, 0 c/o, 0 distress, daughter at bedside.

## 2025-07-29 NOTE — H&P
Hospital Medicine  History and Physical    Patient:  Zach Aguirre  MRN: [id number]    CHIEF COMPLAINT:    Chief Complaint   Patient presents with    Fatigue     Weakness, poor appetite       History Obtained From:  Patient, EMR  Primary Care Physician: Yesenia Viramontes MD    HISTORY OF PRESENT ILLNESS:   The patient is a 80 y.o. female with PMH of cirrhosis (non alcoholic), anxiety, hypothyroidism, OSMIN, HLD who presents with feeling unwell.    The patient has been dealing with intermittent nausea, vomiting and decreased appetite.  She has a BM of soft stool with urinating at time and when she eats; denies constipation.  Last week Tuesday her appetite was decreased with nausea, Thursday she did not eat at all; Friday she ate and vomited on herself.  Today she came for a paracentesis and presented due to feeling overall unwell.  Denies any fevers, chills, sweats but feels cold.  Denies any urinary complaints    Past Medical History:      Diagnosis Date    Anxiety     DM (diabetes mellitus) (HCC) 4/14/2015    Hepatitis B infection     Hyperlipidemia     Hypothyroidism     Insomnia     Leg pain 4/14/2015    Post herpetic neuralgia     Unspecified sleep apnea     after seeing [facility] they state she is does not have sleep apnea.  Not using cpap       Past Surgical History:      Procedure Laterality Date    ANKLE FRACTURE SURGERY  09/2016    DR ANDRADE  LT    BIOPSY / LIGATION TEMPORAL ARTERY Right 04/26/2019    RIGHT TEMPORAL ARTERY BIOPSY performed by [facility]    BRAIN SURGERY  2015    surgery for transgeminal neuraglia.  Insertion of sponge for chronic pain    COLONOSCOPY  03/31/2014    DR POND    COLONOSCOPY N/A 02/02/2021    COLONOSCOPY DIAGNOSTIC performed by Mei Pond MD at [facility]    HYSTERECTOMY (CERVIX STATUS UNKNOWN)      PARTIAL    LYMPHADENECTOMY      OTHER SURGICAL HISTORY Right     Trigeminal Neuralgia    PARACENTESIS Left 05/07/2024    4660 ml removed, performed by

## 2025-07-29 NOTE — FLOWSHEET NOTE
Call from pts daughter, pt feeling poor post iron infusion last week. Pt has not be able to eat, has been nauseated, having emesis. Daughter is going to bring pt to ER, wishes to still have paracentesis today. Electronically signed by Sharmaine Cartagena RN on 7/29/2025 at 9:15 AM

## 2025-07-29 NOTE — ED PROVIDER NOTES
Declined (3/2/2025)    Received from [facility]    Overall Financial Resource Strain (CARDIA)     Difficulty of Paying Living Expenses: Patient declined   Food Insecurity: No Food Insecurity (7/29/2025)    Hunger Vital Sign     Worried About Running Out of Food in the Last Year: Never true     Ran Out of Food in the Last Year: Never true   Transportation Needs: No Transportation Needs (7/29/2025)    PRAPARE - Transportation     Lack of Transportation (Medical): No     Lack of Transportation (Non-Medical): No   Physical Activity: Patient Declined (3/2/2025)    Received from [facility]    Exercise Vital Sign     Days of Exercise per Week: Patient declined     Minutes of Exercise per Session: Patient declined   Recent Concern: Physical Activity - Inactive (1/14/2025)    Exercise Vital Sign     Days of Exercise per Week: 0 days     Minutes of Exercise per Session: 0 min   Stress: Patient Declined (3/2/2025)    Received from [facility] - Occupational Stress Questionnaire     Feeling of Stress : Patient declined   Social Connections: Patient Declined (3/2/2025)    Received from [facility]    Social Connection and Isolation Panel [NHANES]     Frequency of Communication with Friends and Family: Patient declined     Frequency of Social Gatherings with Friends and Family: Patient declined     Attends Jain Services: Patient declined     Active Member of Clubs or Organizations: Patient declined     Attends Club or Organization Meetings: Patient declined     Marital Status: Patient declined   Intimate Partner Violence: Patient Declined (3/2/2025)    Received from [facility]    Humiliation, Afraid, Rape, and Kick questionnaire     Fear of Current or Ex-Partner: Patient declined     Emotionally Abused: Patient declined     Physically Abused: Patient declined

## 2025-07-30 VITALS
HEART RATE: 69 BPM | RESPIRATION RATE: 18 BRPM | HEIGHT: 63 IN | SYSTOLIC BLOOD PRESSURE: 105 MMHG | BODY MASS INDEX: 29.06 KG/M2 | TEMPERATURE: 98.1 F | OXYGEN SATURATION: 96 % | WEIGHT: 164 LBS | DIASTOLIC BLOOD PRESSURE: 55 MMHG

## 2025-07-30 LAB
ACANTHOCYTES BLD QL SMEAR: ABNORMAL
ALBUMIN FLUID: 0.5 G/DL
ALBUMIN SERPL-MCNC: 2.8 G/DL (ref 3.5–4.6)
ALP SERPL-CCNC: 122 U/L (ref 40–130)
ALT SERPL-CCNC: 18 U/L (ref 0–33)
AMYLASE FLUID: 25 U/L
ANION GAP SERPL CALCULATED.3IONS-SCNC: 8 MEQ/L (ref 9–15)
ANISOCYTOSIS BLD QL SMEAR: ABNORMAL
AST SERPL-CCNC: 28 U/L (ref 0–35)
BASOPHILS # BLD: 0 K/UL (ref 0–0.2)
BASOPHILS NFR BLD: 0.4 %
BILIRUB SERPL-MCNC: 1.3 MG/DL (ref 0.2–0.7)
BUN SERPL-MCNC: 24 MG/DL (ref 8–23)
CALCIUM SERPL-MCNC: 8 MG/DL (ref 8.5–9.9)
CHLORIDE SERPL-SCNC: 113 MEQ/L (ref 95–107)
CO2 SERPL-SCNC: 22 MEQ/L (ref 20–31)
CREAT SERPL-MCNC: 1.29 MG/DL (ref 0.5–0.9)
DACRYOCYTES BLD QL SMEAR: ABNORMAL
EKG ATRIAL RATE: 86 BPM
EKG DIAGNOSIS: NORMAL
EKG P AXIS: 56 DEGREES
EKG P-R INTERVAL: 164 MS
EKG Q-T INTERVAL: 404 MS
EKG QRS DURATION: 92 MS
EKG QTC CALCULATION (BAZETT): 483 MS
EKG R AXIS: -40 DEGREES
EKG T AXIS: 54 DEGREES
EKG VENTRICULAR RATE: 86 BPM
EOSINOPHIL # BLD: 0.1 K/UL (ref 0–0.7)
EOSINOPHIL NFR BLD: 1.4 %
ERYTHROCYTE [DISTWIDTH] IN BLOOD BY AUTOMATED COUNT: 23 % (ref 11.5–14.5)
ESTIMATED AVERAGE GLUCOSE: 105 MG/DL
ETHANOL PERCENT: NORMAL G/DL
ETHANOLAMINE SERPL-MCNC: <10 MG/DL (ref 0–0.08)
GLOBULIN SER CALC-MCNC: 1.9 G/DL (ref 2.3–3.5)
GLUCOSE BLD-MCNC: 118 MG/DL (ref 70–99)
GLUCOSE BLD-MCNC: 159 MG/DL (ref 70–99)
GLUCOSE SERPL-MCNC: 105 MG/DL (ref 70–99)
HBA1C MFR BLD: 5.3 % (ref 4–6)
HCT VFR BLD AUTO: 26.9 % (ref 37–47)
HGB BLD-MCNC: 8 G/DL (ref 12–16)
HYPOCHROMIA BLD QL SMEAR: ABNORMAL
LACTATE DEHYDROGENASE, FLUID: 41 U/L
LYMPHOCYTES # BLD: 0.7 K/UL (ref 1–4.8)
LYMPHOCYTES NFR BLD: 15.1 %
MCH RBC QN AUTO: 24.8 PG (ref 27–31.3)
MCHC RBC AUTO-ENTMCNC: 29.7 % (ref 33–37)
MCV RBC AUTO: 83.3 FL (ref 79.4–94.8)
MONOCYTES # BLD: 0.5 K/UL (ref 0.2–0.8)
MONOCYTES NFR BLD: 11.1 %
NEUTROPHILS # BLD: 3.3 K/UL (ref 1.4–6.5)
NEUTS SEG NFR BLD: 68.3 %
PATH CONSULT FLUID: NORMAL
PERFORMED ON: ABNORMAL
PERFORMED ON: ABNORMAL
PLATELET # BLD AUTO: 122 K/UL (ref 130–400)
PLATELET BLD QL SMEAR: ABNORMAL
POIKILOCYTOSIS BLD QL SMEAR: ABNORMAL
POLYCHROMASIA BLD QL SMEAR: ABNORMAL
POTASSIUM SERPL-SCNC: 4.1 MEQ/L (ref 3.4–4.9)
PROT SERPL-MCNC: 4.7 G/DL (ref 6.3–8)
RBC # BLD AUTO: 3.23 M/UL (ref 4.2–5.4)
SLIDE REVIEW: ABNORMAL
SODIUM SERPL-SCNC: 143 MEQ/L (ref 135–144)
SPECIMEN TYPE: NORMAL
STOMATOCYTES BLD QL SMEAR: ABNORMAL
WBC # BLD AUTO: 4.9 K/UL (ref 4.8–10.8)

## 2025-07-30 PROCEDURE — P9047 ALBUMIN (HUMAN), 25%, 50ML: HCPCS | Performed by: INTERNAL MEDICINE

## 2025-07-30 PROCEDURE — 6360000002 HC RX W HCPCS: Performed by: INTERNAL MEDICINE

## 2025-07-30 PROCEDURE — 6370000000 HC RX 637 (ALT 250 FOR IP): Performed by: INTERNAL MEDICINE

## 2025-07-30 PROCEDURE — 85025 COMPLETE CBC W/AUTO DIFF WBC: CPT

## 2025-07-30 PROCEDURE — 2500000003 HC RX 250 WO HCPCS: Performed by: INTERNAL MEDICINE

## 2025-07-30 PROCEDURE — 2580000003 HC RX 258: Performed by: INTERNAL MEDICINE

## 2025-07-30 PROCEDURE — 82077 ASSAY SPEC XCP UR&BREATH IA: CPT

## 2025-07-30 PROCEDURE — 36415 COLL VENOUS BLD VENIPUNCTURE: CPT

## 2025-07-30 PROCEDURE — 80053 COMPREHEN METABOLIC PANEL: CPT

## 2025-07-30 RX ORDER — CEPHALEXIN 500 MG/1
500 CAPSULE ORAL 2 TIMES DAILY
Qty: 14 CAPSULE | Refills: 0 | Status: SHIPPED | OUTPATIENT
Start: 2025-07-30 | End: 2025-08-06

## 2025-07-30 RX ORDER — PANTOPRAZOLE SODIUM 40 MG/1
40 TABLET, DELAYED RELEASE ORAL
Qty: 30 TABLET | Refills: 0 | Status: SHIPPED | OUTPATIENT
Start: 2025-07-30

## 2025-07-30 RX ORDER — ALBUMIN (HUMAN) 12.5 G/50ML
50 SOLUTION INTRAVENOUS ONCE
Status: COMPLETED | OUTPATIENT
Start: 2025-07-30 | End: 2025-07-30

## 2025-07-30 RX ORDER — LACTULOSE 10 G/15ML
30 SOLUTION ORAL 3 TIMES DAILY
Qty: 946 ML | Refills: 0 | Status: SHIPPED | OUTPATIENT
Start: 2025-07-30

## 2025-07-30 RX ADMIN — SODIUM CHLORIDE, PRESERVATIVE FREE 10 ML: 5 INJECTION INTRAVENOUS at 08:44

## 2025-07-30 RX ADMIN — LACTULOSE 20 G: 10 SOLUTION ORAL at 13:39

## 2025-07-30 RX ADMIN — RIFAXIMIN 550 MG: 550 TABLET ORAL at 08:45

## 2025-07-30 RX ADMIN — PANTOPRAZOLE SODIUM 40 MG: 40 TABLET, DELAYED RELEASE ORAL at 05:33

## 2025-07-30 RX ADMIN — NADOLOL 20 MG: 40 TABLET ORAL at 08:45

## 2025-07-30 RX ADMIN — ALBUMIN (HUMAN) 50 G: 0.25 INJECTION, SOLUTION INTRAVENOUS at 11:41

## 2025-07-30 RX ADMIN — CITALOPRAM HYDROBROMIDE 30 MG: 10 TABLET ORAL at 08:45

## 2025-07-30 RX ADMIN — LEVOTHYROXINE SODIUM 175 MCG: 150 TABLET ORAL at 08:44

## 2025-07-30 RX ADMIN — SODIUM CHLORIDE 125 MG: 9 INJECTION, SOLUTION INTRAVENOUS at 08:44

## 2025-07-30 NOTE — PROGRESS NOTES
Wound Ostomy Continence Nurse  Consult Note       NAME:  Zach Aguirre  MEDICAL RECORD NUMBER:  [id number]  AGE: 80 y.o.   GENDER: female  : 1945  TODAY'S DATE:  2025    Subjective   Reason for WOC Nurse Evaluation and Assessment: ANETTE Aguirre is a 80 y.o. female referred by:   [] Physician  [x] Nursing  [] Other:     Wound Identification:  Wound Type: pressure  Contributing Factors: chronic pressure, decreased mobility, and shear force    Wound History: Patient admitted to [facility] with a healing stage 3 pressure injury to the left lateral ankle.   Current Wound Care Treatment: Recommending 1) Continue pressure injury prevention interventions, strict offloading 2) Border foam HEEL dressing to the left heel/ankle to pad protect the healing wound, change every 3 days and as needed 3) Follow-up in wound clinic as needed    Patient Goal of Care:  [x] Wound Healing  [] Odor Control  [] Palliative Care  [] Pain Control   [] Other:         PAST MEDICAL HISTORY        Diagnosis Date    Anxiety     DM (diabetes mellitus) (HCC) 2015    Hepatitis B infection     Hyperlipidemia     Hypothyroidism     Insomnia     Leg pain 2015    Post herpetic neuralgia     Unspecified sleep apnea     after seeing [facility] they state she is does not have sleep apnea.  Not using cpap       PAST SURGICAL HISTORY    Past Surgical History:   Procedure Laterality Date    ANKLE FRACTURE SURGERY  2016    DR ANDRADE  LT    BIOPSY / LIGATION TEMPORAL ARTERY Right 2019    RIGHT TEMPORAL ARTERY BIOPSY performed by Emmanuel Irwin MD at [facility] [place]    BRAIN SURGERY      surgery for transgeminal neuraglia.  Insertion of sponge for chronic pain    COLONOSCOPY  2014    DR POND    COLONOSCOPY N/A 2021    COLONOSCOPY DIAGNOSTIC performed by Mei Pond MD at [facility]    HYSTERECTOMY (CERVIX STATUS UNKNOWN)      PARTIAL    LYMPHADENECTOMY      OTHER SURGICAL HISTORY Right

## 2025-07-30 NOTE — PROGRESS NOTES
CLINICAL PHARMACY NOTE: MEDS TO BEDS    Total # of Prescriptions Filled: 3   The following medications were delivered to the patient:  Pantoprazole 40 mg Tab  Lactulose Solu  Cephalexin 500 mg Cap    Additional Documentation:

## 2025-07-30 NOTE — FLOWSHEET NOTE
Spoke to Cat PARRA to follow up after yesterday's paracentesis. No concerns voiced. Pt is doing good. Planning for discharge home today. Encouraged to call back with any questions or concerns.     1239 Spoke to pt's daughter Jerica and confirmed with her pt is scheduled for a paracentesis this Monday 8/4/25 at 1300 and to please arrive at 12:30 pm for appointment. Jerica voices understanding.     1240 Spoke to Cat PARRA and confirmed with her pt is scheduled for a paracentesis this Monday 8/4/25 at 1300. Pt to  arrive at 12:30 pm for appointment. Appointment should be on the discharge instructions. Cat PARRA voices understanding.

## 2025-07-30 NOTE — CARE COORDINATION
Advance Care Planning   Healthcare Decision Maker:    Primary Decision Maker: Jerica Aguirre - David - [phone]    Primary Decision Maker: ORLINNAVI - David - [phone]    Click here to complete Healthcare Decision Makers including selection of the Healthcare Decision Maker Relationship (ie \"Primary\").  Today we documented Decision Maker(s) consistent with Legal Next of Kin hierarchy.         
Yes  Other Identified Issues/Barriers to RETURNING to current housing: NO  Potential Assistance needed at discharge: Home Care            Potential DME:    Patient expects to discharge to: House  Plan for transportation at discharge:      Financial    Payor: [facility] MEDICARE / Plan: [facility] MEDICARE COMPLETE / Product Type: *No Product type* /     Does insurance require precert for SNF: Yes    Potential assistance Purchasing Medications: No  Meds-to-Beds request: Yes      [facility] - [place], [place] - [address] [facility] - P [phone] - F [phone]  [address] [facility] [address] [facility] [place] [address]  Phone: [phone] Fax: [phone]    [facility] - [place], [place] - [address] [place] [address] -  [phone] - F [phone]  [address] [place] [address] [address]  [facility] [address]  Phone: [phone] Fax: [phone]      Notes:    Factors facilitating achievement of predicted outcomes: Family support and Caregiver support    Barriers to discharge: Medical complications and Medication managment    Additional Case Management Notes: PT LIVES AT HOME. INDEPENDENT, FAMILY DRIVES HER.  PT HAS FWW, CANE.  PT DENIES O2, VA, HD.  PT IS CURRENT WITH [facility] RN SERVICES.     The Plan for Transition of Care is related to the following treatment goals of Dehydration [E86.0]  Other ascites [R18.8]  BACILIO (acute kidney injury) [N17.9]  Cirrhosis of liver with ascites, unspecified hepatic cirrhosis type (HCC) [K74.60, R18.8]    IF APPLICABLE: The Patient and/or patient representative Zach and her family were provided with a choice of provider and agrees with the discharge plan. Freedom of choice list with basic dialogue that supports the patient's individualized plan of care/goals and shares the quality data associated with the providers was provided to:     Patient Representative Name:       The Patient and/or Patient Representative Agree with the Discharge Plan?      Niesha Chamberlain  Case Management Department

## 2025-07-30 NOTE — PROGRESS NOTES
Discharge instructions provided to patient and daughter. Verbalized understanding of follow up appointments, diet, activity, wound care,Education provided on  medications,  and reasons to return to ED/call physician. All questions answered. Copy of discharge instructions provided. Assisted into wheelchair and taken to personal car. Denies further needs.

## 2025-07-30 NOTE — PLAN OF CARE
Problem: Safety - Adult  Goal: Free from fall injury  7/30/2025 0044 by Mala Galicia, RN  Outcome: Progressing

## 2025-07-30 NOTE — PLAN OF CARE
Problem: Chronic Conditions and Co-morbidities  Goal: Patient's chronic conditions and co-morbidity symptoms are monitored and maintained or improved  7/30/2025 0044 by Mala Galicia RN  Outcome: Progressing  7/29/2025 1714 by Latasha العلي RN  Outcome: Progressing     Problem: Discharge Planning  Goal: Discharge to home or other facility with appropriate resources  7/30/2025 0044 by Mala Galicia RN  Outcome: Progressing  7/29/2025 1714 by Latasha العلي RN  Outcome: Progressing     Problem: Safety - Adult  Goal: Free from fall injury  Outcome: Progressing

## 2025-07-30 NOTE — DISCHARGE SUMMARY
patient tolerated the procedure well. COMPARISON: None available. CLINICAL HISTORY: Other ascites. FINDINGS: A total of 7530 cc of clear yellow fluid was removed.     1. Successful ultrasound guided paracentesis with removal of 7530 cc of clear yellow fluid. The patient will follow up with the referring clinical service.     US RETROPERITONEAL LIMITED  Result Date: 7/29/2025  EXAMINATION: ULTRASOUND OF THE KIDNEYS 7/29/2025 8:04 pm COMPARISON: None. HISTORY: ORDERING SYSTEM PROVIDED HISTORY: henok TECHNOLOGIST PROVIDED HISTORY: Reason for exam:->henok What reading provider will be dictating this exam?->CRC FINDINGS: The right kidney measures 9.0 cm in length and the left kidney measures 10.2 cm in length. Kidneys demonstrate normal cortical echogenicity.  No hydronephrosis or intrarenal stones.  No focal lesions.     Unremarkable ultrasound of the kidneys. Incidentally noted ascites.     CT ABDOMEN PELVIS WO CONTRAST Additional Contrast? None  Result Date: 7/29/2025  EXAMINATION: CT OF THE ABDOMEN AND PELVIS WITHOUT CONTRAST 7/29/2025 1:22 pm TECHNIQUE: CT of the abdomen and pelvis was performed without the administration of intravenous contrast. Multiplanar reformatted images are provided for review. Automated exposure control, iterative reconstruction, and/or weight based adjustment of the mA/kV was utilized to reduce the radiation dose to as low as reasonably achievable. COMPARISON: None. HISTORY: ORDERING SYSTEM PROVIDED HISTORY: r/o abdominal pathology TECHNOLOGIST PROVIDED HISTORY: Reason for exam:->r/o abdominal pathology Additional Contrast?->None Decision Support Exception - unselect if not a suspected or confirmed emergency medical condition->Emergency Medical Condition ([place]) What reading provider will be dictating this exam?->CRC FINDINGS: Lower Chest: No infiltrate or effusion. Liver: Liver is cirrhotic in configuration. Biliary: Cholelithiasis. Spleen: Enlarged. Pancreas: No contour deforming mass. Adrenal

## 2025-07-31 ENCOUNTER — CARE COORDINATION (OUTPATIENT)
Dept: CARE COORDINATION | Age: 80
End: 2025-07-31

## 2025-07-31 NOTE — CARE COORDINATION
Care Transitions Note    Initial Call - Call within 2 business days of discharge: Yes    Patient Current Location:  Home: [address] [place] [id number]    Care Transition Nurse contacted the family, daughter Jerica, (PHI form verified; on file) by telephone to perform post hospital discharge assessment, verified name and  as identifiers.  Provided introduction to self, and explanation of the Care Transition Nurse role.    Patient: Zach Aguirre    Patient : 1945   MRN: [id number]    Reason for Admission: BACILIO  Discharge Date: 25  RURS: Readmission Risk Score: 21.3      Last Discharge Facility       Date Complaint Diagnosis Description Type Department Provider    25 Fatigue Other ascites ... ED to Hosp-Admission (Discharged) (ADMITTED) Clem Ca MD; Everardo N...            Was this an external facility discharge? No    Additional needs identified to be addressed with provider   No needs identified             Method of communication with provider: none.    Patients top risk factors for readmission: depression, medical condition-BACILIO, CAD, CKD, COPD, DM, liver disease, and polypharmacy    Interventions to address risk factors:   Education: BACILIO  Home Health: CTN to call [facility] by [facility] to check on resumption   Referrals: declined to [facility] pharmacy     Care Summary Note:   -Patient admitted to [facility] on 25 with symptoms of decreased appetite and weakness. See hospital discharge summary for further details.   -Jerica reports she has not spoken to her mother yet today but her mother appeared to be improved yesterday.  States her mother is taking the antibiotic (Keflex) that was ordered at hospital discharge.  -Patient's daughter denies any needs at this time and politely declines continued follow up from care transitions.  -CTN to sign off.    Care Transition Nurse reviewed discharge instructions with family. The family was given an opportunity to ask questions; all

## 2025-07-31 NOTE — PROGRESS NOTES
Physician Progress Note      PATIENT:               BLAIR ORDAZ  CSN #:                  [id number]  :                       1945  ADMIT DATE:       2025 10:55 AM  DISCH DATE:        2025 3:11 PM  RESPONDING  PROVIDER #:        Clem Wilkinson MD          QUERY TEXT:    \"Patient admitted to [facility] with a healing stage 3 pressure   injury to the left lateral ankle\" is documented in the 25 AIDA Barragan WO   PN. Please specify the present on admission status and/or stage:    The clinical indicators include:  -female age 80  -25: AIDA Barragan: \"Recommending 1) Continue pressure injury prevention   interventions, strict offloading 2) Border foam HEEL dressing to the left   heel/ankle to pad protect the healing wound, change every 3 days and as needed   3) Follow-up in wound clinic as needed\" \"Left lateral ankle healing stage 3   pressure injury is currently dry with epithelialized tissue noted. Border heel   foam dressing applied to pad/protect the susceptible area. Patient educated   on offloading techniques\"  Options provided:  -- This patient has a Stage 3 pressure injury which was present on admission.  -- Other - I will add my own diagnosis  -- Disagree - Not applicable / Not valid  -- Disagree - Clinically unable to determine / Unknown  -- Refer to Clinical Documentation Reviewer    PROVIDER RESPONSE TEXT:    This patient has a Stage 3 pressure injury which was present on admission.    Query created by: Kwaku Cristobal on 2025 1:18 PM      QUERY TEXT:    \"Also noted to have a UTI for which she was prescribed keflex.\" is documented   in the 25 DCS. Please provide additional clinical indicators supportive   of your documentation. Or please document if the diagnosis of UTI has been   ruled out after study.    The clinical indicators include:  -female age 80  -25 UA  LE trace  WBC 10-20  renal epithelial 0-2  -25 UC Status: In process  Options provided:  -- UTI

## 2025-07-31 NOTE — CARE COORDINATION
-CTN phoned [facility] by [employer] and left message on VM of Paulina checking on NEYDA.  CTN provided contact information requesting return call.

## 2025-08-01 ENCOUNTER — TELEPHONE (OUTPATIENT)
Dept: PRIMARY CARE CLINIC | Age: 80
End: 2025-08-01

## 2025-08-01 LAB
BACTERIA UR CULT: ABNORMAL
BACTERIA UR CULT: ABNORMAL
ORGANISM: ABNORMAL

## 2025-08-01 NOTE — TELEPHONE ENCOUNTER
Care Transitions Initial Follow Up Call    Outreach made within 2 business days of discharge: Yes    Patient: Zach Aguirre Patient : 1945   MRN: [id number]  Reason for Admission: BACILIO (acute kidney injury)   Discharge Date: 25       Spoke with: Jerica (Child)     Discharge department/facility: [facility]    TCM Interactive Patient Contact:  Was patient able to fill all prescriptions: Yes  Was patient instructed to bring all medications to the follow-up visit: Yes  Is patient taking all medications as directed in the discharge summary? Yes  Does patient understand their discharge instructions: Yes  Does patient have questions or concerns that need addressed prior to 7-14 day follow up office visit: no    Additional needs identified to be addressed with provider  No needs identified             Scheduled appointment with PCP within 7-14 days    Follow Up  Future Appointments   Date Time Provider Department Center   2025 11:00 AM Matias Reese DPM [facility]   2025  1:00 PM [place] SPECIAL PROCEDURES ROOM 1 [facility]   2025 11:15 AM Yesenia Viramontes MD [facility] DE[facility]   2025 11:00 AM Yesenia Viramontes MD [facility] DEP       Judit Ann MA

## 2025-08-03 LAB — BACTERIA FLD AEROBE CULT: NORMAL

## 2025-08-04 ENCOUNTER — HOSPITAL ENCOUNTER (OUTPATIENT)
Dept: INTERVENTIONAL RADIOLOGY/VASCULAR | Age: 80
Discharge: HOME | End: 2025-08-06
Payer: MEDICARE

## 2025-08-04 ENCOUNTER — HOSPITAL ENCOUNTER (OUTPATIENT)
Dept: WOUND CARE | Age: 80
Discharge: HOME | End: 2025-08-04
Payer: MEDICARE

## 2025-08-04 ENCOUNTER — TELEPHONE (OUTPATIENT)
Dept: PRIMARY CARE CLINIC | Age: 80
End: 2025-08-04

## 2025-08-04 VITALS
OXYGEN SATURATION: 99 % | HEART RATE: 70 BPM | SYSTOLIC BLOOD PRESSURE: 105 MMHG | RESPIRATION RATE: 16 BRPM | DIASTOLIC BLOOD PRESSURE: 52 MMHG

## 2025-08-04 VITALS
RESPIRATION RATE: 18 BRPM | HEART RATE: 66 BPM | DIASTOLIC BLOOD PRESSURE: 49 MMHG | SYSTOLIC BLOOD PRESSURE: 110 MMHG | TEMPERATURE: 97 F

## 2025-08-04 DIAGNOSIS — L89.523 PRESSURE INJURY OF LEFT ANKLE, STAGE 3 (HCC): Primary | ICD-10-CM

## 2025-08-04 PROCEDURE — 99212 OFFICE O/P EST SF 10 MIN: CPT

## 2025-08-04 PROCEDURE — 49083 ABD PARACENTESIS W/IMAGING: CPT | Performed by: RADIOLOGY

## 2025-08-04 PROCEDURE — 49083 ABD PARACENTESIS W/IMAGING: CPT

## 2025-08-04 PROCEDURE — 2709999900 IR US GUIDED PARACENTESIS

## 2025-08-04 PROCEDURE — 96365 THER/PROPH/DIAG IV INF INIT: CPT

## 2025-08-04 PROCEDURE — P9047 ALBUMIN (HUMAN), 25%, 50ML: HCPCS | Performed by: NURSE PRACTITIONER

## 2025-08-04 PROCEDURE — 6360000002 HC RX W HCPCS: Performed by: NURSE PRACTITIONER

## 2025-08-04 RX ORDER — NEOMYCIN/BACITRACIN/POLYMYXINB 3.5-400-5K
OINTMENT (GRAM) TOPICAL PRN
Status: CANCELLED | OUTPATIENT
Start: 2025-08-04

## 2025-08-04 RX ORDER — MUPIROCIN 2 %
OINTMENT (GRAM) TOPICAL PRN
Status: CANCELLED | OUTPATIENT
Start: 2025-08-04

## 2025-08-04 RX ORDER — SILVER SULFADIAZINE 10 MG/G
CREAM TOPICAL PRN
Status: CANCELLED | OUTPATIENT
Start: 2025-08-04

## 2025-08-04 RX ORDER — SODIUM CHLOR/HYPOCHLOROUS ACID 0.033 %
SOLUTION, IRRIGATION IRRIGATION PRN
Status: CANCELLED | OUTPATIENT
Start: 2025-08-04

## 2025-08-04 RX ORDER — LIDOCAINE HYDROCHLORIDE 20 MG/ML
INJECTION, SOLUTION INFILTRATION; PERINEURAL PRN
Status: COMPLETED | OUTPATIENT
Start: 2025-08-04 | End: 2025-08-04

## 2025-08-04 RX ORDER — LIDOCAINE HYDROCHLORIDE 20 MG/ML
JELLY TOPICAL PRN
Status: CANCELLED | OUTPATIENT
Start: 2025-08-04

## 2025-08-04 RX ORDER — ALBUMIN (HUMAN) 12.5 G/50ML
SOLUTION INTRAVENOUS CONTINUOUS PRN
Status: COMPLETED | OUTPATIENT
Start: 2025-08-04 | End: 2025-08-04

## 2025-08-04 RX ORDER — LIDOCAINE 40 MG/G
CREAM TOPICAL PRN
Status: CANCELLED | OUTPATIENT
Start: 2025-08-04

## 2025-08-04 RX ORDER — CLOBETASOL PROPIONATE 0.5 MG/G
OINTMENT TOPICAL PRN
Status: CANCELLED | OUTPATIENT
Start: 2025-08-04

## 2025-08-04 RX ORDER — TRIAMCINOLONE ACETONIDE 1 MG/G
OINTMENT TOPICAL PRN
Status: CANCELLED | OUTPATIENT
Start: 2025-08-04

## 2025-08-04 RX ORDER — LIDOCAINE 50 MG/G
OINTMENT TOPICAL PRN
Status: CANCELLED | OUTPATIENT
Start: 2025-08-04

## 2025-08-04 RX ORDER — GINSENG 100 MG
CAPSULE ORAL PRN
Status: CANCELLED | OUTPATIENT
Start: 2025-08-04

## 2025-08-04 RX ORDER — GENTAMICIN SULFATE 1 MG/G
OINTMENT TOPICAL PRN
Status: CANCELLED | OUTPATIENT
Start: 2025-08-04

## 2025-08-04 RX ORDER — BETAMETHASONE DIPROPIONATE 0.5 MG/G
CREAM TOPICAL PRN
Status: CANCELLED | OUTPATIENT
Start: 2025-08-04

## 2025-08-04 RX ORDER — BACITRACIN ZINC AND POLYMYXIN B SULFATE 500; 1000 [USP'U]/G; [USP'U]/G
OINTMENT TOPICAL PRN
Status: CANCELLED | OUTPATIENT
Start: 2025-08-04

## 2025-08-04 RX ORDER — LIDOCAINE HYDROCHLORIDE 40 MG/ML
SOLUTION TOPICAL PRN
Status: CANCELLED | OUTPATIENT
Start: 2025-08-04

## 2025-08-04 RX ADMIN — ALBUMIN (HUMAN) 25 G: 12.5 SOLUTION INTRAVENOUS at 14:10

## 2025-08-04 RX ADMIN — LIDOCAINE HYDROCHLORIDE 9 ML: 20 INJECTION, SOLUTION INFILTRATION; PERINEURAL at 13:31

## 2025-08-05 ENCOUNTER — OFFICE VISIT (OUTPATIENT)
Dept: PRIMARY CARE CLINIC | Age: 80
End: 2025-08-05

## 2025-08-05 VITALS
HEART RATE: 61 BPM | BODY MASS INDEX: 26.96 KG/M2 | TEMPERATURE: 98.1 F | SYSTOLIC BLOOD PRESSURE: 92 MMHG | DIASTOLIC BLOOD PRESSURE: 40 MMHG | WEIGHT: 152.2 LBS | OXYGEN SATURATION: 96 %

## 2025-08-05 DIAGNOSIS — E11.622 TYPE 2 DIABETES MELLITUS WITH OTHER SKIN ULCER (CODE) (HCC): ICD-10-CM

## 2025-08-05 DIAGNOSIS — K74.60 HEPATIC CIRRHOSIS, UNSPECIFIED HEPATIC CIRRHOSIS TYPE, UNSPECIFIED WHETHER ASCITES PRESENT (HCC): ICD-10-CM

## 2025-08-05 DIAGNOSIS — Z09 HOSPITAL DISCHARGE FOLLOW-UP: Primary | ICD-10-CM

## 2025-08-05 DIAGNOSIS — I95.1 ORTHOSTATIC HYPOTENSION: ICD-10-CM

## 2025-08-05 DIAGNOSIS — Z71.89 ACP (ADVANCE CARE PLANNING): ICD-10-CM

## 2025-08-05 DIAGNOSIS — D50.9 IRON DEFICIENCY ANEMIA, UNSPECIFIED IRON DEFICIENCY ANEMIA TYPE: ICD-10-CM

## 2025-08-05 LAB — BACTERIA FLD AEROBE CULT: NORMAL

## 2025-08-05 RX ORDER — LEVOTHYROXINE SODIUM 150 UG/1
150 TABLET ORAL DAILY
COMMUNITY
Start: 2025-07-14

## 2025-08-05 RX ORDER — ACYCLOVIR 400 MG/1
TABLET ORAL
Qty: 1 EACH | Refills: 1 | Status: SHIPPED | OUTPATIENT
Start: 2025-08-05

## 2025-08-05 RX ORDER — ACYCLOVIR 400 MG/1
TABLET ORAL
Qty: 4 EACH | Refills: 5 | Status: SHIPPED | OUTPATIENT
Start: 2025-08-05

## 2025-08-05 RX ORDER — ASPIRIN 81 MG/1
81 TABLET, CHEWABLE ORAL DAILY
COMMUNITY
Start: 2025-07-21

## 2025-08-08 ENCOUNTER — TELEPHONE (OUTPATIENT)
Dept: INTERVENTIONAL RADIOLOGY/VASCULAR | Age: 80
End: 2025-08-08

## 2025-08-11 ENCOUNTER — HOSPITAL ENCOUNTER (OUTPATIENT)
Dept: INTERVENTIONAL RADIOLOGY/VASCULAR | Age: 80
Discharge: HOME | End: 2025-08-13
Payer: MEDICARE

## 2025-08-11 VITALS
OXYGEN SATURATION: 100 % | RESPIRATION RATE: 16 BRPM | SYSTOLIC BLOOD PRESSURE: 133 MMHG | HEART RATE: 70 BPM | DIASTOLIC BLOOD PRESSURE: 63 MMHG

## 2025-08-11 DIAGNOSIS — R18.8 ASCITES OF LIVER: ICD-10-CM

## 2025-08-11 DIAGNOSIS — D50.9 IRON DEFICIENCY ANEMIA, UNSPECIFIED IRON DEFICIENCY ANEMIA TYPE: ICD-10-CM

## 2025-08-11 LAB
ANISOCYTOSIS BLD QL SMEAR: ABNORMAL
BASOPHILS # BLD: 0 K/UL (ref 0–0.2)
BASOPHILS NFR BLD: 0.5 %
EOSINOPHIL # BLD: 0 K/UL (ref 0–0.7)
EOSINOPHIL NFR BLD: 1 %
ERYTHROCYTE [DISTWIDTH] IN BLOOD BY AUTOMATED COUNT: 25.3 % (ref 11.5–14.5)
HCT VFR BLD AUTO: 33 % (ref 37–47)
HGB BLD-MCNC: 9.5 G/DL (ref 12–16)
LYMPHOCYTES # BLD: 0.6 K/UL (ref 1–4.8)
LYMPHOCYTES NFR BLD: 16.4 %
MACROCYTES BLD QL SMEAR: ABNORMAL
MCH RBC QN AUTO: 26.1 PG (ref 27–31.3)
MCHC RBC AUTO-ENTMCNC: 28.8 % (ref 33–37)
MCV RBC AUTO: 90.7 FL (ref 79.4–94.8)
MONOCYTES # BLD: 0.5 K/UL (ref 0.2–0.8)
MONOCYTES NFR BLD: 13.5 %
NEUTROPHILS # BLD: 2.6 K/UL (ref 1.4–6.5)
NEUTS SEG NFR BLD: 67.8 %
OVALOCYTES BLD QL SMEAR: ABNORMAL
PLATELET # BLD AUTO: 129 K/UL (ref 130–400)
PLATELET BLD QL SMEAR: ABNORMAL
POIKILOCYTOSIS BLD QL SMEAR: ABNORMAL
RBC # BLD AUTO: 3.64 M/UL (ref 4.2–5.4)
SLIDE REVIEW: ABNORMAL
STOMATOCYTES BLD QL SMEAR: ABNORMAL
WBC # BLD AUTO: 3.9 K/UL (ref 4.8–10.8)

## 2025-08-11 PROCEDURE — 96365 THER/PROPH/DIAG IV INF INIT: CPT

## 2025-08-11 PROCEDURE — 49083 ABD PARACENTESIS W/IMAGING: CPT | Performed by: RADIOLOGY

## 2025-08-11 PROCEDURE — P9047 ALBUMIN (HUMAN), 25%, 50ML: HCPCS | Performed by: NURSE PRACTITIONER

## 2025-08-11 PROCEDURE — 6360000002 HC RX W HCPCS: Performed by: NURSE PRACTITIONER

## 2025-08-11 PROCEDURE — 85025 COMPLETE CBC W/AUTO DIFF WBC: CPT

## 2025-08-11 PROCEDURE — C1729 CATH, DRAINAGE: HCPCS

## 2025-08-11 PROCEDURE — 49083 ABD PARACENTESIS W/IMAGING: CPT

## 2025-08-11 RX ORDER — LIDOCAINE HYDROCHLORIDE 20 MG/ML
INJECTION, SOLUTION INFILTRATION; PERINEURAL PRN
Status: DISCONTINUED | OUTPATIENT
Start: 2025-08-11 | End: 2025-08-14 | Stop reason: HOSPADM

## 2025-08-11 RX ORDER — ALBUMIN (HUMAN) 12.5 G/50ML
SOLUTION INTRAVENOUS CONTINUOUS PRN
Status: DISCONTINUED | OUTPATIENT
Start: 2025-08-11 | End: 2025-08-14 | Stop reason: HOSPADM

## 2025-08-11 RX ADMIN — LIDOCAINE HYDROCHLORIDE 6 ML: 20 INJECTION, SOLUTION INFILTRATION; PERINEURAL at 11:20

## 2025-08-11 RX ADMIN — ALBUMIN (HUMAN) 25 G: 12.5 SOLUTION INTRAVENOUS at 11:50

## 2025-08-12 ENCOUNTER — POST-OP TELEPHONE (OUTPATIENT)
Dept: INTERVENTIONAL RADIOLOGY/VASCULAR | Age: 80
End: 2025-08-12

## 2025-08-12 ENCOUNTER — HOSPITAL ENCOUNTER (OUTPATIENT)
Dept: WOUND CARE | Age: 80
Discharge: HOME | End: 2025-08-12
Attending: STUDENT IN AN ORGANIZED HEALTH CARE EDUCATION/TRAINING PROGRAM
Payer: MEDICARE

## 2025-08-12 VITALS
TEMPERATURE: 96.5 F | RESPIRATION RATE: 18 BRPM | DIASTOLIC BLOOD PRESSURE: 54 MMHG | HEART RATE: 71 BPM | SYSTOLIC BLOOD PRESSURE: 114 MMHG

## 2025-08-12 DIAGNOSIS — L97.929 VENOUS ULCER OF LEFT LOWER EXTREMITY WITH VARICOSE VEINS (HCC): ICD-10-CM

## 2025-08-12 DIAGNOSIS — I83.029 VENOUS ULCER OF LEFT LOWER EXTREMITY WITH VARICOSE VEINS (HCC): ICD-10-CM

## 2025-08-12 DIAGNOSIS — E03.9 HYPOTHYROIDISM, UNSPECIFIED TYPE: ICD-10-CM

## 2025-08-12 LAB
ALBUMIN SERPL-MCNC: 3.2 G/DL (ref 3.5–4.6)
ALP SERPL-CCNC: 198 U/L (ref 40–130)
ALT SERPL-CCNC: 24 U/L (ref 0–33)
ANION GAP SERPL CALCULATED.3IONS-SCNC: 9 MEQ/L (ref 9–15)
ANISOCYTOSIS BLD QL SMEAR: ABNORMAL
AST SERPL-CCNC: 44 U/L (ref 0–35)
BASOPHILS # BLD: 0 K/UL (ref 0–0.2)
BASOPHILS NFR BLD: 0.5 %
BILIRUB SERPL-MCNC: 0.8 MG/DL (ref 0.2–0.7)
BUN SERPL-MCNC: 16 MG/DL (ref 8–23)
BURR CELLS: ABNORMAL
C-REACTIVE PROTEIN, HIGH SENSITIVITY: 4.5 MG/L (ref 0–5)
CALCIUM SERPL-MCNC: 8.4 MG/DL (ref 8.5–9.9)
CHLORIDE SERPL-SCNC: 108 MEQ/L (ref 95–107)
CO2 SERPL-SCNC: 23 MEQ/L (ref 20–31)
CREAT SERPL-MCNC: 1.34 MG/DL (ref 0.5–0.9)
DACRYOCYTES BLD QL SMEAR: ABNORMAL
EOSINOPHIL # BLD: 0.1 K/UL (ref 0–0.7)
EOSINOPHIL NFR BLD: 1.6 %
ERYTHROCYTE [DISTWIDTH] IN BLOOD BY AUTOMATED COUNT: 24.7 % (ref 11.5–14.5)
ERYTHROCYTE [SEDIMENTATION RATE] IN BLOOD BY WESTERGREN METHOD: 6 MM (ref 0–30)
GLOBULIN SER CALC-MCNC: 2.3 G/DL (ref 2.3–3.5)
GLUCOSE SERPL-MCNC: 177 MG/DL (ref 70–99)
HCT VFR BLD AUTO: 33.7 % (ref 37–47)
HGB BLD-MCNC: 9.9 G/DL (ref 12–16)
HYPOCHROMIA BLD QL SMEAR: ABNORMAL
LYMPHOCYTES # BLD: 0.7 K/UL (ref 1–4.8)
LYMPHOCYTES NFR BLD: 18.5 %
MCH RBC QN AUTO: 26.5 PG (ref 27–31.3)
MCHC RBC AUTO-ENTMCNC: 29.4 % (ref 33–37)
MCV RBC AUTO: 90.1 FL (ref 79.4–94.8)
MONOCYTES # BLD: 0.4 K/UL (ref 0.2–0.8)
MONOCYTES NFR BLD: 11.6 %
NEUTROPHILS # BLD: 2.5 K/UL (ref 1.4–6.5)
NEUTS SEG NFR BLD: 67.3 %
OVALOCYTES BLD QL SMEAR: ABNORMAL
PLATELET # BLD AUTO: 134 K/UL (ref 130–400)
PLATELET BLD QL SMEAR: ABNORMAL
POIKILOCYTOSIS BLD QL SMEAR: ABNORMAL
POTASSIUM SERPL-SCNC: 4.2 MEQ/L (ref 3.4–4.9)
PROT SERPL-MCNC: 5.5 G/DL (ref 6.3–8)
RBC # BLD AUTO: 3.74 M/UL (ref 4.2–5.4)
SLIDE REVIEW: ABNORMAL
SODIUM SERPL-SCNC: 140 MEQ/L (ref 135–144)
STOMATOCYTES BLD QL SMEAR: ABNORMAL
TARGETS BLD QL SMEAR: ABNORMAL
TSH REFLEX: 1.01 UIU/ML (ref 0.44–3.86)
WBC # BLD AUTO: 3.8 K/UL (ref 4.8–10.8)

## 2025-08-12 PROCEDURE — 99213 OFFICE O/P EST LOW 20 MIN: CPT

## 2025-08-12 RX ORDER — AMOXICILLIN AND CLAVULANATE POTASSIUM 500; 125 MG/1; MG/1
1 TABLET, FILM COATED ORAL 2 TIMES DAILY
Qty: 20 TABLET | Refills: 0 | Status: SHIPPED | OUTPATIENT
Start: 2025-08-12 | End: 2025-08-22

## 2025-08-12 RX ORDER — DOXYCYCLINE HYCLATE 100 MG
100 TABLET ORAL 2 TIMES DAILY
Qty: 14 TABLET | Refills: 0 | Status: SHIPPED | OUTPATIENT
Start: 2025-08-12 | End: 2025-08-19

## 2025-08-12 ASSESSMENT — PAIN SCALES - GENERAL: PAINLEVEL_OUTOF10: 2

## 2025-08-12 ASSESSMENT — PAIN DESCRIPTION - ORIENTATION: ORIENTATION: LOWER

## 2025-08-12 ASSESSMENT — PAIN DESCRIPTION - LOCATION: LOCATION: LEG

## 2025-08-13 ENCOUNTER — TELEPHONE (OUTPATIENT)
Dept: PRIMARY CARE CLINIC | Age: 80
End: 2025-08-13

## 2025-08-13 LAB
ESTIMATED AVERAGE GLUCOSE: 85 MG/DL
HBA1C MFR BLD: 4.6 % (ref 4–6)

## 2025-08-18 ENCOUNTER — HOSPITAL ENCOUNTER (OUTPATIENT)
Dept: INTERVENTIONAL RADIOLOGY/VASCULAR | Age: 80
Discharge: HOME | End: 2025-08-20
Payer: MEDICARE

## 2025-08-18 VITALS
HEART RATE: 72 BPM | OXYGEN SATURATION: 100 % | RESPIRATION RATE: 18 BRPM | DIASTOLIC BLOOD PRESSURE: 56 MMHG | SYSTOLIC BLOOD PRESSURE: 125 MMHG

## 2025-08-18 DIAGNOSIS — R18.8 ASCITES OF LIVER: ICD-10-CM

## 2025-08-18 DIAGNOSIS — E55.9 VITAMIN D DEFICIENCY: ICD-10-CM

## 2025-08-18 PROCEDURE — 49083 ABD PARACENTESIS W/IMAGING: CPT

## 2025-08-18 PROCEDURE — 49083 ABD PARACENTESIS W/IMAGING: CPT | Performed by: RADIOLOGY

## 2025-08-18 PROCEDURE — P9047 ALBUMIN (HUMAN), 25%, 50ML: HCPCS | Performed by: NURSE PRACTITIONER

## 2025-08-18 PROCEDURE — C1729 CATH, DRAINAGE: HCPCS

## 2025-08-18 PROCEDURE — 6360000002 HC RX W HCPCS: Performed by: NURSE PRACTITIONER

## 2025-08-18 PROCEDURE — 96365 THER/PROPH/DIAG IV INF INIT: CPT

## 2025-08-18 RX ORDER — ALBUMIN (HUMAN) 12.5 G/50ML
SOLUTION INTRAVENOUS CONTINUOUS PRN
Status: COMPLETED | OUTPATIENT
Start: 2025-08-18 | End: 2025-08-18

## 2025-08-18 RX ORDER — LIDOCAINE HYDROCHLORIDE 20 MG/ML
INJECTION, SOLUTION INFILTRATION; PERINEURAL PRN
Status: COMPLETED | OUTPATIENT
Start: 2025-08-18 | End: 2025-08-18

## 2025-08-18 RX ADMIN — LIDOCAINE HYDROCHLORIDE 8 ML: 20 INJECTION, SOLUTION INFILTRATION; PERINEURAL at 11:20

## 2025-08-18 RX ADMIN — ALBUMIN (HUMAN) 25 G: 12.5 SOLUTION INTRAVENOUS at 11:46

## 2025-08-19 ENCOUNTER — TELEPHONE (OUTPATIENT)
Dept: INTERVENTIONAL RADIOLOGY/VASCULAR | Age: 80
End: 2025-08-19

## 2025-08-19 ENCOUNTER — HOSPITAL ENCOUNTER (OUTPATIENT)
Dept: WOUND CARE | Age: 80
Discharge: HOME | End: 2025-08-19
Attending: STUDENT IN AN ORGANIZED HEALTH CARE EDUCATION/TRAINING PROGRAM
Payer: MEDICARE

## 2025-08-19 VITALS
RESPIRATION RATE: 18 BRPM | DIASTOLIC BLOOD PRESSURE: 51 MMHG | TEMPERATURE: 96.1 F | HEART RATE: 71 BPM | SYSTOLIC BLOOD PRESSURE: 116 MMHG

## 2025-08-19 PROCEDURE — 99213 OFFICE O/P EST LOW 20 MIN: CPT

## 2025-08-19 RX ORDER — LIDOCAINE HYDROCHLORIDE 20 MG/ML
JELLY TOPICAL PRN
OUTPATIENT
Start: 2025-08-19

## 2025-08-19 RX ORDER — BETAMETHASONE DIPROPIONATE 0.5 MG/G
CREAM TOPICAL PRN
OUTPATIENT
Start: 2025-08-19

## 2025-08-19 RX ORDER — LIDOCAINE 50 MG/G
OINTMENT TOPICAL PRN
OUTPATIENT
Start: 2025-08-19

## 2025-08-19 RX ORDER — NEOMYCIN/BACITRACIN/POLYMYXINB 3.5-400-5K
OINTMENT (GRAM) TOPICAL PRN
OUTPATIENT
Start: 2025-08-19

## 2025-08-19 RX ORDER — GINSENG 100 MG
CAPSULE ORAL PRN
OUTPATIENT
Start: 2025-08-19

## 2025-08-19 RX ORDER — ERGOCALCIFEROL 1.25 MG/1
50000 CAPSULE, LIQUID FILLED ORAL WEEKLY
Qty: 13 CAPSULE | Refills: 3 | Status: SHIPPED | OUTPATIENT
Start: 2025-08-19

## 2025-08-19 RX ORDER — CLOBETASOL PROPIONATE 0.5 MG/G
OINTMENT TOPICAL PRN
OUTPATIENT
Start: 2025-08-19

## 2025-08-19 RX ORDER — TRIAMCINOLONE ACETONIDE 1 MG/G
OINTMENT TOPICAL PRN
OUTPATIENT
Start: 2025-08-19

## 2025-08-19 RX ORDER — GENTAMICIN SULFATE 1 MG/G
OINTMENT TOPICAL PRN
OUTPATIENT
Start: 2025-08-19

## 2025-08-19 RX ORDER — SILVER SULFADIAZINE 10 MG/G
CREAM TOPICAL PRN
OUTPATIENT
Start: 2025-08-19

## 2025-08-19 RX ORDER — LIDOCAINE HYDROCHLORIDE 40 MG/ML
SOLUTION TOPICAL PRN
OUTPATIENT
Start: 2025-08-19

## 2025-08-19 RX ORDER — SODIUM CHLOR/HYPOCHLOROUS ACID 0.033 %
SOLUTION, IRRIGATION IRRIGATION PRN
OUTPATIENT
Start: 2025-08-19

## 2025-08-19 RX ORDER — MUPIROCIN 2 %
OINTMENT (GRAM) TOPICAL PRN
OUTPATIENT
Start: 2025-08-19

## 2025-08-19 RX ORDER — BACITRACIN ZINC AND POLYMYXIN B SULFATE 500; 1000 [USP'U]/G; [USP'U]/G
OINTMENT TOPICAL PRN
OUTPATIENT
Start: 2025-08-19

## 2025-08-19 RX ORDER — LIDOCAINE 40 MG/G
CREAM TOPICAL PRN
OUTPATIENT
Start: 2025-08-19

## 2025-08-19 ASSESSMENT — PAIN SCALES - GENERAL: PAINLEVEL_OUTOF10: 0

## 2025-08-22 DIAGNOSIS — R18.8 ASCITES OF LIVER: Primary | ICD-10-CM

## 2025-08-25 ENCOUNTER — HOSPITAL ENCOUNTER (OUTPATIENT)
Dept: INTERVENTIONAL RADIOLOGY/VASCULAR | Age: 80
Discharge: HOME | End: 2025-08-27
Payer: MEDICARE

## 2025-08-25 VITALS
SYSTOLIC BLOOD PRESSURE: 116 MMHG | HEART RATE: 67 BPM | RESPIRATION RATE: 16 BRPM | DIASTOLIC BLOOD PRESSURE: 56 MMHG | OXYGEN SATURATION: 97 %

## 2025-08-25 DIAGNOSIS — R18.8 ASCITES OF LIVER: ICD-10-CM

## 2025-08-25 PROCEDURE — 6360000002 HC RX W HCPCS: Performed by: NURSE PRACTITIONER

## 2025-08-25 PROCEDURE — 49083 ABD PARACENTESIS W/IMAGING: CPT | Performed by: RADIOLOGY

## 2025-08-25 PROCEDURE — P9047 ALBUMIN (HUMAN), 25%, 50ML: HCPCS | Performed by: NURSE PRACTITIONER

## 2025-08-25 PROCEDURE — 96365 THER/PROPH/DIAG IV INF INIT: CPT

## 2025-08-25 PROCEDURE — 2709999900 IR US GUIDED PARACENTESIS

## 2025-08-25 PROCEDURE — 49083 ABD PARACENTESIS W/IMAGING: CPT

## 2025-08-25 RX ORDER — LIDOCAINE HYDROCHLORIDE 20 MG/ML
INJECTION, SOLUTION INFILTRATION; PERINEURAL PRN
Status: COMPLETED | OUTPATIENT
Start: 2025-08-25 | End: 2025-08-25

## 2025-08-25 RX ORDER — ALBUMIN (HUMAN) 12.5 G/50ML
SOLUTION INTRAVENOUS CONTINUOUS PRN
Status: COMPLETED | OUTPATIENT
Start: 2025-08-25 | End: 2025-08-25

## 2025-08-25 RX ADMIN — LIDOCAINE HYDROCHLORIDE 10 ML: 20 INJECTION, SOLUTION INFILTRATION; PERINEURAL at 13:30

## 2025-08-25 RX ADMIN — ALBUMIN (HUMAN) 25 G: 12.5 SOLUTION INTRAVENOUS at 14:10

## 2025-08-26 ENCOUNTER — HOSPITAL ENCOUNTER (OUTPATIENT)
Dept: WOUND CARE | Age: 80
Discharge: HOME | End: 2025-08-26
Attending: STUDENT IN AN ORGANIZED HEALTH CARE EDUCATION/TRAINING PROGRAM
Payer: MEDICARE

## 2025-08-26 ENCOUNTER — POST-OP TELEPHONE (OUTPATIENT)
Dept: INTERVENTIONAL RADIOLOGY/VASCULAR | Age: 80
End: 2025-08-26

## 2025-08-26 VITALS
DIASTOLIC BLOOD PRESSURE: 55 MMHG | TEMPERATURE: 96.2 F | SYSTOLIC BLOOD PRESSURE: 126 MMHG | HEART RATE: 75 BPM | RESPIRATION RATE: 16 BRPM

## 2025-08-26 DIAGNOSIS — L97.323: Primary | ICD-10-CM

## 2025-08-26 PROCEDURE — 6370000000 HC RX 637 (ALT 250 FOR IP): Performed by: STUDENT IN AN ORGANIZED HEALTH CARE EDUCATION/TRAINING PROGRAM

## 2025-08-26 PROCEDURE — 11042 DBRDMT SUBQ TIS 1ST 20SQCM/<: CPT

## 2025-08-26 RX ORDER — BACITRACIN ZINC AND POLYMYXIN B SULFATE 500; 1000 [USP'U]/G; [USP'U]/G
OINTMENT TOPICAL PRN
OUTPATIENT
Start: 2025-08-26

## 2025-08-26 RX ORDER — CLOBETASOL PROPIONATE 0.5 MG/G
OINTMENT TOPICAL PRN
OUTPATIENT
Start: 2025-08-26

## 2025-08-26 RX ORDER — GINSENG 100 MG
CAPSULE ORAL PRN
OUTPATIENT
Start: 2025-08-26

## 2025-08-26 RX ORDER — TRIAMCINOLONE ACETONIDE 1 MG/G
OINTMENT TOPICAL PRN
OUTPATIENT
Start: 2025-08-26

## 2025-08-26 RX ORDER — LIDOCAINE HYDROCHLORIDE 20 MG/ML
JELLY TOPICAL PRN
Status: DISCONTINUED | OUTPATIENT
Start: 2025-08-26 | End: 2025-08-27 | Stop reason: HOSPADM

## 2025-08-26 RX ORDER — LIDOCAINE HYDROCHLORIDE 20 MG/ML
JELLY TOPICAL PRN
OUTPATIENT
Start: 2025-08-26

## 2025-08-26 RX ORDER — GENTAMICIN SULFATE 1 MG/G
OINTMENT TOPICAL PRN
OUTPATIENT
Start: 2025-08-26

## 2025-08-26 RX ORDER — LIDOCAINE HYDROCHLORIDE 40 MG/ML
SOLUTION TOPICAL PRN
OUTPATIENT
Start: 2025-08-26

## 2025-08-26 RX ORDER — NEOMYCIN/BACITRACIN/POLYMYXINB 3.5-400-5K
OINTMENT (GRAM) TOPICAL PRN
OUTPATIENT
Start: 2025-08-26

## 2025-08-26 RX ORDER — LIDOCAINE 50 MG/G
OINTMENT TOPICAL PRN
OUTPATIENT
Start: 2025-08-26

## 2025-08-26 RX ORDER — SILVER SULFADIAZINE 10 MG/G
CREAM TOPICAL PRN
OUTPATIENT
Start: 2025-08-26

## 2025-08-26 RX ORDER — SODIUM CHLOR/HYPOCHLOROUS ACID 0.033 %
SOLUTION, IRRIGATION IRRIGATION PRN
OUTPATIENT
Start: 2025-08-26

## 2025-08-26 RX ORDER — BETAMETHASONE DIPROPIONATE 0.5 MG/G
CREAM TOPICAL PRN
OUTPATIENT
Start: 2025-08-26

## 2025-08-26 RX ORDER — LIDOCAINE 40 MG/G
CREAM TOPICAL PRN
OUTPATIENT
Start: 2025-08-26

## 2025-08-26 RX ORDER — MUPIROCIN 2 %
OINTMENT (GRAM) TOPICAL PRN
OUTPATIENT
Start: 2025-08-26

## 2025-08-26 RX ADMIN — LIDOCAINE HYDROCHLORIDE: 20 JELLY TOPICAL at 13:19

## 2025-08-26 ASSESSMENT — PAIN SCALES - GENERAL: PAINLEVEL_OUTOF10: 0

## 2025-08-28 ENCOUNTER — TELEPHONE (OUTPATIENT)
Dept: PRIMARY CARE CLINIC | Age: 80
End: 2025-08-28

## 2025-08-29 DIAGNOSIS — K70.31 ASCITES DUE TO ALCOHOLIC CIRRHOSIS (HCC): Primary | ICD-10-CM

## 2025-09-02 ENCOUNTER — HOSPITAL ENCOUNTER (OUTPATIENT)
Dept: INTERVENTIONAL RADIOLOGY/VASCULAR | Age: 80
Discharge: HOME | End: 2025-09-04
Payer: MEDICARE

## 2025-09-02 VITALS
OXYGEN SATURATION: 100 % | DIASTOLIC BLOOD PRESSURE: 58 MMHG | HEART RATE: 65 BPM | SYSTOLIC BLOOD PRESSURE: 127 MMHG | RESPIRATION RATE: 14 BRPM

## 2025-09-02 DIAGNOSIS — K70.31 ASCITES DUE TO ALCOHOLIC CIRRHOSIS (HCC): ICD-10-CM

## 2025-09-02 PROCEDURE — 96365 THER/PROPH/DIAG IV INF INIT: CPT

## 2025-09-02 PROCEDURE — 49083 ABD PARACENTESIS W/IMAGING: CPT

## 2025-09-02 PROCEDURE — 6360000002 HC RX W HCPCS: Performed by: NURSE PRACTITIONER

## 2025-09-02 PROCEDURE — 2709999900 IR US GUIDED PARACENTESIS

## 2025-09-02 PROCEDURE — P9047 ALBUMIN (HUMAN), 25%, 50ML: HCPCS | Performed by: NURSE PRACTITIONER

## 2025-09-02 RX ORDER — LIDOCAINE HYDROCHLORIDE 20 MG/ML
INJECTION, SOLUTION INFILTRATION; PERINEURAL PRN
Status: COMPLETED | OUTPATIENT
Start: 2025-09-02 | End: 2025-09-02

## 2025-09-02 RX ORDER — ALBUMIN (HUMAN) 12.5 G/50ML
SOLUTION INTRAVENOUS CONTINUOUS PRN
Status: COMPLETED | OUTPATIENT
Start: 2025-09-02 | End: 2025-09-02

## 2025-09-02 RX ADMIN — ALBUMIN (HUMAN) 25 G: 12.5 SOLUTION INTRAVENOUS at 15:07

## 2025-09-02 RX ADMIN — LIDOCAINE HYDROCHLORIDE 9 ML: 20 INJECTION, SOLUTION INFILTRATION; PERINEURAL at 14:37

## 2025-09-03 ENCOUNTER — POST-OP TELEPHONE (OUTPATIENT)
Dept: INTERVENTIONAL RADIOLOGY/VASCULAR | Age: 80
End: 2025-09-03

## 2025-09-04 ENCOUNTER — HOSPITAL ENCOUNTER (OUTPATIENT)
Dept: ULTRASOUND IMAGING | Age: 80
Discharge: HOME | End: 2025-09-06
Payer: MEDICARE

## 2025-09-04 DIAGNOSIS — I83.029 VENOUS ULCER OF LEFT LOWER EXTREMITY WITH VARICOSE VEINS (HCC): ICD-10-CM

## 2025-09-04 DIAGNOSIS — L97.929 VENOUS ULCER OF LEFT LOWER EXTREMITY WITH VARICOSE VEINS (HCC): ICD-10-CM

## 2025-09-04 DIAGNOSIS — I73.9 PVD (PERIPHERAL VASCULAR DISEASE): Primary | ICD-10-CM

## 2025-09-04 PROCEDURE — 93925 LOWER EXTREMITY STUDY: CPT

## 2025-09-05 ENCOUNTER — TELEPHONE (OUTPATIENT)
Dept: INTERVENTIONAL RADIOLOGY/VASCULAR | Age: 80
End: 2025-09-05

## (undated) DEVICE — LIGATOR ENDOSCP L2.8MM DIA8.6-11.5MM MULT BND SPEEDBAND

## (undated) DEVICE — TUBING, SUCTION, 1/4" X 10', STRAIGHT: Brand: MEDLINE

## (undated) DEVICE — Device: Brand: ENDO SMARTCAP

## (undated) DEVICE — TUBE SET 96 MM 64 MM H2O PERISTALTIC STD AUX CHANNEL

## (undated) DEVICE — SINGLE PORT MANIFOLD: Brand: NEPTUNE 2

## (undated) DEVICE — SYRINGE MED 10ML TRNSLUC BRL PLUNG BLK MRK POLYPR CTRL

## (undated) DEVICE — CONMED SCOPE SAVER BITE BLOCK, 20X27 MM: Brand: SCOPE SAVER

## (undated) DEVICE — MARKER SURG SKIN GENTIAN VLT REG TIP W/ 6IN RUL

## (undated) DEVICE — ELECTRODE NDL 2.8IN COAT VALLEYLAB

## (undated) DEVICE — ENDO CARRY-ON PROCEDURE KIT: Brand: ENDO CARRY-ON PROCEDURE KIT

## (undated) DEVICE — SUTURE PERMAHAND SZ 3-0 L18IN NONABSORBABLE BLK SILK BRAID A184H

## (undated) DEVICE — MULTIPLE BAND LIGATOR: Brand: SPEEDBAND SUPERVIEW SUPER 7

## (undated) DEVICE — GLOVE ORTHO 8   MSG9480

## (undated) DEVICE — GLOVE ORANGE PI 8 1/2   MSG9085

## (undated) DEVICE — GOWN,SIRUS,POLYRNF,BRTHSLV,XLN/XL,20/CS: Brand: MEDLINE

## (undated) DEVICE — GOWN,AURORA,NONREINFORCED,LARGE: Brand: MEDLINE

## (undated) DEVICE — ADAPTER FLSH PMP FLD MGMT GI IRRIG OFP 2 DISPOSABLE

## (undated) DEVICE — DBD-PACK,EENT,SIRUS,PK II: Brand: MEDLINE

## (undated) DEVICE — BRUSH ENDO CLN L90.5IN SHTH DIA1.7MM BRIST DIA5-7MM 2-6MM

## (undated) DEVICE — COUNTER NDL 40 COUNT HLD 70 FOAM BLK ADH W/ MAG

## (undated) DEVICE — SPONGE,LAP,18"X18",DLX,XR,ST,5/PK,40/PK: Brand: MEDLINE

## (undated) DEVICE — PAD N ADH W3XL4IN POLY COT SFT PERF FLM EASILY CUT ABSRB

## (undated) DEVICE — ELECTRODE PT RET AD L9FT HI MOIST COND ADH HYDRGEL CORDED

## (undated) DEVICE — ELECTROSURGICAL PENCIL BUTTON SWITCH E-Z CLEAN COATED BLADE ELECTRODE 10 FT (3 M) CORD HOLSTER: Brand: MEGADYNE

## (undated) DEVICE — SUTURE MCRYL SZ 4-0 L27IN ABSRB UD L19MM PS-2 1/2 CIR PRIM Y426H

## (undated) DEVICE — FORCEPS BX L240CM JAW DIA2.4MM ORNG L CAP W/ NDL DISP RAD

## (undated) DEVICE — Z DISCONTINUED NO SUB IDED GLOVE SURG BEAD CUF 8 STD PF WHT STRL TRIUMPH LT LTX

## (undated) DEVICE — LABEL MED MINI W/ MARKER

## (undated) DEVICE — NEEDLE HYPO 25GA L0625IN THN WALL PIVOTING SHLD BVL ORIENTED

## (undated) DEVICE — SUTURE VCRL SZ 3-0 L27IN ABSRB UD L26MM SH 1/2 CIR J416H

## (undated) DEVICE — INTENDED FOR TISSUE SEPARATION, AND OTHER PROCEDURES THAT REQUIRE A SHARP SURGICAL BLADE TO PUNCTURE OR CUT.: Brand: BARD-PARKER ® CARBON RIB-BACK BLADES

## (undated) DEVICE — BRUSH ENDO COMBO

## (undated) DEVICE — TOWEL,OR,DSP,ST,BLUE,STD,4/PK,20PK/CS: Brand: MEDLINE

## (undated) DEVICE — TRAY PREP DRY W/ PREM GLV 2 APPL 6 SPNG 2 UNDPD 1 OVERWRAP